# Patient Record
Sex: FEMALE | Race: WHITE | NOT HISPANIC OR LATINO | Employment: OTHER | URBAN - METROPOLITAN AREA
[De-identification: names, ages, dates, MRNs, and addresses within clinical notes are randomized per-mention and may not be internally consistent; named-entity substitution may affect disease eponyms.]

---

## 2017-01-03 ENCOUNTER — PATIENT MESSAGE (OUTPATIENT)
Dept: FAMILY MEDICINE | Facility: CLINIC | Age: 42
End: 2017-01-03

## 2017-01-04 ENCOUNTER — OFFICE VISIT (OUTPATIENT)
Dept: FAMILY MEDICINE | Facility: CLINIC | Age: 42
End: 2017-01-04
Payer: MEDICAID

## 2017-01-04 VITALS
OXYGEN SATURATION: 96 % | HEIGHT: 61 IN | BODY MASS INDEX: 31.55 KG/M2 | TEMPERATURE: 98 F | HEART RATE: 110 BPM | DIASTOLIC BLOOD PRESSURE: 100 MMHG | WEIGHT: 167.13 LBS | SYSTOLIC BLOOD PRESSURE: 170 MMHG | RESPIRATION RATE: 14 BRPM

## 2017-01-04 DIAGNOSIS — H92.01 OTALGIA OF RIGHT EAR: ICD-10-CM

## 2017-01-04 DIAGNOSIS — I10 ESSENTIAL HYPERTENSION: Primary | ICD-10-CM

## 2017-01-04 DIAGNOSIS — M62.838 MUSCLE SPASMS OF NECK: ICD-10-CM

## 2017-01-04 PROCEDURE — 99215 OFFICE O/P EST HI 40 MIN: CPT | Mod: PBBFAC,PO | Performed by: NURSE PRACTITIONER

## 2017-01-04 PROCEDURE — 99999 PR PBB SHADOW E&M-EST. PATIENT-LVL V: CPT | Mod: PBBFAC,,, | Performed by: NURSE PRACTITIONER

## 2017-01-04 PROCEDURE — 99213 OFFICE O/P EST LOW 20 MIN: CPT | Mod: S$PBB,,, | Performed by: NURSE PRACTITIONER

## 2017-01-04 RX ORDER — LISINOPRIL 5 MG/1
5 TABLET ORAL DAILY
Qty: 90 TABLET | Refills: 0 | Status: SHIPPED | OUTPATIENT
Start: 2017-01-04 | End: 2017-04-04 | Stop reason: SDUPTHER

## 2017-01-04 NOTE — PROGRESS NOTES
Subjective:       Patient ID: Lavonne Pierre is a 41 y.o. female.    Chief Complaint: Neck Pain (causing ear to hurt)  She was last seen by Dr. Mcgee on 12/14/2016. This is her first time seeing me in the clinic  HPI   Neck pain to right posterior that is making ear hurt. Using Similasan and no relief.  Vitals:    01/04/17 1458   BP: (!) 170/100   Pulse:    Resp:    Temp:      BP Readings from Last 3 Encounters:   01/04/17 (!) 170/100   12/14/16 (!) 142/80   12/12/16 (!) 120/90   States her blood pressure medication was stopped over one year ago when hospitalized and has not been back on it since.    Review of Systems   HENT: Positive for ear pain.    Respiratory: Negative for shortness of breath.    Cardiovascular: Negative for chest pain.   Musculoskeletal: Positive for neck pain.   Neurological: Negative for dizziness and headaches.   All other systems reviewed and are negative.    She states last took tizanidine a few days ago  Objective:      Physical Exam   Constitutional: She is oriented to person, place, and time. She appears well-developed.   HENT:   Head: Normocephalic and atraumatic.   Right Ear: Hearing, tympanic membrane, external ear and ear canal normal. No drainage or swelling.   Left Ear: Hearing, tympanic membrane, external ear and ear canal normal.   Mouth/Throat: Uvula is midline, oropharynx is clear and moist and mucous membranes are normal.   Eyes: Lids are normal.   Neck: Normal range of motion. Neck supple.       Cardiovascular: Normal rate and regular rhythm.    Pulmonary/Chest: Effort normal and breath sounds normal.   Abdominal: Soft.   Lymphadenopathy:        Head (right side): No submental, no submandibular, no tonsillar, no preauricular, no posterior auricular and no occipital adenopathy present.        Head (left side): No submental, no submandibular, no tonsillar, no preauricular, no posterior auricular and no occipital adenopathy present.     She has no cervical adenopathy.    Neurological: She is alert and oriented to person, place, and time.   Skin: Skin is warm, dry and intact.   Psychiatric: She has a normal mood and affect. Her speech is normal and behavior is normal. Judgment and thought content normal. Cognition and memory are normal.   Nursing note and vitals reviewed.      Assessment & Plan:       Essential hypertension  -     lisinopril (PRINIVIL,ZESTRIL) 5 MG tablet; Take 1 tablet (5 mg total) by mouth once daily.  Dispense: 90 tablet; Refill: 0    Muscle spasms of neck    Otalgia of right ear    Please start taking tizanidine that she has at home  Continue OTC ear discomfort drops    Take blood pressure every day and record and bring into next visit with Dr. Mcgee on 01/17/2017      Return if symptoms worsen or fail to improve. Keep appt with Dr. Mcgee on 01/17/2017 and with rheumatology on 01/30/2017  To ED for severe headache, if neck pain worsens or if she has any visual disturbances

## 2017-01-17 ENCOUNTER — LAB VISIT (OUTPATIENT)
Dept: LAB | Facility: HOSPITAL | Age: 42
End: 2017-01-17
Attending: FAMILY MEDICINE
Payer: MEDICAID

## 2017-01-17 ENCOUNTER — OFFICE VISIT (OUTPATIENT)
Dept: FAMILY MEDICINE | Facility: CLINIC | Age: 42
End: 2017-01-17
Payer: MEDICAID

## 2017-01-17 VITALS
HEART RATE: 92 BPM | SYSTOLIC BLOOD PRESSURE: 110 MMHG | BODY MASS INDEX: 31.26 KG/M2 | WEIGHT: 165.56 LBS | HEIGHT: 61 IN | DIASTOLIC BLOOD PRESSURE: 88 MMHG

## 2017-01-17 DIAGNOSIS — F32.A DEPRESSION, UNSPECIFIED DEPRESSION TYPE: Primary | ICD-10-CM

## 2017-01-17 DIAGNOSIS — M77.11 LATERAL EPICONDYLITIS OF RIGHT ELBOW: ICD-10-CM

## 2017-01-17 DIAGNOSIS — M79.7 FIBROMYALGIA: ICD-10-CM

## 2017-01-17 PROCEDURE — 99213 OFFICE O/P EST LOW 20 MIN: CPT | Mod: S$PBB,,, | Performed by: FAMILY MEDICINE

## 2017-01-17 PROCEDURE — 36415 COLL VENOUS BLD VENIPUNCTURE: CPT | Mod: PO

## 2017-01-17 PROCEDURE — 99999 PR PBB SHADOW E&M-EST. PATIENT-LVL III: CPT | Mod: PBBFAC,,, | Performed by: FAMILY MEDICINE

## 2017-01-17 PROCEDURE — 86039 ANTINUCLEAR ANTIBODIES (ANA): CPT

## 2017-01-17 PROCEDURE — 86235 NUCLEAR ANTIGEN ANTIBODY: CPT | Mod: 59

## 2017-01-17 PROCEDURE — 86038 ANTINUCLEAR ANTIBODIES: CPT

## 2017-01-17 NOTE — MR AVS SNAPSHOT
[facility]  [address] [facility] [address]  [place] [address]  Phone: [phone]  Fax: [phone]                  Lavonne Tonyaicha   2017 3:00 PM   Office Visit    Description:  Female : 1975   Provider:  Omer Mcgee MD   Department:  [facility]           Reason for Visit     Follow-up           Diagnoses this Visit        Comments    Depression, unspecified depression type    -  Primary     Fibromyalgia         Lateral epicondylitis of right elbow                To Do List           Future Appointments        Provider Department Dept Phone    2017 3:00 PM Trevor Estrada MD [facility] Physical Med/Rehab [phone]    2017 8:30 AM Sheri Ross DO [facility] Rheumatology [phone]    3/15/2017 3:00 PM Omer Mcgee MD [facility] [phone]      Goals (5 Years of Data)     None      Follow-Up and Disposition     Return in about 8 weeks (around 3/14/2017).      [facility]     [facility] -  Assistance  Registered nurses in the [facility] provide clinical advisement, health education, appointment booking, and other advisory services.  Call for this free service at [phone].             Medications           Message regarding Medications     Verify the changes and/or additions to your medication regime listed below are the same as discussed with your clinician today.  If any of these changes or additions are incorrect, please notify your healthcare provider.        STOP taking these medications     azelastine-fluticasone (DYMISTA) 137-50 mcg/spray Spry nassal spray 1 spray by Each Nare route 2 (two) times daily.    buPROPion (WELLBUTRIN XL) 150 MG TB24 tablet Take 1 tablet (150 mg total) by mouth once daily.    fluticasone (FLONASE) 50 mcg/actuation nasal spray     medroxyPROGESTERone (PROVERA) 10 MG tablet Take 1 tablet (10 mg total) by mouth once daily. For 10  "days    pregabalin (LYRICA) 75 MG capsule Take 1 capsule (75 mg total) by mouth 2 (two) times daily.    topiramate (TOPAMAX) 100 MG tablet TK 1 T PO Q NIGHT AROUND BEDTIME UTD           Verify that the below list of medications is an accurate representation of the medications you are currently taking.  If none reported, the list may be blank. If incorrect, please contact your healthcare provider. Carry this list with you in case of emergency.           Current Medications     acetaminophen (TYLENOL) 500 MG tablet Take 500 mg by mouth every 6 (six) hours as needed for Pain.    amitriptyline (ELAVIL) 25 MG tablet Take 1 tablet (25 mg total) by mouth nightly as needed for Insomnia.    ANORO ELLIPTA 62.5-25 mcg/actuation DsDv INL 1 PUFF PO QD UTD    lisinopril (PRINIVIL,ZESTRIL) 5 MG tablet Take 1 tablet (5 mg total) by mouth once daily.    milnacipran (SAVELLA) 12.5 mg Tab tablet Take 1 tablet (12.5 mg total) by mouth 2 (two) times daily.    multivitamin (THERAGRAN) per tablet Take 1 tablet by mouth once daily.    pantoprazole (PROTONIX) 40 MG tablet TK 1 T PO D 30 MIN B NIRAV    polyethylene glycol (GLYCOLAX) 17 gram/dose powder Take 17 g by mouth daily as needed.     rizatriptan (MAXALT) 10 MG tablet 1 pill at onset migraine, second 2 hours later if needed, no more than 2 pills day, 3 days use in week    tizanidine (ZANAFLEX) 2 MG tablet TK 1 T PO BID.    acetaminophen-codeine 300-15mg (TYLENOL #2) 300-15 mg per tablet Take 1 tablet by mouth 2 (two) times daily as needed for Pain.           Clinical Reference Information           Vital Signs - Last Recorded  Most recent update: 1/17/2017  3:31 PM by Gabbi Sims LPN    BP Pulse Ht Wt BMI    110/88 (BP Location: Left arm, Patient Position: Sitting, BP Method: Manual) 92 5' 1" (1.549 m) 75.1 kg (165 lb 9.1 oz) 31.28 kg/m2      Blood Pressure          Most Recent Value    BP  110/88      Allergies as of 1/17/2017     Ibuprofen    Latex, Natural Rubber    "   Immunizations Administered on Date of Encounter - 1/17/2017     None      Orders Placed During Today's Visit      Normal Orders This Visit    Ambulatory consult to Physical Medicine Rehab     Future Labs/Procedures Expected by Expires    MIRIAN  1/17/2017 4/17/2017

## 2017-01-17 NOTE — PROGRESS NOTES
Subjective:       Patient ID: Lavonne Pierre is a 41 y.o. female.    Chief Complaint: Follow-up    HPI   Here today to discuss depression  Took Wellbutrin for 3 weeks, then stopped it due to worried about weight gain.    Review of Systems    Objective:      Physical Exam   Constitutional: She is oriented to person, place, and time. She appears well-developed and well-nourished.   Neurological: She is alert and oriented to person, place, and time.   Vitals reviewed.      Assessment:       1. Depression, unspecified depression type        Plan:       Depression, unspecified depression type  - Resume Wellbutrin, reassured about weight issues  - Return in about 8 weeks (around 3/14/2017).     Still with pain in elbow, will set up with PMR.

## 2017-01-18 LAB
ANA SER QL IF: POSITIVE
ANA TITR SER IF: NORMAL {TITER}

## 2017-01-19 LAB
ANTI SM ANTIBODY: 0.91 EU
ANTI SM/RNP ANTIBODY: 1.52 EU
ANTI-SM INTERPRETATION: NEGATIVE
ANTI-SM/RNP INTERPRETATION: NEGATIVE
ANTI-SSA ANTIBODY: 0.81 EU
ANTI-SSA INTERPRETATION: NEGATIVE
ANTI-SSB ANTIBODY: 0.66 EU
ANTI-SSB INTERPRETATION: NEGATIVE
DSDNA AB SER-ACNC: NORMAL [IU]/ML

## 2017-01-24 ENCOUNTER — INITIAL CONSULT (OUTPATIENT)
Dept: PHYSICAL MEDICINE AND REHAB | Facility: CLINIC | Age: 42
End: 2017-01-24
Payer: MEDICAID

## 2017-01-24 ENCOUNTER — HOSPITAL ENCOUNTER (OUTPATIENT)
Dept: RADIOLOGY | Facility: HOSPITAL | Age: 42
Discharge: HOME | End: 2017-01-24
Attending: PHYSICAL MEDICINE & REHABILITATION
Payer: MEDICAID

## 2017-01-24 VITALS
WEIGHT: 165 LBS | BODY MASS INDEX: 31.15 KG/M2 | DIASTOLIC BLOOD PRESSURE: 76 MMHG | HEART RATE: 95 BPM | SYSTOLIC BLOOD PRESSURE: 112 MMHG | HEIGHT: 61 IN

## 2017-01-24 DIAGNOSIS — M25.521 RIGHT ELBOW PAIN: ICD-10-CM

## 2017-01-24 DIAGNOSIS — M25.521 RIGHT ELBOW PAIN: Primary | ICD-10-CM

## 2017-01-24 DIAGNOSIS — M77.11 RIGHT LATERAL EPICONDYLITIS: ICD-10-CM

## 2017-01-24 PROCEDURE — 99999 PR PBB SHADOW E&M-EST. PATIENT-LVL II: CPT | Mod: PBBFAC,,, | Performed by: PHYSICAL MEDICINE & REHABILITATION

## 2017-01-24 PROCEDURE — 99203 OFFICE O/P NEW LOW 30 MIN: CPT | Mod: 25,S$PBB,, | Performed by: PHYSICAL MEDICINE & REHABILITATION

## 2017-01-24 PROCEDURE — 76942 ECHO GUIDE FOR BIOPSY: CPT | Mod: 26,S$PBB,, | Performed by: PHYSICAL MEDICINE & REHABILITATION

## 2017-01-24 PROCEDURE — 20551 NJX 1 TENDON ORIGIN/INSJ: CPT | Mod: S$PBB,,, | Performed by: PHYSICAL MEDICINE & REHABILITATION

## 2017-01-24 PROCEDURE — 73080 X-RAY EXAM OF ELBOW: CPT | Mod: TC,PO,RT

## 2017-01-24 PROCEDURE — 73080 X-RAY EXAM OF ELBOW: CPT | Mod: 26,RT,, | Performed by: RADIOLOGY

## 2017-01-24 RX ORDER — BETAMETHASONE SODIUM PHOSPHATE AND BETAMETHASONE ACETATE 3; 3 MG/ML; MG/ML
6 INJECTION, SUSPENSION INTRA-ARTICULAR; INTRALESIONAL; INTRAMUSCULAR; SOFT TISSUE
Status: DISCONTINUED | OUTPATIENT
Start: 2017-01-24 | End: 2017-01-25 | Stop reason: HOSPADM

## 2017-01-24 RX ADMIN — BETAMETHASONE ACETATE AND BETAMETHASONE SODIUM PHOSPHATE 6 MG: 3; 3 INJECTION, SUSPENSION INTRA-ARTICULAR; INTRALESIONAL; INTRAMUSCULAR; SOFT TISSUE at 03:01

## 2017-01-24 NOTE — LETTER
January 24, 2017      Omer Mcgee MD  [address] [facility] [address] [place] [address]           [facility] Physical Med/Rehab  [address] [facility] [address] [place] [address]  Phone: [phone]  Fax: [phone]          Patient: Lavonne Pierre   MR Number: [id number]   YOB: 1975   Date of Visit: 1/24/2017       Dear Dr. Omer Mcgee:    Thank you for referring Lavonne Pierre to me for evaluation. Attached you will find relevant portions of my assessment and plan of care.    If you have questions, please do not hesitate to call me. I look forward to following Lavonne Pierre along with you.    Sincerely,    Trevor Estrada MD    Enclosure  CC:  No Recipients    If you would like to receive this communication electronically, please contact [email or web address] or [phone] to request more information on [employer] Link access.    For providers and/or their staff who would like to refer a patient to [facility], please contact us through our one-stop-shop provider referral line, [facility], at [phone].    If you feel you have received this communication in error or would no longer like to receive these types of communications, please e-mail [email or web address]

## 2017-01-24 NOTE — PROGRESS NOTES
[facility]    Consulting Provider: Omer Mcgee MD    CHIEF COMPLAINT:   Chief Complaint   Patient presents with    Elbow Pain     right elbow pain     HISTORY OF PRESENT ILLNESS: Lavonne Pierre is a 41 y.o. female who presents to me for the first time for evaluation of her right elbow pain.  She states that the pain in her right elbow began about 1 month ago.  She denies any traumatic injury to it, or repeatedly leaning/hitting it on hard surfaces.  She says the pain is diffusely throughout the elbow, both medial and lateral.  Occasionally will experience numbness/tingling along the lateral aspect of her forearm into the thumb, pointer and middle finger.  Also complains of occasional wrist pain bilaterally but feels this is a separate issue.  Admits to mild weakness in her right upper extremity secondary to the pain.  Denies bowel/bladder incontinence.  Denies weakness/numbness in BLE.  Has tried tylenol for the pain, which helped somewhat.  Has not had any prior injections, procedures, or PT for this issue.    Review of Systems   Constitutional: Negative for fever.   HENT: Negative for drooling.    Eyes: Negative for discharge.   Respiratory: Negative for choking.    Cardiovascular: Negative for chest pain.   Genitourinary: Negative for flank pain.   Skin: Negative for wound.   Allergic/Immunologic: Negative for immunocompromised state.   Neurological: Negative for tremors and syncope.   Psychiatric/Behavioral: Negative for behavioral problems.     Past Medical History:   Past Medical History   Diagnosis Date    Anxiety 2013    Fibromyalgia     HTN (hypertension)     Preeclampsia        Past Surgical History:   Past Surgical History   Procedure Laterality Date     section, low transverse      Breast biopsy       Benign    Tubal ligation         Family History:   Family History   Problem Relation Age of Onset    Pancreatic cancer Mother     Lymphoma Father      Pancreatic cancer Maternal Grandmother     Pancreatic cancer Maternal Uncle     Breast cancer Maternal Aunt        Medications:   Current Outpatient Prescriptions on File Prior to Visit   Medication Sig Dispense Refill    acetaminophen (TYLENOL) 500 MG tablet Take 500 mg by mouth every 6 (six) hours as needed for Pain.      acetaminophen-codeine 300-15mg (TYLENOL #2) 300-15 mg per tablet Take 1 tablet by mouth 2 (two) times daily as needed for Pain.      amitriptyline (ELAVIL) 25 MG tablet Take 1 tablet (25 mg total) by mouth nightly as needed for Insomnia. 30 tablet 3    ANORO ELLIPTA 62.5-25 mcg/actuation DsDv INL 1 PUFF PO QD UTD  6    lisinopril (PRINIVIL,ZESTRIL) 5 MG tablet Take 1 tablet (5 mg total) by mouth once daily. 90 tablet 0    milnacipran (SAVELLA) 12.5 mg Tab tablet Take 1 tablet (12.5 mg total) by mouth 2 (two) times daily. 60 tablet 11    multivitamin (THERAGRAN) per tablet Take 1 tablet by mouth once daily.      pantoprazole (PROTONIX) 40 MG tablet TK 1 T PO D 30 MIN B NIRAV  11    polyethylene glycol (GLYCOLAX) 17 gram/dose powder Take 17 g by mouth daily as needed.       rizatriptan (MAXALT) 10 MG tablet 1 pill at onset migraine, second 2 hours later if needed, no more than 2 pills day, 3 days use in week 9 tablet 2    tizanidine (ZANAFLEX) 2 MG tablet TK 1 T PO BID.  5     No current facility-administered medications on file prior to visit.        Allergies:   Review of patient's allergies indicates:   Allergen Reactions    Ibuprofen Other (See Comments)     Affects stomach ulcer, avoids all NSAIDS    Latex, natural rubber Rash     Small red bumps on skin contact       Social History:   Social History     Social History    Marital status:      Spouse name: N/A    Number of children: N/A    Years of education: N/A     Social History Main Topics    Smoking status: Never Smoker    Smokeless tobacco: Never Used    Alcohol use No    Drug use: No    Sexual activity:  "Not Asked     Other Topics Concern    None     Social History Narrative     PHYSICAL EXAMINATION:   General    Vitals:    01/24/17 1513   BP: 112/76   Pulse: 95   Weight: 74.8 kg (165 lb)   Height: 5' 1" (1.549 m)     Constitutional: Oriented to person, place, and time. No apparent distress. Appears well-developed and well-nourished. Pleasant.  HENT:   Head: Normocephalic and atraumatic.   Eyes: Right eye exhibits no discharge. Left eye exhibits no discharge. No scleral icterus.   Pulmonary/Chest: Effort normal. No respiratory distress.   Abdominal: There is no guarding.   Neurological: Alert and oriented to person, place, and time.   Psychiatric: Behavior is normal.   Right Hand Exam     Muscle Strength   Wrist Extension: 5/5   Wrist Flexion: 5/5   : 5/5     Tests   Phalens Sign: negative  Tinels Sign (Medial Nerve): negative    Other   Sensation: normal  Pulse: present      Left Hand Exam     Muscle Strength   Wrist Extension: 5/5   Wrist Flexion: 5/5   :  5/5     Tests   Phalens Sign: negative  Tinels Sign (Medial Nerve): negative    Other   Sensation: normal  Pulse: present      Right Elbow Exam     Tenderness   The patient is experiencing tenderness in the lateral epicondyle and medial epicondyle.     Range of Motion   Extension: 0   Flexion: 130     Muscle Strength   Pronation:  5/5   Supination:  5/5     Tests Varus: negative  Valgus: negative  Tinel's Sign (cubital tunnel): negative    Other   Erythema: absent  Scars: absent  Sensation: normal  Pulse: present    Comments:  Cozen's test positive      Left Elbow Exam     Tenderness   The patient is experiencing no tenderness.         Range of Motion   Extension: 0   Flexion: 110     Muscle Strength   Pronation:  5/5   Supination:  5/5     Tests Varus: negative  Valgus: negative  Tinel's Sign (cubital tunnel): negative    Other   Erythema: absent  Sensation: normal  Pulse: present        Spurlings negative bilaterally    INSPECTION: There is no " swelling, ecchymoses, erythema or gross deformity about the right elbow.    Imaging  Procedure: Exam Date: Reason for Exam:   X-Ray Elbow Complete Right 01/24/2017 None Specified          RESULTS:  3 views of the right elbow fail to demonstrate fail to demonstrate evidence of fracture or subluxation.  There are no soft tissue abnormalities.        Data Reviewed: X-ray    Supportive Actions: Independent visualization of images or test specimens    ASSESSMENT:   1. Right lateral epicondylitis      PLAN:     1. Time was spent reviewing the above diagnosis in depth with Lavonne today, including acute management and rehabilitation.     2.  Patient's history, physical exam, and ultrasound findings were most consistent with a diagnosis of right lateral epicondylitis.  Brief diagnostic ultrasound exam revealed some mild heterogeneity about the proximal portion of the common extensor tendon.  There was no observed hypoechoic defect and color Doppler failed to reveal significant neovascularity within the tendon substance. Though patient's clinical presentation is complicated with a history of fibromyalgia, she does have physical exam findings that are more specific for lateral epicondylitis.  We discussed treatment options with the patient, including steroid injections, PRP, needling, and physical therapy.  She expressed understanding of her options and elected to proceed with the steroid injection of the right lateral epicondyle.  The risks, benefits, and alternative treatment options were reviewed and the injection was done in clinic today.  See procedure note.    3. Patient was instructed to follow the progress of her elbow pain over the next few weeks.  If pain is not improved with this injection, may consider further workup for cause of her elbow pain.    4.  I recommended formal physical therapy for her right elbow for therapeutic exercise, stretching, therapeutic modalities, and be transitioned to a home exercise  "program for long-term maintenance.  She is in agreement with this plan and she was provided with a prescription to start physical therapy.    5. RTC in 4-6 weeks for a follow up visit.    This is a consult from Dr. Omer Mcgee. Please see the "Communications" section of Epic to see how the consulting physician received the report of today's findings and recommendations. If it's an [facility] physician, it will be forwarded to his/her "in basket".    The above note was completed, in part, with the aid of Dragon dictation software/hardware. Translation errors may be present.    "

## 2017-01-25 NOTE — PROCEDURES
Tendon Origin  Date/Time: 1/24/2017 3:01 PM  Performed by: LIZBETH BLANCA  Authorized by: LIZBETH BLANCA     Consent Done?:  Yes (Verbal)  Timeout: prior to procedure the correct patient, procedure, and site was verified    Indications:  Pain  Site marked: the procedure site was marked    Timeout: prior to procedure the correct patient, procedure, and site was verified    Location:  Elbow  Site:  R elbow  Prep: patient was prepped and draped in usual sterile fashion    Ultrasonic Guidance for Needle Placement?: Yes    Needle size:  25 G  Approach: Needle in plane, distal to proximal.  Medications:  6 mg betamethasone acetate-betamethasone sodium phosphate 6 mg/mL  Patient tolerance:  Patient tolerated the procedure well with no immediate complications   Ultrasound guidance was used for correct needle placement, the images were saved will be uploaded to EMR.

## 2017-01-30 ENCOUNTER — LAB VISIT (OUTPATIENT)
Dept: LAB | Facility: HOSPITAL | Age: 42
End: 2017-01-30
Attending: INTERNAL MEDICINE
Payer: MEDICAID

## 2017-01-30 ENCOUNTER — OFFICE VISIT (OUTPATIENT)
Dept: RHEUMATOLOGY | Facility: CLINIC | Age: 42
End: 2017-01-30
Payer: MEDICAID

## 2017-01-30 VITALS
HEART RATE: 72 BPM | DIASTOLIC BLOOD PRESSURE: 78 MMHG | SYSTOLIC BLOOD PRESSURE: 120 MMHG | HEIGHT: 60 IN | BODY MASS INDEX: 32.93 KG/M2 | WEIGHT: 167.75 LBS

## 2017-01-30 DIAGNOSIS — F32.89 OTHER DEPRESSION: ICD-10-CM

## 2017-01-30 DIAGNOSIS — M79.7 FIBROMYALGIA: ICD-10-CM

## 2017-01-30 DIAGNOSIS — R76.8 ANA POSITIVE: Primary | ICD-10-CM

## 2017-01-30 DIAGNOSIS — R76.8 ANA POSITIVE: ICD-10-CM

## 2017-01-30 LAB
CRP SERPL-MCNC: 8.4 MG/L
ERYTHROCYTE [SEDIMENTATION RATE] IN BLOOD BY WESTERGREN METHOD: 10 MM/HR
THYROGLOB AB SERPL IA-ACNC: 13.2 IU/ML
THYROPEROXIDASE IGG SERPL-ACNC: <6 IU/ML

## 2017-01-30 PROCEDURE — 36415 COLL VENOUS BLD VENIPUNCTURE: CPT | Mod: PO

## 2017-01-30 PROCEDURE — 86140 C-REACTIVE PROTEIN: CPT

## 2017-01-30 PROCEDURE — 86376 MICROSOMAL ANTIBODY EACH: CPT

## 2017-01-30 PROCEDURE — 99214 OFFICE O/P EST MOD 30 MIN: CPT | Mod: S$PBB,,, | Performed by: INTERNAL MEDICINE

## 2017-01-30 PROCEDURE — 99999 PR PBB SHADOW E&M-EST. PATIENT-LVL II: CPT | Mod: PBBFAC,,, | Performed by: INTERNAL MEDICINE

## 2017-01-30 PROCEDURE — 85651 RBC SED RATE NONAUTOMATED: CPT | Mod: PO

## 2017-01-30 PROCEDURE — 86800 THYROGLOBULIN ANTIBODY: CPT

## 2017-01-30 RX ORDER — BUPROPION HYDROCHLORIDE 150 MG/1
150 TABLET ORAL DAILY
COMMUNITY
End: 2017-03-15 | Stop reason: SDUPTHER

## 2017-01-30 RX ORDER — TIZANIDINE 2 MG/1
4 TABLET ORAL 2 TIMES DAILY PRN
Qty: 60 TABLET | Refills: 5 | Status: SHIPPED | OUTPATIENT
Start: 2017-01-30 | End: 2017-03-02 | Stop reason: ALTCHOICE

## 2017-01-30 ASSESSMENT — ROUTINE ASSESSMENT OF PATIENT INDEX DATA (RAPID3)
PSYCHOLOGICAL DISTRESS SCORE: 5.5
PAIN SCORE: 7
FATIGUE SCORE: 8
WHEN YOU AWAKENED IN THE MORNING OVER THE LAST WEEK, PLEASE INDICATE THE AMOUNT OF TIME IT TAKES UNTIL YOU ARE AS LIMBER AS YOU WILL BE FOR THE DAY: AN HOUR
TOTAL RAPID3 SCORE: 5.39
AM STIFFNESS SCORE: 1, YES
MDHAQ FUNCTION SCORE: .5
PATIENT GLOBAL ASSESSMENT SCORE: 7.5

## 2017-01-30 NOTE — PROGRESS NOTES
Subjective:          Chief Complaint: Lavonne Pierre is a 41 y.o. female who had no chief complaint listed for this encounter.    HPI:    Patient here today for follow-up with a history of fibromyalgia positive MIRIAN 1:160 homogeneous/speckled negative MIRIAN profile she's had various myalgias as well as joint pain in the ankles, feet, wrists, shoulders and neck and legs.  She also has an underlying history of depression she's had no constitutional symptoms, rashes, oral ulcers, serositis, Raynaud's.  Initially she was diagnosed with repeat legionnaires pneumonia last year approximately same time of positive MIRIAN recently Dr. Mcgee her primary care physician ordered a repeat MIRIAN showed a labs of 1:320 with a negative MIRIAN profile.     Patient in the interim was started on Dionna a 75mg BID for few months with signficant weight gain. More recently started on Savella 12.5mg (approved by her insurance).  She is noting pain in her feet (diffusely), knees at the superior pole of the patella. And bilateral elbows. She is having stiffness in AM of about 1 hour. She is c/o lumps in her skin tender, no redness but she does try to massage them. She does have some redness in groin, behind knees and face and chin. Non-photyosenstive. She is noting dry eye and dry mouth. Thick secretions in eye every day. Noting some trouble with foods sticking in upper esophagus.     Component      Latest Ref Rng & Units 1/17/2017 1/15/2016   Anti Sm Antibody      0.00 - 19.99 EU  0.97   Anti-Sm Interpretation      Negative  Negative   Anti-SSA Antibody      0.00 - 19.99 EU  1.09   Anti-SSA Interpretation      Negative  Negative   Anti-SSB Antibody      0.00 - 19.99 EU  0.30   Anti-SSB Interpretation      Negative  Negative   ds DNA Ab      Negative 1:10  Negative 1:10   Anti Sm/RNP Antibody      0.00 - 19.99 EU  0.11   Anti-Sm/RNP Interpretation      Negative  Negative   Sed Rate      0 - 20 mm/Hr  9   CRP      0.0 - 8.2 mg/L  5.7   MIRIAN Screen       Negative <1:160 Positive (A) Positive (A)   Rheumatoid Factor      0.0 - 15.0 IU/mL  <10.0   MIRIAN HEP-2 Titer       Positive 1:320 Homogeneous Positive 1:160 Homogeneous       REVIEW OF SYSTEMS:    Review of Systems   Constitutional: Positive for malaise/fatigue and weight loss. Negative for chills and fever.   HENT: Negative for sore throat.    Eyes: Negative for double vision, photophobia, discharge and redness.   Respiratory: Negative for cough, shortness of breath and wheezing.    Cardiovascular: Negative for chest pain, palpitations and orthopnea.   Gastrointestinal: Positive for heartburn. Negative for abdominal pain, constipation and diarrhea.   Genitourinary: Negative for dysuria, hematuria and urgency.   Musculoskeletal: Positive for joint pain, myalgias and neck pain. Negative for back pain.   Skin: Negative for rash.   Neurological: Positive for headaches. Negative for dizziness, tingling, focal weakness and weakness.   Endo/Heme/Allergies: Does not bruise/bleed easily.   Psychiatric/Behavioral: Negative for depression, hallucinations and suicidal ideas. The patient is nervous/anxious.                Objective:            Past Medical History   Diagnosis Date    Anxiety 7/16/2013    Fibromyalgia     HTN (hypertension)     Preeclampsia      Family History   Problem Relation Age of Onset    Pancreatic cancer Mother     Lymphoma Father     Pancreatic cancer Maternal Grandmother     Pancreatic cancer Maternal Uncle     Breast cancer Maternal Aunt      Social History   Substance Use Topics    Smoking status: Never Smoker    Smokeless tobacco: Never Used    Alcohol use No         Current Outpatient Prescriptions on File Prior to Visit   Medication Sig Dispense Refill    acetaminophen (TYLENOL) 500 MG tablet Take 500 mg by mouth every 6 (six) hours as needed for Pain.      acetaminophen-codeine 300-15mg (TYLENOL #2) 300-15 mg per tablet Take 1 tablet by mouth 2 (two) times daily as needed for  Pain.      amitriptyline (ELAVIL) 25 MG tablet Take 1 tablet (25 mg total) by mouth nightly as needed for Insomnia. 30 tablet 3    ANORO ELLIPTA 62.5-25 mcg/actuation DsDv INL 1 PUFF PO QD UTD  6    lisinopril (PRINIVIL,ZESTRIL) 5 MG tablet Take 1 tablet (5 mg total) by mouth once daily. 90 tablet 0    milnacipran (SAVELLA) 12.5 mg Tab tablet Take 1 tablet (12.5 mg total) by mouth 2 (two) times daily. 60 tablet 11    multivitamin (THERAGRAN) per tablet Take 1 tablet by mouth once daily.      pantoprazole (PROTONIX) 40 MG tablet TK 1 T PO D 30 MIN B NIRAV  11    polyethylene glycol (GLYCOLAX) 17 gram/dose powder Take 17 g by mouth daily as needed.       rizatriptan (MAXALT) 10 MG tablet 1 pill at onset migraine, second 2 hours later if needed, no more than 2 pills day, 3 days use in week 9 tablet 2    tizanidine (ZANAFLEX) 2 MG tablet TK 1 T PO BID.  5     No current facility-administered medications on file prior to visit.        Vitals:    01/30/17 0808   BP: 120/78   Pulse: 72       Physical Exam:    Physical Exam   Constitutional: She appears well-developed and well-nourished.   HENT:   Nose: No septal deviation.   Mouth/Throat: Mucous membranes are normal. No oral lesions.   Eyes: Pupils are equal, round, and reactive to light. Right conjunctiva is not injected. Left conjunctiva is not injected.   Neck: No JVD present. No thyroid mass and no thyromegaly present.   Cardiovascular: Normal rate, regular rhythm and normal pulses.    No edema   Pulmonary/Chest: Effort normal and breath sounds normal.   Abdominal: Soft. Normal appearance. There is no hepatosplenomegaly.   Musculoskeletal:        Right shoulder: She exhibits tenderness. She exhibits normal range of motion and no swelling.        Left shoulder: She exhibits tenderness. She exhibits normal range of motion and no swelling.        Right elbow: She exhibits normal range of motion and no swelling. Tenderness found. Medial epicondyle tenderness noted.         Left elbow: She exhibits normal range of motion and no swelling. Tenderness found. Medial epicondyle tenderness noted.        Right wrist: She exhibits normal range of motion, no tenderness and no swelling.        Left wrist: She exhibits normal range of motion, no tenderness and no swelling.        Right hip: She exhibits normal range of motion, normal strength and no swelling.        Left hip: She exhibits normal range of motion, no tenderness and no swelling.        Right knee: She exhibits normal range of motion and no swelling. Tenderness found. Medial joint line tenderness noted.        Left knee: She exhibits normal range of motion and no swelling. Tenderness found. Medial joint line tenderness noted.        Right ankle: She exhibits normal range of motion and no swelling. No tenderness.        Left ankle: She exhibits normal range of motion and no swelling. No tenderness.        Cervical back: She exhibits bony tenderness.   14/18 FMS tenderpoints w/o No synovitis, restriction in ROM, tenderness of wrist, MCP, PIP, DIP. No weakness in . Able to fully curl fingers.      Lymphadenopathy:     She has no cervical adenopathy.     She has no axillary adenopathy.   Neurological: She has normal strength and normal reflexes.   Skin: Skin is dry and intact.   Psychiatric: Her mood appears anxious.             Assessment:       Encounter Diagnoses   Name Primary?    MIRIAN positive Yes    Fibromyalgia     Other depression           Plan:        MIRIAN positive  -     Sedimentation rate, manual; Future; Expected date: 1/30/17  -     C-reactive protein; Future; Expected date: 1/30/17  -     THYROGLOBULIN AB SCREEN; Future; Expected date: 1/30/17  -     THYROID PEROXIDASE ANTIBODY; Future; Expected date: 1/30/17    Fibromyalgia  -     tizanidine (ZANAFLEX) 2 MG tablet; Take 2 tablets (4 mg total) by mouth 2 (two) times daily as needed.  Dispense: 60 tablet; Refill: 5  -     milnacipran (SAVELLA) 12.5 mg Tab  tablet; Take 2 tablets (25 mg total) by mouth 2 (two) times daily.  Dispense: 60 tablet; Refill: 11    Other depression    Very pleasant 41-year-old f will rule out for underlying Hashimoto..  That being said, this ould be Sjogren's;she does have some dryI and dry mouth.    Will adjust her Savella 25mg BID  INcrease Tizanidine  Check ESR, CRP, TPO and Tg Ab.     Return in about 3 months (around 4/30/2017).        30min consultation with greater than 50% spent in counseling, chart review and coordination of care. All questions answered.

## 2017-01-30 NOTE — MR AVS SNAPSHOT
[facility]  [address] [facility] [address]  [facility] [address]  Phone: [phone]  Fax: [phone]                  Lavonne Emery Ignacio   2017 8:30 AM   Office Visit    Description:  Female : 1975   Provider:  Sheri Ross DO   Department:  [facility] - [facility]ology           Diagnoses this Visit        Comments    MIRIAN positive    -  Primary     Fibromyalgia         Other depression                To Do List           Future Appointments        Provider Department Dept Phone    2017 10:45 AM LAB, [place] [facility] [phone]    3/15/2017 3:00 PM Omer Mcgee MD [facility] [phone]    2017 8:30 AM Sheri Ross DO [facility] [phone]      Goals (5 Years of Data)     None      Follow-Up and Disposition     Return in about 3 months (around 2017).    Follow-up and Disposition History       These Medications        Disp Refills Start End    tizanidine (ZANAFLEX) 2 MG tablet 60 tablet 5 2017    Take 2 tablets (4 mg total) by mouth 2 (two) times daily as needed. - Oral    Pharmacy: [facility] [address] [place] [address] AT [facility] OF [facility] [address] Ph #: [phone]       milnacipran (SAVELLA) 12.5 mg Tab tablet 60 tablet 11 2017    Take 2 tablets (25 mg total) by mouth 2 (two) times daily. - Oral    Pharmacy: [facility] [place] [id number] [place] [address] AT [facility] OF [address] Ph #: [phone]         [facility]     [facility] On [facility] Nurse Care Line -  Assistance  Registered nurses in the [facility] On Call Center provide clinical advisement, health education, appointment booking, and other advisory services.  Call for this free service at [phone].             Medications           Message regarding Medications     Verify the changes and/or additions to your medication regime listed below are the same  as discussed with your clinician today.  If any of these changes or additions are incorrect, please notify your healthcare provider.        CHANGE how you are taking these medications     Start Taking Instead of    tizanidine (ZANAFLEX) 2 MG tablet tizanidine (ZANAFLEX) 2 MG tablet    Dosage:  Take 2 tablets (4 mg total) by mouth 2 (two) times daily as needed. Dosage:  TK 1 T PO BID.    Reason for Change:  Reorder     milnacipran (SAVELLA) 12.5 mg Tab tablet milnacipran (SAVELLA) 12.5 mg Tab tablet    Dosage:  Take 2 tablets (25 mg total) by mouth 2 (two) times daily. Dosage:  Take 1 tablet (12.5 mg total) by mouth 2 (two) times daily.    Reason for Change:  Reorder       STOP taking these medications     tizanidine 2 mg Cap Take 2 mg by mouth 2 (two) times daily.           Verify that the below list of medications is an accurate representation of the medications you are currently taking.  If none reported, the list may be blank. If incorrect, please contact your healthcare provider. Carry this list with you in case of emergency.           Current Medications     acetaminophen (TYLENOL) 500 MG tablet Take 500 mg by mouth every 6 (six) hours as needed for Pain.    acetaminophen-codeine 300-15mg (TYLENOL #2) 300-15 mg per tablet Take 1 tablet by mouth 2 (two) times daily as needed for Pain.    amitriptyline (ELAVIL) 25 MG tablet Take 1 tablet (25 mg total) by mouth nightly as needed for Insomnia.    ANORO ELLIPTA 62.5-25 mcg/actuation DsDv INL 1 PUFF PO QD UTD    buPROPion (WELLBUTRIN XL) 150 MG TB24 tablet Take 150 mg by mouth once daily.    lisinopril (PRINIVIL,ZESTRIL) 5 MG tablet Take 1 tablet (5 mg total) by mouth once daily.    milnacipran (SAVELLA) 12.5 mg Tab tablet Take 2 tablets (25 mg total) by mouth 2 (two) times daily.    multivitamin (THERAGRAN) per tablet Take 1 tablet by mouth once daily.    pantoprazole (PROTONIX) 40 MG tablet TK 1 T PO D 30 MIN B NIRAV    polyethylene glycol (GLYCOLAX) 17 gram/dose  powder Take 17 g by mouth daily as needed.     rizatriptan (MAXALT) 10 MG tablet 1 pill at onset migraine, second 2 hours later if needed, no more than 2 pills day, 3 days use in week    tizanidine (ZANAFLEX) 2 MG tablet Take 2 tablets (4 mg total) by mouth 2 (two) times daily as needed.           Clinical Reference Information           Vital Signs - Last Recorded  Most recent update: 1/30/2017  8:08 AM by Tarah Lawson LPN    BP Pulse Ht Wt BMI    120/78 72 5' (1.524 m) 76.1 kg (167 lb 12.3 oz) 32.77 kg/m2      Blood Pressure          Most Recent Value    BP  120/78      Allergies as of 1/30/2017     Ibuprofen    Latex, Natural Rubber      Immunizations Administered on Date of Encounter - 1/30/2017     None      Orders Placed During Today's Visit     Future Labs/Procedures Expected by Expires    C-reactive protein  1/30/2017 3/31/2018    Sedimentation rate, manual  1/30/2017 (Approximate) 1/30/2018    THYROGLOBULIN AB SCREEN  1/30/2017 3/31/2018    THYROID PEROXIDASE ANTIBODY  1/30/2017 3/31/2018

## 2017-02-01 ENCOUNTER — PATIENT MESSAGE (OUTPATIENT)
Dept: RHEUMATOLOGY | Facility: CLINIC | Age: 42
End: 2017-02-01

## 2017-02-02 ENCOUNTER — PATIENT MESSAGE (OUTPATIENT)
Dept: RHEUMATOLOGY | Facility: CLINIC | Age: 42
End: 2017-02-02

## 2017-02-03 ENCOUNTER — TELEPHONE (OUTPATIENT)
Dept: RHEUMATOLOGY | Facility: CLINIC | Age: 42
End: 2017-02-03

## 2017-02-03 NOTE — TELEPHONE ENCOUNTER
----- Message from Nessa Felipe sent at 2/2/2017  2:00 PM CST -----  Patient left previous message requesting test results. (stated results were posted on [facility] but has question)Please call patient back at [phone] to advise.  Thanks!   2-3-17 Spoke to patient and told her Dr. Ross will review and then we will call her back. Patient is concerned after seeing in portal. CG

## 2017-02-14 RX ORDER — RIZATRIPTAN BENZOATE 10 MG/1
TABLET ORAL
Qty: 9 TABLET | Refills: 0 | Status: SHIPPED | OUTPATIENT
Start: 2017-02-14 | End: 2017-03-02 | Stop reason: ALTCHOICE

## 2017-02-17 RX ORDER — TIZANIDINE 2 MG/1
TABLET ORAL
Qty: 60 TABLET | Refills: 11 | Status: SHIPPED | OUTPATIENT
Start: 2017-02-17 | End: 2017-03-02 | Stop reason: ALTCHOICE

## 2017-02-27 ENCOUNTER — TELEPHONE (OUTPATIENT)
Dept: RHEUMATOLOGY | Facility: CLINIC | Age: 42
End: 2017-02-27

## 2017-03-02 ENCOUNTER — HOSPITAL ENCOUNTER (OUTPATIENT)
Dept: RADIOLOGY | Facility: HOSPITAL | Age: 42
Discharge: HOME | End: 2017-03-02
Attending: INTERNAL MEDICINE
Payer: MEDICAID

## 2017-03-02 ENCOUNTER — TELEPHONE (OUTPATIENT)
Dept: FAMILY MEDICINE | Facility: CLINIC | Age: 42
End: 2017-03-02

## 2017-03-02 ENCOUNTER — OFFICE VISIT (OUTPATIENT)
Dept: FAMILY MEDICINE | Facility: CLINIC | Age: 42
End: 2017-03-02
Payer: MEDICAID

## 2017-03-02 VITALS
SYSTOLIC BLOOD PRESSURE: 118 MMHG | WEIGHT: 165.38 LBS | BODY MASS INDEX: 31.23 KG/M2 | DIASTOLIC BLOOD PRESSURE: 88 MMHG | TEMPERATURE: 100 F | OXYGEN SATURATION: 98 % | HEART RATE: 118 BPM | HEIGHT: 61 IN

## 2017-03-02 DIAGNOSIS — J20.9 ACUTE BRONCHITIS, UNSPECIFIED ORGANISM: ICD-10-CM

## 2017-03-02 DIAGNOSIS — R50.9 FEVER, UNSPECIFIED FEVER CAUSE: Primary | ICD-10-CM

## 2017-03-02 DIAGNOSIS — R05.9 COUGH IN ADULT: ICD-10-CM

## 2017-03-02 DIAGNOSIS — M25.50 ARTHRALGIA, UNSPECIFIED JOINT: ICD-10-CM

## 2017-03-02 DIAGNOSIS — R68.89 FLU-LIKE SYMPTOMS: ICD-10-CM

## 2017-03-02 LAB
FLUAV AG SPEC QL IA: NEGATIVE
FLUBV AG SPEC QL IA: NEGATIVE
SPECIMEN SOURCE: NORMAL

## 2017-03-02 PROCEDURE — 71020 XR CHEST PA AND LATERAL: CPT | Mod: TC,PO

## 2017-03-02 PROCEDURE — 99213 OFFICE O/P EST LOW 20 MIN: CPT | Mod: S$PBB,,, | Performed by: INTERNAL MEDICINE

## 2017-03-02 PROCEDURE — 99999 PR PBB SHADOW E&M-EST. PATIENT-LVL III: CPT | Mod: PBBFAC,,, | Performed by: INTERNAL MEDICINE

## 2017-03-02 PROCEDURE — 71020 XR CHEST PA AND LATERAL: CPT | Mod: 26,,, | Performed by: RADIOLOGY

## 2017-03-02 RX ORDER — HYDROCODONE POLISTIREX AND CHLORPHENIRAMINE POLISTIREX 10; 8 MG/5ML; MG/5ML
5 SUSPENSION, EXTENDED RELEASE ORAL EVERY 12 HOURS PRN
Qty: 115 ML | Refills: 0 | Status: SHIPPED | OUTPATIENT
Start: 2017-03-02 | End: 2017-05-04

## 2017-03-02 RX ORDER — AMOXICILLIN AND CLAVULANATE POTASSIUM 875; 125 MG/1; MG/1
1 TABLET, FILM COATED ORAL 2 TIMES DAILY
Qty: 16 TABLET | Refills: 0 | Status: SHIPPED | OUTPATIENT
Start: 2017-03-02 | End: 2017-03-12

## 2017-03-02 NOTE — PROGRESS NOTES
This 41-year-old white female presents with rather significant fever, chills,   sweats, cough productive of dark and material, weakness, fatigability associated   with chills, sweats and fatigability.  Slight nausea and headaches also noted,   but no nasal congestion or pressure over the sinuses.    Importantly, the patient started coughing yesterday morning after her EGD   procedure with Dr. Benedict, an outside gastroenterologist.  Apparently, she has   been having some upper gastrointestinal symptoms with possible dysphagia and   dyspepsia.  No definite results from the procedure noted at the present time,   but she began coughing shortly afterwards and when she returned home.  This   appears to be somewhat suggestive of aspiration possibly during and after the   procedure.    No nausea, vomiting subsequently or diarrhea or skin lesions or acute   arthropathy are noted.  Importantly, the patient has not had her flu shot for   this year, but no contagious contact by careful history and review.  No other   complaints in the review of systems except for a febrile sensation.  Temperature   today was 100 degrees Fahrenheit.  The pulses are over 100, 105-110, blood   pressure is 118/88, repeat in the left arm standing was approximately 128/85.    Slight discomfort noted over the lateral thoraces with her coughing.  No   definite pleurisy obtained by history.    PHYSICAL EXAMINATION:  GENERAL:  Reveals a well-developed, well-nourished, slightly overweight white   female.  See vital signs and measurements.  ARTERIES AND VEINS:  Good peripheral pulsation.  Normal carotid upstroke and   amplitude.  SKIN:  Warm and dry, slightly sweaty though in the neck and shoulder areas.  NODES:  No nodes felt in and around the neck, submandibular or supraclavicular   region.  NEUROLOGICAL:  The patient is weak and lethargic, but ambulatory and responsive   at the present time.  HEENT EXAMINATION:  The pharynx is slightly reddened and  edematous, but no   exudate noted.  Mild rhinitis, clear exudate and no purulent material or   pressure over the sinuses or pain.  Ears clear.  NECK:  No masses or thyroid felt.  No adenopathy.  LUNGS:  Revealed coarse rales in the anterior lung fields and the right middle   lobe appeared clear mostly, but not completely with deep cough and inspiration.  HEART:  Regular rhythm, 68 beats per minute.  No gallop, 105 beats per minute.    No S3 or S4.  ABDOMINAL EXAM:  Soft.  No localized pain, tenderness or organomegaly.  BACK:  No paravertebral or vertebral pain.    IMPRESSION:  At this time acute afebrile flu-like illness with associated   bronchial congestion consistent with acute bronchitis.    This appears to be   historically suggestive of possible aspiration with febrile response.  Cannot   rule out epidemic flu.  We will obtain a nasal swab for A and B and for the   possibility of aspiration, CBC and a chest x-ray on lateral and also Augmentin   875 days 1, 2 ,5, 1 b.i.d. x8 days.  Tussionex for severe protracted cough 1   teaspoon q.12 hours on a p.r.n. basis, avoid driving.  Force cold fluids.  The   patient was advised to call or contact if no improvement over the next three to   four days and will be in contact regarding results with the CBC and chest x-ray.  Continue her inhaler, Anoro Ellipta.      WTB/PN  dd: 03/02/2017 16:37:35 (CST)  td: 03/02/2017 22:13:00 (CST)  Doc ID   #[id number]  Job ID #[id number]    CC:    This office note has been dictated.

## 2017-03-02 NOTE — TELEPHONE ENCOUNTER
----- Message from Nessa Woodall sent at 3/2/2017 11:44 AM CST -----  Patient is requesting medication call into her pharmacy for runny nose/cough/pain in rib cage due to cough,  contact patient at [phone].with any questions        [facility] [address] [place] [address] [id number] [place] [address] AT [facility] [address]  [address] [place] [address]  Phone: [phone] Fax: [phone]

## 2017-03-02 NOTE — MR AVS SNAPSHOT
[facility]  [address]  [facility] [address]  Phone: [phone]  Fax: [phone]                  Lavonne Tonyaicha   3/2/2017 3:30 PM   Office Visit    Description:  Female : 1975   Provider:  Jeffy Martinez MD   Department:  [facility]           Reason for Visit     congested/ bad cough     Fever     Pain in neck           Diagnoses this Visit        Comments    Fever, unspecified fever cause    -  Primary     Cough in adult         Arthralgia, unspecified joint         Flu-like symptoms         Acute bronchitis, unspecified organism                To Do List           Future Appointments        Provider Department Dept Phone    3/2/2017 4:20 PM LAB, [place] [facility] [phone]    3/2/2017 4:45 PM [facility] [id number] [phone]    3/15/2017 3:00 PM Omer Mcgee MD [facility] [phone]    2017 8:30 AM Sheri Ross DO [facility] [phone]      Goals (5 Years of Data)     None       These Medications        Disp Refills Start End    amoxicillin-clavulanate 875-125mg (AUGMENTIN) 875-125 mg per tablet 16 tablet 0 3/2/2017 3/12/2017    Take 1 tablet by mouth 2 (two) times daily. - Oral    Pharmacy: [facility] [address] [place] [address] [id number] [place] [address] AT [facility] [address] Ph #: [phone]       hydrocodone-chlorpheniramine (TUSSIONEX) 10-8 mg/5 mL suspension 115 mL 0 3/2/2017     Take 5 mLs by mouth every 12 (twelve) hours as needed for Cough. - Oral    Pharmacy: [facility] [address] [place] [address] [id number] [place] [address] AT [facility] [address] Ph #: [phone]         [facility]     [facility] On Call Nurse Care Line -  Assistance  Registered nurses in the [facility] provide clinical advisement, health education, appointment booking, and other advisory services.  Call for this free service at  [phone].             Medications           Message regarding Medications     Verify the changes and/or additions to your medication regime listed below are the same as discussed with your clinician today.  If any of these changes or additions are incorrect, please notify your healthcare provider.        START taking these NEW medications        Refills    amoxicillin-clavulanate 875-125mg (AUGMENTIN) 875-125 mg per tablet 0    Sig: Take 1 tablet by mouth 2 (two) times daily.    Class: Normal    Route: Oral    hydrocodone-chlorpheniramine (TUSSIONEX) 10-8 mg/5 mL suspension 0    Sig: Take 5 mLs by mouth every 12 (twelve) hours as needed for Cough.    Class: Normal    Route: Oral      STOP taking these medications     acetaminophen-codeine 300-15mg (TYLENOL #2) 300-15 mg per tablet Take 1 tablet by mouth 2 (two) times daily as needed for Pain.    polyethylene glycol (GLYCOLAX) 17 gram/dose powder Take 17 g by mouth daily as needed.     rizatriptan (MAXALT) 10 MG tablet 1 pill at onset migraine, second 2 hours later if needed, no more than 2 pills day, 3 days use in week    rizatriptan (MAXALT) 10 MG tablet 1 tab onset migraine, 2nd two hours later if needed, no more than 2 pill day/3 days use in week. Courtesy Refill    tizanidine (ZANAFLEX) 2 MG tablet Take 2 tablets (4 mg total) by mouth 2 (two) times daily as needed.    tizanidine (ZANAFLEX) 2 MG tablet TAKE 1 TABLET BY MOUTH TWICE DAILY.           Verify that the below list of medications is an accurate representation of the medications you are currently taking.  If none reported, the list may be blank. If incorrect, please contact your healthcare provider. Carry this list with you in case of emergency.           Current Medications     acetaminophen (TYLENOL) 500 MG tablet Take 500 mg by mouth every 6 (six) hours as needed for Pain.    amitriptyline (ELAVIL) 25 MG tablet Take 1 tablet (25 mg total) by mouth nightly as needed for Insomnia.    ANORO ELLIPTA  62.5-25 mcg/actuation DsDv INL 1 PUFF PO QD UTD    buPROPion (WELLBUTRIN XL) 150 MG TB24 tablet Take 150 mg by mouth once daily.    lisinopril (PRINIVIL,ZESTRIL) 5 MG tablet Take 1 tablet (5 mg total) by mouth once daily.    milnacipran (SAVELLA) 12.5 mg Tab tablet Take 2 tablets (25 mg total) by mouth 2 (two) times daily.    multivitamin (THERAGRAN) per tablet Take 1 tablet by mouth once daily.    pantoprazole (PROTONIX) 40 MG tablet TK 1 T PO D 30 MIN B NIRAV    amoxicillin-clavulanate 875-125mg (AUGMENTIN) 875-125 mg per tablet Take 1 tablet by mouth 2 (two) times daily.    hydrocodone-chlorpheniramine (TUSSIONEX) 10-8 mg/5 mL suspension Take 5 mLs by mouth every 12 (twelve) hours as needed for Cough.           Clinical Reference Information           Your Vitals Were     BP                   118/88           Blood Pressure          Most Recent Value    BP  118/88      Allergies as of 3/2/2017     Ibuprofen    Latex, Natural Rubber      Immunizations Administered on Date of Encounter - 3/2/2017     None      Orders Placed During Today's Visit      Normal Orders This Visit    Influenza antigen Nasal Swab     Future Labs/Procedures Expected by Expires    CBC auto differential  3/2/2017 3/2/2018    X-Ray Chest PA And Lateral  3/2/2017 3/2/2018      Language Assistance Services     ATTENTION: Language assistance services are available, free of charge. Please call [phone].      ATENCIÓN: Si habla español, tiene a arguello disposición servicios gratuitos de asistencia lingüística. Llame al [phone].     [facility] Ý: N?u b?n nói Ti?ng Vi?t, có các d?ch v? h? tr? ngôn ng? mi?n phí dành cho b?n. G?i s? [phone].         [facility] complies with applicable Federal civil rights laws and does not discriminate on the basis of race, color, national origin, age, disability, or sex.

## 2017-03-03 ENCOUNTER — TELEPHONE (OUTPATIENT)
Dept: FAMILY MEDICINE | Facility: CLINIC | Age: 42
End: 2017-03-03

## 2017-03-03 NOTE — TELEPHONE ENCOUNTER
PLEASE CONTACT THE PATIENT AND OBTAIN STATUS RE: HER FEBRILE RESPIRATORY INFECTION. TELL HER THE CXR DOES NOT SHOW PNEUMONIA, BUT THE BLOOD COUNT IS CONSISTENT WITH INFECTION.RICCARDO

## 2017-03-06 NOTE — TELEPHONE ENCOUNTER
Patient notified of results. Patient reports minimal nasal congestion, no fever, and states the abx have worked. Pt to call clinic if symptoms reoccur.

## 2017-03-08 ENCOUNTER — TELEPHONE (OUTPATIENT)
Dept: FAMILY MEDICINE | Facility: CLINIC | Age: 42
End: 2017-03-08

## 2017-03-08 RX ORDER — PROMETHAZINE HYDROCHLORIDE AND CODEINE PHOSPHATE 6.25; 1 MG/5ML; MG/5ML
5 SOLUTION ORAL EVERY 4 HOURS PRN
Qty: 118 ML | Refills: 0 | Status: SHIPPED | OUTPATIENT
Start: 2017-03-08 | End: 2017-03-18

## 2017-03-13 ENCOUNTER — PATIENT MESSAGE (OUTPATIENT)
Dept: PHYSICAL MEDICINE AND REHAB | Facility: CLINIC | Age: 42
End: 2017-03-13

## 2017-03-15 ENCOUNTER — OFFICE VISIT (OUTPATIENT)
Dept: FAMILY MEDICINE | Facility: CLINIC | Age: 42
End: 2017-03-15
Payer: MEDICAID

## 2017-03-15 ENCOUNTER — OFFICE VISIT (OUTPATIENT)
Dept: PHYSICAL MEDICINE AND REHAB | Facility: CLINIC | Age: 42
End: 2017-03-15
Payer: MEDICAID

## 2017-03-15 VITALS
BODY MASS INDEX: 31.05 KG/M2 | OXYGEN SATURATION: 97 % | WEIGHT: 164.44 LBS | DIASTOLIC BLOOD PRESSURE: 78 MMHG | SYSTOLIC BLOOD PRESSURE: 120 MMHG | HEART RATE: 102 BPM | HEIGHT: 61 IN

## 2017-03-15 VITALS
DIASTOLIC BLOOD PRESSURE: 81 MMHG | WEIGHT: 165 LBS | SYSTOLIC BLOOD PRESSURE: 130 MMHG | HEIGHT: 61 IN | BODY MASS INDEX: 31.15 KG/M2 | HEART RATE: 104 BPM

## 2017-03-15 DIAGNOSIS — M77.11 RIGHT LATERAL EPICONDYLITIS: ICD-10-CM

## 2017-03-15 DIAGNOSIS — F32.A DEPRESSION, UNSPECIFIED DEPRESSION TYPE: Primary | ICD-10-CM

## 2017-03-15 DIAGNOSIS — M79.7 FIBROMYALGIA: ICD-10-CM

## 2017-03-15 DIAGNOSIS — M25.521 RIGHT ELBOW PAIN: Primary | ICD-10-CM

## 2017-03-15 PROCEDURE — 99999 PR PBB SHADOW E&M-EST. PATIENT-LVL III: CPT | Mod: PBBFAC,,, | Performed by: PHYSICAL MEDICINE & REHABILITATION

## 2017-03-15 PROCEDURE — 99213 OFFICE O/P EST LOW 20 MIN: CPT | Mod: PBBFAC,27,PO | Performed by: FAMILY MEDICINE

## 2017-03-15 PROCEDURE — 99214 OFFICE O/P EST MOD 30 MIN: CPT | Mod: S$PBB,,, | Performed by: PHYSICAL MEDICINE & REHABILITATION

## 2017-03-15 PROCEDURE — 99213 OFFICE O/P EST LOW 20 MIN: CPT | Mod: S$PBB,,, | Performed by: FAMILY MEDICINE

## 2017-03-15 PROCEDURE — 99999 PR PBB SHADOW E&M-EST. PATIENT-LVL III: CPT | Mod: PBBFAC,,, | Performed by: FAMILY MEDICINE

## 2017-03-15 RX ORDER — BUPROPION HYDROCHLORIDE 300 MG/1
300 TABLET ORAL DAILY
Qty: 90 TABLET | Refills: 3 | Status: SHIPPED | OUTPATIENT
Start: 2017-03-15 | End: 2017-05-04

## 2017-03-15 RX ORDER — MILNACIPRAN HYDROCHLORIDE 25 MG/1
TABLET, FILM COATED ORAL
Refills: 11 | COMMUNITY
Start: 2017-03-06 | End: 2017-03-15

## 2017-03-15 RX ORDER — TIZANIDINE HYDROCHLORIDE 2 MG/1
2 CAPSULE, GELATIN COATED ORAL
COMMUNITY
End: 2017-06-14 | Stop reason: SDUPTHER

## 2017-03-15 NOTE — MR AVS SNAPSHOT
[facility]  [address]  [facility] [address]  Phone: [phone]  Fax: [phone]                  Lavonne Tonyaicha   3/15/2017 3:00 PM   Office Visit    Description:  Female : 1975   Provider:  Omer Mcgee MD   Department:  [facility]           Reason for Visit     Follow-up           Diagnoses this Visit        Comments    Depression, unspecified depression type    -  Primary     Fibromyalgia                To Do List           Future Appointments        Provider Department Dept Phone    3/21/2017 3:00 PM Trevor Estrada MD [facility] Physical Med/Rehab [phone]    2017 8:30 AM Sheri Ross DO [facility] Rheumatology [phone]    2017 3:30 PM Omer Mcgee MD [facility] [phone]      Goals (5 Years of Data)     None      Follow-Up and Disposition     Return in about 8 weeks (around 5/10/2017).       These Medications        Disp Refills Start End    buPROPion (WELLBUTRIN XL) 300 MG 24 hr tablet 90 tablet 3 3/15/2017     Take 1 tablet (300 mg total) by mouth once daily. - Oral    Pharmacy: [facility] [address] [place] [address] AT [facility] [address] Ph #: [phone]       milnacipran (SAVELLA) 50 mg Tab 180 tablet 3 3/15/2017 3/15/2018    Take 1 tablet (50 mg total) by mouth 2 (two) times daily. - Oral    Pharmacy: [facility] [place] [id number] [place] [address] [address] AT [facility] [address] Ph #: [phone]         [facility]     [facility] On Call Nurse Care Line -  Assistance  Registered nurses in the [facility] provide clinical advisement, health education, appointment booking, and other advisory services.  Call for this free service at [phone].             Medications           Message regarding Medications     Verify the changes and/or additions to your medication regime listed below are the same as  discussed with your clinician today.  If any of these changes or additions are incorrect, please notify your healthcare provider.        START taking these NEW medications        Refills    milnacipran (SAVELLA) 50 mg Tab 3    Sig: Take 1 tablet (50 mg total) by mouth 2 (two) times daily.    Class: Normal    Route: Oral      CHANGE how you are taking these medications     Start Taking Instead of    buPROPion (WELLBUTRIN XL) 300 MG 24 hr tablet buPROPion (WELLBUTRIN XL) 150 MG TB24 tablet    Dosage:  Take 1 tablet (300 mg total) by mouth once daily. Dosage:  Take 150 mg by mouth once daily.    Reason for Change:  Reorder            Verify that the below list of medications is an accurate representation of the medications you are currently taking.  If none reported, the list may be blank. If incorrect, please contact your healthcare provider. Carry this list with you in case of emergency.           Current Medications     amitriptyline (ELAVIL) 25 MG tablet Take 1 tablet (25 mg total) by mouth nightly as needed for Insomnia.    ANORO ELLIPTA 62.5-25 mcg/actuation DsDv INL 1 PUFF PO QD UTD    buPROPion (WELLBUTRIN XL) 300 MG 24 hr tablet Take 1 tablet (300 mg total) by mouth once daily.    hydrocodone-chlorpheniramine (TUSSIONEX) 10-8 mg/5 mL suspension Take 5 mLs by mouth every 12 (twelve) hours as needed for Cough.    lisinopril (PRINIVIL,ZESTRIL) 5 MG tablet Take 1 tablet (5 mg total) by mouth once daily.    multivitamin (THERAGRAN) per tablet Take 1 tablet by mouth once daily.    pantoprazole (PROTONIX) 40 MG tablet TK 1 T PO D 30 MIN B NIRAV    tizanidine 2 mg Cap Take by mouth.    milnacipran (SAVELLA) 50 mg Tab Take 1 tablet (50 mg total) by mouth 2 (two) times daily.    promethazine-codeine 6.25-10 mg/5 ml (PHENERGAN WITH CODEINE) 6.25-10 mg/5 mL syrup Take 5 mLs by mouth every 4 (four) hours as needed for Cough.           Clinical Reference Information           Your Vitals Were     BP Pulse Height Weight Last  "Period SpO2    120/78 102 5' 1" (1.549 m) 74.6 kg (164 lb 7.4 oz) 02/13/2017 (Exact Date) 97%    BMI                31.08 kg/m2          Blood Pressure          Most Recent Value    BP  120/78      Allergies as of 3/15/2017     Ibuprofen    Latex, Natural Rubber      Immunizations Administered on Date of Encounter - 3/15/2017     None      Language Assistance Services     ATTENTION: Language assistance services are available, free of charge. Please call [phone].      ATENCIÓN: Si kimberly russ, tiene a arguello disposición servicios gratuitos de asistencia lingüística. Llame al [phone].     [facility] Ý: N?u b?n nói Ti?ng Vi?t, có các d?ch v? h? tr? ngôn ng? mi?n phí dành cho b?n. G?i s? [phone].         [facility] complies with applicable Federal civil rights laws and does not discriminate on the basis of race, color, national origin, age, disability, or sex.        "

## 2017-03-15 NOTE — PROGRESS NOTES
"[facility]    CHIEF COMPLAINT: Right elbow pain    HISTORY OF PRESENT ILLNESS: Lavonne Pierre is a 41 y.o. female who presents in follow up for right elbow pain. At last visit on 17, she received a steroid injection under ultrasound and was given a prescription for physical therapy. Her pain originally started about 10 weeks ago atraumatically. Since last visit, she says that her right elbow pain was improved by approximately 80% for 5 weeks. She did not start PT since she was feeling much better initially with the injection. Her pain has since returned to baseline. She says that she can't hold heavy objects since she gets weakness in her hands and wrist due to pain. Pain is "a twisting" type pain and is 8/10 in severity that is constant located on her lateral right elbow. She feels like the pain radiates at times to her distal triceps now as well.  She has not been leaning/hitting it on hard surfaces. She has tried Tylenol that helps somewhat. No numbness/tingling.     Review of Systems   Constitutional: Negative for fever.   HENT: Negative for drooling.    Eyes: Negative for discharge.   Respiratory: Negative for choking.    Cardiovascular: Negative for chest pain.   Genitourinary: Negative for flank pain.   Skin: Negative for wound.   Allergic/Immunologic: Negative for immunocompromised state.   Neurological: Negative for tremors and syncope.   Psychiatric/Behavioral: Negative for behavioral problems.     Past Medical History:   Past Medical History:   Diagnosis Date    Anxiety 2013    Fibromyalgia     HTN (hypertension)     Preeclampsia        Past Surgical History:   Past Surgical History:   Procedure Laterality Date    BREAST BIOPSY      Benign     SECTION, LOW TRANSVERSE      TUBAL LIGATION         Family History:   Family History   Problem Relation Age of Onset    Pancreatic cancer Mother     Lymphoma Father     Pancreatic cancer Maternal Grandmother  "    Pancreatic cancer Maternal Uncle     Breast cancer Maternal Aunt        Medications:   Current Outpatient Prescriptions on File Prior to Visit   Medication Sig Dispense Refill    acetaminophen (TYLENOL) 500 MG tablet Take 500 mg by mouth every 6 (six) hours as needed for Pain.      amitriptyline (ELAVIL) 25 MG tablet Take 1 tablet (25 mg total) by mouth nightly as needed for Insomnia. 30 tablet 3    ANORO ELLIPTA 62.5-25 mcg/actuation DsDv INL 1 PUFF PO QD UTD  6    buPROPion (WELLBUTRIN XL) 150 MG TB24 tablet Take 150 mg by mouth once daily.      hydrocodone-chlorpheniramine (TUSSIONEX) 10-8 mg/5 mL suspension Take 5 mLs by mouth every 12 (twelve) hours as needed for Cough. 115 mL 0    lisinopril (PRINIVIL,ZESTRIL) 5 MG tablet Take 1 tablet (5 mg total) by mouth once daily. 90 tablet 0    milnacipran (SAVELLA) 12.5 mg Tab tablet Take 2 tablets (25 mg total) by mouth 2 (two) times daily. 60 tablet 11    multivitamin (THERAGRAN) per tablet Take 1 tablet by mouth once daily.      pantoprazole (PROTONIX) 40 MG tablet TK 1 T PO D 30 MIN B NIRAV  11    promethazine-codeine 6.25-10 mg/5 ml (PHENERGAN WITH CODEINE) 6.25-10 mg/5 mL syrup Take 5 mLs by mouth every 4 (four) hours as needed for Cough. 118 mL 0     No current facility-administered medications on file prior to visit.        Allergies:   Review of patient's allergies indicates:   Allergen Reactions    Ibuprofen Other (See Comments)     Affects stomach ulcer, avoids all NSAIDS    Latex, natural rubber Rash     Small red bumps on skin contact       Social History:   Social History     Social History    Marital status:      Spouse name: N/A    Number of children: N/A    Years of education: N/A     Social History Main Topics    Smoking status: Never Smoker    Smokeless tobacco: Never Used    Alcohol use No    Drug use: No    Sexual activity: Not on file     Other Topics Concern    Not on file     Social History Narrative       PHYSICAL  "EXAMINATION:   General  /81  Pulse 104  Ht 5' 1" (1.549 m)  Wt 74.8 kg (165 lb)  LMP 02/13/2017 (Exact Date)  BMI 31.18 kg/m2  Constitutional: Oriented to person, place, and time. No apparent distress. Appears well-developed and well-nourished. Pleasant.  HENT:   Head: Normocephalic and atraumatic.   Eyes: Right eye exhibits no discharge. Left eye exhibits no discharge. No scleral icterus.   Pulmonary/Chest: Effort normal. No respiratory distress.   Abdominal: There is no guarding.   Neurological: Alert and oriented to person, place, and time.   Psychiatric: Behavior is normal.   Ortho Exam  INSPECTION: There is no swelling, ecchymoses, erythema or gross deformity.  Right Hand Exam      Muscle Strength   Wrist Extension: 5-/5 secondary to pain  Wrist Flexion: 5/5   : 5/5      Other   Sensation: normal  Pulse: present        Left Hand Exam      Muscle Strength   Wrist Extension: 5/5   Wrist Flexion: 5/5   : 5/5      Other   Sensation: normal  Pulse: present        Right Elbow Exam      Tenderness   The patient is experiencing tenderness in the lateral epicondyle and distal triceps. Pain most exquisite at the lateral epicondyle.     Range of Motion   Extension: 0   Flexion: 130      Muscle Strength   Pronation: 5/5   Supination: 5/5      Tests Varus: negative  Valgus: negative     Other   Erythema: absent  Scars: absent  Sensation: normal  Pulse: present     Comments: Cozen's test positive        Left Elbow Exam      Tenderness   The patient is experiencing no tenderness.      Range of Motion   Extension: 0   Flexion: 110      Muscle Strength   Pronation: 5/5   Supination: 5/5      Other   Erythema: absent  Sensation: normal  Pulse: present     INSPECTION: There is no swelling, ecchymoses, erythema or gross deformity about the right elbow.    Imaging  X-ray of the right elbow on 1/24/17:  3 views of the right elbow fail to demonstrate fail to demonstrate evidence of fracture or subluxation. There " are no soft tissue abnormalities.    Diagnostic ultrasound exam: Limited exam of the right common extensor tendon was performed today.  The tendon was normal in thickness.  There was some mild heterogeneity of the tissue at the proximal attachment.  There was no lateral elbow joint effusion or abnormalities of the lateral collateral ligament.    Data Reviewed: X-ray, Ultrasound     Supportive Actions: Independent visualization of images or test specimens    ASSESSMENT:   1. Right lateral epicondylitis    PLAN:     1. Time was spent reviewing the above diagnosis in depth with Lavonne today, including acute management and rehabilitation. Brief diagnostic ultrasound exam again revealed some mild heterogeneity about the proximal portion of the common extensor tendon. There was no observed hypoechoic defect and color Doppler failed to reveal significant neovascularity within the tendon substance.    2. We discussed treatment options with the patient, including PRP, needling, and physical therapy. She expressed understanding of her options. Recommended dry needling to her today to help produce long-term resolution of her symptoms. She expressed understanding of her options and elected to proceed with the percutaneous needle fenestration of the right common extensor tendon.  We also discussed the nature of this procedure and the likelihood that she will experience increased soreness for a few weeks following this procedure, and the pain may gradually reduce over a 3 month time span.    3. I recommended formal physical therapy after dry needling is performed. This will be for her right elbow for therapeutic exercise, stretching, therapeutic modalities, and be transitioned to a home exercise program for long-term maintenance. She is in agreement with this plan.    4. RTC for scheduled percutaneous needle fenestration on the right common extensor tendon.      Patient was initially seen and examined by [facility] PGY-II resident  Dr. Angelo Tai. Thank you for allowing me to participate in the care of this patient.

## 2017-03-20 DIAGNOSIS — M25.521 RIGHT ELBOW PAIN: Primary | ICD-10-CM

## 2017-03-21 ENCOUNTER — OFFICE VISIT (OUTPATIENT)
Dept: PHYSICAL MEDICINE AND REHAB | Facility: CLINIC | Age: 42
End: 2017-03-21
Payer: MEDICAID

## 2017-03-21 VITALS — HEIGHT: 61 IN | WEIGHT: 164 LBS | BODY MASS INDEX: 30.96 KG/M2

## 2017-03-21 DIAGNOSIS — M77.11 LATERAL EPICONDYLITIS OF RIGHT ELBOW: ICD-10-CM

## 2017-03-21 DIAGNOSIS — M25.521 RIGHT ELBOW PAIN: Primary | ICD-10-CM

## 2017-03-21 PROCEDURE — 20551 NJX 1 TENDON ORIGIN/INSJ: CPT | Mod: PBBFAC,PO | Performed by: PHYSICAL MEDICINE & REHABILITATION

## 2017-03-21 PROCEDURE — 99213 OFFICE O/P EST LOW 20 MIN: CPT | Mod: PBBFAC,PO | Performed by: PHYSICAL MEDICINE & REHABILITATION

## 2017-03-21 PROCEDURE — 76942 ECHO GUIDE FOR BIOPSY: CPT | Mod: 26,S$PBB,, | Performed by: PHYSICAL MEDICINE & REHABILITATION

## 2017-03-21 PROCEDURE — 99212 OFFICE O/P EST SF 10 MIN: CPT | Mod: 25,S$PBB,, | Performed by: PHYSICAL MEDICINE & REHABILITATION

## 2017-03-21 PROCEDURE — 99999 PR PBB SHADOW E&M-EST. PATIENT-LVL III: CPT | Mod: PBBFAC,,, | Performed by: PHYSICAL MEDICINE & REHABILITATION

## 2017-03-22 ENCOUNTER — PATIENT MESSAGE (OUTPATIENT)
Dept: PHYSICAL MEDICINE AND REHAB | Facility: CLINIC | Age: 42
End: 2017-03-22

## 2017-03-22 NOTE — PROCEDURES
Tendon Origin  Date/Time: 3/21/2017 2:54 PM  Performed by: LIZBETH BLANCA  Authorized by: LIZBETH BLANCA     Consent Done?:  Yes (Verbal)  Timeout: prior to procedure the correct patient, procedure, and site was verified    Indications:  Pain  Site marked: the procedure site was marked    Timeout: prior to procedure the correct patient, procedure, and site was verified    Location:  Elbow  Site:  R elbow (Common extensor tendon)  Prep: patient was prepped and draped in usual sterile fashion    Ultrasonic Guidance for Needle Placement?: Yes    Needle size:  25 G  Approach: Needle in plane, distal to proximal.   Ultrasound guidance was used for correct needle placement, the images were saved will be uploaded to EMR.  The 25-gauge needle was used to pass through the tendon substance in multiple planes over her pathologic areas.  This was done for approximately 5 minutes.  Care was taken not to pass the needle through the radial collateral ligament or surrounding neurovascular structures.

## 2017-03-22 NOTE — PROGRESS NOTES
[facility]    CHIEF COMPLAINT: Right elbow pain    HISTORY OF PRESENT ILLNESS: Lavonne Pierre is a 41 y.o. female who presents in follow up for right elbow pain.  At her last visit one week ago, we agreed to proceed with percutaneous needle fenestration of the right common extensor tendon.  She presents today to receive this procedure.  She rates her pain as a 7 on a scale of 1-10.  Her pain continues to be localized to the lateral right elbow.    Review of Systems   Constitutional: Negative for fever.   HENT: Negative for drooling.    Eyes: Negative for discharge.   Respiratory: Negative for choking.    Cardiovascular: Negative for chest pain.   Genitourinary: Negative for flank pain.   Skin: Negative for wound.   Allergic/Immunologic: Negative for immunocompromised state.   Neurological: Negative for tremors and syncope.   Psychiatric/Behavioral: Negative for behavioral problems.     Past Medical History:   Past Medical History:   Diagnosis Date    Anxiety 2013    Fibromyalgia     HTN (hypertension)     Preeclampsia        Past Surgical History:   Past Surgical History:   Procedure Laterality Date    BREAST BIOPSY      Benign     SECTION, LOW TRANSVERSE      TUBAL LIGATION         Family History:   Family History   Problem Relation Age of Onset    Pancreatic cancer Mother     Lymphoma Father     Pancreatic cancer Maternal Grandmother     Pancreatic cancer Maternal Uncle     Breast cancer Maternal Aunt        Medications:   Current Outpatient Prescriptions on File Prior to Visit   Medication Sig Dispense Refill    amitriptyline (ELAVIL) 25 MG tablet Take 1 tablet (25 mg total) by mouth nightly as needed for Insomnia. 30 tablet 3    ANORO ELLIPTA 62.5-25 mcg/actuation DsDv INL 1 PUFF PO QD UTD  6    buPROPion (WELLBUTRIN XL) 300 MG 24 hr tablet Take 1 tablet (300 mg total) by mouth once daily. 90 tablet 3    hydrocodone-chlorpheniramine (TUSSIONEX) 10-8 mg/5 mL  "suspension Take 5 mLs by mouth every 12 (twelve) hours as needed for Cough. 115 mL 0    lisinopril (PRINIVIL,ZESTRIL) 5 MG tablet Take 1 tablet (5 mg total) by mouth once daily. 90 tablet 0    milnacipran (SAVELLA) 50 mg Tab Take 1 tablet (50 mg total) by mouth 2 (two) times daily. 180 tablet 3    multivitamin (THERAGRAN) per tablet Take 1 tablet by mouth once daily.      pantoprazole (PROTONIX) 40 MG tablet TK 1 T PO D 30 MIN B NIRAV  11    tizanidine 2 mg Cap Take by mouth.       No current facility-administered medications on file prior to visit.        Allergies:   Review of patient's allergies indicates:   Allergen Reactions    Ibuprofen Other (See Comments)     Affects stomach ulcer, avoids all NSAIDS    Latex, natural rubber Rash     Small red bumps on skin contact       Social History:   Social History     Social History    Marital status:      Spouse name: N/A    Number of children: N/A    Years of education: N/A     Social History Main Topics    Smoking status: Never Smoker    Smokeless tobacco: Never Used    Alcohol use No    Drug use: No    Sexual activity: Not Asked     Other Topics Concern    None     Social History Narrative     PHYSICAL EXAMINATION:   General  Ht 5' 1" (1.549 m)  Wt 74.4 kg (164 lb)  LMP 02/13/2017 (Exact Date)  BMI 30.99 kg/m2   Vital signs stable.  Constitutional: Oriented to person, place, and time. No apparent distress. Appears well-developed and well-nourished. Pleasant.  HENT:   Head: Normocephalic and atraumatic.   Eyes: Right eye exhibits no discharge. Left eye exhibits no discharge. No scleral icterus.   Pulmonary/Chest: Effort normal. No respiratory distress.   Abdominal: There is no guarding.   Neurological: Alert and oriented to person, place, and time.   Psychiatric: Behavior is normal.   Ortho Exam  INSPECTION: There is no swelling, ecchymoses, erythema or gross deformity.  Right Hand Exam      Muscle Strength   Wrist Extension: 5-/5 secondary " to pain  Wrist Flexion: 5/5   : 5/5      Other   Sensation: normal  Pulse: present        Left Hand Exam      Muscle Strength   Wrist Extension: 5/5   Wrist Flexion: 5/5   : 5/5      Other   Sensation: normal  Pulse: present        Right Elbow Exam      Tenderness   The patient is experiencing tenderness in the lateral epicondyle and distal triceps. Pain most exquisite at the lateral epicondyle.     Range of Motion   Extension: 0   Flexion: 130      Muscle Strength   Pronation: 5/5   Supination: 5/5      Tests Varus: negative  Valgus: negative     Other   Erythema: absent  Scars: absent  Sensation: normal  Pulse: present     Comments: Cozen's test positive        Left Elbow Exam      Tenderness   The patient is experiencing no tenderness.      Range of Motion   Extension: 0   Flexion: 110      Muscle Strength   Pronation: 5/5   Supination: 5/5      Other   Erythema: absent  Sensation: normal  Pulse: present     INSPECTION: There is no swelling, ecchymoses, erythema or gross deformity about the right elbow.    Imaging  X-ray of the right elbow on 1/24/17:  3 views of the right elbow fail to demonstrate fail to demonstrate evidence of fracture or subluxation. There are no soft tissue abnormalities.    Diagnostic ultrasound exam: Limited exam of the right common extensor tendon was reviewed.  The tendon was normal in thickness.  There was some mild heterogeneity of the tissue at the proximal attachment.  There was no lateral elbow joint effusion or abnormalities of the lateral collateral ligament.    Data Reviewed: X-ray, Ultrasound     Supportive Actions: Independent visualization of images or test specimens    ASSESSMENT:   Right lateral epicondylosis    PLAN:     1.  We proceeded today with the percutaneous needle fenestration of the right common extensor tendon.  She did experience sudden lightheadedness and nausea MCFP through the procedure.  At that point the needle was withdrawn and the procedure was  halted.  She was then laid down and given some water.  She felt much better after resting for 5-10 minutes.  Before this event we were able to needle her most pathologic areas to a moderate degree.    2.  We discussed the likelihood that she will become sore in the area for the following few weeks.  She was instructed to provide ice or heat to the area as needed.  She was also instructed to avoid the use of anti-inflammatories for the next 2 weeks.  She may use Tylenol for pain.  She was also instructed to call office if she has any issues or has increased pain not responsive to Tylenol.  We also discussed activity modifications and the avoidance of lifting anything more than a coffee cup for 2 weeks.  She should also avoid repetitive wrist motions and heavy lifting.    3.  At this point, we'll have her established in formal physical therapy to address her right common extensor tendinosis.    4. RTC in 4 weeks.

## 2017-03-23 DIAGNOSIS — M77.11 LATERAL EPICONDYLITIS, RIGHT ELBOW: Primary | ICD-10-CM

## 2017-03-26 NOTE — PROGRESS NOTES
Subjective:       Patient ID: Lavonne Pierre is a 41 y.o. female    Chief Complaint: Follow-up    HPI  Here today for interval evaluatin  Making slow progress with treatment of fibromyalgia and depression on Wellbutrin and Savella.  No new complaints.  Tolerating medications without difficulty    Review of Systems   HENT: Positive for hearing loss (chronic issue).    Eyes: Positive for discharge.   Respiratory: Positive for wheezing (none presently).    Cardiovascular: Positive for chest pain (stable and consistent with previous issues surrounding fibromyalgia.). Negative for palpitations.   Gastrointestinal: Positive for constipation. Negative for blood in stool, diarrhea and vomiting.   Genitourinary: Negative for dysuria and hematuria.   Neurological: Positive for weakness and headaches (similar to previous complaints.).   Endo/Heme/Allergies: Negative for polydipsia.   Seeing PMR for treatment of lateral epicondylitis which continues to cause pain.      Objective:   Physical Exam   Constitutional: She is oriented to person, place, and time. She appears well-developed and well-nourished.   Neurological: She is alert and oriented to person, place, and time.   Vitals reviewed.        Assessment:       1. Depression, unspecified depression type  buPROPion (WELLBUTRIN XL) 300 MG 24 hr tablet   2. Fibromyalgia  milnacipran (SAVELLA) 50 mg Tab         Plan:       Depression, unspecified depression type with Fibromyalgia  -     INCREASE buPROPion (WELLBUTRIN XL) 300 MG 24 hr tablet; Take 1 tablet (300 mg total) by mouth once daily.  Dispense: 90 tablet; Refill: 3  -     milnacipran (SAVELLA) 50 mg Tab; Take 1 tablet (50 mg total) by mouth 2 (two) times daily.  Dispense: 180 tablet; Refill: 3  - Return in about 8 weeks (around 5/10/2017).

## 2017-04-04 ENCOUNTER — OFFICE VISIT (OUTPATIENT)
Dept: PHYSICAL MEDICINE AND REHAB | Facility: CLINIC | Age: 42
End: 2017-04-04
Payer: MEDICAID

## 2017-04-04 VITALS
HEIGHT: 61 IN | DIASTOLIC BLOOD PRESSURE: 82 MMHG | BODY MASS INDEX: 30.96 KG/M2 | WEIGHT: 164 LBS | HEART RATE: 76 BPM | SYSTOLIC BLOOD PRESSURE: 126 MMHG

## 2017-04-04 DIAGNOSIS — I10 ESSENTIAL HYPERTENSION: ICD-10-CM

## 2017-04-04 DIAGNOSIS — M77.11 RIGHT LATERAL EPICONDYLITIS: Primary | ICD-10-CM

## 2017-04-04 PROCEDURE — 99999 PR PBB SHADOW E&M-EST. PATIENT-LVL III: CPT | Mod: PBBFAC,,, | Performed by: PHYSICAL MEDICINE & REHABILITATION

## 2017-04-04 PROCEDURE — 99211 OFF/OP EST MAY X REQ PHY/QHP: CPT | Mod: S$PBB,,, | Performed by: PHYSICAL MEDICINE & REHABILITATION

## 2017-04-04 PROCEDURE — 99213 OFFICE O/P EST LOW 20 MIN: CPT | Mod: PBBFAC,PO | Performed by: PHYSICAL MEDICINE & REHABILITATION

## 2017-04-04 RX ORDER — NITROGLYCERIN 20 MG/1
1 PATCH TRANSDERMAL DAILY
Qty: 30 PATCH | Refills: 1 | Status: SHIPPED | OUTPATIENT
Start: 2017-04-04 | End: 2017-09-06

## 2017-04-05 ENCOUNTER — PATIENT MESSAGE (OUTPATIENT)
Dept: PHYSICAL MEDICINE AND REHAB | Facility: CLINIC | Age: 42
End: 2017-04-05

## 2017-04-05 RX ORDER — LISINOPRIL 5 MG/1
TABLET ORAL
Qty: 90 TABLET | Refills: 3 | Status: SHIPPED | OUTPATIENT
Start: 2017-04-05 | End: 2018-05-05 | Stop reason: SDUPTHER

## 2017-04-05 NOTE — PROGRESS NOTES
[facility]    CHIEF COMPLAINT: Right elbow pain    HISTORY OF PRESENT ILLNESS: Lavonne Pierre is a 41 y.o. female who presents in follow up for right elbow pain.  At her last visit two weeks ago, we proceeded with percutaneous needle fenestration of the right common extensor tendon.  She did have some lightheadedness and nausea during the procedure, therefore it was cut short.  She reports continued soreness and stiffness of the right lateral elbow over the last 2 weeks. She rates her pain as a 6 on a scale of 1-10.  Her plan was to start physical therapy, however she was unable to schedule an appointment until 2017.  There is some stiffness and pulling sensations over the lateral elbow when attempting to straighten the elbow out.  There is no excess redness or warmth of the area.  She notes increased pain when gripping or lifting heavy objects.    Review of Systems   Constitutional: Negative for fever.   HENT: Negative for drooling.    Eyes: Negative for discharge.   Respiratory: Negative for choking.    Cardiovascular: Negative for chest pain.   Genitourinary: Negative for flank pain.   Skin: Negative for wound.   Allergic/Immunologic: Negative for immunocompromised state.   Neurological: Negative for tremors and syncope.   Psychiatric/Behavioral: Negative for behavioral problems.     Past Medical History:   Past Medical History:   Diagnosis Date    Anxiety 2013    Fibromyalgia     HTN (hypertension)     Preeclampsia        Past Surgical History:   Past Surgical History:   Procedure Laterality Date    BREAST BIOPSY      Benign     SECTION, LOW TRANSVERSE      TUBAL LIGATION         Family History:   Family History   Problem Relation Age of Onset    Pancreatic cancer Mother     Lymphoma Father     Pancreatic cancer Maternal Grandmother     Pancreatic cancer Maternal Uncle     Breast cancer Maternal Aunt        Medications:   Current Outpatient Prescriptions  "on File Prior to Visit   Medication Sig Dispense Refill    amitriptyline (ELAVIL) 25 MG tablet Take 1 tablet (25 mg total) by mouth nightly as needed for Insomnia. 30 tablet 3    ANORO ELLIPTA 62.5-25 mcg/actuation DsDv INL 1 PUFF PO QD UTD  6    buPROPion (WELLBUTRIN XL) 300 MG 24 hr tablet Take 1 tablet (300 mg total) by mouth once daily. 90 tablet 3    hydrocodone-chlorpheniramine (TUSSIONEX) 10-8 mg/5 mL suspension Take 5 mLs by mouth every 12 (twelve) hours as needed for Cough. 115 mL 0    lisinopril (PRINIVIL,ZESTRIL) 5 MG tablet Take 1 tablet (5 mg total) by mouth once daily. 90 tablet 0    milnacipran (SAVELLA) 50 mg Tab Take 1 tablet (50 mg total) by mouth 2 (two) times daily. 180 tablet 3    multivitamin (THERAGRAN) per tablet Take 1 tablet by mouth once daily.      pantoprazole (PROTONIX) 40 MG tablet TK 1 T PO D 30 MIN B NIRAV  11    tizanidine 2 mg Cap Take by mouth.       No current facility-administered medications on file prior to visit.        Allergies:   Review of patient's allergies indicates:   Allergen Reactions    Ibuprofen Other (See Comments)     Affects stomach ulcer, avoids all NSAIDS    Latex, natural rubber Rash     Small red bumps on skin contact       Social History:   Social History     Social History    Marital status:      Spouse name: N/A    Number of children: N/A    Years of education: N/A     Social History Main Topics    Smoking status: Never Smoker    Smokeless tobacco: Never Used    Alcohol use No    Drug use: No    Sexual activity: Not Asked     Other Topics Concern    None     Social History Narrative     PHYSICAL EXAMINATION:   General  /82  Pulse 76  Ht 5' 1" (1.549 m)  Wt 74.4 kg (164 lb)  BMI 30.99 kg/m2   Constitutional: Oriented to person, place, and time. No apparent distress. Appears well-developed and well-nourished. Pleasant.  HENT:   Head: Normocephalic and atraumatic.   Eyes: Right eye exhibits no discharge. Left eye exhibits " no discharge. No scleral icterus.   Pulmonary/Chest: Effort normal. No respiratory distress.   Abdominal: There is no guarding.   Neurological: Alert and oriented to person, place, and time.   Psychiatric: Behavior is normal.   Ortho Exam  INSPECTION: There is no swelling, ecchymoses, erythema or gross deformity.  Right Elbow Exam      Tenderness   The patient is experiencing tenderness in the lateral epicondyle and distal triceps. Pain most exquisite at the lateral epicondyle.     Range of Motion   Extension: 0, pain with terminal extension  Flexion: 130      Muscle Strength   Pronation: 5/5   Supination: 5/5      Tests Varus: negative  Valgus: negative     Other   Erythema: absent  Scars: absent  Sensation: normal  Pulse: present     Comments: Cozen's test positive        Left Elbow Exam      Tenderness   The patient is experiencing no tenderness.      Range of Motion   Extension: 0   Flexion: 110      Muscle Strength   Pronation: 5/5   Supination: 5/5      Other   Erythema: absent  Sensation: normal  Pulse: present     INSPECTION: There is no swelling, ecchymoses, erythema or gross deformity about the right elbow.    Imaging  X-ray of the right elbow on 1/24/17:  3 views of the right elbow fail to demonstrate fail to demonstrate evidence of fracture or subluxation. There are no soft tissue abnormalities.    Data Reviewed: X-ray    Supportive Actions: Independent visualization of images or test specimens    ASSESSMENT:   Right lateral epicondylosis    PLAN:     1.  We are 2 weeks status post percutaneous needle fenestration of the right common extensor tendon.  She notes some continued pain and soreness in the area, and I have reassured her that this is to be expected at this point.  I encouraged her to keep her upcoming physical therapy appointment.  She reports that she frequently gets oral confirm sleep with the pain.  We issued her a wrist splint to wear at night keep the wrist in neutral and prevent excessive  wrist flexion extension during sleep.  I also prescribed her nitroglycerin patch to apply to the common extensor tendon daily at bedtime.  She was instructed to put this on for 12 hours at time.  We discussed common side effects such as headache and lightheadedness and she was instructed to discontinue the patch if the symptoms are too significant or persistent.  She may also use ice and heat when necessary to the elbow.    2.  RTC in 4 weeks.

## 2017-04-19 ENCOUNTER — CLINICAL SUPPORT (OUTPATIENT)
Dept: REHABILITATION | Facility: HOSPITAL | Age: 42
End: 2017-04-19
Attending: PHYSICAL MEDICINE & REHABILITATION
Payer: MEDICAID

## 2017-04-19 DIAGNOSIS — R29.898 DECREASED GRIP STRENGTH OF RIGHT HAND: ICD-10-CM

## 2017-04-19 DIAGNOSIS — G89.29 CHRONIC PAIN OF RIGHT ELBOW: Primary | ICD-10-CM

## 2017-04-19 DIAGNOSIS — R68.89 DECREASED FUNCTIONAL ACTIVITY TOLERANCE: ICD-10-CM

## 2017-04-19 DIAGNOSIS — M25.60 DECREASED RANGE OF MOTION: ICD-10-CM

## 2017-04-19 DIAGNOSIS — M25.521 CHRONIC PAIN OF RIGHT ELBOW: Primary | ICD-10-CM

## 2017-04-19 PROCEDURE — 97530 THERAPEUTIC ACTIVITIES: CPT | Mod: 59,PN

## 2017-04-19 PROCEDURE — G8984 CARRY CURRENT STATUS: HCPCS | Mod: CK,PN

## 2017-04-19 PROCEDURE — 97165 OT EVAL LOW COMPLEX 30 MIN: CPT | Mod: PN

## 2017-04-19 PROCEDURE — G8985 CARRY GOAL STATUS: HCPCS | Mod: CK,PN

## 2017-04-19 NOTE — PROGRESS NOTES
"Occupational Therapy Evaluation    Patient:  Lavonne Pierre  Date of Therapy Visit:  2017  Referring Physician:  Dr Estrada  Diagnosis: R lateral epicondylitis  MRN : [id number]  Referral Orders:  Eval and treat     Start Time:500pm  End Time:  600pm  Total Time:  60 min    Certification period from 17 to 17  Visit # 1   Diagnosis:    HISTORY/OCCUPATIONAL PROFILE/ Medical and Therapy History:  Subjective:  Patient is a 41 year old right hand dominant female who presents for initial occupational therapy evaluation and today,2017 and  is 4 weeks 1 days s/p percutaneous needle fenestration of the right common extensor tendon on 3/21/17 by Dr Estrada.    Date of surgery:  3/21/17  Location of injury:   Right elbow   Current History of Injury /Mechanism of Injury:  Insidious; She started having painful symptoms in November and did a trial of an elbow strap with no resolution in symptoms.  She believes she woke up one day with elbow pain.  She does have fibromyalgia.   She has had a previous cortisone injection which did not help.  On 3/21/17, she had percutaneous needle fenestration of the right common extensor tendon. She is currently wearing a wrist splint during the day.  Her main complaint today is pain and lack of motion. She has difficulty with all ADLs and is dependent on her left for most activities.      Rehabilitation Expectation/Goals: Patient's goals for therapy are to be able to  "have no pain and be able to do things"   - minimize: pain  - normalize: loss of function  Pain:   During no work/At Rest:    4-5 out of 10   While working/ With Activity:  8 out of 10   Sleepin-7 out of 10    Location of Pain:  Right elbow   Description of Pain:  Stabbing pain   Pain relived by: resting, nitroglycerin patch    Previous Medical Management/ Past Medical History/Physical Systems Review:   Patient denies any previous injury to this area.  Past Medical History:   Diagnosis Date    Anxiety " 7/16/2013    Fibromyalgia     HTN (hypertension)     Preeclampsia      Review of patient's allergies indicates:   Allergen Reactions    Ibuprofen Other (See Comments)     Affects stomach ulcer, avoids all NSAIDS    Latex, natural rubber Rash     Small red bumps on skin contact       Occupation:    Working presently:  yes  Patient's work/Activities consist of: phone, computer, writing, filing   Workmen's Compensation:  no        FUNCTIONAL STATUS:   Previous functional status includes: Independent with all ADLs and ambulating without assistance   Current FunctionalStatus:  D on L for most ADLs (see below)   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   Exercise routine prior to onset : n/a   DME owned:  none   Home/Living environment :  Lives with fiance and 2 children(18 yo and 8 yo) in one story house with 4 steps on entry, railings on both sides   Limitation of Functional Status:   ADLs (difficulty with the following tasks):    - Feeding:i    - Meal Prep: pain with holding a pot    - Bathing:i      - Dressing: i     - Grooming: blowdrying hair      Self Care / ADL:     IADLs: (difficulty with the following tasks):    - managing finances: i    - driving/handling transportation: turning steering wheel;     - shopping: D on L    - using phone: pain    - computer: mousing and typing    - managing medications: pain with child proof caps    - housework & basic home maintenance:        Leisure: n/a  Environmental Concerns/ Fall Risk:  None   Barriers to Learning:  None   Cultural/Spiritual : None   Developmental/Education: None  Nutritional Deficit: None   Abuse/Neglect : None    Language: None   Hearing/Vision Deficit: None   Other:     Objective:  Edema/ Girth/Volume: Pre-Treatment Girth (cm):      04/19/2017 04/19/2017    right left   elbow 26.6 25.5     Observations:    Sensation Test:  Sensation grossly intact to light touch all dermatomal regions  Palpation:   Sensitivity: severe  tenderness and hypersensitivity at ECRB insertion   Patient denies current numbness & tingling; Temp, touch and pressure sense are intact in right    Range of Motion: AROM        RIGHT      LEFT       Date:   04/19/2017 04/19/2017         Elbow Ext/Flex 15/115 0/135         Elbow Sup/Pro 75/90 75/90        Wrist Ext/Flex 65/55 70/69        Wrist RD/UD 5/25 15/30     Special Testing:  Cozen's Test: +  Phalen's Test: -  Durkan's Test: -  Tinel's @ cubital tunnel: -  Tinel's @ carpal tunnel: -  Valgus Instability: -  Varus Instability: -    Coordination:  not impaired for FMC in right  in ADL/IADL's     Endurance: decreased for light ADL/IADL's w/ use of right           Strength: (LYDIA Dynamometer in lbs.), Average 3 trials, Position II; deferred at this time    Treatment/ Patient and family/caregiver learning and education: :     OT eval performed today and instruction in written HEP including forearm stretches, forearm, wrist and thumb ROM  Patient did require any verbal cues to perform exercises correctly.  THERAPEUTIC ACTIVITIES x 30 to increase ROM, functional use, coordination, dexterity and improve fine motor control  -gentle STM to ext mass avoiding tendon insertion  -AROM shoulder, elbow, wrist and fingers (all planes) 3 x 10 reps    Assessment:     Medical Necessity is demonstrated by the following impairments/problems:    History Examination Decision Making Complexity Score   Occupational Profile:  Performance Deficits:  Difficulty with bathing, dressing, feeding, writing, driving, cleaning    Detailed assessment, 3-5 performance deficits noted      Medical and Therapy History:   Past Medical History:   Diagnosis Date    Anxiety 7/16/2013    Fibromyalgia     HTN (hypertension)     Preeclampsia     Physical:  Decreased ROM, increased pain, increased edema, decreased functional use, decreased strength Inability to perform regular duties at work:      Cognitive:  Impaired cognitive skills: NONE  Modifications/ need for assistance:  Modification/task , no assistance required  Minimal to moderate assistance or modification of task required  Significant assistance required     Psychosocial:    Lives with finance and 2 children, working FT as  Impaired psychosocial coping strategies:  NONE             Level of complexity is low based on PMHX, co morbidities , data from assessments and functional level of assistance required with task and clinical presentation directly impacting       Medical necessity is demonstrated by the following IMPAIRMENTS/PROBLEMS:  Patient Lack of Knowledge/Awareness in Home Program  Increased Pain in right  Decreased functional use/ unable to perform ADLs/iADLs  Increased Edema  Decreased ROM   Decreased  strength  Decreased pinch strength  Hypersensitivity      Pt presents to occupational therapy with an OT diagnosis of right lateral epicondylitis and is 4 weeks 1 days s/p percutaneous needle fenestration of the right common extensor tendon on 3/21/17 by Dr Estrada. We reviewed a detailed home program to address the above problem areas including modality use, body mechanics, stretches and splinting and patient was able to independently demonstrate these while in therapy today. The patient requires skilled occupational therapy to address the problems identified above and to achieve the individual patient goals as outlined in the problems and goals section of this evaluation.  Overall rehab potential is GOOD.  The patient and or family/caregiver was educated regarding the details of their diagnosis, prognosis, related pathology, plan of care and modality use.  The patient demonstrates a GOOD understanding of the risks, benefits, precautions/ contraindications and prognosis of their skilled occupational therapy rehabilitation program.  We reviewed therapy expectations and goals and it was understood clearly that they will be active participants in their  rehabilitation.Lavonne demonstrated a good understanding of the education provided including HEP and modalities for pain management.     Patient prefers to attend therapy: 1 times a week with time preference of late evening    G CODE TOOL: FOTO    Category: self care/ carrying      Current Score  = 56% impaired  Goal at Discharge Score = 45% impaired    Score interpretation is as follows:     TEST SCORE  Modifier  Impairment Limitation Restriction    0%  CH  0 % impaired, limited or restricted    1-19%  CI  @ least 1% but less than 20% impaired, limited or restricted    20-39%  CJ  @ least 20%<40% impaired, limited or restricted    40-59%  CK  @ least 40%<60% impaired, limited or restricted    60-79%  CL  @ least 60% <80% impaired, limited or restricted    80-99%  CM  @ least 80%<100% impaired limited or restricted    100%  CN  100% impaired, limited or restricted     GOALS:  Short Therm Goals: (2 weeks)    STG: Patient will be independent in home exercise and self care program    STG : Symptomatic Improvements: Decreasing Pain: to 2/10 for increased activity tolerance    STG: Patient to be IND with HEP and modalities for pain management   Long Term Goals: (8-10 weeks)   LTG: Decrease edema in right wrist to WNLs for increased ability to hold a glass of water   LTG: Decrease complaints of pain to 0 out of 10 to increase tolerance for ADLs    LTG: Increase independence in the following ADLs/iADLs: use utensils to feed self and/or prepare meal   LTG: Increase ROM to right = left in order to turn a doorknob   LTG: Increase functional use of right to push a grocery cart   LTG: Increase  strength of right to turn steering wheel while driving   LTG: Increase lateral pinch strength by 2 lbs to turn key in ignition    Pt's spiritual, cultural and educational needs considered and patient is agreeable to plan of care and goals as stated above.     There are no Anticipated Barriers for Occupational  therapy      Plan:    Patient to be treated by Occupational Therapy 2 times per week for 8 weeks  to achieve the established goals. Treatment to include modalities including but not limited to paraffin, fluidotherapy, moist heat pack, edema control techniques, A/PROM, Manual therapy/mobilizations, Therapeutic exercises/activities, ultrasound, scar maturation techniques, desensitization, strengthening, neural mobilizations/nerve gliding, stretching as well as any other treatments deemed necessary based on the patient's needs or progress.                   I certify the need for these services furnished under this plan of treatment and while under my care    ____________________________________                        ______________  Physician/Referring Practitioner                Date of Signature

## 2017-04-20 PROBLEM — G89.29 CHRONIC PAIN OF RIGHT ELBOW: Status: ACTIVE | Noted: 2017-04-20

## 2017-04-20 PROBLEM — M25.60 DECREASED RANGE OF MOTION: Status: ACTIVE | Noted: 2017-04-20

## 2017-04-20 PROBLEM — R68.89 DECREASED FUNCTIONAL ACTIVITY TOLERANCE: Status: ACTIVE | Noted: 2017-04-20

## 2017-04-20 PROBLEM — M25.521 CHRONIC PAIN OF RIGHT ELBOW: Status: ACTIVE | Noted: 2017-04-20

## 2017-04-20 PROBLEM — R29.898 DECREASED GRIP STRENGTH OF RIGHT HAND: Status: ACTIVE | Noted: 2017-04-20

## 2017-04-20 NOTE — PLAN OF CARE
"Occupational Therapy Evaluation    Patient:  Lavonne Pierre  Date of Therapy Visit:  2017  Referring Physician:  Dr Estrada  Diagnosis: R lateral epicondylitis  MRN : [id number]  Referral Orders:  Eval and treat     Start Time:500pm  End Time:  600pm  Total Time:  60 min    Certification period from 17 to 17  Visit # 1   Diagnosis:    HISTORY/OCCUPATIONAL PROFILE/ Medical and Therapy History:  Subjective:  Patient is a 41 year old right hand dominant female who presents for initial occupational therapy evaluation and today,2017 and  is 4 weeks 1 days s/p percutaneous needle fenestration of the right common extensor tendon on 3/21/17 by Dr Estrada.    Date of surgery:  3/21/17  Location of injury:   Right elbow   Current History of Injury /Mechanism of Injury:  Insidious; She started having painful symptoms in November and did a trial of an elbow strap with no resolution in symptoms.  She believes she woke up one day with elbow pain.  She does have fibromyalgia.   She has had a previous cortisone injection which did not help.  On 3/21/17, she had percutaneous needle fenestration of the right common extensor tendon. She is currently wearing a wrist splint during the day.  Her main complaint today is pain and lack of motion. She has difficulty with all ADLs and is dependent on her left for most activities.      Rehabilitation Expectation/Goals: Patient's goals for therapy are to be able to  "have no pain and be able to do things"   - minimize: pain  - normalize: loss of function  Pain:   During no work/At Rest:    4-5 out of 10   While working/ With Activity:  8 out of 10   Sleepin-7 out of 10    Location of Pain:  Right elbow   Description of Pain:  Stabbing pain   Pain relived by: resting, nitroglycerin patch    Previous Medical Management/ Past Medical History/Physical Systems Review:   Patient denies any previous injury to this area.  Past Medical History:   Diagnosis Date    Anxiety " 7/16/2013    Fibromyalgia     HTN (hypertension)     Preeclampsia      Review of patient's allergies indicates:   Allergen Reactions    Ibuprofen Other (See Comments)     Affects stomach ulcer, avoids all NSAIDS    Latex, natural rubber Rash     Small red bumps on skin contact       Occupation:    Working presently:  yes  Patient's work/Activities consist of: phone, computer, writing, filing   Workmen's Compensation:  no        FUNCTIONAL STATUS:   Previous functional status includes: Independent with all ADLs and ambulating without assistance   Current FunctionalStatus:  D on L for most ADLs (see below)   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   Exercise routine prior to onset : n/a   DME owned:  none   Home/Living environment :  Lives with fiance and 2 children(16 yo and 10 yo) in one story house with 4 steps on entry, railings on both sides   Limitation of Functional Status:   ADLs (difficulty with the following tasks):    - Feeding:i    - Meal Prep: pain with holding a pot    - Bathing:i      - Dressing: i     - Grooming: blowdrying hair      Self Care / ADL:     IADLs: (difficulty with the following tasks):    - managing finances: i    - driving/handling transportation: turning steering wheel;     - shopping: D on L    - using phone: pain    - computer: mousing and typing    - managing medications: pain with child proof caps    - housework & basic home maintenance:        Leisure: n/a  Environmental Concerns/ Fall Risk:  None   Barriers to Learning:  None   Cultural/Spiritual : None   Developmental/Education: None  Nutritional Deficit: None   Abuse/Neglect : None    Language: None   Hearing/Vision Deficit: None   Other:     Objective:  Edema/ Girth/Volume: Pre-Treatment Girth (cm):      04/19/2017 04/19/2017    right left   elbow 26.6 25.5     Observations:    Sensation Test:  Sensation grossly intact to light touch all dermatomal regions  Palpation:   Sensitivity: severe  tenderness and hypersensitivity at ECRB insertion   Patient denies current numbness & tingling; Temp, touch and pressure sense are intact in right    Range of Motion: AROM        RIGHT      LEFT       Date:   04/19/2017 04/19/2017         Elbow Ext/Flex 15/115 0/135         Elbow Sup/Pro 75/90 75/90        Wrist Ext/Flex 65/55 70/69        Wrist RD/UD 5/25 15/30     Special Testing:  Cozen's Test: +  Phalen's Test: -  Durkan's Test: -  Tinel's @ cubital tunnel: -  Tinel's @ carpal tunnel: -  Valgus Instability: -  Varus Instability: -    Coordination:  not impaired for FMC in right  in ADL/IADL's     Endurance: decreased for light ADL/IADL's w/ use of right           Strength: (LYDIA Dynamometer in lbs.), Average 3 trials, Position II; deferred at this time    Treatment/ Patient and family/caregiver learning and education: :     OT eval performed today and instruction in written HEP including forearm stretches, forearm, wrist and thumb ROM  Patient did require any verbal cues to perform exercises correctly.  THERAPEUTIC ACTIVITIES x 30 to increase ROM, functional use, coordination, dexterity and improve fine motor control  -gentle STM to ext mass avoiding tendon insertion  -AROM shoulder, elbow, wrist and fingers (all planes) 3 x 10 reps    Assessment:     Medical Necessity is demonstrated by the following impairments/problems:    History Examination Decision Making Complexity Score   Occupational Profile:  Performance Deficits:  Difficulty with bathing, dressing, feeding, writing, driving, cleaning    Detailed assessment, 3-5 performance deficits noted      Medical and Therapy History:   Past Medical History:   Diagnosis Date    Anxiety 7/16/2013    Fibromyalgia     HTN (hypertension)     Preeclampsia     Physical:  Decreased ROM, increased pain, increased edema, decreased functional use, decreased strength Inability to perform regular duties at work:      Cognitive:  Impaired cognitive skills: NONE  Modifications/ need for assistance:  Modification/task , no assistance required  Minimal to moderate assistance or modification of task required  Significant assistance required     Psychosocial:    Lives with finance and 2 children, working FT as  Impaired psychosocial coping strategies:  NONE             Level of complexity is low based on PMHX, co morbidities , data from assessments and functional level of assistance required with task and clinical presentation directly impacting       Medical necessity is demonstrated by the following IMPAIRMENTS/PROBLEMS:  Patient Lack of Knowledge/Awareness in Home Program  Increased Pain in right  Decreased functional use/ unable to perform ADLs/iADLs  Increased Edema  Decreased ROM   Decreased  strength  Decreased pinch strength  Hypersensitivity      Pt presents to occupational therapy with an OT diagnosis of right lateral epicondylitis and is 4 weeks 1 days s/p percutaneous needle fenestration of the right common extensor tendon on 3/21/17 by Dr Estrada. We reviewed a detailed home program to address the above problem areas including modality use, body mechanics, stretches and splinting and patient was able to independently demonstrate these while in therapy today. The patient requires skilled occupational therapy to address the problems identified above and to achieve the individual patient goals as outlined in the problems and goals section of this evaluation.  Overall rehab potential is GOOD.  The patient and or family/caregiver was educated regarding the details of their diagnosis, prognosis, related pathology, plan of care and modality use.  The patient demonstrates a GOOD understanding of the risks, benefits, precautions/ contraindications and prognosis of their skilled occupational therapy rehabilitation program.  We reviewed therapy expectations and goals and it was understood clearly that they will be active participants in their  rehabilitation.Lavonne demonstrated a good understanding of the education provided including HEP and modalities for pain management.     Patient prefers to attend therapy: 1 times a week with time preference of late evening    G CODE TOOL: FOTO    Category: self care/ carrying      Current Score  = 56% impaired  Goal at Discharge Score = 45% impaired    Score interpretation is as follows:     TEST SCORE  Modifier  Impairment Limitation Restriction    0%  CH  0 % impaired, limited or restricted    1-19%  CI  @ least 1% but less than 20% impaired, limited or restricted    20-39%  CJ  @ least 20%<40% impaired, limited or restricted    40-59%  CK  @ least 40%<60% impaired, limited or restricted    60-79%  CL  @ least 60% <80% impaired, limited or restricted    80-99%  CM  @ least 80%<100% impaired limited or restricted    100%  CN  100% impaired, limited or restricted     GOALS:  Short Therm Goals: (2 weeks)    STG: Patient will be independent in home exercise and self care program    STG : Symptomatic Improvements: Decreasing Pain: to 2/10 for increased activity tolerance    STG: Patient to be IND with HEP and modalities for pain management   Long Term Goals: (8-10 weeks)   LTG: Decrease edema in right wrist to WNLs for increased ability to hold a glass of water   LTG: Decrease complaints of pain to 0 out of 10 to increase tolerance for ADLs    LTG: Increase independence in the following ADLs/iADLs: use utensils to feed self and/or prepare meal   LTG: Increase ROM to right = left in order to turn a doorknob   LTG: Increase functional use of right to push a grocery cart   LTG: Increase  strength of right to turn steering wheel while driving   LTG: Increase lateral pinch strength by 2 lbs to turn key in ignition    Pt's spiritual, cultural and educational needs considered and patient is agreeable to plan of care and goals as stated above.     There are no Anticipated Barriers for Occupational  therapy      Plan:    Patient to be treated by Occupational Therapy 2 times per week for 8 weeks  to achieve the established goals. Treatment to include modalities including but not limited to paraffin, fluidotherapy, moist heat pack, edema control techniques, A/PROM, Manual therapy/mobilizations, Therapeutic exercises/activities, ultrasound, scar maturation techniques, desensitization, strengthening, neural mobilizations/nerve gliding, stretching as well as any other treatments deemed necessary based on the patient's needs or progress.                   I certify the need for these services furnished under this plan of treatment and while under my care    ____________________________________                        ______________  Physician/Referring Practitioner                Date of Signature

## 2017-04-26 ENCOUNTER — CLINICAL SUPPORT (OUTPATIENT)
Dept: REHABILITATION | Facility: HOSPITAL | Age: 42
End: 2017-04-26
Attending: PHYSICAL MEDICINE & REHABILITATION
Payer: MEDICAID

## 2017-04-26 DIAGNOSIS — M25.60 DECREASED RANGE OF MOTION: ICD-10-CM

## 2017-04-26 DIAGNOSIS — G89.29 CHRONIC PAIN OF RIGHT ELBOW: ICD-10-CM

## 2017-04-26 DIAGNOSIS — R29.898 DECREASED GRIP STRENGTH OF RIGHT HAND: ICD-10-CM

## 2017-04-26 DIAGNOSIS — M25.521 CHRONIC PAIN OF RIGHT ELBOW: ICD-10-CM

## 2017-04-26 DIAGNOSIS — R68.89 DECREASED FUNCTIONAL ACTIVITY TOLERANCE: ICD-10-CM

## 2017-04-26 PROCEDURE — 97035 APP MDLTY 1+ULTRASOUND EA 15: CPT | Mod: PN

## 2017-04-26 PROCEDURE — 97530 THERAPEUTIC ACTIVITIES: CPT | Mod: 59,PN

## 2017-04-26 NOTE — PROGRESS NOTES
"Occupational Therapy Evaluation    Patient:  Lavonne Pierre  Date of Therapy Visit:  2017  Referring Physician:  Dr Estrada  Diagnosis: R lateral epicondylitis  MRN : [id number]  Referral Orders:  Eval and treat     Start Time:500pm  End Time:  600pm  Total Time:  60 min    Certification period from 17 to 17  Visit # 1   G CODE TOOL: FOTO  Category: self care/ carrying   Current Score  = 56% impaired  Goal at Discharge Score = 45% impaired    HISTORY/OCCUPATIONAL PROFILE/ Medical and Therapy History:  Subjective:  Patient is a 41 year old right hand dominant female who presents for initial occupational therapy evaluation and today,2017 and  is 4 weeks 1 days s/p percutaneous needle fenestration of the right common extensor tendon on 3/21/17 by Dr Estrada.  Daily Comments:  "My elbow really hurts when I move it, I have to carry my arm when I walk"  Date of surgery:  3/21/17    Pain:   During no work/At Rest:    4-5 out of 10   While working/ With Activity:  8 out of 10   Sleepin-7 out of 10    Location of Pain:  Right elbow   Description of Pain:  Stabbing pain   Pain relived by: resting, nitroglycerin patch    Occupation:    Objective:  Treatment :     THERAPEUTIC ACTIVITIES x 30 to increase ROM, functional use, coordination, dexterity and improve fine motor control  -MH to right elbow  -gentle STM to ext mass avoiding tendon insertion  -AROM shoulder, elbow, wrist and fingers (all planes) 3 x 10 reps  -gentle neutral overhead pulleys x 4  -PUS pain control and decreased inflammation @ 50 duty cycle, 3.3 Mhz, applied to right ECRB insertion, intensity = .6 w/cm2 for 8 minutes.    Assessment:   Medical necessity is demonstrated by the following IMPAIRMENTS/PROBLEMS:  Patient Lack of Knowledge/Awareness in Home Program  Increased Pain in right  Decreased functional use/ unable to perform ADLs/iADLs  Increased Edema  Decreased ROM   Decreased  strength  Decreased pinch " strength  Hypersensitivity    Pt presents to occupational therapy with an OT diagnosis of right lateral epicondylitis and is 5 weeks 1 days s/p percutaneous needle fenestration of the right common extensor tendon on 3/21/17 by Dr Estrada. Lavonne continues to walk with her hand on her chest in a guarded position.  She was able to complete AROM exercises today with mild discomfort.  I encouraged her to increase her elbow flexion/extension exercises in supination.  She was instructed to continue with using cold packs.  I fitted her for a long xochitl sleeve for her right as a trial.  I adjusted her wrist splint to a more approximately 15 degrees of extension as it was in approximately 30-35.  We re-reviewed her home program to address the above problem areas including modality use, body mechanics, stretches and splinting and patient was able to independently demonstrate these while in therapy today. The patient requires skilled occupational therapy to address the problems identified above and to achieve the individual patient goals as outlined in the problems and goals section.    GOALS:  Short Therm Goals: (2 weeks)    STG: Patient will be independent in home exercise and self care program    STG : Symptomatic Improvements: Decreasing Pain: to 2/10 for increased activity tolerance    STG: Patient to be IND with HEP and modalities for pain management   Long Term Goals: (8-10 weeks)   LTG: Decrease edema in right wrist to WNLs for increased ability to hold a glass of water   LTG: Decrease complaints of pain to 0 out of 10 to increase tolerance for ADLs    LTG: Increase independence in the following ADLs/iADLs: use utensils to feed self and/or prepare meal   LTG: Increase ROM to right = left in order to turn a doorknob   LTG: Increase functional use of right to push a grocery cart   LTG: Increase  strength of right to turn steering wheel while driving   LTG: Increase lateral pinch strength by 2 lbs to turn key in  ignition    Pt's spiritual, cultural and educational needs considered and patient is agreeable to plan of care and goals as stated above.     There are no Anticipated Barriers for Occupational  therapy      Plan:   Patient to be treated by Occupational Therapy 2 times per week for 8 weeks  to achieve the established goals. Treatment to include modalities including but not limited to paraffin, fluidotherapy, moist heat pack, edema control techniques, A/PROM, Manual therapy/mobilizations, Therapeutic exercises/activities, ultrasound, scar maturation techniques, desensitization, strengthening, neural mobilizations/nerve gliding, stretching as well as any other treatments deemed necessary based on the patient's needs or progress.                   I certify the need for these services furnished under this plan of treatment and while under my care    ____________________________________                        ______________  Physician/Referring Practitioner                Date of Signature

## 2017-05-03 DIAGNOSIS — M79.7 FIBROMYALGIA: ICD-10-CM

## 2017-05-03 RX ORDER — AMITRIPTYLINE HYDROCHLORIDE 25 MG/1
TABLET, FILM COATED ORAL
Qty: 30 TABLET | Refills: 11 | Status: SHIPPED | OUTPATIENT
Start: 2017-05-03 | End: 2018-05-05 | Stop reason: SDUPTHER

## 2017-05-04 ENCOUNTER — OFFICE VISIT (OUTPATIENT)
Dept: RHEUMATOLOGY | Facility: CLINIC | Age: 42
End: 2017-05-04
Payer: MEDICAID

## 2017-05-04 VITALS
SYSTOLIC BLOOD PRESSURE: 128 MMHG | HEART RATE: 112 BPM | DIASTOLIC BLOOD PRESSURE: 87 MMHG | BODY MASS INDEX: 31.74 KG/M2 | WEIGHT: 168 LBS

## 2017-05-04 DIAGNOSIS — R76.8 ANA POSITIVE: ICD-10-CM

## 2017-05-04 DIAGNOSIS — R68.89 DECREASED FUNCTIONAL ACTIVITY TOLERANCE: ICD-10-CM

## 2017-05-04 DIAGNOSIS — E04.1 THYROID NODULE: ICD-10-CM

## 2017-05-04 DIAGNOSIS — R13.10 DYSPHAGIA, UNSPECIFIED TYPE: ICD-10-CM

## 2017-05-04 DIAGNOSIS — M79.7 FIBROMYALGIA: Primary | ICD-10-CM

## 2017-05-04 DIAGNOSIS — F41.9 ANXIETY: ICD-10-CM

## 2017-05-04 PROCEDURE — 99213 OFFICE O/P EST LOW 20 MIN: CPT | Mod: PBBFAC,PO | Performed by: INTERNAL MEDICINE

## 2017-05-04 PROCEDURE — 99214 OFFICE O/P EST MOD 30 MIN: CPT | Mod: S$PBB,,, | Performed by: INTERNAL MEDICINE

## 2017-05-04 PROCEDURE — 99999 PR PBB SHADOW E&M-EST. PATIENT-LVL III: CPT | Mod: PBBFAC,,, | Performed by: INTERNAL MEDICINE

## 2017-05-04 RX ORDER — BUPROPION HYDROCHLORIDE 300 MG/1
300 TABLET ORAL DAILY
Qty: 90 TABLET | Refills: 4 | Status: SHIPPED | OUTPATIENT
Start: 2017-05-04 | End: 2017-09-17

## 2017-05-04 RX ORDER — BUPROPION HYDROCHLORIDE 150 MG/1
150 TABLET ORAL DAILY
Refills: 8 | COMMUNITY
Start: 2017-04-04 | End: 2017-05-04 | Stop reason: SDUPTHER

## 2017-05-04 ASSESSMENT — ROUTINE ASSESSMENT OF PATIENT INDEX DATA (RAPID3)
PSYCHOLOGICAL DISTRESS SCORE: 2.2
MDHAQ FUNCTION SCORE: 1.2
PAIN SCORE: 7
TOTAL RAPID3 SCORE: 5.33
PATIENT GLOBAL ASSESSMENT SCORE: 5
FATIGUE SCORE: 7
AM STIFFNESS SCORE: 1, YES

## 2017-05-04 NOTE — MR AVS SNAPSHOT
[facility]  [address] [facility] [address] [place] [address]  Phone: [phone]  Fax: [phone]                  Lavonne Emery Cecilyt   2017 8:30 AM   Office Visit    Description:  Female : 1975   Provider:  Sheri Ross DO   Department:  [place] [facility]           Reason for Visit     Follow-up           Diagnoses this Visit        Comments    Fibromyalgia    -  Primary Savella and Wellbutrin. Recent increase Savella to 50mg , now increasing Wellbutrin to 300mg    MIRIAN positive     With autoimmune thyroid antibodies.     Anxiety         Decreased functional activity tolerance     Deconditioning.     Dysphagia, unspecified type         Thyroid nodule                To Do List           Future Appointments        Provider Department Dept Phone    2017 2:45 PM [facility] US[facility] [phone]    5/10/2017 5:00 PM Abram Herman, OT [facility] [facility] [phone]    2017 5:00 PM Abram Herman OT [facility] [phone]    2017 5:00 PM Abram Herman, OT [facility] [phone]    2017 3:30 PM Omer Mcgee MD [facility] [phone]      Goals (5 Years of Data)     None      Follow-Up and Disposition     Return in about 4 months (around 2017).       These Medications        Disp Refills Start End    buPROPion (WELLBUTRIN XL) 300 MG 24 hr tablet 90 tablet 4 2017    Take 1 tablet (300 mg total) by mouth once daily at 6am. - Oral    Pharmacy: [facility] [address] AT [facility] [address] Ph #: [phone]         [facility]     [facility] On Call Nurse Care Line -  Assistance  Unless otherwise directed by your provider, please contact [facility], our nurse care line that is available for  assistance.     Registered nurses in the [facility] provide: appointment  scheduling, clinical advisement, health education, and other advisory services.  Call: [phone] (toll free)               Medications           Message regarding Medications     Verify the changes and/or additions to your medication regime listed below are the same as discussed with your clinician today.  If any of these changes or additions are incorrect, please notify your healthcare provider.        START taking these NEW medications        Refills    buPROPion (WELLBUTRIN XL) 300 MG 24 hr tablet 4    Sig: Take 1 tablet (300 mg total) by mouth once daily at 6am.    Class: Normal    Route: Oral      STOP taking these medications     hydrocodone-chlorpheniramine (TUSSIONEX) 10-8 mg/5 mL suspension Take 5 mLs by mouth every 12 (twelve) hours as needed for Cough.    pantoprazole (PROTONIX) 40 MG tablet TK 1 T PO D 30 MIN B NIRAV           Verify that the below list of medications is an accurate representation of the medications you are currently taking.  If none reported, the list may be blank. If incorrect, please contact your healthcare provider. Carry this list with you in case of emergency.           Current Medications     amitriptyline (ELAVIL) 25 MG tablet TAKE 1 TABLET(25 MG) BY MOUTH EVERY NIGHT AS NEEDED FOR INSOMNIA    ANORO ELLIPTA 62.5-25 mcg/actuation DsDv INL 1 PUFF PO QD UTD    buPROPion (WELLBUTRIN XL) 300 MG 24 hr tablet Take 1 tablet (300 mg total) by mouth once daily at 6am.    lisinopril (PRINIVIL,ZESTRIL) 5 MG tablet TAKE 1 TABLET BY MOUTH ONCE DAILY.    milnacipran (SAVELLA) 50 mg Tab Take 1 tablet (50 mg total) by mouth 2 (two) times daily.    multivitamin (THERAGRAN) per tablet Take 1 tablet by mouth once daily.    nitroGLYCERIN 0.1 mg/hr TD PT24 (NITRODUR) 0.1 mg/hr PT24 Place 1 patch onto the skin once daily.    tizanidine 2 mg Cap Take 2 mg by mouth as needed.            Clinical Reference Information           Your Vitals Were     BP Pulse Weight BMI       128/87 (BP Location: Left  arm, Patient Position: Sitting, BP Method: Automatic) 112 76.2 kg (167 lb 15.9 oz) 31.74 kg/m2       Blood Pressure          Most Recent Value    BP  128/87      Allergies as of 5/4/2017     Ibuprofen    Latex, Natural Rubber      Immunizations Administered on Date of Encounter - 5/4/2017     None      Orders Placed During Today's Visit     Future Labs/Procedures Expected by Expires    US Soft Tissue Head Neck Thyroid  5/4/2017 5/4/2018      Language Assistance Services     ATTENTION: Language assistance services are available, free of charge. Please call [phone].      ATENCIÓN: Si habla dlañol, tiene a arguello disposición servicios gratuitos de asistencia lingüística. Llame al [phone].     CHÚ Ý: N?u b?n nói Ti?ng Vi?t, có các d?ch v? h? tr? ngôn ng? mi?n phí dành cho b?n. G?i s? [phone].         [facility] complies with applicable Federal civil rights laws and does not discriminate on the basis of race, color, national origin, age, disability, or sex.

## 2017-05-04 NOTE — PROGRESS NOTES
Subjective:          Chief Complaint: Lavonne Pierre is a 41 y.o. female who had concerns including Follow-up.    HPI:    Patient here today for follow-up with a history of fibromyalgia positive MIRIAN 1:160 homogeneous/speckled. negative MIRIAN profile. + thyroid Antibodies. Patient did have increase with Savella to 50mg BID, Still on Wellbutrin XL 300mg daily.   She is noting significant brain fog. INcreased anxiety. Trouble with restless leg type symptoms, but occur during the day.  She feels like she has a lump in her neck. Did have dilation of esophagus with Dr. Benedict as of 2/2017. Still feels food getting stuck.    she's had various myalgias as well as joint pain in the ankles, feet, wrists, shoulders and neck and legs.  She also has an underlying history of depression she's had no constitutional symptoms, rashes, oral ulcers, serositis, Raynaud's.  Initially she was diagnosed with repeat legionnaires pneumonia last year approximately same time of positive MIRIAN recently Dr. Mcgee her primary care physician ordered a repeat MIRIAN showed a labs of 1:320 with a negative MIRIAN profile.     Patient in the interim was started on Lyrica a 75mg BID for few months with signficant weight gain. More recently started on Savella 12.5mg (approved by her insurance).  She is noting pain in her feet (diffusely), knees at the superior pole of the patella. And bilateral elbows. She is having stiffness in AM of about 1 hour. She is c/o lumps in her skin tender, no redness but she does try to massage them. She does have some redness in groin, behind knees and face and chin. Non-photyosenstive. She is noting dry eye and dry mouth. Thick secretions in eye every day. Noting some trouble with foods sticking in upper esophagus.     Component      Latest Ref Rng & Units 1/17/2017 1/15/2016   Anti Sm Antibody      0.00 - 19.99 EU  0.97   Anti-Sm Interpretation      Negative  Negative   Anti-SSA Antibody      0.00 - 19.99 EU  1.09   Anti-SSA  Interpretation      Negative  Negative   Anti-SSB Antibody      0.00 - 19.99 EU  0.30   Anti-SSB Interpretation      Negative  Negative   ds DNA Ab      Negative 1:10  Negative 1:10   Anti Sm/RNP Antibody      0.00 - 19.99 EU  0.11   Anti-Sm/RNP Interpretation      Negative  Negative   Sed Rate      0 - 20 mm/Hr  9   CRP      0.0 - 8.2 mg/L  5.7   MIRIAN Screen      Negative <1:160 Positive (A) Positive (A)   Rheumatoid Factor      0.0 - 15.0 IU/mL  <10.0   MIRIAN HEP-2 Titer       Positive 1:320 Homogeneous Positive 1:160 Homogeneous       REVIEW OF SYSTEMS:    Review of Systems   Constitutional: Positive for malaise/fatigue and weight loss. Negative for chills and fever.   HENT: Negative for sore throat.    Eyes: Negative for double vision, photophobia, discharge and redness.   Respiratory: Negative for cough, shortness of breath and wheezing.    Cardiovascular: Negative for chest pain, palpitations and orthopnea.   Gastrointestinal: Positive for heartburn. Negative for abdominal pain, constipation and diarrhea.   Genitourinary: Negative for dysuria, hematuria and urgency.   Musculoskeletal: Positive for joint pain, myalgias and neck pain. Negative for back pain.   Skin: Negative for rash.   Neurological: Positive for headaches. Negative for dizziness, tingling, focal weakness and weakness.   Endo/Heme/Allergies: Does not bruise/bleed easily.   Psychiatric/Behavioral: Negative for depression, hallucinations and suicidal ideas. The patient is nervous/anxious.                Objective:            Past Medical History:   Diagnosis Date    Anxiety 7/16/2013    Fibromyalgia     HTN (hypertension)     Preeclampsia      Family History   Problem Relation Age of Onset    Pancreatic cancer Mother     Lymphoma Father     Pancreatic cancer Maternal Grandmother     Pancreatic cancer Maternal Uncle     Breast cancer Maternal Aunt      Social History   Substance Use Topics    Smoking status: Never Smoker    Smokeless  tobacco: Never Used    Alcohol use No         Current Outpatient Prescriptions on File Prior to Visit   Medication Sig Dispense Refill    amitriptyline (ELAVIL) 25 MG tablet TAKE 1 TABLET(25 MG) BY MOUTH EVERY NIGHT AS NEEDED FOR INSOMNIA 30 tablet 11    ANORO ELLIPTA 62.5-25 mcg/actuation DsDv INL 1 PUFF PO QD UTD  6    lisinopril (PRINIVIL,ZESTRIL) 5 MG tablet TAKE 1 TABLET BY MOUTH ONCE DAILY. 90 tablet 3    milnacipran (SAVELLA) 50 mg Tab Take 1 tablet (50 mg total) by mouth 2 (two) times daily. 180 tablet 3    multivitamin (THERAGRAN) per tablet Take 1 tablet by mouth once daily.      nitroGLYCERIN 0.1 mg/hr TD PT24 (NITRODUR) 0.1 mg/hr PT24 Place 1 patch onto the skin once daily. 30 patch 1    tizanidine 2 mg Cap Take 2 mg by mouth as needed.       [DISCONTINUED] buPROPion (WELLBUTRIN XL) 300 MG 24 hr tablet Take 1 tablet (300 mg total) by mouth once daily. (Patient taking differently: Take 150 mg by mouth once daily. ) 90 tablet 3    [DISCONTINUED] hydrocodone-chlorpheniramine (TUSSIONEX) 10-8 mg/5 mL suspension Take 5 mLs by mouth every 12 (twelve) hours as needed for Cough. 115 mL 0    [DISCONTINUED] pantoprazole (PROTONIX) 40 MG tablet TK 1 T PO D 30 MIN B NIRAV  11     No current facility-administered medications on file prior to visit.        Vitals:    05/04/17 0804   BP: 128/87   Pulse: (!) 112       Physical Exam:    Physical Exam   Constitutional: She appears well-developed and well-nourished.   HENT:   Nose: No septal deviation.   Mouth/Throat: Mucous membranes are normal. No oral lesions.   Eyes: Pupils are equal, round, and reactive to light. Right conjunctiva is not injected. Left conjunctiva is not injected.   Neck: No JVD present. No thyroid mass and no thyromegaly present.   Cardiovascular: Normal rate, regular rhythm and normal pulses.    No edema   Pulmonary/Chest: Effort normal and breath sounds normal.   Abdominal: Soft. Normal appearance. There is no hepatosplenomegaly.    Musculoskeletal:        Right shoulder: She exhibits tenderness. She exhibits normal range of motion and no swelling.        Left shoulder: She exhibits tenderness. She exhibits normal range of motion and no swelling.        Right elbow: She exhibits normal range of motion and no swelling. Tenderness found. Medial epicondyle tenderness noted.        Left elbow: She exhibits normal range of motion and no swelling. Tenderness found. Medial epicondyle tenderness noted.        Right wrist: She exhibits normal range of motion, no tenderness and no swelling.        Left wrist: She exhibits normal range of motion, no tenderness and no swelling.        Right hip: She exhibits normal range of motion, normal strength and no swelling.        Left hip: She exhibits normal range of motion, no tenderness and no swelling.        Right knee: She exhibits normal range of motion and no swelling. Tenderness found. Medial joint line tenderness noted.        Left knee: She exhibits normal range of motion and no swelling. Tenderness found. Medial joint line tenderness noted.        Right ankle: She exhibits normal range of motion and no swelling. No tenderness.        Left ankle: She exhibits normal range of motion and no swelling. No tenderness.        Cervical back: She exhibits bony tenderness.   14/18 FMS tenderpoints w/o No synovitis, restriction in ROM, tenderness of wrist, MCP, PIP, DIP. No weakness in . Able to fully curl fingers.      Lymphadenopathy:     She has no cervical adenopathy.     She has no axillary adenopathy.   Neurological: She has normal strength and normal reflexes.   Skin: Skin is dry and intact.   Psychiatric: Her mood appears anxious.             Assessment:       Encounter Diagnoses   Name Primary?    Fibromyalgia Yes    MIRIAN positive     Anxiety     Decreased functional activity tolerance     Dysphagia, unspecified type     Thyroid nodule           Plan:        Fibromyalgia  Comments:  Nadja riggs  Wellbutrin. Recent increase Savella to 50mg , now increasing Wellbutrin to 300mg    MIRIAN positive  Comments:  With autoimmune thyroid antibodies.     Anxiety    Decreased functional activity tolerance  Comments:  Deconditioning.     Dysphagia, unspecified type  -     US Soft Tissue Head Neck Thyroid; Future; Expected date: 5/4/17    Thyroid nodule  -     US Soft Tissue Head Neck Thyroid; Future; Expected date: 5/4/17    Other orders  -     buPROPion (WELLBUTRIN XL) 300 MG 24 hr tablet; Take 1 tablet (300 mg total) by mouth once daily at 6am.  Dispense: 90 tablet; Refill: 4    Very pleasant 41-year-old f will rule out for underlying Hashimoto..  That being said, this ould be Sjogren's;she does have some dryI and dry mouth.    Will adjust her Savella 25mg BID  INcrease Tizanidine  Check ESR, CRP, TPO and Tg Ab.     Return in about 4 months (around 9/4/2017).        30min consultation with greater than 50% spent in counseling, chart review and coordination of care. All questions answered.

## 2017-05-08 ENCOUNTER — HOSPITAL ENCOUNTER (OUTPATIENT)
Dept: RADIOLOGY | Facility: HOSPITAL | Age: 42
Discharge: HOME | End: 2017-05-08
Attending: INTERNAL MEDICINE
Payer: MEDICAID

## 2017-05-08 DIAGNOSIS — E04.1 THYROID NODULE: ICD-10-CM

## 2017-05-08 DIAGNOSIS — R13.10 DYSPHAGIA, UNSPECIFIED TYPE: ICD-10-CM

## 2017-05-08 PROCEDURE — 76536 US EXAM OF HEAD AND NECK: CPT | Mod: 26,,, | Performed by: RADIOLOGY

## 2017-05-08 PROCEDURE — 76536 US EXAM OF HEAD AND NECK: CPT | Mod: TC,PO

## 2017-05-10 ENCOUNTER — TELEPHONE (OUTPATIENT)
Dept: RHEUMATOLOGY | Facility: CLINIC | Age: 42
End: 2017-05-10

## 2017-05-10 NOTE — TELEPHONE ENCOUNTER
----- Message from RT Shazia sent at 5/10/2017  1:14 PM CDT -----  Contact: pt    pt , requesting a call back soon to have her US of the thyroid results explained, thanks.    Spoke to the patient and let her know that the doctor will review and let her know her interpretation. CG

## 2017-05-11 NOTE — TELEPHONE ENCOUNTER
Please call patient she will need a TSH and likely just f/u for small nodule on thyroid that is less than 1cm. Typically we just monitor these if TSH is normal. She can f/u with Dr. Alford for this.    Dr. KELLY

## 2017-05-11 NOTE — TELEPHONE ENCOUNTER
Spoke to patient and advised that Dr. Mcgee should monitor nodule and thyroid labs. As a courtesy, a TSH test was booked from an old order from Melissa Cagle for this Saturday. Patient will contact PCP in a few days for results. ROME

## 2017-05-12 ENCOUNTER — PATIENT MESSAGE (OUTPATIENT)
Dept: FAMILY MEDICINE | Facility: CLINIC | Age: 42
End: 2017-05-12

## 2017-05-13 ENCOUNTER — LAB VISIT (OUTPATIENT)
Dept: LAB | Facility: HOSPITAL | Age: 42
End: 2017-05-13
Attending: OBSTETRICS & GYNECOLOGY
Payer: MEDICAID

## 2017-05-13 DIAGNOSIS — N91.2 ABSENT MENSES: ICD-10-CM

## 2017-05-13 LAB — TSH SERPL DL<=0.005 MIU/L-ACNC: 2.21 UIU/ML

## 2017-05-13 PROCEDURE — 84443 ASSAY THYROID STIM HORMONE: CPT

## 2017-05-13 PROCEDURE — 36415 COLL VENOUS BLD VENIPUNCTURE: CPT | Mod: PO

## 2017-05-17 ENCOUNTER — CLINICAL SUPPORT (OUTPATIENT)
Dept: REHABILITATION | Facility: HOSPITAL | Age: 42
End: 2017-05-17
Attending: PHYSICAL MEDICINE & REHABILITATION
Payer: MEDICAID

## 2017-05-17 DIAGNOSIS — G89.29 CHRONIC PAIN OF RIGHT ELBOW: Primary | ICD-10-CM

## 2017-05-17 DIAGNOSIS — M25.521 CHRONIC PAIN OF RIGHT ELBOW: Primary | ICD-10-CM

## 2017-05-17 DIAGNOSIS — R68.89 DECREASED FUNCTIONAL ACTIVITY TOLERANCE: ICD-10-CM

## 2017-05-17 DIAGNOSIS — M25.60 DECREASED RANGE OF MOTION: ICD-10-CM

## 2017-05-17 DIAGNOSIS — R29.898 DECREASED GRIP STRENGTH OF RIGHT HAND: ICD-10-CM

## 2017-05-17 PROCEDURE — 97530 THERAPEUTIC ACTIVITIES: CPT | Mod: 59,PN

## 2017-05-17 NOTE — PROGRESS NOTES
"Occupational Therapy Evaluation    Patient:  Lavonne Pierre  Date of Therapy Visit:  2017  Referring Physician:  Dr Estrada  Diagnosis: R lateral epicondylitis  MRN : [id number]  Referral Orders:  Eval and treat     Start Time: 500pm  End Time:  600pm  Total Time:  60 min    Certification period from 17 to 17  Visit # 3   G CODE TOOL: FOTO  Category: self care/ carrying   Current Score  = 56% impaired  Goal at Discharge Score = 45% impaired    HISTORY/OCCUPATIONAL PROFILE/ Medical and Therapy History:  Subjective:  Patient is a 41 year old right hand dominant female who presents for initial occupational therapy evaluation and today,2017 and  is 4 weeks 1 days s/p percutaneous needle fenestration of the right common extensor tendon on 3/21/17 by Dr Estrada.  Daily Comments:  "I had to stop wearing the nitroglycerin patch because it is causing a rash"  Date of surgery:  3/21/17    Pain:   During no work/At Rest:    3 out of 10   While working/ With Activity:  5 out of 10   Sleepin out of 10    Location of Pain:  Right elbow   Description of Pain:  Stabbing pain   Pain relived by: resting, nitroglycerin patch    Occupation:    Objective:  Treatment :     THERAPEUTIC ACTIVITIES x 60 to increase ROM, functional use, coordination, dexterity and improve fine motor control  -MH to right elbow  -gentle STM to ext mass avoiding tendon insertion  -AROM shoulder, elbow, wrist and fingers (all planes) 3 x 20 reps  -gentle neutral overhead pulleys x 4  -PUS pain control and decreased inflammation @ 50 duty cycle, 3.3 Mhz, applied to right ECRB insertion, intensity = .6 w/cm2 for 8 minutes  -blue weighted ball on table top elbow e/f, wrist e/f  - dextercisor x 3  - wrist power flex x 3  - digi-extension x 3  -hammer s/p x3  -cold pack right elbow      Assessment:   Medical necessity is demonstrated by the following IMPAIRMENTS/PROBLEMS:  Patient Lack of Knowledge/Awareness in Home " Program  Increased Pain in right  Decreased functional use/ unable to perform ADLs/iADLs  Increased Edema  Decreased ROM   Decreased  strength  Decreased pinch strength  Hypersensitivity    Pt presents to occupational therapy with an OT diagnosis of right lateral epicondylitis and is 8 weeks 1 days s/p percutaneous needle fenestration of the right common extensor tendon on 3/21/17 by Dr Estrada. Lavonne has no showed for her past 2 appointments.  She states she is doing her HEP but could not replicate them in the clinic.  She had moderate pain with pulley exercises and has difficulty with full extension of her elbow.   I encouraged her to be compliant with her elbow flexion/extension exercises and with her forearm stretches.  She was instructed to continue with using cold packs.  I fitted her for another long xochitl sleeve for her right as a trial.   We re-reviewed her home program to address the above problem areas including modality use, body mechanics, stretches and splinting and patient was able to independently demonstrate these while in therapy today. The patient requires skilled occupational therapy to address the problems identified above and to achieve the individual patient goals as outlined in the problems and goals section.    GOALS:  Short Therm Goals: (2 weeks)    STG: Patient will be independent in home exercise and self care program    STG : Symptomatic Improvements: Decreasing Pain: to 2/10 for increased activity tolerance    STG: Patient to be IND with HEP and modalities for pain management   Long Term Goals: (8-10 weeks)   LTG: Decrease edema in right wrist to WNLs for increased ability to hold a glass of water   LTG: Decrease complaints of pain to 0 out of 10 to increase tolerance for ADLs    LTG: Increase independence in the following ADLs/iADLs: use utensils to feed self and/or prepare meal   LTG: Increase ROM to right = left in order to turn a doorknob   LTG: Increase functional use of  right to push a grocery cart   LTG: Increase  strength of right to turn steering wheel while driving   LTG: Increase lateral pinch strength by 2 lbs to turn key in ignition    Pt's spiritual, cultural and educational needs considered and patient is agreeable to plan of care and goals as stated above.     There are no Anticipated Barriers for Occupational  therapy      Plan:   Patient to be treated by Occupational Therapy 2 times per week for 8 weeks  to achieve the established goals. Treatment to include modalities including but not limited to paraffin, fluidotherapy, moist heat pack, edema control techniques, A/PROM, Manual therapy/mobilizations, Therapeutic exercises/activities, ultrasound, scar maturation techniques, desensitization, strengthening, neural mobilizations/nerve gliding, stretching as well as any other treatments deemed necessary based on the patient's needs or progress.                   I certify the need for these services furnished under this plan of treatment and while under my care    ____________________________________                        ______________  Physician/Referring Practitioner                Date of Signature

## 2017-05-24 ENCOUNTER — CLINICAL SUPPORT (OUTPATIENT)
Dept: REHABILITATION | Facility: HOSPITAL | Age: 42
End: 2017-05-24
Attending: PHYSICAL MEDICINE & REHABILITATION
Payer: MEDICAID

## 2017-05-24 DIAGNOSIS — R29.898 DECREASED GRIP STRENGTH OF RIGHT HAND: ICD-10-CM

## 2017-05-24 DIAGNOSIS — M25.60 DECREASED RANGE OF MOTION: ICD-10-CM

## 2017-05-24 DIAGNOSIS — R68.89 DECREASED FUNCTIONAL ACTIVITY TOLERANCE: ICD-10-CM

## 2017-05-24 DIAGNOSIS — G89.29 CHRONIC PAIN OF RIGHT ELBOW: ICD-10-CM

## 2017-05-24 DIAGNOSIS — M25.521 CHRONIC PAIN OF RIGHT ELBOW: ICD-10-CM

## 2017-05-24 PROCEDURE — 97530 THERAPEUTIC ACTIVITIES: CPT | Mod: 59,PN

## 2017-05-24 NOTE — PROGRESS NOTES
"Occupational Therapy Evaluation    Patient:  Lavonne Pierre  Date of Therapy Visit:  5/24/2017  Referring Physician:  Dr Estrada  Diagnosis: R lateral epicondylitis  MRN : [id number]  Referral Orders:  Eval and treat     Start Time: 500pm  End Time:  600pm  Total Time:  60 min    Certification period from 4/19/17 to 6/14/17  Visit # 4   G CODE TOOL: FOTO  Category: self care/ carrying   Current Score  = 56% impaired  Goal at Discharge Score = 45% impaired    HISTORY/OCCUPATIONAL PROFILE/ Medical and Therapy History:  Subjective:  Patient is a 41 year old right hand dominant female who presents for initial occupational therapy evaluation and today,05/24/2017 and  is 4 weeks 1 days s/p percutaneous needle fenestration of the right common extensor tendon on 3/21/17 by Dr Estrada.  Daily Comments:  "I had to stop wearing the nitroglycerin patch because it is causing a rash"  Date of surgery:  3/21/17    Pain:   Upon arrival to therapy:  3 out of 10   While working/ With Activity:  5 out of 10   Upon Leaving therapy:  3 out of 10    Location of Pain:  Right elbow   Description of Pain:  Stabbing pain   Pain relived by: resting, nitroglycerin patch    Occupation:    Objective:  Treatment :     THERAPEUTIC ACTIVITIES x 60 to increase ROM, functional use, coordination, dexterity and improve fine motor control  -MH to right elbow  -gentle STM to ext mass avoiding tendon insertion  -AROM shoulder, elbow, wrist and fingers (all planes) 3 x 20 reps  -gentle neutral overhead pulleys x 4  -PUS pain control and decreased inflammation @ 50 duty cycle, 3.3 Mhz, applied to right ECRB insertion, intensity = .6 w/cm2 for 8 minutes  -blue weighted ball on table top elbow e/f, wrist e/f  - dextercisor x 3  - wrist power flex x 3  - digi-extension x 3  -hammer s/p 3 x20  - yellow digi-flex x 4  -s/p wand 3x10  -cold pack right elbow      Assessment:   Medical necessity is demonstrated by the following " IMPAIRMENTS/PROBLEMS:  Patient Lack of Knowledge/Awareness in Home Program  Increased Pain in right  Decreased functional use/ unable to perform ADLs/iADLs  Increased Edema  Decreased ROM   Decreased  strength  Decreased pinch strength  Hypersensitivity    Pt presents to occupational therapy with an OT diagnosis of right lateral epicondylitis and is  s/p percutaneous needle fenestration of the right common extensor tendon on 3/21/17 by Dr Estrada. Lavonne states she was sore after therapy for a day.  She states she took her muscle relaxant medications.  She completed all exercises today with minimal difficulty.  I encouraged her to be compliant with her elbow flexion/extension exercises and with her forearm stretches.  She was instructed to continue with using cold packs.  I fitted her for another long xochitl sleeve for her right as she thinks the compression helps with her pain.   We re-reviewed her home program to address the above problem areas including modality use, body mechanics, stretches and splinting and patient was able to independently demonstrate these while in therapy today. The patient requires skilled occupational therapy to address the problems identified above and to achieve the individual patient goals as outlined in the problems and goals section.    GOALS:  Short Therm Goals: (2 weeks)    STG: Patient will be independent in home exercise and self care program    STG : Symptomatic Improvements: Decreasing Pain: to 2/10 for increased activity tolerance    STG: Patient to be IND with HEP and modalities for pain management   Long Term Goals: (8-10 weeks)   LTG: Decrease edema in right wrist to WNLs for increased ability to hold a glass of water   LTG: Decrease complaints of pain to 0 out of 10 to increase tolerance for ADLs    LTG: Increase independence in the following ADLs/iADLs: use utensils to feed self and/or prepare meal   LTG: Increase ROM to right = left in order to turn a doorknob   LTG:  Increase functional use of right to push a grocery cart   LTG: Increase  strength of right to turn steering wheel while driving   LTG: Increase lateral pinch strength by 2 lbs to turn key in ignition    Pt's spiritual, cultural and educational needs considered and patient is agreeable to plan of care and goals as stated above.     There are no Anticipated Barriers for Occupational  therapy      Plan:   Patient to be treated by Occupational Therapy 2 times per week for 8 weeks  to achieve the established goals. Treatment to include modalities including but not limited to paraffin, fluidotherapy, moist heat pack, edema control techniques, A/PROM, Manual therapy/mobilizations, Therapeutic exercises/activities, ultrasound, scar maturation techniques, desensitization, strengthening, neural mobilizations/nerve gliding, stretching as well as any other treatments deemed necessary based on the patient's needs or progress.                   I certify the need for these services furnished under this plan of treatment and while under my care    ____________________________________                        ______________  Physician/Referring Practitioner                Date of Signature

## 2017-05-26 ENCOUNTER — OFFICE VISIT (OUTPATIENT)
Dept: FAMILY MEDICINE | Facility: CLINIC | Age: 42
End: 2017-05-26
Payer: MEDICAID

## 2017-05-26 VITALS
RESPIRATION RATE: 16 BRPM | SYSTOLIC BLOOD PRESSURE: 116 MMHG | BODY MASS INDEX: 30.88 KG/M2 | WEIGHT: 163.56 LBS | HEIGHT: 61 IN | HEART RATE: 110 BPM | DIASTOLIC BLOOD PRESSURE: 80 MMHG

## 2017-05-26 DIAGNOSIS — M79.7 FIBROMYALGIA: Primary | ICD-10-CM

## 2017-05-26 PROCEDURE — 99213 OFFICE O/P EST LOW 20 MIN: CPT | Mod: S$PBB,,, | Performed by: FAMILY MEDICINE

## 2017-05-26 PROCEDURE — 99213 OFFICE O/P EST LOW 20 MIN: CPT | Mod: PBBFAC,PO | Performed by: FAMILY MEDICINE

## 2017-05-26 PROCEDURE — 99999 PR PBB SHADOW E&M-EST. PATIENT-LVL III: CPT | Mod: PBBFAC,,, | Performed by: FAMILY MEDICINE

## 2017-05-28 NOTE — PROGRESS NOTES
"Subjective:       Patient ID: Lavonne Pierre is a 41 y.o. female    Chief Complaint: Depression (follow up)    HPI  Here today for review of depression.  Overall improved with Wellbutrin.  Patient discontinued Savella due to side effects.  Since that time, her pain related to fibromyalgia has worsened.  She is seeing Rheumatology for elevated MIRIAN and diagnosed with potentially Sjogren's syndrome.  She reports her primary pain is in her hands and feet, but she "hurts all over"    Review of Systems   Constitutional: Positive for activity change. Negative for unexpected weight change.   HENT: Positive for hearing loss and trouble swallowing. Negative for rhinorrhea.    Eyes: Positive for discharge. Negative for visual disturbance.   Respiratory: Negative for chest tightness and wheezing.    Cardiovascular: Negative for chest pain and palpitations.   Gastrointestinal: Positive for constipation. Negative for blood in stool, diarrhea and vomiting.   Endocrine: Positive for polydipsia. Negative for polyuria.   Genitourinary: Negative for difficulty urinating, dysuria, hematuria and menstrual problem.   Musculoskeletal: Positive for arthralgias and joint swelling.   Neurological: Negative for weakness and headaches.   Psychiatric/Behavioral: Positive for confusion. Negative for dysphoric mood.         Objective:   Physical Exam   Constitutional: She is oriented to person, place, and time. She appears well-developed and well-nourished.   Neurological: She is alert and oriented to person, place, and time.   Vitals reviewed.        Assessment:       1. Fibromyalgia           Plan:       Fibromyalgia  - Continue current therapy  - Continue Rheumatology  - Return in about 4 months (around 9/26/2017).         "

## 2017-06-02 ENCOUNTER — PATIENT MESSAGE (OUTPATIENT)
Dept: FAMILY MEDICINE | Facility: CLINIC | Age: 42
End: 2017-06-02

## 2017-06-02 ENCOUNTER — TELEPHONE (OUTPATIENT)
Dept: RHEUMATOLOGY | Facility: CLINIC | Age: 42
End: 2017-06-02

## 2017-06-02 ENCOUNTER — TELEPHONE (OUTPATIENT)
Dept: FAMILY MEDICINE | Facility: CLINIC | Age: 42
End: 2017-06-02

## 2017-06-02 NOTE — TELEPHONE ENCOUNTER
----- Message from Delmi Terry sent at 6/2/2017 10:44 AM CDT -----  Contact: Patient  Patient states that she would like to come in for an injection because she is in a lot of pain in her right hip and leg.  Dr Nery Ross is not in today.  Can you please call the patient back at [phone].  Thank you

## 2017-06-02 NOTE — TELEPHONE ENCOUNTER
Pt called clinic. Upset. Req to come to clinic for steroid injection for c/o fibromyalgia. States Dr. Ross is out of clinic today. Advised Dr. Mcgee is out of clinic but she can go to urgent care clinic or er. Pt hung up phone.

## 2017-06-02 NOTE — TELEPHONE ENCOUNTER
----- Message from Stephanie Roman sent at 6/2/2017  7:43 AM CDT -----  Contact: self  Needs a shot today due to pain in right hip/leg.  Please call back at [phone] (home)     Explained to  the  patient a shot is not possible today because the doctor is not in the office to order it and monitor for adverse reactions. Advised patient to call PCP concerning hip pain. ROME

## 2017-06-07 ENCOUNTER — CLINICAL SUPPORT (OUTPATIENT)
Dept: REHABILITATION | Facility: HOSPITAL | Age: 42
End: 2017-06-07
Attending: PHYSICAL MEDICINE & REHABILITATION
Payer: MEDICAID

## 2017-06-07 DIAGNOSIS — M25.521 CHRONIC PAIN OF RIGHT ELBOW: Primary | ICD-10-CM

## 2017-06-07 DIAGNOSIS — M25.60 DECREASED RANGE OF MOTION: ICD-10-CM

## 2017-06-07 DIAGNOSIS — G89.29 CHRONIC PAIN OF RIGHT ELBOW: Primary | ICD-10-CM

## 2017-06-07 DIAGNOSIS — R29.898 DECREASED GRIP STRENGTH OF RIGHT HAND: ICD-10-CM

## 2017-06-07 DIAGNOSIS — R68.89 DECREASED FUNCTIONAL ACTIVITY TOLERANCE: ICD-10-CM

## 2017-06-07 PROCEDURE — 97035 APP MDLTY 1+ULTRASOUND EA 15: CPT | Mod: PN

## 2017-06-07 PROCEDURE — 97530 THERAPEUTIC ACTIVITIES: CPT | Mod: 59,PN

## 2017-06-07 NOTE — PROGRESS NOTES
"Occupational Therapy Evaluation    Patient:  Lavonne Pierre  Date of Therapy Visit:  6/7/2017  Referring Physician:  Dr Estrada  Diagnosis: R lateral epicondylitis  MRN : [id number]  Referral Orders:  Eval and treat     Start Time: 500pm  End Time:  600pm  Total Time:  60 min    Certification period from 4/19/17 to 6/14/17  Visit # 5  G CODE TOOL: FOTO  Category: self care/ carrying   Current Score  = 56% impaired  Goal at Discharge Score = 45% impaired    HISTORY/OCCUPATIONAL PROFILE/ Medical and Therapy History:  Subjective:  Patient is a 41 year old right hand dominant female who presents for initial occupational therapy evaluation and today,06/07/2017 and  is 4 weeks 1 days s/p percutaneous needle fenestration of the right common extensor tendon on 3/21/17 by Dr Estrada.  Daily Comments:  "I had to quit my job last month because of my health"  Date of surgery:  3/21/17    Pain:   Upon arrival to therapy:  2  out of 10   While working/ With Activity:  5 out of 10   Upon Leaving therapy:  3 out of 10    Location of Pain:  Right elbow   Description of Pain:  Stabbing pain   Pain relived by: resting, nitroglycerin patch    Occupation:    Objective:  Treatment :     THERAPEUTIC ACTIVITIES x 60 to increase ROM, functional use, coordination, dexterity and improve fine motor control  -MH to right elbow  -gentle STM to ext mass avoiding tendon insertion  -AROM shoulder, elbow, wrist and fingers (all planes) 3 x 20 reps  -gentle neutral overhead pulleys x 4  -PUS pain control and decreased inflammation @ 50 duty cycle, 3.3 Mhz, applied to right ECRB insertion, intensity = .6 w/cm2 for 8 minutes  -blue weighted ball on table top elbow e/f, wrist e/f  - dextercisor x 3  - wrist power flex x 3  - digi-extension x 3  -hammer s/p 3 x20  - yellow digi-flex x 4  -s/p wand 3x10  -2# wrist curls e/f 2 x 10  -red tbar s/p with pivot fulcrum on tabletop 3 x 10  -kinesiotape to forearm extensors x 2 istrips  -cold " pack right elbow      Assessment:   Medical necessity is demonstrated by the following IMPAIRMENTS/PROBLEMS:  Patient Lack of Knowledge/Awareness in Home Program  Increased Pain in right  Decreased functional use/ unable to perform ADLs/iADLs  Increased Edema  Decreased ROM   Decreased  strength  Decreased pinch strength  Hypersensitivity    Pt presents to occupational therapy with an OT diagnosis of right lateral epicondylitis and is  s/p percutaneous needle fenestration of the right common extensor tendon on 3/21/17 by Dr Estrada. Lavonne is making steady gains in therapy.  She has minimal pain in volar elbow with elbow extension.  She states she is doing better overall and is increasing her function.  We added 2 istrips of kinesiotape to forearm extensors.  She will continue with using cold packs.   We re-reviewed her home program to address the above problem areas including modality use, body mehchanics, stretches and splinting and patient was able to independently demonstrate these while in therapy today. The patient requires skilled occupational therapy to address the problems identified above and to achieve the individual patient goals as outlined in the problems and goals section.    GOALS:  Short Therm Goals: (2 weeks)    STG: Patient will be independent in home exercise and self care program    STG : Symptomatic Improvements: Decreasing Pain: to 2/10 for increased activity tolerance    STG: Patient to be IND with HEP and modalities for pain management   Long Term Goals: (8-10 weeks)   LTG: Decrease edema in right wrist to WNLs for increased ability to hold a glass of water   LTG: Decrease complaints of pain to 0 out of 10 to increase tolerance for ADLs    LTG: Increase independence in the following ADLs/iADLs: use utensils to feed self and/or prepare meal   LTG: Increase ROM to right = left in order to turn a doorknob   LTG: Increase functional use of right to push a grocery cart   LTG: Increase   strength of right to turn steering wheel while driving   LTG: Increase lateral pinch strength by 2 lbs to turn key in ignition    Pt's spiritual, cultural and educational needs considered and patient is agreeable to plan of care and goals as stated above.     There are no Anticipated Barriers for Occupational  therapy      Plan:   Patient to be treated by Occupational Therapy 2 times per week for 8 weeks  to achieve the established goals. Treatment to include modalities including but not limited to paraffin, fluidotherapy, moist heat pack, edema control techniques, A/PROM, Manual therapy/mobilizations, Therapeutic exercises/activities, ultrasound, scar maturation techniques, desensitization, strengthening, neural mobilizations/nerve gliding, stretching as well as any other treatments deemed necessary based on the patient's needs or progress.                   I certify the need for these services furnished under this plan of treatment and while under my care    ____________________________________                        ______________  Physician/Referring Practitioner                Date of Signature

## 2017-06-08 RX ORDER — RIZATRIPTAN BENZOATE 10 MG/1
TABLET ORAL
Qty: 9 TABLET | Refills: 0 | OUTPATIENT
Start: 2017-06-08

## 2017-06-12 ENCOUNTER — TELEPHONE (OUTPATIENT)
Dept: RHEUMATOLOGY | Facility: CLINIC | Age: 42
End: 2017-06-12

## 2017-06-12 ENCOUNTER — CLINICAL SUPPORT (OUTPATIENT)
Dept: REHABILITATION | Facility: HOSPITAL | Age: 42
End: 2017-06-12
Attending: PHYSICAL MEDICINE & REHABILITATION
Payer: MEDICAID

## 2017-06-12 DIAGNOSIS — M25.521 CHRONIC PAIN OF RIGHT ELBOW: Primary | ICD-10-CM

## 2017-06-12 DIAGNOSIS — M25.60 DECREASED RANGE OF MOTION: ICD-10-CM

## 2017-06-12 DIAGNOSIS — R68.89 DECREASED FUNCTIONAL ACTIVITY TOLERANCE: ICD-10-CM

## 2017-06-12 DIAGNOSIS — R29.898 DECREASED GRIP STRENGTH OF RIGHT HAND: ICD-10-CM

## 2017-06-12 DIAGNOSIS — G89.29 CHRONIC PAIN OF RIGHT ELBOW: Primary | ICD-10-CM

## 2017-06-12 PROCEDURE — 97035 APP MDLTY 1+ULTRASOUND EA 15: CPT | Mod: PN

## 2017-06-12 PROCEDURE — 97530 THERAPEUTIC ACTIVITIES: CPT | Mod: 59,PN

## 2017-06-12 NOTE — PROGRESS NOTES
"Occupational Therapy Daily Note    Patient:  Lavonne Pierre  Date of Therapy Visit:  6/12/2017  Referring Physician:  Dr Estrada  Diagnosis: R lateral epicondylitis  MRN : [id number]  Referral Orders:  Eval and treat     Start Time: 1130AM  End Time:  1230am  Total Time:  60 min    Certification period from 4/19/17 to 6/14/17  Visit # 7  G CODE TOOL: FOTO  Category: self care/ carrying   Current Score  = 56% impaired  Goal at Discharge Score = 45% impaired    HISTORY/OCCUPATIONAL PROFILE/ Medical and Therapy History:  Subjective:  Patient is a 41 year old right hand dominant female who presents for initial occupational therapy evaluation and today,06/12/2017 and  is 4 weeks 1 days s/p percutaneous needle fenestration of the right common extensor tendon on 3/21/17 by Dr Estrada.  Daily Comments:  "The tape felt good"  Date of surgery:  3/21/17    Pain:   Upon arrival to therapy:  2  out of 10   While working/ With Activity:  5 out of 10   Upon Leaving therapy:  3 out of 10    Location of Pain:  Right elbow   Description of Pain:  Stabbing pain   Pain relived by: resting, nitroglycerin patch    Occupation:    Objective:  Treatment :     THERAPEUTIC ACTIVITIES x 60 to increase ROM, functional use, coordination, dexterity and improve fine motor control  -MH to right elbow  -gentle STM to ext mass avoiding tendon insertion  -AROM shoulder, elbow, wrist and fingers (all planes) 3 x 20 reps  -gentle neutral overhead pulleys x 4  -PUS pain control and decreased inflammation @ 50 duty cycle, 3.3 Mhz, applied to right ECRB insertion, intensity = .6 w/cm2 for 8 minutes  -blue weighted ball on table top elbow e/f, wrist e/f  - dextercisor x 3  - wrist power flex x 3  - digi-extension x 3  -hammer s/p 3 x20  - yellow digi-flex x 4  -s/p wand 3x10  -2# wrist curls e/f 2 x 10  -red tbar s/p with pivot fulcrum on tabletop 3 x 10  -kinesiotape to forearm extensors x 2 istrips  -cold pack right elbow      Assessment: "   Medical necessity is demonstrated by the following IMPAIRMENTS/PROBLEMS:  Patient Lack of Knowledge/Awareness in Home Program  Increased Pain in right  Decreased functional use/ unable to perform ADLs/iADLs  Increased Edema  Decreased ROM   Decreased  strength  Decreased pinch strength  Hypersensitivity    Pt presents to occupational therapy with an OT diagnosis of right lateral epicondylitis and is  s/p percutaneous needle fenestration of the right common extensor tendon on 3/21/17 by Dr Estrada. Lavonne states the kinesiotape helped with her pain level.  She is having an exacerbation of fibromyalgia symptoms today and is having increased pain overall.   We continues with 2 istrips of kinesiotape to forearm extensors.   We re-reviewed her home program to address the above problem areas including modality use, body mehchanics, stretches and splinting and patient was able to independently demonstrate these while in therapy today. The patient requires skilled occupational therapy to address the problems identified above and to achieve the individual patient goals as outlined in the problems and goals section.    GOALS:  Short Therm Goals: (2 weeks)    STG: Patient will be independent in home exercise and self care program    STG : Symptomatic Improvements: Decreasing Pain: to 2/10 for increased activity tolerance    STG: Patient to be IND with HEP and modalities for pain management   Long Term Goals: (8-10 weeks)   LTG: Decrease edema in right wrist to WNLs for increased ability to hold a glass of water   LTG: Decrease complaints of pain to 0 out of 10 to increase tolerance for ADLs    LTG: Increase independence in the following ADLs/iADLs: use utensils to feed self and/or prepare meal   LTG: Increase ROM to right = left in order to turn a doorknob   LTG: Increase functional use of right to push a grocery cart   LTG: Increase  strength of right to turn steering wheel while driving   LTG: Increase lateral  pinch strength by 2 lbs to turn key in ignition        Plan:   Misael with POC

## 2017-06-12 NOTE — TELEPHONE ENCOUNTER
Returned patients call. Patient states that Wellbutrin is not working for her Fibromyalgia pain. Patient requesting to be seen. Informed patient no current opening today. Will advise with Dr. Ross on new treatment plan. Patient verbalized understanding.

## 2017-06-14 ENCOUNTER — TELEPHONE (OUTPATIENT)
Dept: FAMILY MEDICINE | Facility: CLINIC | Age: 42
End: 2017-06-14

## 2017-06-14 RX ORDER — TIZANIDINE HYDROCHLORIDE 2 MG/1
4 CAPSULE, GELATIN COATED ORAL
Qty: 90 CAPSULE | Refills: 3 | Status: SHIPPED | OUTPATIENT
Start: 2017-06-14 | End: 2017-10-09

## 2017-06-14 NOTE — TELEPHONE ENCOUNTER
The wellbutrin is at its max.  As for Savella is she still taking 50 mg BID?  Can try increasing her tizanidine to 4mg up to Three times daily.

## 2017-06-14 NOTE — TELEPHONE ENCOUNTER
----- Message from Tarah Lawson LPN sent at 6/14/2017  2:54 PM CDT -----  Per Dr. Ross-she does not fill out disability forms for fibromyalgia. This patient gave us a form today  from a disability . I will call her and let her know. Dr. Ross just wanted primary staff to know. Tarah/[id number]

## 2017-06-19 ENCOUNTER — PATIENT MESSAGE (OUTPATIENT)
Dept: RHEUMATOLOGY | Facility: CLINIC | Age: 42
End: 2017-06-19

## 2017-06-26 ENCOUNTER — CLINICAL SUPPORT (OUTPATIENT)
Dept: REHABILITATION | Facility: HOSPITAL | Age: 42
End: 2017-06-26
Attending: PHYSICAL MEDICINE & REHABILITATION
Payer: MEDICAID

## 2017-06-26 DIAGNOSIS — R68.89 DECREASED FUNCTIONAL ACTIVITY TOLERANCE: ICD-10-CM

## 2017-06-26 DIAGNOSIS — R29.898 DECREASED GRIP STRENGTH OF RIGHT HAND: ICD-10-CM

## 2017-06-26 DIAGNOSIS — M25.521 CHRONIC PAIN OF RIGHT ELBOW: Primary | ICD-10-CM

## 2017-06-26 DIAGNOSIS — M25.60 DECREASED RANGE OF MOTION: ICD-10-CM

## 2017-06-26 DIAGNOSIS — G89.29 CHRONIC PAIN OF RIGHT ELBOW: Primary | ICD-10-CM

## 2017-06-26 PROCEDURE — 97530 THERAPEUTIC ACTIVITIES: CPT | Mod: 59,PN

## 2017-06-26 PROCEDURE — 97035 APP MDLTY 1+ULTRASOUND EA 15: CPT | Mod: PN

## 2017-06-26 RX ORDER — RIZATRIPTAN BENZOATE 10 MG/1
TABLET ORAL
Qty: 9 TABLET | Refills: 0 | OUTPATIENT
Start: 2017-06-26

## 2017-06-26 NOTE — PROGRESS NOTES
"Occupational Therapy Daily Note    Patient:  Lavonne Pierre  Date of Therapy Visit:  6/26/2017  Referring Physician:  Dr Estrada  Diagnosis: R lateral epicondylitis  MRN : [id number]  Referral Orders:  Eval and treat     Start Time: 1130AM  End Time:  1230am  Total Time:  60 min    Certification period from 4/19/17 to 6/14/17  Visit # 8  G CODE TOOL: FOTO  Category: self care/ carrying   Current Score  = 56% impaired  Goal at Discharge Score = 45% impaired    HISTORY/OCCUPATIONAL PROFILE/ Medical and Therapy History:  Subjective:  Patient is a 41 year old right hand dominant female who presents for initial occupational therapy evaluation and today,06/26/2017 and  is 4 weeks 1 days s/p percutaneous needle fenestration of the right common extensor tendon on 3/21/17 by Dr Estrada.  Daily Comments:  "I am wearing the splint because my hand is getting numb; I had to cancel the appointment with the doctor"  Date of surgery:  3/21/17    Pain:   Upon arrival to therapy:  2  out of 10   While working/ With Activity:  5 out of 10   Upon Leaving therapy:  3 out of 10    Location of Pain:  Right elbow   Description of Pain:  Stabbing pain   Pain relived by: resting, nitroglycerin patch    Occupation:    Objective:  Treatment :     THERAPEUTIC ACTIVITIES x 60 to increase ROM, functional use, coordination, dexterity and improve fine motor control  -MH to right elbow  -gentle STM to ext mass avoiding tendon insertion  -AROM shoulder, elbow, wrist and fingers (all planes) 3 x 20 reps  -gentle neutral overhead pulleys x 4  -PUS pain control and decreased inflammation @ 50 duty cycle, 3.3 Mhz, applied to right ECRB insertion, intensity = .6 w/cm2 for 8 minutes  -blue weighted ball on table top elbow e/f, wrist e/f  - dextercisor x 3  - wrist power flex x 3  - digi-extension x 3  -hammer s/p 3 x20  - yellow digi-flex x 4  -s/p wand 3x10  -2# wrist curls e/f 2 x 10  -red tbar s/p with pivot fulcrum on tabletop 3 x " 10  -kinesiotape to forearm extensors x 2 istrips  -cold pack right elbow      Assessment:   Medical necessity is demonstrated by the following IMPAIRMENTS/PROBLEMS:  Patient Lack of Knowledge/Awareness in Home Program  Increased Pain in right  Decreased functional use/ unable to perform ADLs/iADLs  Increased Edema  Decreased ROM   Decreased  strength  Decreased pinch strength  Hypersensitivity    Pt presents to occupational therapy with an OT diagnosis of right lateral epicondylitis and is  s/p percutaneous needle fenestration of the right common extensor tendon on 3/21/17 by Dr Estrada. Lavonne states she has had a lot of pain lately due to her fibromyalgia. She arrives with a splint on her wrist again.  It appears that she has extrinsic extensor tightness again in her forearm.  She admits to not doing her stretches as instructed.  We reviewed the importance of performing her stretches and her exercises at home in order for her to improve.  We continues with 2 istrips of kinesiotape to forearm extensors.  The patient requires skilled occupational therapy to address the problems identified above and to achieve the individual patient goals as outlined in the problems and goals section.    GOALS:  Short Therm Goals: (2 weeks)    STG: Patient will be independent in home exercise and self care program    STG : Symptomatic Improvements: Decreasing Pain: to 2/10 for increased activity tolerance    STG: Patient to be IND with HEP and modalities for pain management   Long Term Goals: (8-10 weeks)   LTG: Decrease edema in right wrist to WNLs for increased ability to hold a glass of water   LTG: Decrease complaints of pain to 0 out of 10 to increase tolerance for ADLs    LTG: Increase independence in the following ADLs/iADLs: use utensils to feed self and/or prepare meal   LTG: Increase ROM to right = left in order to turn a doorknob   LTG: Increase functional use of right to push a grocery cart   LTG: Increase   strength of right to turn steering wheel while driving   LTG: Increase lateral pinch strength by 2 lbs to turn key in ignition        Plan:   Misael with POC

## 2017-07-07 ENCOUNTER — TELEPHONE (OUTPATIENT)
Dept: FAMILY MEDICINE | Facility: CLINIC | Age: 42
End: 2017-07-07

## 2017-07-07 NOTE — TELEPHONE ENCOUNTER
(FYI)Called pt, she called to say she called the Medicaid number and they advised her to go to [place] which is out of the question as it is too far for her, so she will just keep appt in September.

## 2017-07-07 NOTE — TELEPHONE ENCOUNTER
Spoke to patient and attempted to schedule her to see NP next week. Due to new Medicaid rule there are no current appointments. Gave patient [facility] Medicaid number. Verbalized understanding.

## 2017-07-07 NOTE — TELEPHONE ENCOUNTER
Spoke to patient and states that the Wellbutrin is not working anymore. Pt states that she is hurting all the time, she feels down and just needs to see if there is something else she can take. Pt would like to be seen Monday. No available appointments with . Okay for patient to see NP next week? Please advise.

## 2017-07-07 NOTE — TELEPHONE ENCOUNTER
----- Message from Josselin Dyer sent at 7/7/2017 12:18 PM CDT -----  Contact: isidro   Wants to be seen on MOnday 07 10 2017  Wants her medication changed   Call back

## 2017-07-07 NOTE — TELEPHONE ENCOUNTER
----- Message from Eli Hsu sent at 7/7/2017  2:51 PM CDT -----  Contact: [phone]    Calling to  Speak to the  Nurse // please call  For  details

## 2017-08-10 ENCOUNTER — PATIENT MESSAGE (OUTPATIENT)
Dept: OBSTETRICS AND GYNECOLOGY | Facility: CLINIC | Age: 42
End: 2017-08-10

## 2017-08-14 ENCOUNTER — OFFICE VISIT (OUTPATIENT)
Dept: OBSTETRICS AND GYNECOLOGY | Facility: CLINIC | Age: 42
End: 2017-08-14
Payer: MEDICAID

## 2017-08-14 VITALS
WEIGHT: 166.88 LBS | SYSTOLIC BLOOD PRESSURE: 118 MMHG | BODY MASS INDEX: 32.76 KG/M2 | HEIGHT: 60 IN | DIASTOLIC BLOOD PRESSURE: 72 MMHG

## 2017-08-14 DIAGNOSIS — N89.8 VAGINAL ODOR: ICD-10-CM

## 2017-08-14 DIAGNOSIS — K58.1 IRRITABLE BOWEL SYNDROME WITH CONSTIPATION: ICD-10-CM

## 2017-08-14 DIAGNOSIS — N92.6 IRREGULAR PERIODS: Primary | ICD-10-CM

## 2017-08-14 DIAGNOSIS — R10.30 LOWER ABDOMINAL PAIN: ICD-10-CM

## 2017-08-14 LAB
CANDIDA RRNA VAG QL PROBE: NEGATIVE
G VAGINALIS RRNA GENITAL QL PROBE: NEGATIVE
T VAGINALIS RRNA GENITAL QL PROBE: NEGATIVE

## 2017-08-14 PROCEDURE — 87660 TRICHOMONAS VAGIN DIR PROBE: CPT

## 2017-08-14 PROCEDURE — 99999 PR PBB SHADOW E&M-EST. PATIENT-LVL IV: CPT | Mod: PBBFAC,,, | Performed by: OBSTETRICS & GYNECOLOGY

## 2017-08-14 PROCEDURE — 99213 OFFICE O/P EST LOW 20 MIN: CPT | Mod: S$PBB,,, | Performed by: OBSTETRICS & GYNECOLOGY

## 2017-08-14 PROCEDURE — 3078F DIAST BP <80 MM HG: CPT | Mod: ,,, | Performed by: OBSTETRICS & GYNECOLOGY

## 2017-08-14 PROCEDURE — 3074F SYST BP LT 130 MM HG: CPT | Mod: ,,, | Performed by: OBSTETRICS & GYNECOLOGY

## 2017-08-14 PROCEDURE — 3008F BODY MASS INDEX DOCD: CPT | Mod: ,,, | Performed by: OBSTETRICS & GYNECOLOGY

## 2017-08-14 PROCEDURE — 87086 URINE CULTURE/COLONY COUNT: CPT

## 2017-08-14 PROCEDURE — 87480 CANDIDA DNA DIR PROBE: CPT

## 2017-08-14 PROCEDURE — 99214 OFFICE O/P EST MOD 30 MIN: CPT | Mod: PBBFAC,PN | Performed by: OBSTETRICS & GYNECOLOGY

## 2017-08-14 RX ORDER — MEDROXYPROGESTERONE ACETATE 10 MG/1
10 TABLET ORAL DAILY
Qty: 10 TABLET | Refills: 0 | Status: SHIPPED | OUTPATIENT
Start: 2017-08-14 | End: 2017-10-09

## 2017-08-14 NOTE — PROGRESS NOTES
Chief Complaint   Patient presents with    Pelvic Pain     x few months    Vaginal Discharge       History of Present Illness: Lavonne Pierre is a 41 y.o. female that presents today 2017 for   Chief Complaint   Patient presents with    Pelvic Pain     x few months    Vaginal Discharge   she reports for 2 months bilateral lower pelvic pain with vaginal odor with discharge. She reports that she missed her period early in the year.       Past Medical History:   Diagnosis Date    Anxiety 2013    Asthma     Fibromyalgia     HTN (hypertension)     Preeclampsia        Past Surgical History:   Procedure Laterality Date    BREAST BIOPSY      Benign     SECTION, LOW TRANSVERSE      TUBAL LIGATION         Current Outpatient Prescriptions   Medication Sig Dispense Refill    amitriptyline (ELAVIL) 25 MG tablet TAKE 1 TABLET(25 MG) BY MOUTH EVERY NIGHT AS NEEDED FOR INSOMNIA 30 tablet 11    ANORO ELLIPTA 62.5-25 mcg/actuation DsDv INL 1 PUFF PO QD UTD  6    lisinopril (PRINIVIL,ZESTRIL) 5 MG tablet TAKE 1 TABLET BY MOUTH ONCE DAILY. 90 tablet 3    multivitamin (THERAGRAN) per tablet Take 1 tablet by mouth once daily.      nitroGLYCERIN 0.1 mg/hr TD PT24 (NITRODUR) 0.1 mg/hr PT24 Place 1 patch onto the skin once daily. 30 patch 1    buPROPion (WELLBUTRIN XL) 300 MG 24 hr tablet Take 1 tablet (300 mg total) by mouth once daily at 6am. 90 tablet 4    medroxyPROGESTERone (PROVERA) 10 MG tablet Take 1 tablet (10 mg total) by mouth once daily. 10 tablet 0    tizanidine 2 mg Cap Take 2 capsules (4 mg total) by mouth as needed. 90 capsule 3     No current facility-administered medications for this visit.        Review of patient's allergies indicates:   Allergen Reactions    Ibuprofen Other (See Comments)     Affects stomach ulcer, avoids all NSAIDS    Latex, natural rubber Rash     Small red bumps on skin contact       Family History   Problem Relation Age of Onset    Pancreatic cancer  Mother     Lymphoma Father     Pancreatic cancer Maternal Grandmother     Pancreatic cancer Maternal Uncle     Breast cancer Maternal Aunt        Social History   Substance Use Topics    Smoking status: Never Smoker    Smokeless tobacco: Never Used    Alcohol use No       OB History    Para Term  AB Living   2 2 1 1   3   SAB TAB Ectopic Multiple Live Births           1      # Outcome Date GA Lbr Ron/2nd Weight Sex Delivery Anes PTL Lv   2   29w0d    CS-Unspec   ALEX   1 Term      Vag-Spont             Review of Symptoms:  GENERAL: Denies weight gain or weight loss. Feeling well overall.   SKIN: Denies rash or lesions.   HEAD: Denies head injury or headache.   NODES: Denies enlarged lymph nodes.   CHEST: Denies chest pain or shortness of breath.   CARDIOVASCULAR: Denies palpitations or left sided chest pain.   ABDOMEN: ++ constipation,IBS ++  URINARY: No frequency, dysuria, hematuria, or burning on urination.  HEMATOLOGIC: No easy bruisability or excessive bleeding.   MUSCULOSKELETAL: Denies joint pain or swelling.     /72   Ht 5' (1.524 m)   Wt 75.7 kg (166 lb 14.2 oz)   LMP 2017 (Exact Date)   Physical Exam:  APPEARANCE: Well nourished, well developed, in no acute distress.  SKIN: Normal skin turgor, no lesions.  NECK: Neck symmetric without masses   RESPIRATORY: Normal respiratory effort with no retractions or use of accessory muscles  CARDIOVASCULAR: Peripheral vascular system with no swelling no varicosities and palpation of pulses normal  LYMPHATIC: No enlargements of the lymph nodes noted in the neck, axillae, or groin  ABDOMEN: Soft. No tenderness or masses. No hepatosplenomegaly. No hernias.  PELVIC: Normal external female genitalia without lesions. Normal hair distribution. Adequate perineal body, normal urethral meatus. Urethra with no masses.  Bladder +++tender. Vagina moist and well rugated without lesions or discharge. Cervix pink and without lesions. No  significant cystocele or rectocele. Bimanual exam showed uterus normal size, shape, position, mobile and nontender. Adnexa without masses or tenderness. Urethra and bladder normal.  EXTREMITIES: No clubbing cyanosis or edema.    ASSESSMENT/PLAN:  Irregular periods  -     medroxyPROGESTERone (PROVERA) 10 MG tablet; Take 1 tablet (10 mg total) by mouth once daily.  Dispense: 10 tablet; Refill: 0    Vaginal odor  -     Vaginosis Screen by DNA Probe    Lower abdominal pain  -     US Pelvis Limited Non OB; Future; Expected date: 08/14/2017  -     Urine culture    Irritable bowel syndrome with constipation      15 minutes spent today with this patient. Greater than half spent in counseling today.

## 2017-08-15 ENCOUNTER — TELEPHONE (OUTPATIENT)
Dept: OBSTETRICS AND GYNECOLOGY | Facility: CLINIC | Age: 42
End: 2017-08-15

## 2017-08-15 LAB — BACTERIA UR CULT: NO GROWTH

## 2017-08-15 NOTE — TELEPHONE ENCOUNTER
----- Message from Palak Sims sent at 8/15/2017 10:08 AM CDT -----  Contact: pt  Pt states that she needs to reschedule her ultrasound that is scheduled for 8/16.please...[phone] (home)

## 2017-08-21 ENCOUNTER — HOSPITAL ENCOUNTER (OUTPATIENT)
Dept: RADIOLOGY | Facility: HOSPITAL | Age: 42
Discharge: HOME | End: 2017-08-21
Attending: OBSTETRICS & GYNECOLOGY
Payer: MEDICAID

## 2017-08-21 DIAGNOSIS — R10.30 LOWER ABDOMINAL PAIN: ICD-10-CM

## 2017-08-21 PROCEDURE — 76856 US EXAM PELVIC COMPLETE: CPT | Mod: TC,PO

## 2017-08-21 PROCEDURE — 76856 US EXAM PELVIC COMPLETE: CPT | Mod: 26,,, | Performed by: RADIOLOGY

## 2017-08-21 PROCEDURE — 76830 TRANSVAGINAL US NON-OB: CPT | Mod: 26,,, | Performed by: RADIOLOGY

## 2017-08-23 ENCOUNTER — TELEPHONE (OUTPATIENT)
Dept: PHYSICAL MEDICINE AND REHAB | Facility: CLINIC | Age: 42
End: 2017-08-23

## 2017-08-23 NOTE — TELEPHONE ENCOUNTER
----- Message from Audrey Lindsay sent at 8/23/2017  1:40 PM CDT -----  Contact: self  Patient [phone] is returning call to reschedule appt/please call

## 2017-09-06 ENCOUNTER — OFFICE VISIT (OUTPATIENT)
Dept: RHEUMATOLOGY | Facility: CLINIC | Age: 42
End: 2017-09-06
Payer: MEDICAID

## 2017-09-06 VITALS
WEIGHT: 165.81 LBS | BODY MASS INDEX: 32.55 KG/M2 | HEART RATE: 68 BPM | HEIGHT: 60 IN | SYSTOLIC BLOOD PRESSURE: 130 MMHG | DIASTOLIC BLOOD PRESSURE: 90 MMHG

## 2017-09-06 DIAGNOSIS — M25.562 CHRONIC ARTHRALGIAS OF KNEES AND HIPS: ICD-10-CM

## 2017-09-06 DIAGNOSIS — F32.89 OTHER DEPRESSION: ICD-10-CM

## 2017-09-06 DIAGNOSIS — G89.29 CHRONIC ARTHRALGIAS OF KNEES AND HIPS: ICD-10-CM

## 2017-09-06 DIAGNOSIS — M25.561 CHRONIC ARTHRALGIAS OF KNEES AND HIPS: ICD-10-CM

## 2017-09-06 DIAGNOSIS — K25.4 CHRONIC GASTRIC ULCER WITH HEMORRHAGE: ICD-10-CM

## 2017-09-06 DIAGNOSIS — M25.552 CHRONIC ARTHRALGIAS OF KNEES AND HIPS: ICD-10-CM

## 2017-09-06 DIAGNOSIS — F41.9 ANXIETY: ICD-10-CM

## 2017-09-06 DIAGNOSIS — M79.7 FIBROMYALGIA: Primary | ICD-10-CM

## 2017-09-06 DIAGNOSIS — M25.551 CHRONIC ARTHRALGIAS OF KNEES AND HIPS: ICD-10-CM

## 2017-09-06 PROCEDURE — 99213 OFFICE O/P EST LOW 20 MIN: CPT | Mod: PBBFAC,PO | Performed by: INTERNAL MEDICINE

## 2017-09-06 PROCEDURE — 3075F SYST BP GE 130 - 139MM HG: CPT | Mod: ,,, | Performed by: INTERNAL MEDICINE

## 2017-09-06 PROCEDURE — 99999 PR PBB SHADOW E&M-EST. PATIENT-LVL III: CPT | Mod: PBBFAC,,, | Performed by: INTERNAL MEDICINE

## 2017-09-06 PROCEDURE — 3008F BODY MASS INDEX DOCD: CPT | Mod: ,,, | Performed by: INTERNAL MEDICINE

## 2017-09-06 PROCEDURE — 99214 OFFICE O/P EST MOD 30 MIN: CPT | Mod: S$PBB,,, | Performed by: INTERNAL MEDICINE

## 2017-09-06 PROCEDURE — 3080F DIAST BP >= 90 MM HG: CPT | Mod: ,,, | Performed by: INTERNAL MEDICINE

## 2017-09-06 RX ORDER — FLUOXETINE HYDROCHLORIDE 40 MG/1
40 CAPSULE ORAL DAILY
Qty: 30 CAPSULE | Refills: 3 | Status: SHIPPED | OUTPATIENT
Start: 2017-09-06 | End: 2017-11-29 | Stop reason: ALTCHOICE

## 2017-09-06 RX ORDER — CELECOXIB 200 MG/1
200 CAPSULE ORAL DAILY
Qty: 30 CAPSULE | Refills: 6 | Status: SHIPPED | OUTPATIENT
Start: 2017-09-06 | End: 2017-10-09

## 2017-09-06 NOTE — PROGRESS NOTES
"Subjective:          Chief Complaint: Lavonne Pierre is a 42 y.o. female who had concerns including Disease Management.    HPI:    Patient here today for follow-up with a history of fibromyalgia, positive MIRIAN 1:160 homogeneous/speckled. negative MIRIAN profile. + thyroid Antibodies. No evidence of CTD.     Patient did try to increase with Savella to 50mg BID   Still on Wellbutrin XL 300mg daily. But not helping and noting increased tearfulness.    was started on Lyrica a 75mg BID for few months with signficant weight gain  Elavil 25mg for HS  Failed Cymbalta, Effexor, Zoloft in past.   States she did well with Topamax but concern on long term safety.   Did have steroid injections in past no relief with elbows, hands, feet or knees. Does help some with hip busitis.     She is noting significant brain fog. INcreased anxiety. Trouble with restless leg type symptoms, but occur during the day.     She's had various myalgias as well as joint pain in the ankles, feet, wrists, shoulders and neck and legs.  She also has an underlying history of depression she's had no constitutional symptoms, rashes, oral ulcers, serositis, Raynaud's.  Having increase in Headaches as well.   She is noting pain in her feet (diffusely), knees at the superior pole of the patella. And bilateral elbows. She is having stiffness in AM of about 1 hour. She is c/o lumps in her skin tender, no redness but she does try to massage them. She is feeling increased pain in the knees with "lumps" in gastrocnemius region. Do feel swollen.     NSAIDs with gastric ulcers in past. Never on Celebrex.   Never prozac that she can recall.       Component      Latest Ref Rng & Units 1/17/2017 1/15/2016   Anti Sm Antibody      0.00 - 19.99 EU  0.97   Anti-Sm Interpretation      Negative  Negative   Anti-SSA Antibody      0.00 - 19.99 EU  1.09   Anti-SSA Interpretation      Negative  Negative   Anti-SSB Antibody      0.00 - 19.99 EU  0.30   Anti-SSB Interpretation      " Negative  Negative   ds DNA Ab      Negative 1:10  Negative 1:10   Anti Sm/RNP Antibody      0.00 - 19.99 EU  0.11   Anti-Sm/RNP Interpretation      Negative  Negative   Sed Rate      0 - 20 mm/Hr  9   CRP      0.0 - 8.2 mg/L  5.7   MIRIAN Screen      Negative <1:160 Positive (A) Positive (A)   Rheumatoid Factor      0.0 - 15.0 IU/mL  <10.0   MIRIAN HEP-2 Titer       Positive 1:320 Homogeneous Positive 1:160 Homogeneous       REVIEW OF SYSTEMS:    Review of Systems   Constitutional: Positive for malaise/fatigue and weight loss. Negative for chills and fever.   HENT: Negative for sore throat.    Eyes: Negative for double vision, photophobia, discharge and redness.   Respiratory: Negative for cough, shortness of breath and wheezing.    Cardiovascular: Negative for chest pain, palpitations and orthopnea.   Gastrointestinal: Positive for heartburn. Negative for abdominal pain, constipation and diarrhea.   Genitourinary: Negative for dysuria, hematuria and urgency.   Musculoskeletal: Positive for joint pain, myalgias and neck pain. Negative for back pain.   Skin: Negative for rash.   Neurological: Positive for headaches. Negative for dizziness, tingling, focal weakness and weakness.   Endo/Heme/Allergies: Does not bruise/bleed easily.   Psychiatric/Behavioral: Negative for depression, hallucinations and suicidal ideas. The patient is nervous/anxious.                Objective:            Past Medical History:   Diagnosis Date    Anxiety 7/16/2013    Asthma     Fibromyalgia     HTN (hypertension)     Preeclampsia      Family History   Problem Relation Age of Onset    Pancreatic cancer Mother     Lymphoma Father     Pancreatic cancer Maternal Grandmother     Pancreatic cancer Maternal Uncle     Breast cancer Maternal Aunt      Social History   Substance Use Topics    Smoking status: Never Smoker    Smokeless tobacco: Never Used    Alcohol use No         Current Outpatient Prescriptions on File Prior to Visit    Medication Sig Dispense Refill    amitriptyline (ELAVIL) 25 MG tablet TAKE 1 TABLET(25 MG) BY MOUTH EVERY NIGHT AS NEEDED FOR INSOMNIA 30 tablet 11    ANORO ELLIPTA 62.5-25 mcg/actuation DsDv INL 1 PUFF PO QD UTD  6    lisinopril (PRINIVIL,ZESTRIL) 5 MG tablet TAKE 1 TABLET BY MOUTH ONCE DAILY. 90 tablet 3    medroxyPROGESTERone (PROVERA) 10 MG tablet Take 1 tablet (10 mg total) by mouth once daily. 10 tablet 0    multivitamin (THERAGRAN) per tablet Take 1 tablet by mouth once daily.      buPROPion (WELLBUTRIN XL) 300 MG 24 hr tablet Take 1 tablet (300 mg total) by mouth once daily at 6am. 90 tablet 4    tizanidine 2 mg Cap Take 2 capsules (4 mg total) by mouth as needed. 90 capsule 3    [DISCONTINUED] nitroGLYCERIN 0.1 mg/hr TD PT24 (NITRODUR) 0.1 mg/hr PT24 Place 1 patch onto the skin once daily. 30 patch 1     No current facility-administered medications on file prior to visit.        Vitals:    09/06/17 0852   BP: (!) 130/90   Pulse: 68       Physical Exam:    Physical Exam   Constitutional: She appears well-developed and well-nourished.   HENT:   Nose: No septal deviation.   Mouth/Throat: Mucous membranes are normal. No oral lesions.   Eyes: Pupils are equal, round, and reactive to light. Right conjunctiva is not injected. Left conjunctiva is not injected.   Neck: No JVD present. No thyroid mass and no thyromegaly present.   Cardiovascular: Normal rate, regular rhythm and normal pulses.    No edema   Pulmonary/Chest: Effort normal and breath sounds normal.   Abdominal: Soft. Normal appearance. There is no hepatosplenomegaly.   Musculoskeletal:        Right shoulder: She exhibits tenderness. She exhibits normal range of motion and no swelling.        Left shoulder: She exhibits tenderness. She exhibits normal range of motion and no swelling.        Right elbow: She exhibits normal range of motion and no swelling. Tenderness found. Medial epicondyle tenderness noted.        Left elbow: She exhibits  normal range of motion and no swelling. Tenderness found. Medial epicondyle tenderness noted.        Right wrist: She exhibits normal range of motion, no tenderness and no swelling.        Left wrist: She exhibits normal range of motion, no tenderness and no swelling.        Right hip: She exhibits normal range of motion, normal strength and no swelling.        Left hip: She exhibits normal range of motion, no tenderness and no swelling.        Right knee: She exhibits normal range of motion and no swelling. Tenderness found. Medial joint line tenderness noted.        Left knee: She exhibits normal range of motion and no swelling. Tenderness found. Medial joint line tenderness noted.        Right ankle: She exhibits normal range of motion and no swelling. No tenderness.        Left ankle: She exhibits normal range of motion and no swelling. No tenderness.        Cervical back: She exhibits bony tenderness.   14/18 FMS tenderpoints w/o No synovitis, restriction in ROM, tenderness of wrist, MCP, PIP, DIP. No weakness in . Able to fully curl fingers.      Lymphadenopathy:     She has no cervical adenopathy.     She has no axillary adenopathy.   Neurological: She has normal strength and normal reflexes.   Skin: Skin is dry and intact.   Psychiatric: Her mood appears anxious.             Assessment:       Encounter Diagnoses   Name Primary?    Fibromyalgia Yes    Anxiety     Other depression     Chronic arthralgias of knees and hips     Chronic gastric ulcer with hemorrhage           Plan:        Fibromyalgia    Anxiety  -     fluoxetine (PROZAC) 40 MG capsule; Take 1 capsule (40 mg total) by mouth once daily.  Dispense: 30 capsule; Refill: 3    Other depression  -     fluoxetine (PROZAC) 40 MG capsule; Take 1 capsule (40 mg total) by mouth once daily.  Dispense: 30 capsule; Refill: 3    Chronic arthralgias of knees and hips  -     celecoxib (CELEBREX) 200 MG capsule; Take 1 capsule (200 mg total) by mouth once  daily.  Dispense: 30 capsule; Refill: 6    Chronic gastric ulcer with hemorrhage  -     celecoxib (CELEBREX) 200 MG capsule; Take 1 capsule (200 mg total) by mouth once daily.  Dispense: 30 capsule; Refill: 6    Trail of prozac  DC Wellbutrin  Trial with Celebrex.       Return in about 6 months (around 3/6/2018).        30min consultation with greater than 50% spent in counseling, chart review and coordination of care. All questions answered.

## 2017-09-07 ENCOUNTER — OFFICE VISIT (OUTPATIENT)
Dept: PHYSICAL MEDICINE AND REHAB | Facility: CLINIC | Age: 42
End: 2017-09-07
Payer: MEDICAID

## 2017-09-07 VITALS
BODY MASS INDEX: 32.39 KG/M2 | WEIGHT: 165 LBS | DIASTOLIC BLOOD PRESSURE: 81 MMHG | HEART RATE: 114 BPM | HEIGHT: 60 IN | SYSTOLIC BLOOD PRESSURE: 130 MMHG

## 2017-09-07 DIAGNOSIS — G56.03 CARPAL TUNNEL SYNDROME ON BOTH SIDES: ICD-10-CM

## 2017-09-07 DIAGNOSIS — M25.522 LEFT ELBOW PAIN: Primary | ICD-10-CM

## 2017-09-07 DIAGNOSIS — M77.11 RIGHT LATERAL EPICONDYLITIS: ICD-10-CM

## 2017-09-07 PROCEDURE — 99213 OFFICE O/P EST LOW 20 MIN: CPT | Mod: 25,S$PBB,, | Performed by: PHYSICAL MEDICINE & REHABILITATION

## 2017-09-07 PROCEDURE — 20526 THER INJECTION CARP TUNNEL: CPT | Mod: 50,S$PBB,, | Performed by: PHYSICAL MEDICINE & REHABILITATION

## 2017-09-07 PROCEDURE — 3008F BODY MASS INDEX DOCD: CPT | Mod: ,,, | Performed by: PHYSICAL MEDICINE & REHABILITATION

## 2017-09-07 PROCEDURE — 99999 PR PBB SHADOW E&M-EST. PATIENT-LVL III: CPT | Mod: PBBFAC,,, | Performed by: PHYSICAL MEDICINE & REHABILITATION

## 2017-09-07 PROCEDURE — 99213 OFFICE O/P EST LOW 20 MIN: CPT | Mod: PBBFAC,PO | Performed by: PHYSICAL MEDICINE & REHABILITATION

## 2017-09-07 PROCEDURE — 76942 ECHO GUIDE FOR BIOPSY: CPT | Mod: PBBFAC,PO | Performed by: PHYSICAL MEDICINE & REHABILITATION

## 2017-09-07 PROCEDURE — 76882 US LMTD JT/FCL EVL NVASC XTR: CPT | Mod: 26,S$PBB,, | Performed by: PHYSICAL MEDICINE & REHABILITATION

## 2017-09-07 PROCEDURE — 76882 US LMTD JT/FCL EVL NVASC XTR: CPT | Mod: PBBFAC,PO | Performed by: PHYSICAL MEDICINE & REHABILITATION

## 2017-09-07 PROCEDURE — 20526 THER INJECTION CARP TUNNEL: CPT | Mod: 50,PBBFAC,PO | Performed by: PHYSICAL MEDICINE & REHABILITATION

## 2017-09-07 PROCEDURE — 3079F DIAST BP 80-89 MM HG: CPT | Mod: ,,, | Performed by: PHYSICAL MEDICINE & REHABILITATION

## 2017-09-07 PROCEDURE — 3075F SYST BP GE 130 - 139MM HG: CPT | Mod: ,,, | Performed by: PHYSICAL MEDICINE & REHABILITATION

## 2017-09-07 RX ADMIN — BETAMETHASONE ACETATE AND BETAMETHASONE SODIUM PHOSPHATE 6 MG: 3; 3 INJECTION, SUSPENSION INTRA-ARTICULAR; INTRALESIONAL; INTRAMUSCULAR; SOFT TISSUE at 01:09

## 2017-09-08 RX ORDER — BETAMETHASONE SODIUM PHOSPHATE AND BETAMETHASONE ACETATE 3; 3 MG/ML; MG/ML
6 INJECTION, SUSPENSION INTRA-ARTICULAR; INTRALESIONAL; INTRAMUSCULAR; SOFT TISSUE
Status: DISCONTINUED | OUTPATIENT
Start: 2017-09-07 | End: 2017-09-08 | Stop reason: HOSPADM

## 2017-09-08 NOTE — PROGRESS NOTES
[facility]    CHIEF COMPLAINT: Right elbow pain    HISTORY OF PRESENT ILLNESS: Lavonne Pierre is a 42 y.o. female who presents in follow up for right elbow pain, as well as bilateral hand pain and paresthesias.  She is about 5 months status post percutaneous needle fenestration of the right common extensor tendon.  She feels that overall her pain is reduced compared to before the procedure.  She does still have some occasional soreness of the area.  She notes increased range of motion.  She does have some increased soreness when picking up heavy objects.  She did use a nitroglycerin patches but has discontinued these.  Secondary to developing a rash.  She rates her pain as a 3 on a scale of 1-10.  She also notes that she has been developing some similar pains at the lateral aspect left elbow.  Her primary complaint today however is bilateral hand pain and paresthesias.  She notes frequent tingling and numbness in all 5 fingers of each hand, worse on the right.  Her symptoms are increased at night and she has to shake her hands to reduce the symptoms.  She has tried night splints but this is produced no noticeable improvement.    Review of Systems   Constitutional: Negative for fever.   HENT: Negative for drooling.    Eyes: Negative for discharge.   Respiratory: Negative for choking.    Cardiovascular: Negative for chest pain.   Genitourinary: Negative for flank pain.   Skin: Negative for wound.   Allergic/Immunologic: Negative for immunocompromised state.   Neurological: Negative for tremors and syncope.   Psychiatric/Behavioral: Negative for behavioral problems.     Past Medical History:   Past Medical History:   Diagnosis Date    Anxiety 2013    Asthma     Fibromyalgia     HTN (hypertension)     Preeclampsia        Past Surgical History:   Past Surgical History:   Procedure Laterality Date    BREAST BIOPSY      Benign     SECTION, LOW TRANSVERSE      TUBAL LIGATION          Family History:   Family History   Problem Relation Age of Onset    Pancreatic cancer Mother     Lymphoma Father     Pancreatic cancer Maternal Grandmother     Pancreatic cancer Maternal Uncle     Breast cancer Maternal Aunt        Medications:   Current Outpatient Prescriptions on File Prior to Visit   Medication Sig Dispense Refill    amitriptyline (ELAVIL) 25 MG tablet TAKE 1 TABLET(25 MG) BY MOUTH EVERY NIGHT AS NEEDED FOR INSOMNIA 30 tablet 11    ANORO ELLIPTA 62.5-25 mcg/actuation DsDv INL 1 PUFF PO QD UTD  6    buPROPion (WELLBUTRIN XL) 300 MG 24 hr tablet Take 1 tablet (300 mg total) by mouth once daily at 6am. 90 tablet 4    celecoxib (CELEBREX) 200 MG capsule Take 1 capsule (200 mg total) by mouth once daily. 30 capsule 6    fluoxetine (PROZAC) 40 MG capsule Take 1 capsule (40 mg total) by mouth once daily. 30 capsule 3    lisinopril (PRINIVIL,ZESTRIL) 5 MG tablet TAKE 1 TABLET BY MOUTH ONCE DAILY. 90 tablet 3    medroxyPROGESTERone (PROVERA) 10 MG tablet Take 1 tablet (10 mg total) by mouth once daily. 10 tablet 0    multivitamin (THERAGRAN) per tablet Take 1 tablet by mouth once daily.      tizanidine 2 mg Cap Take 2 capsules (4 mg total) by mouth as needed. 90 capsule 3     No current facility-administered medications on file prior to visit.        Allergies:   Review of patient's allergies indicates:   Allergen Reactions    Ibuprofen Other (See Comments)     Affects stomach ulcer, avoids all NSAIDS    Latex, natural rubber Rash     Small red bumps on skin contact       Social History:   Social History     Social History    Marital status:      Spouse name: N/A    Number of children: N/A    Years of education: N/A     Social History Main Topics    Smoking status: Never Smoker    Smokeless tobacco: Never Used    Alcohol use No    Drug use: No    Sexual activity: Yes     Birth control/ protection: See Surgical Hx     Other Topics Concern    None     Social History  Narrative    None     PHYSICAL EXAMINATION:   General  /81   Pulse (!) 114   Ht 5' (1.524 m)   Wt 74.8 kg (165 lb)   LMP 07/20/2017 (Exact Date)   BMI 32.22 kg/m²    Constitutional: Oriented to person, place, and time. No apparent distress. Appears well-developed and well-nourished. Pleasant.  HENT:   Head: Normocephalic and atraumatic.   Eyes: Right eye exhibits no discharge. Left eye exhibits no discharge. No scleral icterus.   Pulmonary/Chest: Effort normal. No respiratory distress.   Abdominal: There is no guarding.   Neurological: Alert and oriented to person, place, and time.   Psychiatric: Behavior is normal.   Right Hand Exam     Tenderness   The patient is experiencing no tenderness.         Range of Motion     Wrist   Extension: normal   Flexion: normal   Pronation: normal   Supination: normal     Muscle Strength   Wrist Extension: 5/5   Wrist Flexion: 5/5   : 5/5     Tests   Phalens Sign: positive  Tinels Sign (Medial Nerve): positive  Finkelstein: negative    Other   Erythema: absent  Scars: absent  Sensation: normal  Pulse: present      Left Hand Exam     Tenderness   The patient is experiencing no tenderness.         Range of Motion     Wrist   Extension: normal   Flexion: normal   Pronation: normal   Supination: normal     Muscle Strength   Wrist Extension: 5/5   Wrist Flexion: 5/5   :  5/5     Tests   Phalens Sign: negative  Tinels Sign (Medial Nerve): negative    Other   Erythema: absent  Scars: absent  Sensation: normal  Pulse: present      Right Elbow Exam     Tenderness   The patient is experiencing tenderness in the lateral epicondyle.     Range of Motion   Extension: 0   Flexion: 130   Pronation: normal   Supination: normal     Muscle Strength   Pronation:  5/5   Supination:  5/5     Tests Varus: negative  Valgus: negative  Tinel's Sign (cubital tunnel): negative    Other   Erythema: absent  Scars: absent  Sensation: normal  Pulse: present    Comments:  Positive Cozen's  test      Left Elbow Exam     Tenderness   The patient is experiencing tenderness in the lateral epicondyle.     Range of Motion   Extension: normal   Flexion: normal   Pronation: normal   Supination: normal     Muscle Strength   Pronation:  5/5   Supination:  5/5     Tests Varus: negative  Valgus: negative        Other   Erythema: absent  Scars: absent  Sensation: normal  Pulse: present    Comments:  Positive Cozen's test        INSPECTION: There is no swelling, ecchymoses, erythema or gross deformity about the bilateral hands.  There is no gross atrophy.    Imaging  X-ray of the right elbow on 1/24/17:  3 views of the right elbow fail to demonstrate fail to demonstrate evidence of fracture or subluxation. There are no soft tissue abnormalities.    Diagnostic ultrasound exam: Limited diagnostic exam of the bilateral median nerves was performed today.  The images were saved will be uploaded to EMR.  On the right, the median nerve was observed and short axis coursing into the carpal tunnel.  There was significant flattening of the nerve in a hypoechoic appearance at the level of the transverse carpal ligament.  The cross-sectional area measured 0.25 cm².  On the left side, the nerve measured 0.12 cm² and also had a flattened appearance.    Data Reviewed: X-ray, ultrasound    Supportive Actions: Independent visualization of images or test specimens    ASSESSMENT:   Right lateral epicondylosis  Bilateral carpal tunnel syndrome    PLAN:     1.  In terms of her elbow, she is improved post percutaneous needle fenestration.  She does have some persistent symptoms we discussed other advanced options such as Tenex procedure, and platelet rich plasma.  He feels that the elbow pain is secondary to her hand pain and she would like to monitor her elbows at the present time.  She may continue to do her home exercises to promote lengthening of the forearm and elbow tendons and muscles.    2.  In terms of her hand symptoms, she  appears to have bilateral carpal tunnel syndrome.  Diagnostic ultrasound exam also was suggestive of this showing flattening of the bilateral median nerves in the carpal tunnel.  The right median nerve was very significantly thickened in terms of his cross-sectional area.  She has failed night splints and I recommended injection of corticosteroid under ultrasound guidance.  She is in favor of this option, see separate procedure note for bilateral carpal tunnel injections under ultrasound guidance.    3.  Return to clinic in 4-6 weeks.

## 2017-09-08 NOTE — PROCEDURES
Carpal Tunnel  Date/Time: 9/7/2017 1:42 PM  Performed by: LIZBETH BLANCA  Authorized by: LIZBETH BLANCA     Consent Done?:  Yes (Verbal)  Indications:  Pain  Site marked: The procedure site was marked    Timeout: Prior to procedure the correct patient, procedure, and site was verified      Location:  Wrist  Site:  R intercarpal and L intercarpal  Prep: Patient was prepped and draped in usual sterile fashion    Ultrasonic Guidance for needle placement: Yes  Images are saved and documented.  Needle size:  27 G  Approach: Needle in plane, ulnar to radial.  Medications:  6 mg betamethasone acetate-betamethasone sodium phosphate 6 mg/mL  Patient tolerance:  Patient tolerated the procedure well with no immediate complications     Additional Comments: Ultrasound guidance was used for correct needle placement, the images were saved will be uploaded to EMR.

## 2017-09-18 ENCOUNTER — TELEPHONE (OUTPATIENT)
Dept: OBSTETRICS AND GYNECOLOGY | Facility: CLINIC | Age: 42
End: 2017-09-18

## 2017-09-18 DIAGNOSIS — Z12.31 VISIT FOR SCREENING MAMMOGRAM: Primary | ICD-10-CM

## 2017-09-18 NOTE — TELEPHONE ENCOUNTER
----- Message from Shira Mace sent at 9/18/2017 10:36 AM CDT -----  Contact: pt [phone]  Patient called and asked if you will put her Mammogram orders into the system she received a letter.

## 2017-09-24 DIAGNOSIS — M79.7 FIBROMYALGIA: ICD-10-CM

## 2017-09-25 RX ORDER — DULOXETIN HYDROCHLORIDE 60 MG/1
CAPSULE, DELAYED RELEASE ORAL
Qty: 30 CAPSULE | Refills: 11 | Status: SHIPPED | OUTPATIENT
Start: 2017-09-25 | End: 2017-10-09

## 2017-09-27 ENCOUNTER — OFFICE VISIT (OUTPATIENT)
Dept: FAMILY MEDICINE | Facility: CLINIC | Age: 42
End: 2017-09-27
Payer: MEDICAID

## 2017-09-27 ENCOUNTER — TELEPHONE (OUTPATIENT)
Dept: FAMILY MEDICINE | Facility: CLINIC | Age: 42
End: 2017-09-27

## 2017-09-27 VITALS
WEIGHT: 163.38 LBS | HEIGHT: 60 IN | RESPIRATION RATE: 18 BRPM | DIASTOLIC BLOOD PRESSURE: 64 MMHG | BODY MASS INDEX: 32.08 KG/M2 | HEART RATE: 80 BPM | SYSTOLIC BLOOD PRESSURE: 110 MMHG

## 2017-09-27 DIAGNOSIS — M79.7 FIBROMYALGIA: ICD-10-CM

## 2017-09-27 DIAGNOSIS — R51.9 HEADACHE, UNSPECIFIED HEADACHE TYPE: ICD-10-CM

## 2017-09-27 DIAGNOSIS — N91.2 ABSENT MENSES: ICD-10-CM

## 2017-09-27 PROCEDURE — 3078F DIAST BP <80 MM HG: CPT | Mod: ,,, | Performed by: FAMILY MEDICINE

## 2017-09-27 PROCEDURE — 99213 OFFICE O/P EST LOW 20 MIN: CPT | Mod: PBBFAC,PO,25 | Performed by: FAMILY MEDICINE

## 2017-09-27 PROCEDURE — 90471 IMMUNIZATION ADMIN: CPT | Mod: PBBFAC,PO

## 2017-09-27 PROCEDURE — 3074F SYST BP LT 130 MM HG: CPT | Mod: ,,, | Performed by: FAMILY MEDICINE

## 2017-09-27 PROCEDURE — 99999 PR PBB SHADOW E&M-EST. PATIENT-LVL III: CPT | Mod: PBBFAC,,, | Performed by: FAMILY MEDICINE

## 2017-09-27 PROCEDURE — 99214 OFFICE O/P EST MOD 30 MIN: CPT | Mod: S$PBB,,, | Performed by: FAMILY MEDICINE

## 2017-09-27 PROCEDURE — 3008F BODY MASS INDEX DOCD: CPT | Mod: ,,, | Performed by: FAMILY MEDICINE

## 2017-09-27 RX ORDER — MEDROXYPROGESTERONE ACETATE 10 MG/1
TABLET ORAL
Qty: 30 TABLET | Refills: 0 | Status: SHIPPED | OUTPATIENT
Start: 2017-09-27 | End: 2017-10-09

## 2017-09-27 NOTE — TELEPHONE ENCOUNTER
Pt in clinic seeing PCP . He would like patient to be seen by Dr. Sheth or Dr. Ly for headaches. Pt is a medicaid patient. Please assist. Thanks.

## 2017-09-27 NOTE — TELEPHONE ENCOUNTER
Unfortunately, we do not take medicaid. The patient will have to go to [facility]. The patient can call [phone] or [phone] to schedule. (Routing comment)     See message from neurology. Okay to send patient to [facility]?

## 2017-09-27 NOTE — MEDICAL/APP STUDENT
Subjective:       Patient ID: Lavonne Pierre is a 42 y.o. female.    Chief Complaint: Fibromyalgia (follow up; hm due: mammogram, flu) and Headache (pt states her headaches are coming back)    Patient is complaining of return of her headaches that are triggered by light and loud noises.  They are located in the front of her head and back of her head and last for about 2 hours.  Patient is also complaining of a rash that occurred 3 days ago that is itchy.  Patient continues to have generalized body pain. Patient also endorses bumps bilaterally on the back of her legs and left shoulder associated with tenderness. Patient recently received bilateral lidocaine injections for carpel tunnel.  Patient was recently put on Elavil and Prozac for mood and pain.  Patient cannot exercise due to asthma.      Review of Systems   Constitutional: Positive for fatigue.   HENT: Negative for rhinorrhea, sinus pain, sinus pressure and sneezing.    Respiratory: Negative for apnea, choking and chest tightness.    Cardiovascular: Negative for chest pain, palpitations and leg swelling.   Gastrointestinal: Positive for constipation. Negative for abdominal distention, diarrhea and nausea.   Endocrine: Negative.    Genitourinary: Negative.    Musculoskeletal: Positive for arthralgias and myalgias.   Neurological: Positive for dizziness, light-headedness, numbness and headaches.       Objective:       Vitals:    09/27/17 1046   BP: 110/64   Pulse: 80   Resp: 18       Physical Exam   Constitutional: She is oriented to person, place, and time. She appears well-developed and well-nourished.   HENT:   Head: Normocephalic and atraumatic.   Right Ear: External ear normal.   Left Ear: External ear normal.   Nose: Nose normal.   Mouth/Throat: Oropharynx is clear and moist.   Eyes: Conjunctivae and EOM are normal. Pupils are equal, round, and reactive to light.   Neck: Normal range of motion.   Cardiovascular: Normal rate, regular rhythm, normal  heart sounds and intact distal pulses.    Pulmonary/Chest: Effort normal and breath sounds normal.   Abdominal: Soft. Bowel sounds are normal.   Musculoskeletal: Normal range of motion. She exhibits tenderness (left shoulder).   Neurological: She is alert and oriented to person, place, and time.   Skin: Skin is warm and dry. Rash noted.   Tender subcutaneous nodules bilaterally on back of lower limb                 Assessment:       -Fibromyalgia  -Headache  -Rash  Plan:       -Neurology referral   -Flu shot  -Continue Prozac and Elavil     Avinash Olivas MS4

## 2017-10-01 NOTE — PROGRESS NOTES
Subjective:       Patient ID: Lavonne Pierre is a 42 y.o. female.     Chief Complaint: Fibromyalgia (follow up; hm due: mammogram, flu) and Headache (pt states her headaches are coming back)     Patient is complaining of return of her headaches that are triggered by light and loud noises.  They are located in the front of her head and back of her head and last for about 2 hours.    Patient is also complaining of a rash that occurred 3 days ago that is itchy.  Patient continues to have generalized body pain. Patient also endorses bumps bilaterally on the back of her legs and left shoulder associated with tenderness.   Patient recently received bilateral lidocaine injections for carpel tunnel.    Patient was recently put on Elavil and Prozac by Rheumatology  Patient reports inability to exercise due to asthma.     Review of Systems   Constitutional: Positive for fatigue.   HENT: Negative for rhinorrhea, sinus pain, sinus pressure and sneezing.    Respiratory: Negative for apnea, choking and chest tightness.    Cardiovascular: Negative for chest pain, palpitations and leg swelling.   Gastrointestinal: Positive for constipation. Negative for abdominal distention, diarrhea and nausea.   Endocrine: Negative.    Genitourinary: Negative.    Musculoskeletal: Positive for arthralgias and myalgias.   Neurological: Positive for dizziness, light-headedness, numbness and headaches.       Objective:           Vitals:     09/27/17 1046   BP: 110/64   Pulse: 80   Resp: 18   Physical Exam   Constitutional: She is oriented to person, place, and time. She appears well-developed and well-nourished.   HENT:   Head: Normocephalic and atraumatic.   Right Ear: External ear normal.   Left Ear: External ear normal.   Nose: Nose normal.   Mouth/Throat: Oropharynx is clear and moist.   Eyes: Conjunctivae and EOM are normal. Pupils are equal, round, and reactive to light.   Neck: Normal range of motion.   Cardiovascular: Normal rate, regular  rhythm, normal heart sounds and intact distal pulses.    Pulmonary/Chest: Effort normal and breath sounds normal.   Abdominal: Soft. Bowel sounds are normal.   Musculoskeletal: Normal range of motion. She exhibits tenderness (left shoulder).   Neurological: She is alert and oriented to person, place, and time.   Skin: Skin is warm and dry. Rash noted.   Tender subcutaneous nodules bilaterally on back of lower limb     Assessment:       1. Fibromyalgia     2. Headache, unspecified headache type  Ambulatory consult to Neurology      Plan:       Fibromyalgia  - Continue current therapy  - Continue Rheumaology    Headache, unspecified headache type  -     Ambulatory consult to Neurology    Other orders  -     Influenza - Quadrivalent (3 years & older) (PF)    Return in about 6 months (around 3/27/2018).

## 2017-10-05 ENCOUNTER — TELEPHONE (OUTPATIENT)
Dept: NEUROLOGY | Facility: CLINIC | Age: 42
End: 2017-10-05

## 2017-10-05 NOTE — TELEPHONE ENCOUNTER
Called to schedule an appointment to see a neurologist for headaches, got voicemail, left return call message.

## 2017-10-09 ENCOUNTER — HOSPITAL ENCOUNTER (OUTPATIENT)
Dept: RADIOLOGY | Facility: HOSPITAL | Age: 42
Discharge: HOME | End: 2017-10-09
Attending: OBSTETRICS & GYNECOLOGY
Payer: MEDICAID

## 2017-10-09 ENCOUNTER — OFFICE VISIT (OUTPATIENT)
Dept: OBSTETRICS AND GYNECOLOGY | Facility: CLINIC | Age: 42
End: 2017-10-09
Payer: MEDICAID

## 2017-10-09 VITALS — WEIGHT: 162 LBS | BODY MASS INDEX: 31.8 KG/M2 | HEIGHT: 60 IN

## 2017-10-09 VITALS
SYSTOLIC BLOOD PRESSURE: 110 MMHG | BODY MASS INDEX: 31.99 KG/M2 | DIASTOLIC BLOOD PRESSURE: 82 MMHG | WEIGHT: 162.94 LBS | HEIGHT: 60 IN

## 2017-10-09 DIAGNOSIS — K59.04 CHRONIC IDIOPATHIC CONSTIPATION: ICD-10-CM

## 2017-10-09 DIAGNOSIS — Z12.4 ENCOUNTER FOR PAP SMEAR OF CERVIX WITH HPV DNA COTESTING: Primary | ICD-10-CM

## 2017-10-09 DIAGNOSIS — Z12.31 VISIT FOR SCREENING MAMMOGRAM: ICD-10-CM

## 2017-10-09 PROCEDURE — 88175 CYTOPATH C/V AUTO FLUID REDO: CPT

## 2017-10-09 PROCEDURE — 77067 SCR MAMMO BI INCL CAD: CPT | Mod: 26,,, | Performed by: RADIOLOGY

## 2017-10-09 PROCEDURE — 99999 PR PBB SHADOW E&M-EST. PATIENT-LVL III: CPT | Mod: PBBFAC,,, | Performed by: OBSTETRICS & GYNECOLOGY

## 2017-10-09 PROCEDURE — 87624 HPV HI-RISK TYP POOLED RSLT: CPT

## 2017-10-09 PROCEDURE — 77067 SCR MAMMO BI INCL CAD: CPT | Mod: TC

## 2017-10-09 PROCEDURE — 77063 BREAST TOMOSYNTHESIS BI: CPT | Mod: 26,,, | Performed by: RADIOLOGY

## 2017-10-09 PROCEDURE — 99396 PREV VISIT EST AGE 40-64: CPT | Mod: S$PBB,,, | Performed by: OBSTETRICS & GYNECOLOGY

## 2017-10-09 PROCEDURE — 99213 OFFICE O/P EST LOW 20 MIN: CPT | Mod: PBBFAC,PN | Performed by: OBSTETRICS & GYNECOLOGY

## 2017-10-09 NOTE — PROGRESS NOTES
Chief Complaint   Patient presents with    Well Woman       History of Present Illness: Lavonne Pierre is a 42 y.o. female that presents today 10/9/2017 for well gyn visit.    Past Medical History:   Diagnosis Date    Anxiety 2013    Asthma     Fibromyalgia     HTN (hypertension)     Preeclampsia        Past Surgical History:   Procedure Laterality Date    BREAST BIOPSY      Benign     SECTION, LOW TRANSVERSE      TUBAL LIGATION         Current Outpatient Prescriptions   Medication Sig Dispense Refill    amitriptyline (ELAVIL) 25 MG tablet TAKE 1 TABLET(25 MG) BY MOUTH EVERY NIGHT AS NEEDED FOR INSOMNIA 30 tablet 11    ANORO ELLIPTA 62.5-25 mcg/actuation DsDv INL 1 PUFF PO QD UTD  6    fluoxetine (PROZAC) 40 MG capsule Take 1 capsule (40 mg total) by mouth once daily. 30 capsule 3    lisinopril (PRINIVIL,ZESTRIL) 5 MG tablet TAKE 1 TABLET BY MOUTH ONCE DAILY. 90 tablet 3    multivitamin (THERAGRAN) per tablet Take 1 tablet by mouth once daily.       No current facility-administered medications for this visit.        Review of patient's allergies indicates:   Allergen Reactions    Ibuprofen Other (See Comments)     Affects stomach ulcer, avoids all NSAIDS    Latex, natural rubber Rash     Small red bumps on skin contact       Family History   Problem Relation Age of Onset    Pancreatic cancer Mother     Lymphoma Father     Pancreatic cancer Maternal Grandmother     Pancreatic cancer Maternal Uncle     Breast cancer Maternal Aunt        Social History     Social History    Marital status:      Spouse name: N/A    Number of children: N/A    Years of education: N/A     Social History Main Topics    Smoking status: Never Smoker    Smokeless tobacco: Never Used    Alcohol use No    Drug use: No    Sexual activity: Yes     Birth control/ protection: See Surgical Hx     Other Topics Concern    None     Social History Narrative    None       OB History    Para  Term  AB Living   2 2 1 1   3   SAB TAB Ectopic Multiple Live Births           1      # Outcome Date GA Lbr Ron/2nd Weight Sex Delivery Anes PTL Lv   2   29w0d    CS-Unspec   ALEX   1 Term      Vag-Spont             Review of Symptoms:  GENERAL: Denies weight gain or weight loss. Feeling well overall.   SKIN: Denies rash or lesions.   HEAD: Denies head injury or headache.   NODES: Denies enlarged lymph nodes.   CHEST: Denies chest pain or shortness of breath.   CARDIOVASCULAR: Denies palpitations or left sided chest pain.   ABDOMEN: No abdominal pain, constipation, diarrhea, nausea, vomiting or rectal bleeding.   URINARY: No frequency, dysuria, hematuria, or burning on urination.  HEMATOLOGIC: No easy bruisability or excessive bleeding.   MUSCULOSKELETAL: Denies joint pain or swelling.     /82   Ht 5' (1.524 m)   Wt 73.9 kg (162 lb 14.7 oz)   LMP 10/02/2017   Physical Exam:  APPEARANCE: Well nourished, well developed, in no acute distress.  SKIN: Normal skin turgor, no lesions.  NECK: Neck symmetric without masses   RESPIRATORY: Normal respiratory effort with no retractions or use of accessory muscles  CARDIOVASCULAR: Peripheral vascular system with no swelling no varicosities and palpation of pulses normal  LYMPHATIC: No enlargements of the lymph nodes noted in the neck, axillae, or groin  ABDOMEN: Soft. No tenderness or masses. No hepatosplenomegaly. No hernias.  BREASTS: Symmetrical, no skin changes or visible lesions. No palpable masses, nipple discharge or adenopathy bilaterally.  PELVIC: Normal external female genitalia without lesions. Normal hair distribution. Adequate perineal body, normal urethral meatus. Urethra with no masses.  Bladder nontender. Vagina moist and well rugated without lesions or discharge. Cervix pink and without lesions. No significant cystocele or rectocele. Bimanual exam showed uterus normal size, shape, position, mobile and nontender. Adnexa without masses or  tenderness. Urethra and bladder normal. PAP DONE  EXTREMITIES: No clubbing cyanosis or edema.    ASSESSMENT/PLAN:  Encounter for Pap smear of cervix with HPV DNA cotesting  -     Liquid-based pap smear, screening  -     HPV High Risk Genotypes, PCR    Chronic idiopathic constipation      Repeat pap in 2 years    Patient was counseled today on Pap guidelines, recommendation for pelvic exams, mammograms every other year after the age of 40 and annually after the age of 50, Colonoscopy after the age of 50, Dexa Bone Scan and calcium and vitamin D supplementation in menopause and to see her PCP for other health maintenance.   FOLLOW-UP:prn

## 2017-10-10 ENCOUNTER — PATIENT MESSAGE (OUTPATIENT)
Dept: RHEUMATOLOGY | Facility: CLINIC | Age: 42
End: 2017-10-10

## 2017-10-12 LAB
HPV HR 12 DNA CVX QL NAA+PROBE: NEGATIVE
HPV16 DNA SPEC QL NAA+PROBE: NEGATIVE
HPV18 DNA SPEC QL NAA+PROBE: NEGATIVE

## 2017-10-13 ENCOUNTER — PATIENT MESSAGE (OUTPATIENT)
Dept: FAMILY MEDICINE | Facility: CLINIC | Age: 42
End: 2017-10-13

## 2017-10-13 ENCOUNTER — PATIENT MESSAGE (OUTPATIENT)
Dept: PHYSICAL MEDICINE AND REHAB | Facility: CLINIC | Age: 42
End: 2017-10-13

## 2017-10-16 DIAGNOSIS — M77.11 RIGHT LATERAL EPICONDYLITIS: Primary | ICD-10-CM

## 2017-10-16 RX ORDER — METAXALONE 400 MG/1
400 TABLET ORAL 3 TIMES DAILY
Qty: 30 TABLET | Refills: 0 | Status: SHIPPED | OUTPATIENT
Start: 2017-10-16 | End: 2017-10-26

## 2017-10-17 ENCOUNTER — PATIENT MESSAGE (OUTPATIENT)
Dept: RHEUMATOLOGY | Facility: CLINIC | Age: 42
End: 2017-10-17

## 2017-10-17 RX ORDER — TRAMADOL HYDROCHLORIDE 50 MG/1
50 TABLET ORAL EVERY 12 HOURS PRN
Qty: 20 TABLET | Refills: 0 | Status: SHIPPED | OUTPATIENT
Start: 2017-10-17 | End: 2017-11-28 | Stop reason: SDUPTHER

## 2017-10-23 ENCOUNTER — TELEPHONE (OUTPATIENT)
Dept: PHYSICAL MEDICINE AND REHAB | Facility: CLINIC | Age: 42
End: 2017-10-23

## 2017-10-23 NOTE — TELEPHONE ENCOUNTER
----- Message from Jak Kelsey sent at 10/23/2017 12:28 PM CDT -----  Contact: Lavonne  Patient is returning phone call. Please contact patient back at   [phone] (home)   Thanks!

## 2017-10-24 ENCOUNTER — PATIENT MESSAGE (OUTPATIENT)
Dept: PHYSICAL MEDICINE AND REHAB | Facility: CLINIC | Age: 42
End: 2017-10-24

## 2017-10-25 ENCOUNTER — PATIENT MESSAGE (OUTPATIENT)
Dept: PHYSICAL MEDICINE AND REHAB | Facility: CLINIC | Age: 42
End: 2017-10-25

## 2017-11-07 ENCOUNTER — PROCEDURE VISIT (OUTPATIENT)
Dept: PHYSICAL MEDICINE AND REHAB | Facility: CLINIC | Age: 42
End: 2017-11-07
Payer: MEDICAID

## 2017-11-07 DIAGNOSIS — G56.03 CARPAL TUNNEL SYNDROME ON BOTH SIDES: Primary | ICD-10-CM

## 2017-11-07 DIAGNOSIS — M77.11 RIGHT LATERAL EPICONDYLITIS: ICD-10-CM

## 2017-11-07 PROCEDURE — 95911 NRV CNDJ TEST 9-10 STUDIES: CPT | Mod: PBBFAC,PO | Performed by: PHYSICAL MEDICINE & REHABILITATION

## 2017-11-07 PROCEDURE — 95886 MUSC TEST DONE W/N TEST COMP: CPT | Mod: PBBFAC,PO | Performed by: PHYSICAL MEDICINE & REHABILITATION

## 2017-11-07 PROCEDURE — 95886 MUSC TEST DONE W/N TEST COMP: CPT | Mod: 26,S$PBB,, | Performed by: PHYSICAL MEDICINE & REHABILITATION

## 2017-11-07 PROCEDURE — 95911 NRV CNDJ TEST 9-10 STUDIES: CPT | Mod: 26,S$PBB,, | Performed by: PHYSICAL MEDICINE & REHABILITATION

## 2017-11-07 RX ORDER — GABAPENTIN 300 MG/1
300 CAPSULE ORAL 2 TIMES DAILY
Qty: 60 CAPSULE | Refills: 0 | Status: SHIPPED | OUTPATIENT
Start: 2017-11-07 | End: 2017-11-29 | Stop reason: SDUPTHER

## 2017-11-08 NOTE — PROCEDURES
[facility]  [address] [place] [address]             Full Name: FLIP RAUSCH Gender: Female  Patient ID: [id number] YOB: 1975  History: Pt reports bilateral hand paresthesias and pain.      Visit Date: 11/7/2017 11:27  Age: 42 Years 2 Months Old  Examining Physician: EVANGELIST  Referring Physician: EVANGELIST      Sensory NCS      Nerve / Sites Rec. Site Onset Lat Peak Lat NP Amp PP Amp Segments Distance Velocity     ms ms µV µV  cm m/s   L Median - Digit III (Antidromic)      Wrist Dig III 3.33 4.22 28.3 42.7 Wrist - Dig III 14 42      Mid palm Dig III 1.09 1.98 84.4 57.8 Mid palm - Dig III 7 64   R Median - Digit III (Antidromic)      Wrist Dig III 4.69 5.78 4.0 29.7 Wrist - Dig III 14 30      Mid palm Dig III 1.82 2.60 15.7 7.1 Mid palm - Dig III 7 38   L Ulnar - Digit V (Antidromic)      Wrist Dig V 2.40 3.18 42.2 26.3 Wrist - Dig V 14 58   R Ulnar - Digit V (Antidromic)      Wrist Dig V 2.24 3.13 47.6 88.2 Wrist - Dig V 14 63   R Radial - Anatomical snuff box (Forearm)      Forearm Wrist 1.72 2.40 35.0 49.9 Forearm - Wrist 10 58       Motor NCS      Nerve / Sites Muscle Latency Amplitude Amp % Duration Segments Distance Lat Diff Velocity     ms mV % ms  cm ms m/s   R Median - APB      Wrist APB 5.36 16.8 100 6.09 Wrist - APB 8        Elbow APB 9.53 15.7 93.7 6.30 Elbow - Wrist 22 4.17 53   L Median - APB      Wrist APB 3.65 16.4 100 5.78 Wrist - APB 8        Elbow APB 7.50 16.9 103 5.78 Elbow - Wrist 22 3.85 57   R Ulnar - ADM      Wrist ADM 2.60 17.3 100 5.83 Wrist - ADM 8        B.Elbow ADM 4.64 17.0 98.4 5.99 B.Elbow - Wrist 15 2.03 74      A.Elbow ADM 6.61 16.8 97.2 5.94 A.Elbow - B.Elbow 10 1.98 51   L Ulnar - ADM      Wrist ADM 2.55 15.6 100 5.36 Wrist - ADM 8        B.Elbow ADM 4.95 15.6 100 5.52 B.Elbow - Wrist 15 2.40 63      A.Elbow ADM 6.98 14.6 93.9 5.63 A.Elbow - B.Elbow 10 2.03 49   R Ulnar - FDI      Wrist FDI 3.18 13.2 100 5.31 Wrist - FDI 11        B.Elbow FDI  5.68 12.5 94.8 5.31 B.Elbow - Wrist 15 2.50 60      A.Elbow FDI 7.29 11.4 86.4 5.26 A.Elbow - B.Elbow 10 1.61 62       EMG         EMG Summary Table     Spontaneous MUAP Recruitment   Muscle IA Fib PSW Fasc H.F. Amp Dur. PPP Pattern   R. Deltoid N None None None None N N N N   R. Biceps brachii N None None None None N N N N   R. Triceps brachii N None None None None N N N N   R. Pronator teres N None None None None N N N N   R. First dorsal interosseous N None None None None N N N N   R. Abductor pollicis brevis N None None None None N N N N   R. Abductor digiti minimi (manus) N None None None None N N N N   R. Cervical paraspinals N None None None None           Summary    The motor conduction test was performed on 5 nerve(s). The results were normal in 4 nerve(s): L Median - APB, R Ulnar - ADM, L Ulnar - ADM, R Ulnar - FDI. Results outside the specified normal range were found in 1 nerve(s), as follows:   In the R Median - APB study  o the take off latency result was increased for Wrist stimulation    The sensory conduction test was performed on 5 nerve(s). The results were normal in 3 nerve(s): R Radial - Anatomical snuff box (Forearm), L Ulnar - Digit V (Antidromic), R Ulnar - Digit V (Antidromic). Results outside the specified normal range were found in 2 nerve(s), as follows:   In the L Median - Digit III (Antidromic) study  o the peak latency result was increased for Wrist stimulation   In the R Median - Digit III (Antidromic) study  o the peak latency result was increased for Wrist stimulation  o the peak amplitude result was reduced for Wrist stimulation    The needle EMG study was normal in all 8 tested muscles: R. Deltoid, R. Biceps brachii, R. Triceps brachii, R. Pronator teres, R. First dorsal interosseous, R. Abductor pollicis brevis, R. Abductor digiti minimi (manus), R. Cervical paraspinals.      Electrodiagnostic Impression:  1. There was electrophysiologic evidence of moderate right and mild  left median nerve neuropathy at the wrists.  2. There is no electrodiagnostic evidence of peripheral polyneuropathy, myopathy, or cervical radiculopathy/brachial plexopathy of the right upper extremity.    Summary:  Moderate right and mild left carpal tunnel syndrome.    Plan:  Todays electrodiagnostic findings in combination with a diagnostic ultrasound exam findings both confirm the diagnosis of right greater than left carpal tunnel syndrome.  She did respond but temporarily to the prior injections of corticosteroid.  She did not respond a prior night splints.  At this point, I recommended surgical consultation.  Previous diagnostic ultrasound exam did show a significantly swollen right median nerve at the level of the tunnel.  We will help facilitate her getting set up to see one of our surgeons to discuss open carpal tunnel release.      -------------------------------  Trevor Estrada M.D.

## 2017-11-10 ENCOUNTER — PATIENT MESSAGE (OUTPATIENT)
Dept: PHYSICAL MEDICINE AND REHAB | Facility: CLINIC | Age: 42
End: 2017-11-10

## 2017-11-10 NOTE — TELEPHONE ENCOUNTER
Tali I told Mrs Banerjee that you would call her Monday to discuss a doctor that accepts medicaid for her carpal tunnel.

## 2017-11-13 ENCOUNTER — TELEPHONE (OUTPATIENT)
Dept: PHYSICAL MEDICINE AND REHAB | Facility: CLINIC | Age: 42
End: 2017-11-13

## 2017-11-13 ENCOUNTER — PATIENT MESSAGE (OUTPATIENT)
Dept: PHYSICAL MEDICINE AND REHAB | Facility: CLINIC | Age: 42
End: 2017-11-13

## 2017-11-13 NOTE — TELEPHONE ENCOUNTER
Dr Deng in [place] can do carpal tunnel and takes medicaid. I sent a msg to his staff to call but if you would like to give them a call you are free to do so. His name is Carlos Deng MD. I believe he is with [facility] but I am not sure.    Thanks  Tali

## 2017-11-15 ENCOUNTER — TELEPHONE (OUTPATIENT)
Dept: PHYSICAL MEDICINE AND REHAB | Facility: CLINIC | Age: 42
End: 2017-11-15

## 2017-11-15 NOTE — TELEPHONE ENCOUNTER
----- Message from Mer Sheridan sent at 11/15/2017 10:03 AM CST -----  Contact: Patient  Patient is trying to see if you sent a referral over to have surgery on right hand for carpal tunnel.  Call Back#[phone]  Thanks

## 2017-11-24 PROBLEM — G56.03 BILATERAL CARPAL TUNNEL SYNDROME: Status: ACTIVE | Noted: 2017-11-24

## 2017-11-28 ENCOUNTER — PATIENT MESSAGE (OUTPATIENT)
Dept: RHEUMATOLOGY | Facility: CLINIC | Age: 42
End: 2017-11-28

## 2017-11-28 RX ORDER — TRAMADOL HYDROCHLORIDE 50 MG/1
50 TABLET ORAL EVERY 12 HOURS PRN
Qty: 20 TABLET | Refills: 0 | Status: ON HOLD | OUTPATIENT
Start: 2017-11-28 | End: 2017-12-07 | Stop reason: HOSPADM

## 2017-11-29 ENCOUNTER — OFFICE VISIT (OUTPATIENT)
Dept: FAMILY MEDICINE | Facility: CLINIC | Age: 42
End: 2017-11-29
Payer: MEDICAID

## 2017-11-29 VITALS
DIASTOLIC BLOOD PRESSURE: 82 MMHG | WEIGHT: 167.31 LBS | SYSTOLIC BLOOD PRESSURE: 132 MMHG | BODY MASS INDEX: 32.85 KG/M2 | HEIGHT: 60 IN | RESPIRATION RATE: 16 BRPM | HEART RATE: 88 BPM

## 2017-11-29 DIAGNOSIS — M54.31 SCIATICA OF RIGHT SIDE: Primary | ICD-10-CM

## 2017-11-29 DIAGNOSIS — M79.7 FIBROMYALGIA: ICD-10-CM

## 2017-11-29 PROCEDURE — 99999 PR PBB SHADOW E&M-EST. PATIENT-LVL III: CPT | Mod: PBBFAC,,, | Performed by: FAMILY MEDICINE

## 2017-11-29 PROCEDURE — 99213 OFFICE O/P EST LOW 20 MIN: CPT | Mod: S$PBB,,, | Performed by: FAMILY MEDICINE

## 2017-11-29 PROCEDURE — 99213 OFFICE O/P EST LOW 20 MIN: CPT | Mod: PBBFAC,PO | Performed by: FAMILY MEDICINE

## 2017-11-29 RX ORDER — PREDNISONE 20 MG/1
20 TABLET ORAL DAILY
Qty: 5 TABLET | Refills: 0 | Status: SHIPPED | OUTPATIENT
Start: 2017-11-29 | End: 2017-12-04

## 2017-11-29 RX ORDER — FLUOXETINE HYDROCHLORIDE 60 MG/1
60 TABLET, FILM COATED ORAL; ORAL DAILY
Qty: 90 TABLET | Refills: 3 | Status: SHIPPED | OUTPATIENT
Start: 2017-11-29 | End: 2018-02-06 | Stop reason: SDUPTHER

## 2017-11-29 RX ORDER — GABAPENTIN 300 MG/1
300 CAPSULE ORAL 3 TIMES DAILY
Qty: 270 CAPSULE | Refills: 3 | Status: SHIPPED | OUTPATIENT
Start: 2017-11-29 | End: 2018-08-06

## 2017-11-29 NOTE — PROGRESS NOTES
Subjective:       Patient ID: Lavonne Pierre is a 42 y.o. female    Chief Complaint: Hip Pain (R hip pain) and Fibromyalgia    HPI  Here today to follow up fibromyalgia with depression.  No improvement with Prozac and Neurontin.  Now with worsening pain in sciatic area.  No radiation.  No trauma.  No weakness.    Review of Systems     Objective:   Physical Exam   Constitutional: She is oriented to person, place, and time. She appears well-developed and well-nourished.   Musculoskeletal:        Lumbar back: She exhibits tenderness (right sciatic area) and pain. She exhibits normal range of motion and no spasm.   Neurological: She is alert and oriented to person, place, and time.   Vitals reviewed.       Assessment:       1. Sciatica of right side  predniSONE (DELTASONE) 20 MG tablet   2. Fibromyalgia  FLUoxetine 60 mg Tab    gabapentin (NEURONTIN) 300 MG capsule        Plan:       Sciatica of right side  -     predniSONE (DELTASONE) 20 MG tablet; Take 1 tablet (20 mg total) by mouth once daily.  Dispense: 5 tablet; Refill: 0  - Recheck if persists    Fibromyalgia  -     INCREASE FLUoxetine 60 mg Tab; Take 60 mg by mouth once daily.  Dispense: 90 tablet; Refill: 3  -     INCREASE gabapentin (NEURONTIN) 300 MG capsule; Take 1 capsule (300 mg total) by mouth 3 (three) times daily.  Dispense: 270 capsule; Refill: 3    Return in about 3 months (around 2/28/2018).

## 2017-12-07 PROBLEM — G56.01 RIGHT CARPAL TUNNEL SYNDROME: Status: ACTIVE | Noted: 2017-12-07

## 2017-12-21 ENCOUNTER — TELEPHONE (OUTPATIENT)
Dept: FAMILY MEDICINE | Facility: CLINIC | Age: 42
End: 2017-12-21

## 2017-12-21 NOTE — TELEPHONE ENCOUNTER
Received incoming call from patient. Asked patient the questions and stated to her that I would leave it up front for her pickup. Verbalized understanding.

## 2017-12-21 NOTE — TELEPHONE ENCOUNTER
Attempted to contact patient. Left message to call clinic back in regards to paperwork that  completed. Have a few questions to finish answering and need to ask patient.

## 2018-01-02 DIAGNOSIS — F32.89 OTHER DEPRESSION: ICD-10-CM

## 2018-01-02 DIAGNOSIS — F41.9 ANXIETY: ICD-10-CM

## 2018-01-05 RX ORDER — FLUOXETINE HYDROCHLORIDE 40 MG/1
CAPSULE ORAL
Qty: 30 CAPSULE | Refills: 6 | Status: SHIPPED | OUTPATIENT
Start: 2018-01-05 | End: 2018-02-06

## 2018-01-24 PROBLEM — R20.0 NUMBNESS AND TINGLING IN RIGHT HAND: Status: ACTIVE | Noted: 2018-01-24

## 2018-01-24 PROBLEM — R52 PAIN: Status: ACTIVE | Noted: 2018-01-24

## 2018-01-24 PROBLEM — R20.2 NUMBNESS AND TINGLING IN RIGHT HAND: Status: ACTIVE | Noted: 2018-01-24

## 2018-01-25 ENCOUNTER — PATIENT MESSAGE (OUTPATIENT)
Dept: FAMILY MEDICINE | Facility: CLINIC | Age: 43
End: 2018-01-25

## 2018-01-31 ENCOUNTER — PATIENT MESSAGE (OUTPATIENT)
Dept: FAMILY MEDICINE | Facility: CLINIC | Age: 43
End: 2018-01-31

## 2018-01-31 RX ORDER — OSELTAMIVIR PHOSPHATE 75 MG/1
75 CAPSULE ORAL 2 TIMES DAILY
Qty: 10 CAPSULE | Refills: 0 | Status: SHIPPED | OUTPATIENT
Start: 2018-01-31 | End: 2018-02-05

## 2018-02-06 ENCOUNTER — OFFICE VISIT (OUTPATIENT)
Dept: RHEUMATOLOGY | Facility: CLINIC | Age: 43
End: 2018-02-06
Payer: MEDICAID

## 2018-02-06 ENCOUNTER — HOSPITAL ENCOUNTER (OUTPATIENT)
Dept: RADIOLOGY | Facility: HOSPITAL | Age: 43
Discharge: HOME | End: 2018-02-06
Attending: INTERNAL MEDICINE
Payer: MEDICAID

## 2018-02-06 VITALS
DIASTOLIC BLOOD PRESSURE: 81 MMHG | WEIGHT: 175.06 LBS | BODY MASS INDEX: 37.77 KG/M2 | HEART RATE: 81 BPM | HEIGHT: 57 IN | SYSTOLIC BLOOD PRESSURE: 120 MMHG

## 2018-02-06 DIAGNOSIS — G43.B0 OPHTHALMOPLEGIC MIGRAINE, NOT INTRACTABLE: ICD-10-CM

## 2018-02-06 DIAGNOSIS — M54.2 CERVICALGIA: ICD-10-CM

## 2018-02-06 DIAGNOSIS — M79.7 FIBROMYALGIA: ICD-10-CM

## 2018-02-06 DIAGNOSIS — F41.9 ANXIETY: Primary | ICD-10-CM

## 2018-02-06 PROCEDURE — 99999 PR PBB SHADOW E&M-EST. PATIENT-LVL III: CPT | Mod: PBBFAC,,, | Performed by: INTERNAL MEDICINE

## 2018-02-06 PROCEDURE — 72040 X-RAY EXAM NECK SPINE 2-3 VW: CPT | Mod: TC,FY,PO

## 2018-02-06 PROCEDURE — 72040 X-RAY EXAM NECK SPINE 2-3 VW: CPT | Mod: 26,,, | Performed by: RADIOLOGY

## 2018-02-06 PROCEDURE — 3008F BODY MASS INDEX DOCD: CPT | Mod: ,,, | Performed by: INTERNAL MEDICINE

## 2018-02-06 PROCEDURE — 99214 OFFICE O/P EST MOD 30 MIN: CPT | Mod: S$PBB,,, | Performed by: INTERNAL MEDICINE

## 2018-02-06 PROCEDURE — 99213 OFFICE O/P EST LOW 20 MIN: CPT | Mod: PBBFAC,PO,25 | Performed by: INTERNAL MEDICINE

## 2018-02-06 RX ORDER — FLUOXETINE HYDROCHLORIDE 60 MG/1
60 TABLET, FILM COATED ORAL; ORAL DAILY
Qty: 90 TABLET | Refills: 3 | Status: SHIPPED | OUTPATIENT
Start: 2018-02-06 | End: 2018-08-06

## 2018-02-06 RX ORDER — TIZANIDINE 4 MG/1
TABLET ORAL
Refills: 0 | COMMUNITY
Start: 2018-01-31 | End: 2018-08-06

## 2018-02-06 RX ORDER — PREDNISONE 20 MG/1
TABLET ORAL
Refills: 0 | COMMUNITY
Start: 2018-01-31 | End: 2018-07-30

## 2018-02-06 ASSESSMENT — ROUTINE ASSESSMENT OF PATIENT INDEX DATA (RAPID3)
MDHAQ FUNCTION SCORE: 1.8
TOTAL RAPID3 SCORE: 7.16
PAIN SCORE: 8
PATIENT GLOBAL ASSESSMENT SCORE: 7.5
PSYCHOLOGICAL DISTRESS SCORE: 9.9

## 2018-02-06 NOTE — PATIENT INSTRUCTIONS
The patient will have to go to [facility]. The patient can call [phone] or [phone] to schedule.      and try Celebrex for next few weeks.      Once done with hand PT, let me know I want to get you into PT for FMS.

## 2018-02-06 NOTE — PROGRESS NOTES
"Subjective:          Chief Complaint: Lavonne Pierre is a 42 y.o. female who had concerns including Fibromyalgia (5 month follow up).    HPI:    Patient here today for follow-up with a history of fibromyalgia, positive MIRIAN 1:160 homogeneous/speckled. negative MIRIAN profile. + thyroid Antibodies. No evidence of CTD.        Still on Wellbutrin XL 300mg daily. But not helping and noting increased tearfulness.    We switched to Prozac - increased by PCP to 60mg daily  Added in Gabapentin 300mg TID  Celebrex: has not started, insurance just approved for $1  Elavil 25mg for HS    S/o right CTR 12/7/17, will f/u with Ortho for left soon. Doing better.     Failed Cymbalta, Effexor, Zoloft, wellbutrin. Tizanidine, savella,    States she did well with Topamax but concern on long term safety. For ocular migraines. Still having HA.   Lyrica ASE with weight gain.   Did have steroid injections in past no relief with elbows, hands, feet or knees. Does help some with hip busitis.     She's had various myalgias as well as joint pain in the ankles, feet, wrists, shoulders and neck and legs.  She also has an underlying history of depression she's had no constitutional symptoms, rashes, oral ulcers, serositis, Raynaud's.   She is not sleeping at night.   She is having significant fatigue and feel "shaky" in her body. She is noting significant brain fog.  Trouble with restless leg type symptoms, but occur during the day.  She is noting pain in her feet (diffusely), knees at the superior pole of the patella. And bilateral elbows she is going to meet with Dr. Jack as right elbow is doing better , now needing the left.    She is c/o lumps in her skin tender, no redness but she does try to massage them. She is feeling increased pain in the knees with "lumps" in gastrocnemius region. Do feel swollen.     NSAIDs with gastric ulcers in past.   Cannot exercise b/c of pain and asthma causes her to stop.         Component      Latest Ref " Rng & Units 1/17/2017 1/15/2016   Anti Sm Antibody      0.00 - 19.99 EU  0.97   Anti-Sm Interpretation      Negative  Negative   Anti-SSA Antibody      0.00 - 19.99 EU  1.09   Anti-SSA Interpretation      Negative  Negative   Anti-SSB Antibody      0.00 - 19.99 EU  0.30   Anti-SSB Interpretation      Negative  Negative   ds DNA Ab      Negative 1:10  Negative 1:10   Anti Sm/RNP Antibody      0.00 - 19.99 EU  0.11   Anti-Sm/RNP Interpretation      Negative  Negative   Sed Rate      0 - 20 mm/Hr  9   CRP      0.0 - 8.2 mg/L  5.7   MIRIAN Screen      Negative <1:160 Positive (A) Positive (A)   Rheumatoid Factor      0.0 - 15.0 IU/mL  <10.0   MIRIAN HEP-2 Titer       Positive 1:320 Homogeneous Positive 1:160 Homogeneous       REVIEW OF SYSTEMS:    Review of Systems   Constitutional: Positive for malaise/fatigue and weight loss. Negative for chills and fever.   HENT: Negative for sore throat.    Eyes: Negative for double vision, photophobia, discharge and redness.   Respiratory: Negative for cough, shortness of breath and wheezing.    Cardiovascular: Negative for chest pain, palpitations and orthopnea.   Gastrointestinal: Positive for heartburn. Negative for abdominal pain, constipation and diarrhea.   Genitourinary: Negative for dysuria, hematuria and urgency.   Musculoskeletal: Positive for joint pain, myalgias and neck pain. Negative for back pain.   Skin: Negative for rash.   Neurological: Positive for headaches. Negative for dizziness, tingling, focal weakness and weakness.   Endo/Heme/Allergies: Does not bruise/bleed easily.   Psychiatric/Behavioral: Negative for depression, hallucinations and suicidal ideas. The patient is nervous/anxious.                Objective:            Past Medical History:   Diagnosis Date    Anxiety 7/16/2013    Asthma     Depression     Fibromyalgia     GERD (gastroesophageal reflux disease)     History of pneumonia 2015    HTN (hypertension)     Preeclampsia     Sleep apnea     Uses  CPAP     Family History   Problem Relation Age of Onset    Pancreatic cancer Mother     Lymphoma Father     Diabetes Father     Hypertension Father     Pancreatic cancer Maternal Grandmother     Pancreatic cancer Maternal Uncle     Breast cancer Maternal Aunt      Social History   Substance Use Topics    Smoking status: Never Smoker    Smokeless tobacco: Never Used    Alcohol use No         Current Outpatient Prescriptions on File Prior to Visit   Medication Sig Dispense Refill    amitriptyline (ELAVIL) 25 MG tablet TAKE 1 TABLET(25 MG) BY MOUTH EVERY NIGHT AS NEEDED FOR INSOMNIA 30 tablet 11    ASMANEX TWISTHALER 220 mcg (30 doses) inhaler INL 1 PUFF PO BID  3    budesonide-formoterol 160-4.5 mcg (SYMBICORT) 160-4.5 mcg/actuation HFAA Inhale 2 puffs into the lungs every 12 (twelve) hours. Controller      FLUoxetine 60 mg Tab Take 60 mg by mouth once daily. 90 tablet 3    gabapentin (NEURONTIN) 300 MG capsule Take 1 capsule (300 mg total) by mouth 3 (three) times daily. 270 capsule 3    hydrocodone-acetaminophen 5-325mg (NORCO) 5-325 mg per tablet Take 1 tablet by mouth every 6 (six) hours as needed for Pain. 30 tablet 0    lisinopril (PRINIVIL,ZESTRIL) 5 MG tablet TAKE 1 TABLET BY MOUTH ONCE DAILY. 90 tablet 3    multivitamin (THERAGRAN) per tablet Take 1 tablet by mouth once daily.      ondansetron (ZOFRAN-ODT) 4 MG TbDL Take 1 tablet (4 mg total) by mouth every 6 (six) hours as needed. 10 tablet 1    VENTOLIN HFA 90 mcg/actuation inhaler INL 1 PUFF PO Q 4 TO 6 H PRN  2    docusate sodium 100 mg capsule Take 100 mg by mouth 2 (two) times daily.  0    FLUoxetine (PROZAC) 40 MG capsule TAKE 1 CAPSULE(40 MG) BY MOUTH EVERY DAY 30 capsule 6    [] oseltamivir (TAMIFLU) 75 MG capsule Take 1 capsule (75 mg total) by mouth 2 (two) times daily. 10 capsule 0     No current facility-administered medications on file prior to visit.        Vitals:    18 0919   BP: 120/81   Pulse: 81        Physical Exam:    Physical Exam   Constitutional: She appears well-developed and well-nourished.   HENT:   Nose: No septal deviation.   Mouth/Throat: Mucous membranes are normal. No oral lesions.   Eyes: Pupils are equal, round, and reactive to light. Right conjunctiva is not injected. Left conjunctiva is not injected.   Neck: No JVD present. No thyroid mass and no thyromegaly present.   Cardiovascular: Normal rate, regular rhythm and normal pulses.    No edema   Pulmonary/Chest: Effort normal and breath sounds normal.   Abdominal: Soft. Normal appearance. There is no hepatosplenomegaly.   Musculoskeletal:        Right shoulder: She exhibits tenderness. She exhibits normal range of motion and no swelling.        Left shoulder: She exhibits tenderness. She exhibits normal range of motion and no swelling.        Right elbow: She exhibits normal range of motion and no swelling. Tenderness found. Medial epicondyle tenderness noted.        Left elbow: She exhibits normal range of motion and no swelling. Tenderness found. Medial epicondyle tenderness noted.        Right wrist: She exhibits normal range of motion, no tenderness and no swelling.        Left wrist: She exhibits normal range of motion, no tenderness and no swelling.        Right hip: She exhibits normal range of motion, normal strength and no swelling.        Left hip: She exhibits normal range of motion, no tenderness and no swelling.        Right knee: She exhibits normal range of motion and no swelling. Tenderness found. Medial joint line tenderness noted.        Left knee: She exhibits normal range of motion and no swelling. Tenderness found. Medial joint line tenderness noted.        Right ankle: She exhibits normal range of motion and no swelling. No tenderness.        Left ankle: She exhibits normal range of motion and no swelling. No tenderness.        Cervical back: She exhibits bony tenderness.   14/18 FMS tenderpoints w/o No synovitis,  restriction in ROM, tenderness of wrist, MCP, PIP, DIP. No weakness in . Able to fully curl fingers.      Lymphadenopathy:     She has no cervical adenopathy.     She has no axillary adenopathy.   Neurological: She has normal strength and normal reflexes.   Skin: Skin is dry and intact.   Psychiatric: Her mood appears anxious.             Assessment:       Encounter Diagnoses   Name Primary?    Fibromyalgia     Anxiety Yes    Cervicalgia           Plan:        Anxiety    Fibromyalgia  -     FLUoxetine 60 mg Tab; Take 60 mg by mouth once daily.  Dispense: 90 tablet; Refill: 3    Cervicalgia  Comments:  chronic, failed conservative therapy.   Orders:  -     X-Ray Cervical Spine AP And Lateral; Future; Expected date: 02/06/2018    Ophthalmoplegic migraine, not intractable  Comments:  better on topamax has not established with neurology. gave # for [facility] 2/2018 JMM         Keep Prozac at 60mg.   Keep Gabapentin at 300mg TID-she has been on in past no real benefit, but want to see if combination of Prozac, Celebrex, Gabapentin will offer some relief.   Trial with Celebrex.   Patient just started with [facility] PT for hand, I think once she is done we should consider FMS PT for overall deconditioning as she has exhausted all FMS medications. She has a myriad of complaints for which I am not sure as to etiologies with twitching, shaking, various tender spots in the skin/fascia.     Follow-up in about 6 months (around 8/6/2018).        30min consultation with greater than 50% spent in counseling, chart review and coordination of care. All questions answered.

## 2018-02-09 ENCOUNTER — PATIENT MESSAGE (OUTPATIENT)
Dept: RHEUMATOLOGY | Facility: CLINIC | Age: 43
End: 2018-02-09

## 2018-02-14 ENCOUNTER — PATIENT MESSAGE (OUTPATIENT)
Dept: RHEUMATOLOGY | Facility: CLINIC | Age: 43
End: 2018-02-14

## 2018-02-26 ENCOUNTER — OFFICE VISIT (OUTPATIENT)
Dept: FAMILY MEDICINE | Facility: CLINIC | Age: 43
End: 2018-02-26
Payer: MEDICAID

## 2018-02-26 ENCOUNTER — LAB VISIT (OUTPATIENT)
Dept: LAB | Facility: HOSPITAL | Age: 43
End: 2018-02-26
Attending: FAMILY MEDICINE
Payer: MEDICAID

## 2018-02-26 VITALS
SYSTOLIC BLOOD PRESSURE: 122 MMHG | RESPIRATION RATE: 18 BRPM | BODY MASS INDEX: 38.09 KG/M2 | WEIGHT: 176.56 LBS | HEIGHT: 57 IN | DIASTOLIC BLOOD PRESSURE: 84 MMHG | HEART RATE: 76 BPM

## 2018-02-26 DIAGNOSIS — M79.7 FIBROMYALGIA: ICD-10-CM

## 2018-02-26 DIAGNOSIS — M79.7 FIBROMYALGIA: Primary | ICD-10-CM

## 2018-02-26 LAB — TSH SERPL DL<=0.005 MIU/L-ACNC: 2.25 UIU/ML

## 2018-02-26 PROCEDURE — 3008F BODY MASS INDEX DOCD: CPT | Mod: ,,, | Performed by: FAMILY MEDICINE

## 2018-02-26 PROCEDURE — 99214 OFFICE O/P EST MOD 30 MIN: CPT | Mod: S$PBB,,, | Performed by: FAMILY MEDICINE

## 2018-02-26 PROCEDURE — 99213 OFFICE O/P EST LOW 20 MIN: CPT | Mod: PBBFAC,PO | Performed by: FAMILY MEDICINE

## 2018-02-26 PROCEDURE — 84443 ASSAY THYROID STIM HORMONE: CPT

## 2018-02-26 PROCEDURE — 36415 COLL VENOUS BLD VENIPUNCTURE: CPT | Mod: PO

## 2018-02-26 PROCEDURE — 99999 PR PBB SHADOW E&M-EST. PATIENT-LVL III: CPT | Mod: PBBFAC,,, | Performed by: FAMILY MEDICINE

## 2018-02-26 RX ORDER — TRAMADOL HYDROCHLORIDE 50 MG/1
50 TABLET ORAL EVERY 6 HOURS PRN
COMMUNITY
End: 2018-03-08 | Stop reason: SDUPTHER

## 2018-02-26 RX ORDER — TRAMADOL HYDROCHLORIDE 50 MG/1
50 TABLET ORAL EVERY 6 HOURS PRN
Status: CANCELLED | OUTPATIENT
Start: 2018-02-26

## 2018-02-26 NOTE — Clinical Note
Patient requesting stimulants due to her chronic fatigue based on some recent research she has done regarding fibromyalgia.  I am not sure about this and want your opinion

## 2018-02-26 NOTE — PROGRESS NOTES
"Subjective:       Patient ID: Lavonne Pierre is a 42 y.o. female    Chief Complaint: Fibromyalgia (follow up)    HPI  Here today to review fibromyalgia  She continues with chronic fatigue.    She continues with diffuse myalgias.  She describes "a fog" with decreased focus and concentration.  She has requested stimulants for treatment.  No previous history of use of these.  Her son uses stimulants for ADHD.    Review of Systems     Objective:   Physical Exam   Constitutional: She is oriented to person, place, and time. She appears well-developed and well-nourished.   Neurological: She is alert and oriented to person, place, and time.   Vitals reviewed.       Assessment:       1. Fibromyalgia  TSH    Ambulatory Referral to Physical/Occupational Therapy        Plan:       Fibromyalgia  -     TSH; Future; Expected date: 02/26/2018  -     Ambulatory Referral to Physical/Occupational Therapy  - Continue current therapy  - Will discuss possible stimulant use with Rheumatology         "

## 2018-02-27 ENCOUNTER — PATIENT MESSAGE (OUTPATIENT)
Dept: RHEUMATOLOGY | Facility: CLINIC | Age: 43
End: 2018-02-27

## 2018-02-27 ENCOUNTER — PATIENT MESSAGE (OUTPATIENT)
Dept: FAMILY MEDICINE | Facility: CLINIC | Age: 43
End: 2018-02-27

## 2018-02-27 RX ORDER — TRAMADOL HYDROCHLORIDE 50 MG/1
50 TABLET ORAL EVERY 6 HOURS PRN
Status: CANCELLED | OUTPATIENT
Start: 2018-02-27

## 2018-03-05 ENCOUNTER — PATIENT MESSAGE (OUTPATIENT)
Dept: RHEUMATOLOGY | Facility: CLINIC | Age: 43
End: 2018-03-05

## 2018-03-08 RX ORDER — TRAMADOL HYDROCHLORIDE 50 MG/1
50 TABLET ORAL
Qty: 30 TABLET | Refills: 3 | Status: SHIPPED | OUTPATIENT
Start: 2018-03-08 | End: 2018-05-09 | Stop reason: SDUPTHER

## 2018-03-11 ENCOUNTER — PATIENT MESSAGE (OUTPATIENT)
Dept: FAMILY MEDICINE | Facility: CLINIC | Age: 43
End: 2018-03-11

## 2018-03-12 RX ORDER — ARMODAFINIL 250 MG/1
250 TABLET ORAL DAILY
Qty: 30 TABLET | Refills: 3 | Status: SHIPPED | OUTPATIENT
Start: 2018-03-12 | End: 2018-04-11

## 2018-03-13 ENCOUNTER — PATIENT MESSAGE (OUTPATIENT)
Dept: FAMILY MEDICINE | Facility: CLINIC | Age: 43
End: 2018-03-13

## 2018-03-19 ENCOUNTER — PATIENT MESSAGE (OUTPATIENT)
Dept: FAMILY MEDICINE | Facility: CLINIC | Age: 43
End: 2018-03-19

## 2018-03-19 PROBLEM — R52 PAIN: Status: RESOLVED | Noted: 2018-01-24 | Resolved: 2018-03-19

## 2018-03-19 PROBLEM — R20.2 NUMBNESS AND TINGLING IN RIGHT HAND: Status: RESOLVED | Noted: 2018-01-24 | Resolved: 2018-03-19

## 2018-03-19 PROBLEM — R20.0 NUMBNESS AND TINGLING IN RIGHT HAND: Status: RESOLVED | Noted: 2018-01-24 | Resolved: 2018-03-19

## 2018-03-20 ENCOUNTER — PATIENT MESSAGE (OUTPATIENT)
Dept: RHEUMATOLOGY | Facility: CLINIC | Age: 43
End: 2018-03-20

## 2018-03-27 ENCOUNTER — PATIENT MESSAGE (OUTPATIENT)
Dept: FAMILY MEDICINE | Facility: CLINIC | Age: 43
End: 2018-03-27

## 2018-04-15 RX ORDER — CELECOXIB 200 MG/1
200 CAPSULE ORAL DAILY
Qty: 30 CAPSULE | Refills: 11 | Status: SHIPPED | OUTPATIENT
Start: 2018-04-15 | End: 2018-10-30

## 2018-04-17 ENCOUNTER — PATIENT MESSAGE (OUTPATIENT)
Dept: FAMILY MEDICINE | Facility: CLINIC | Age: 43
End: 2018-04-17

## 2018-04-17 ENCOUNTER — TELEPHONE (OUTPATIENT)
Dept: PHARMACY | Facility: CLINIC | Age: 43
End: 2018-04-17

## 2018-04-17 NOTE — TELEPHONE ENCOUNTER
Called and spoke with patient, Lavonne Pierre, notifying her that PA was required on his medication Celebrex 200 MG.    Explained that PA has been submitted to her insurance and a decision can take up to 72 hours.    Patient verbalized understanding and was thankful for the call.    Please let me know if you any questions.     Thanks,   Samina Clark  Patient Care Advocate   [facility]  Phone: [phone]  Fax: [phone]

## 2018-04-17 NOTE — TELEPHONE ENCOUNTER
Called and spoke with patient notifying her PA on Celecoxib was approved for $1.00.    Patient would like prescription mailed to her, address confirmed with patient for mailing.     [facility] will reach out to patient for payment and shipping date.    Patient was thankful for the call.    Thanks,   Samina Clark  Patient Care Advocate   [facility]  Phone: [phone]  Fax: [phone]

## 2018-04-18 ENCOUNTER — PATIENT MESSAGE (OUTPATIENT)
Dept: FAMILY MEDICINE | Facility: CLINIC | Age: 43
End: 2018-04-18

## 2018-04-18 DIAGNOSIS — M79.7 FIBROMYALGIA: Primary | ICD-10-CM

## 2018-05-04 ENCOUNTER — PATIENT MESSAGE (OUTPATIENT)
Dept: RHEUMATOLOGY | Facility: CLINIC | Age: 43
End: 2018-05-04

## 2018-05-04 DIAGNOSIS — F32.9 REACTIVE DEPRESSION: Primary | ICD-10-CM

## 2018-05-04 DIAGNOSIS — M79.7 FIBROMYALGIA: ICD-10-CM

## 2018-05-05 DIAGNOSIS — M79.7 FIBROMYALGIA: ICD-10-CM

## 2018-05-05 DIAGNOSIS — I10 ESSENTIAL HYPERTENSION: ICD-10-CM

## 2018-05-07 ENCOUNTER — CLINICAL SUPPORT (OUTPATIENT)
Dept: REHABILITATION | Facility: HOSPITAL | Age: 43
End: 2018-05-07
Attending: FAMILY MEDICINE
Payer: MEDICAID

## 2018-05-07 DIAGNOSIS — M79.7 MUSCLE PAIN, FIBROMYALGIA: Primary | ICD-10-CM

## 2018-05-07 PROCEDURE — 97162 PT EVAL MOD COMPLEX 30 MIN: CPT | Mod: PN

## 2018-05-07 PROCEDURE — G8991 OTHER PT/OT GOAL STATUS: HCPCS | Mod: CK,PN

## 2018-05-07 PROCEDURE — 97110 THERAPEUTIC EXERCISES: CPT | Mod: PN

## 2018-05-07 PROCEDURE — G8990 OTHER PT/OT CURRENT STATUS: HCPCS | Mod: CK,PN

## 2018-05-07 RX ORDER — AMITRIPTYLINE HYDROCHLORIDE 25 MG/1
TABLET, FILM COATED ORAL
Qty: 30 TABLET | Refills: 3 | Status: SHIPPED | OUTPATIENT
Start: 2018-05-07 | End: 2018-08-29 | Stop reason: SDUPTHER

## 2018-05-07 RX ORDER — LISINOPRIL 5 MG/1
TABLET ORAL
Qty: 90 TABLET | Refills: 3 | Status: SHIPPED | OUTPATIENT
Start: 2018-05-07 | End: 2019-06-07 | Stop reason: SDUPTHER

## 2018-05-07 NOTE — PATIENT INSTRUCTIONS
Pelvic Tilt: Posterior - Legs Bent (Supine)        Tighten stomach and flatten back by rolling pelvis down. Hold ____ seconds. Relax.  Repeat ____ times per set. Do ____ sets per session. Do ____ sessions per day.     https://"[employer]/203     Copyright © [employer]. All rights reserved.   Unilateral Isometric Hip Flexion      https://[employer]/1098     Copyright © [employer]. All rights reserved.   Extremity Flexion (Hook-Lying)        Tighten stomach and slowly lower right arm over head until back begins to arch. Keep trunk rigid.  Repeat ____ times per set. Do ____ sets per session. Do ____ sessions per day.     https://"[employer]/1088     Copyright © [employer]. All rights reserved.   Bent Leg Lift (Hook-Lying)        Tighten stomach and slowly raise right leg ____ inches from floor. Keep trunk rigid. Hold ____ seconds.  Repeat ____ times per set. Do ____ sets per session. Do ____ sessions per day.     https://"[employer]/1090     Copyright © [employer]. All rights reserved.

## 2018-05-07 NOTE — PROGRESS NOTES
OUTPATIENT PHYSICAL THERAPY  PHYSICAL THERAPY EVALUATION    Name: Lavonne Pierre  Clinic Number: [id number]    Evaluation Date: 05/07/2018  Visit #: 1  Precautions: HTN     Diagnosis: Fibromyalgia   Physician: Omer Mcgee MD  Treatment Orders: PT Eval and Treat  Past Medical History:   Diagnosis Date    Anxiety 7/16/2013    Asthma     Depression     Fibromyalgia     GERD (gastroesophageal reflux disease)     History of pneumonia 2015    HTN (hypertension)     Preeclampsia     Sleep apnea     Uses CPAP     Current Outpatient Prescriptions   Medication Sig    amitriptyline (ELAVIL) 25 MG tablet TAKE 1 TABLET(25 MG) BY MOUTH EVERY NIGHT AS NEEDED FOR INSOMNIA    ASMANEX TWISTHALER 220 mcg (30 doses) inhaler INL 1 PUFF PO BID    budesonide-formoterol 160-4.5 mcg (SYMBICORT) 160-4.5 mcg/actuation HFAA Inhale 2 puffs into the lungs every 12 (twelve) hours. Controller    celecoxib (CELEBREX) 200 MG capsule Take 1 capsule (200 mg total) by mouth once daily.    FLUoxetine 60 mg Tab Take 60 mg by mouth once daily.    gabapentin (NEURONTIN) 300 MG capsule Take 1 capsule (300 mg total) by mouth 3 (three) times daily.    lisinopril (PRINIVIL,ZESTRIL) 5 MG tablet TAKE 1 TABLET BY MOUTH ONCE DAILY.    multivitamin (THERAGRAN) per tablet Take 1 tablet by mouth once daily.    predniSONE (DELTASONE) 20 MG tablet TK 1 T PO QD FOR 5 DAYS    traMADol (ULTRAM) 50 mg tablet Take 1 tablet (50 mg total) by mouth every 24 hours as needed for Pain.    VENTOLIN HFA 90 mcg/actuation inhaler INL 1 PUFF PO Q 4 TO 6 H PRN    VIRTUSSIN AC  mg/5 mL syrup TK 10 ML PO Q 4 TO  6 H PRF COUGH     No current facility-administered medications for this visit.      Review of patient's allergies indicates:   Allergen Reactions    Ibuprofen Other (See Comments)     Affects stomach ulcer, avoids all NSAIDS    Latex, natural rubber Rash     Small red bumps on skin contact       Subjective   History of Present Illness:  "Diagnosis with fibromyalgia > 3 years ago, she is afraid that physical activity will make her pain worse. She reports that she is depressed and has chronic fatigue from not being able to move.   PMH: sleep apnea, asthma,  recent carpal tunnel release of R hand  Chief complaint: Back and feet are the worst, also has pain in neck with headaches and knees   Pain: current 7/10, worst 10/10, best 4/10, Aching and Throbbing  Aggravating factors: walking or standing  Easing factors: none   Pts goals: "Move a little more without pain, Do everyday activities without pain."      Prior Therapy/PMH: none for fibromyalgia  Social History: lives with ramirez who helps her around the home, she also has a 10 year old son.  Previous functional status: ongoing widespread pain due to fibromyalgia   Current functional status: limited physical activity due to pain   Work: not working     Objective   Mental status: alert, anxious     Static Postural Assessment: Lower cross syndrome ~ anterior tilt of the pelvis, increased flexion of the hips, compensatory hyperlordosis in the lumbar spine.     GAIT: Lavonne ambulates with no assistive device with independently.     GAIT DEVIATIONS: Lavonne displays decreased wu    Dynamic Movement Screen:    -Squat assessment ~ unable due to pain       ROM:  Cervical Flexion = 50% limited   Cervical Ext = 25% limited   Cervical Rot = 25% limited    Lumbar flexion = 50% limited    Lumbar rotation = 50% limited Darshan      Muscle Length  + Lumbar paraspinals  + Hip flexors   + Piriformis   + Upper traps      Strength:   Anterior core stab = 1   Glute Med = 2 ~ compensation from hip flexors/anterior hips  Glute Max = 2 ~ hamstring dominant     Level of strength:   3 = Normal   2 = Compensation (recruits other muscles)  1= Weak    Special Tests:  + ALEJO for bilateral hip tightness       Pt/family was provided educational information, including: role of PT, goals for PT, scheduling - pt verbalized " understanding. Discussed insurance plan with pt.     TREATMENT   Time In: 9:01 AM  Time Out: 1000 AM     Discussed Plan of Care with patient: Yes    Lavonne received 10 minutes of therapeutic exercise including:   -Basic Core stability exercises:   -PPT 2 x 10   -Hooklying Alt marches 2 x 10   -Hooklying Alt Shld flx 2 x 10       Written Home Exercises Provided: yes  Exercises were reviewed and Lavonne was able to demonstrate them prior to the end of the session. Pt received a written copy of exercises to perform at home. Lavonne demonstrated good  understanding of the education provided.     Assessment   Lavonne is a 42 y.o. female referred to outpatient physical therapy with a medical diagnosis of Fibromyalgia/muscle pain .   Pt with poor stability of lumbo pelvic hip complex, due to decreased abdominal and glute strength and subsequent lumbar paraspinal and hip flexor tightness.   She with poor tolerance for exercise, she would like to try aquatic based exercise routine to see if this will help with her tolerance.     Demonstrates limitations as described in the problem list.  Medical necessity is demonstrated by the following IMPAIRMENTS/PROBLEMS:  weakness, impaired endurance, impaired balance, decreased upper extremity function, decreased lower extremity function, pain, decreased ROM and impaired muscle length       Pt prognosis is Fair.    Pt will benefit from skilled outpatient physical therapy to address the above stated deficits, provide pt/family education, and to maximize pt's level of independence.       History  Co-morbidities and personal factors that may impact the plan of care Examination  Body Structures and Functions, activity limitations and participation restrictions that may impact the plan of care Clinical Presentation   Decision Making/ Complexity Score   Co-morbidities:   anxiety, depression, difficulty sleeping, high BMI and fear avoidance behaviors        Personal Factors:   coping  style  lifestyle  attitudes Body Regions:   neck  back  lower extremities  upper extremities  trunk    Body Systems:   ROM  strength  balance  motor control    Activity limitations:   Learning and applying knowledge  no deficits    General Tasks and Commands  no deficits    Communication  no deficits    Mobility  walking    Self care  no deficits    Domestic Life  doing house work (cleaning house, washing dishes, laundry)    Life Areas  no deficits    Community and Social Life  community life    Participation Restrictions:            evolving clinical presentation with changing clinical characteristics            moderate moderate moderate moderate         Anticipated Barriers for physical therapy:  Fear avoidance, insurance     GOALS: 6  weeks:   -Tolerate HEP for general strength and conditioning program   -Improve core and hip strength to more normalized function with decreased compensatory strategies.  -Tolerate > 20 min continuous activity in aquatic environment  -FOTO reporting to predicted level of limitation       Plan     Pt will transfer to [facility] to begin aquatic exercise routine.     Outpatient physical therapy 1- 2 times weekly to include: pt ed, HEP, therapeutic exercises, therapeutic activities, neuromuscular re-education/ balance exercises, manual therapy, and modalities prn. Cont PT for  6  weeks. Pt may be seen by PTA as part of the rehabilitation team.     I certify the need for these services furnished under this plan of treatment and while under my care.    Tacho Verdin, PT

## 2018-05-07 NOTE — PLAN OF CARE
OUTPATIENT PHYSICAL THERAPY  PHYSICAL THERAPY EVALUATION    Name: Lavonne Pierre  Clinic Number: [id number]    Evaluation Date: 05/07/2018  Visit #: 1  Precautions: HTN     Diagnosis: Fibromyalgia   Physician: Omer Mcgee MD  Treatment Orders: PT Eval and Treat  Past Medical History:   Diagnosis Date    Anxiety 7/16/2013    Asthma     Depression     Fibromyalgia     GERD (gastroesophageal reflux disease)     History of pneumonia 2015    HTN (hypertension)     Preeclampsia     Sleep apnea     Uses CPAP     Current Outpatient Prescriptions   Medication Sig    amitriptyline (ELAVIL) 25 MG tablet TAKE 1 TABLET(25 MG) BY MOUTH EVERY NIGHT AS NEEDED FOR INSOMNIA    ASMANEX TWISTHALER 220 mcg (30 doses) inhaler INL 1 PUFF PO BID    budesonide-formoterol 160-4.5 mcg (SYMBICORT) 160-4.5 mcg/actuation HFAA Inhale 2 puffs into the lungs every 12 (twelve) hours. Controller    celecoxib (CELEBREX) 200 MG capsule Take 1 capsule (200 mg total) by mouth once daily.    FLUoxetine 60 mg Tab Take 60 mg by mouth once daily.    gabapentin (NEURONTIN) 300 MG capsule Take 1 capsule (300 mg total) by mouth 3 (three) times daily.    lisinopril (PRINIVIL,ZESTRIL) 5 MG tablet TAKE 1 TABLET BY MOUTH ONCE DAILY.    multivitamin (THERAGRAN) per tablet Take 1 tablet by mouth once daily.    predniSONE (DELTASONE) 20 MG tablet TK 1 T PO QD FOR 5 DAYS    traMADol (ULTRAM) 50 mg tablet Take 1 tablet (50 mg total) by mouth every 24 hours as needed for Pain.    VENTOLIN HFA 90 mcg/actuation inhaler INL 1 PUFF PO Q 4 TO 6 H PRN    VIRTUSSIN AC  mg/5 mL syrup TK 10 ML PO Q 4 TO  6 H PRF COUGH     No current facility-administered medications for this visit.      Review of patient's allergies indicates:   Allergen Reactions    Ibuprofen Other (See Comments)     Affects stomach ulcer, avoids all NSAIDS    Latex, natural rubber Rash     Small red bumps on skin contact       Subjective   History of Present Illness:  "Diagnosis with fibromyalgia > 3 years ago, she is afraid that physical activity will make her pain worse. She reports that she is depressed and has chronic fatigue from not being able to move.   PMH: sleep apnea, asthma,  recent carpal tunnel release of R hand  Chief complaint: Back and feet are the worst, also has pain in neck with headaches and knees   Pain: current 7/10, worst 10/10, best 4/10, Aching and Throbbing  Aggravating factors: walking or standing  Easing factors: none   Pts goals: "Move a little more without pain, Do everyday activities without pain."      Prior Therapy/PMH: none for fibromyalgia  Social History: lives with ramirez who helps her around the home, she also has a 10 year old son.  Previous functional status: ongoing widespread pain due to fibromyalgia   Current functional status: limited physical activity due to pain   Work: not working     Objective   Mental status: alert, anxious     Static Postural Assessment: Lower cross syndrome ~ anterior tilt of the pelvis, increased flexion of the hips, compensatory hyperlordosis in the lumbar spine.     GAIT: Lavonne ambulates with no assistive device with independently.     GAIT DEVIATIONS: Lavonne displays decreased wu    Dynamic Movement Screen:    -Squat assessment ~ unable due to pain       ROM:  Cervical Flexion = 50% limited   Cervical Ext = 25% limited   Cervical Rot = 25% limited    Lumbar flexion = 50% limited    Lumbar rotation = 50% limited Darshan      Muscle Length  + Lumbar paraspinals  + Hip flexors   + Piriformis   + Upper traps      Strength:   Anterior core stab = 1   Glute Med = 2 ~ compensation from hip flexors/anterior hips  Glute Max = 2 ~ hamstring dominant     Level of strength:   3 = Normal   2 = Compensation (recruits other muscles)  1= Weak    Special Tests:  + ALEJO for bilateral hip tightness       Pt/family was provided educational information, including: role of PT, goals for PT, scheduling - pt verbalized " understanding. Discussed insurance plan with pt.     TREATMENT   Time In: 9:01 AM  Time Out: 1000 AM     Discussed Plan of Care with patient: Yes    Lavonne received 10 minutes of therapeutic exercise including:   -Basic Core stability exercises:   -PPT 2 x 10   -Hooklying Alt marches 2 x 10   -Hooklying Alt Shld flx 2 x 10       Written Home Exercises Provided: yes  Exercises were reviewed and Lavonne was able to demonstrate them prior to the end of the session. Pt received a written copy of exercises to perform at home. Lavonne demonstrated good  understanding of the education provided.     Assessment   Lavonne is a 42 y.o. female referred to outpatient physical therapy with a medical diagnosis of Fibromyalgia/muscle pain .   Pt with poor stability of lumbo pelvic hip complex, due to decreased abdominal and glute strength and subsequent lumbar paraspinal and hip flexor tightness.   She with poor tolerance for exercise, she would like to try aquatic based exercise routine to see if this will help with her tolerance.     Demonstrates limitations as described in the problem list.  Medical necessity is demonstrated by the following IMPAIRMENTS/PROBLEMS:  weakness, impaired endurance, impaired balance, decreased upper extremity function, decreased lower extremity function, pain, decreased ROM and impaired muscle length       Pt prognosis is Fair.    Pt will benefit from skilled outpatient physical therapy to address the above stated deficits, provide pt/family education, and to maximize pt's level of independence.       History  Co-morbidities and personal factors that may impact the plan of care Examination  Body Structures and Functions, activity limitations and participation restrictions that may impact the plan of care Clinical Presentation   Decision Making/ Complexity Score   Co-morbidities:   anxiety, depression, difficulty sleeping, high BMI and fear avoidance behaviors        Personal Factors:   coping  style  lifestyle  attitudes Body Regions:   neck  back  lower extremities  upper extremities  trunk    Body Systems:   ROM  strength  balance  motor control    Activity limitations:   Learning and applying knowledge  no deficits    General Tasks and Commands  no deficits    Communication  no deficits    Mobility  walking    Self care  no deficits    Domestic Life  doing house work (cleaning house, washing dishes, laundry)    Life Areas  no deficits    Community and Social Life  community life    Participation Restrictions:            evolving clinical presentation with changing clinical characteristics            moderate moderate moderate moderate         Anticipated Barriers for physical therapy:  Fear avoidance, insurance     GOALS: 6  weeks:   -Tolerate HEP for general strength and conditioning program   -Improve core and hip strength to more normalized function with decreased compensatory strategies.  -Tolerate > 20 min continuous activity in aquatic environment  -FOTO reporting to predicted level of limitation       Plan     Pt will transfer to [facility] to begin aquatic exercise routine.     Outpatient physical therapy 1- 2 times weekly to include: pt ed, HEP, therapeutic exercises, therapeutic activities, neuromuscular re-education/ balance exercises, manual therapy, and modalities prn. Cont PT for  6  weeks. Pt may be seen by PTA as part of the rehabilitation team.     I certify the need for these services furnished under this plan of treatment and while under my care.    Tacho Verdin, PT

## 2018-05-09 NOTE — TELEPHONE ENCOUNTER
Due to multiple drug failures and widespread somatic complaints I would recommend PT (she should be doing this) and referral to Dr. Villar for any adjustment in psych meds but also some therapy with regard to better managing pain.

## 2018-05-11 RX ORDER — TRAMADOL HYDROCHLORIDE 50 MG/1
50 TABLET ORAL
Qty: 30 TABLET | Refills: 3 | Status: SHIPPED | OUTPATIENT
Start: 2018-05-11 | End: 2018-08-06 | Stop reason: SDUPTHER

## 2018-05-15 ENCOUNTER — PATIENT MESSAGE (OUTPATIENT)
Dept: FAMILY MEDICINE | Facility: CLINIC | Age: 43
End: 2018-05-15

## 2018-05-16 RX ORDER — ARMODAFINIL 150 MG/1
150 TABLET ORAL DAILY
Qty: 30 TABLET | Refills: 3 | Status: SHIPPED | OUTPATIENT
Start: 2018-05-16 | End: 2018-07-30 | Stop reason: ALTCHOICE

## 2018-05-22 ENCOUNTER — CLINICAL SUPPORT (OUTPATIENT)
Dept: REHABILITATION | Facility: HOSPITAL | Age: 43
End: 2018-05-22
Attending: FAMILY MEDICINE
Payer: MEDICAID

## 2018-05-22 DIAGNOSIS — G89.4 CHRONIC PAIN SYNDROME: ICD-10-CM

## 2018-05-22 DIAGNOSIS — R29.3 POSTURE ABNORMALITY: ICD-10-CM

## 2018-05-22 DIAGNOSIS — R29.898 WEAKNESS OF BOTH LOWER EXTREMITIES: ICD-10-CM

## 2018-05-22 PROBLEM — G89.29 CHRONIC PAIN: Status: ACTIVE | Noted: 2018-05-22

## 2018-05-22 PROCEDURE — 97110 THERAPEUTIC EXERCISES: CPT | Mod: PO

## 2018-05-22 NOTE — PROGRESS NOTES
Physical Therapy Daily Treatment Note     Name: Lavonne Pierre  Clinic Number: [id number]    Therapy Diagnosis:   Encounter Diagnoses   Name Primary?    Posture abnormality     Chronic pain syndrome     Weakness of both lower extremities      Physician: Omer Mcgee MD    Physician Orders: PT Eval and Treat  Medical Diagnosis: fibromyalgia  Evaluation Date: 5/7/18  Authorization period Expiration: 12/31/18  Plan of Care Certification Period: 6 weeks    Visit #/ Visits authorized: 2 / 20  Time In: 8:20  Time Out: 8:50  Total Billable Time: 30 minutes    Precautions: Standard    Subjective     Pt reports: she was compliant with home exercise program given last session.   Response to previous treatment:no change  Functional change: has started walking more at home    Pain: 5/10  Location: bilateral back  and feet     Objective     Lavonne received Aquatic therapy to develop strength, endurance, ROM, posture and core stabilization for 30 minutes including:  -forwards/backwards stepping x3 min  -sidestepping x3 min  -standing pelvic tilts x10  -standing marching x10  -standing hip abduction x10  -standing hip extension x10  -standing heel raise x10  -shoulder flexion x10  -shoulder abduction x10  -shoulder horizontal abduction x10  -shoulder rows x10  -piriformis stretch x30 sec each    Home Exercises Provided and Patient Education Provided     Education provided:   - progress towards goals   - role of therapy     Written Home Exercises Provided: Patient educated to continue with previously issued HEP.  Exercises were reviewed and   Lavonne was able to demonstrate them prior to the end of the session.   Pt received a written copy of exercises to perform at home.     Lavonne demonstrated good  understanding of the education provided.     Assessment     Pt has mild difficulty understanding directions, requires multiple cues for correct movements. Required cueing to decrease lumbar  "lordosis during standing exercises, especially hip extension. Reported feeling "pulling" with all standing exercises.   Lavonne is progressing well towards her goals.   Pt prognosis is Fair.     Pt will continue to benefit from skilled outpatient physical therapy to address the deficits listed in the problem list box on initial evaluation, provide pt/family education and to maximize pt's level of independence in the home and community environment.     Anticipated barriers to physical therapy: whole-body pain symptoms    Pt's spiritual, cultural and educational needs considered and pt agreeable to plan of care and goals.    Goals:    6  weeks:   -Tolerate HEP for general strength and conditioning program   -Improve core and hip strength to more normalized function with decreased compensatory strategies.  -Tolerate > 20 min continuous activity in aquatic environment  -FOTO reporting to predicted level of limitation     Plan     Continue with current POC.     Fela Tavarez, PT  "

## 2018-06-04 ENCOUNTER — CLINICAL SUPPORT (OUTPATIENT)
Dept: REHABILITATION | Facility: HOSPITAL | Age: 43
End: 2018-06-04
Attending: FAMILY MEDICINE
Payer: MEDICAID

## 2018-06-04 DIAGNOSIS — R29.3 POSTURE ABNORMALITY: ICD-10-CM

## 2018-06-04 DIAGNOSIS — R29.898 WEAKNESS OF BOTH LOWER EXTREMITIES: ICD-10-CM

## 2018-06-04 DIAGNOSIS — G89.4 CHRONIC PAIN SYNDROME: ICD-10-CM

## 2018-06-04 PROCEDURE — 97110 THERAPEUTIC EXERCISES: CPT | Mod: PO

## 2018-06-04 NOTE — PROGRESS NOTES
Physical Therapy Daily Treatment Note     Name: Lavonne Pierre  Clinic Number: [id number]    Therapy Diagnosis:   Encounter Diagnoses   Name Primary?    Posture abnormality     Chronic pain syndrome     Weakness of both lower extremities      Physician: Omer Mcgee MD    Physician Orders: PT Eval and Treat  Medical Diagnosis: fibromyalgia  Evaluation Date: 5/7/18  Authorization period Expiration: 12/31/18  Plan of Care Certification Period: 6 weeks    Visit #/ Visits authorized: 3 / 20  Time In: 4:15  Time Out: 4:55  Total Billable Time: 40 minutes    Precautions: Standard    Subjective     Pt reports: she was compliant with home exercise program given last session.   Response to previous treatment:no change  Functional change: She was a little sore after last session, and took a few days to diminish. She went to the ED over the weekend due to an asthma attack.     Pain: 5/10  Location: bilateral back  and feet     Objective     Lavonne received Aquatic therapy to develop strength, endurance, ROM, posture and core stabilization for 40 minutes including:  -forwards/backwards stepping x3 min  -sidestepping x3 min  -standing pelvic tilts x10  -standing marching x10  -standing hip abduction x10  -standing squats x10  -standing hip extension x10  -standing heel raise x10  -shoulder flexion x10 (with open paddles)  -shoulder abduction x10 (with open paddles)  -shoulder horizontal abduction x10 (with open paddles)  -shoulder rows x10 (with open paddles)  -abdominal crunches with board 2x10  -piriformis stretch x30 sec each    Home Exercises Provided and Patient Education Provided     Education provided:   - progress towards goals   - role of therapy     Written Home Exercises Provided: Patient educated to continue with previously issued HEP.  Exercises were reviewed and   Lavonne was able to demonstrate them prior to the end of the session.   Pt received a written copy of exercises to  perform at home.     Lavonne demonstrated good  understanding of the education provided.     Assessment     Pt unable to figure out how to perform pelvic tilt exercise today, even with significant verbal cueing and demonstration. Reported muscle pulling with multiple exercises, but no pain. Tendency for bilateral knee valgus with squats, but able to correct with cueing.   Lavonne is progressing well towards her goals.   Pt prognosis is Fair.     Pt will continue to benefit from skilled outpatient physical therapy to address the deficits listed in the problem list box on initial evaluation, provide pt/family education and to maximize pt's level of independence in the home and community environment.     Anticipated barriers to physical therapy: whole-body pain symptoms    Pt's spiritual, cultural and educational needs considered and pt agreeable to plan of care and goals.    Goals:    6  weeks:   -Tolerate HEP for general strength and conditioning program   -Improve core and hip strength to more normalized function with decreased compensatory strategies.  -Tolerate > 20 min continuous activity in aquatic environment  -FOTO reporting to predicted level of limitation     Plan     Continue with current POC.     Fela Tavarez, PT

## 2018-06-06 ENCOUNTER — CLINICAL SUPPORT (OUTPATIENT)
Dept: REHABILITATION | Facility: HOSPITAL | Age: 43
End: 2018-06-06
Attending: FAMILY MEDICINE
Payer: MEDICAID

## 2018-06-06 DIAGNOSIS — G89.29 OTHER CHRONIC PAIN: ICD-10-CM

## 2018-06-06 DIAGNOSIS — R29.3 POSTURE ABNORMALITY: ICD-10-CM

## 2018-06-06 DIAGNOSIS — R29.898 WEAKNESS OF BOTH LOWER EXTREMITIES: ICD-10-CM

## 2018-06-06 PROCEDURE — 97113 AQUATIC THERAPY/EXERCISES: CPT | Mod: PO

## 2018-06-06 NOTE — PROGRESS NOTES
Physical Therapy Daily Treatment Note     Name: Lavonne Pierre  Clinic Number: [id number]    Therapy Diagnosis:   Encounter Diagnoses   Name Primary?    Posture abnormality     Other chronic pain     Weakness of both lower extremities      Physician: Omer Mcgee MD    Physician Orders: PT Eval and Treat  Medical Diagnosis: fibromyalgia  Evaluation Date: 5/7/18  Authorization period Expiration: 12/31/18  Plan of Care Certification Period: 6 weeks    Visit #/ Visits authorized: 3 / 20  Time In: 4:00  Time Out: 4:45  Total Billable Time: 45 minutes    Precautions: Standard    Subjective     Pt reports: she was compliant with home exercise program given last session.   Response to previous treatment:no change  Functional change: She had some gen mm soreness for the rest of the evening post last tx.      Pain: 3/10  Location: [facility]     Objective     Lavonne received Aquatic therapy to develop strength, endurance, ROM, posture and core stabilization for 40 minutes including:  -forwards/backwards stepping x3 min  -sidestepping x3 min  -standing pelvic tilts x12 back against wall  -standing marching x 2min  -standing hip abduction x12  -standing squats x12  -standing hip extension x12  -standing heel raise x12  -shoulder flexion x12 (with open paddles)  -shoulder abduction x12 (with open paddles)  -shoulder horizontal abduction x12 (with open paddles)  -shoulder rows x12 (with open paddles)  -abdominal crunches with board 2x10  -piriformis stretch x30 sec each  Cool Down laps FWD/Side 1 min each    Home Exercises Provided and Patient Education Provided     Education provided:   - progress towards goals   - role of therapy     Written Home Exercises Provided: Patient educated to continue with previously issued HEP.  Exercises were reviewed and   Lavonne was able to demonstrate them prior to the end of the session.   Pt received a written copy of exercises to perform at home.     Lavonne  demonstrated good  understanding of the education provided.     Assessment   Pt babatunde tx with progression of thex well w/o c/o LBP sx.   Pt was able to perform pelvic tilt exercise today with back against the wall. Continues with tendency for bilateral knee valgus with squats, but able to correct with cueing.   Lavonne is progressing well towards her goals.   Pt prognosis is Fair.     Pt will continue to benefit from skilled outpatient physical therapy to address the deficits listed in the problem list box on initial evaluation, provide pt/family education and to maximize pt's level of independence in the home and community environment.     Anticipated barriers to physical therapy: whole-body pain symptoms    Pt's spiritual, cultural and educational needs considered and pt agreeable to plan of care and goals.    Goals:    6  weeks:   -Tolerate HEP for general strength and conditioning program   -Improve core and hip strength to more normalized function with decreased compensatory strategies.  -Tolerate > 20 min continuous activity in aquatic environment  -FOTO reporting to predicted level of limitation     Plan     Continue with current POC.     Jan Stein, PTA

## 2018-06-11 ENCOUNTER — CLINICAL SUPPORT (OUTPATIENT)
Dept: REHABILITATION | Facility: HOSPITAL | Age: 43
End: 2018-06-11
Attending: FAMILY MEDICINE
Payer: MEDICAID

## 2018-06-11 DIAGNOSIS — G89.29 OTHER CHRONIC PAIN: ICD-10-CM

## 2018-06-11 DIAGNOSIS — R29.898 WEAKNESS OF BOTH LOWER EXTREMITIES: ICD-10-CM

## 2018-06-11 DIAGNOSIS — R29.3 POSTURE ABNORMALITY: ICD-10-CM

## 2018-06-11 PROCEDURE — 97110 THERAPEUTIC EXERCISES: CPT | Mod: PO

## 2018-06-11 PROCEDURE — G8978 MOBILITY CURRENT STATUS: HCPCS | Mod: CK,PO

## 2018-06-11 PROCEDURE — G8979 MOBILITY GOAL STATUS: HCPCS | Mod: CK,PO

## 2018-06-11 NOTE — PROGRESS NOTES
Physical Therapy Daily Treatment Note     Name: Lavonne Pierre  Clinic Number: [id number]    Therapy Diagnosis:   Encounter Diagnoses   Name Primary?    Posture abnormality     Other chronic pain     Weakness of both lower extremities      Physician: Omer Mcgee MD    Physician Orders: PT Eval and Treat  Medical Diagnosis: fibromyalgia  Evaluation Date: 5/7/18  Authorization period Expiration: 12/31/18  Plan of Care Certification Period: 6 weeks    Visit #/ Visits authorized: 3 / 20  Time In: 4:50  Time Out: 5:53  Total Billable Time: 53 minutes    Precautions: Standard    Subjective     Pt reports: she was compliant with home exercise program given last session.   Response to previous treatment:no change  Functional change: Noticed muscle soreness in her shoulders from using paddles. Educated on normal muscle response to exercise.     Pain: 3/10  Location: [facility]     Objective     Lavonne received Aquatic therapy to develop strength, endurance, ROM, posture and core stabilization for 53 minutes including:  -forwards/backwards stepping x3 min  -sidestepping x3 min  -standing pelvic tilts x12 back against wall  -standing marching x 2min  -standing hip abduction x12  -standing squats x12  -standing hip extension x12  -standing heel raise x12  -shoulder flexion x12 (with open paddles)  -shoulder abduction x12 (with open paddles)  -shoulder horizontal abduction x12 (with open paddles)  -shoulder rows x12 (with open paddles)  -abdominal crunches with board 2x10  Cool Down laps FWD/Side 1 min each    Home Exercises Provided and Patient Education Provided     Education provided:   - progress towards goals   - role of therapy     Written Home Exercises Provided: Patient educated to continue with previously issued HEP.  Exercises were reviewed and   Lavonne was able to demonstrate them prior to the end of the session.   Pt received a written copy of exercises to perform at home.      Lavonne demonstrated good  understanding of the education provided.     Assessment   Continues to have mild difficulty with comprehension, despite not changing exercises since last visit. Unwilling to go to lowest level for arm exercises, due to feeling that the water level was too high today.   Lavonne is progressing well towards her goals.   Pt prognosis is Fair.     Pt will continue to benefit from skilled outpatient physical therapy to address the deficits listed in the problem list box on initial evaluation, provide pt/family education and to maximize pt's level of independence in the home and community environment.     Anticipated barriers to physical therapy: whole-body pain symptoms    Pt's spiritual, cultural and educational needs considered and pt agreeable to plan of care and goals.    Goals:    6  weeks:   -Tolerate HEP for general strength and conditioning program   -Improve core and hip strength to more normalized function with decreased compensatory strategies.  -Tolerate > 20 min continuous activity in aquatic environment  -FOTO reporting to predicted level of limitation     Plan     Extend current POC.     Fela Tavarez, PT

## 2018-06-11 NOTE — PLAN OF CARE
OUTPATIENT PHYSICAL THERAPY  PHYSICAL THERAPY EVALUATION    Name: Lavonne Pierre  Clinic Number: [id number]    Evaluation Date: 05/07/2018  Visit #: 5  Precautions: HTN     Diagnosis: Fibromyalgia   Physician: Omer Mcgee MD  Treatment Orders: PT Eval and Treat  Past Medical History:   Diagnosis Date    Anxiety 7/16/2013    Asthma     Depression     Fibromyalgia     GERD (gastroesophageal reflux disease)     History of pneumonia 2015    HTN (hypertension)     Preeclampsia     Sleep apnea     Uses CPAP     Current Outpatient Prescriptions   Medication Sig    amitriptyline (ELAVIL) 25 MG tablet TAKE 1 TABLET(25 MG) BY MOUTH EVERY NIGHT AS NEEDED FOR INSOMNIA    ASMANEX TWISTHALER 220 mcg (30 doses) inhaler INL 1 PUFF PO BID    budesonide-formoterol 160-4.5 mcg (SYMBICORT) 160-4.5 mcg/actuation HFAA Inhale 2 puffs into the lungs every 12 (twelve) hours. Controller    celecoxib (CELEBREX) 200 MG capsule Take 1 capsule (200 mg total) by mouth once daily.    FLUoxetine 60 mg Tab Take 60 mg by mouth once daily.    gabapentin (NEURONTIN) 300 MG capsule Take 1 capsule (300 mg total) by mouth 3 (three) times daily.    lisinopril (PRINIVIL,ZESTRIL) 5 MG tablet TAKE 1 TABLET BY MOUTH ONCE DAILY.    multivitamin (THERAGRAN) per tablet Take 1 tablet by mouth once daily.    predniSONE (DELTASONE) 20 MG tablet TK 1 T PO QD FOR 5 DAYS    traMADol (ULTRAM) 50 mg tablet Take 1 tablet (50 mg total) by mouth every 24 hours as needed for Pain.    VENTOLIN HFA 90 mcg/actuation inhaler INL 1 PUFF PO Q 4 TO 6 H PRN    VIRTUSSIN AC  mg/5 mL syrup TK 10 ML PO Q 4 TO  6 H PRF COUGH     No current facility-administered medications for this visit.      Review of patient's allergies indicates:   Allergen Reactions    Ibuprofen Other (See Comments)     Affects stomach ulcer, avoids all NSAIDS    Latex, natural rubber Rash     Small red bumps on skin contact       Subjective   Pt has not noticed any big changes  "since starting therapy. She still has the pulling sensation in her low back. She feels her balance is getting better, and she's moving better than before. She feels the exercises are getting slightly easier in the pool.     Objective   Mental status: alert, anxious     Static Postural Assessment: Lower cross syndrome ~ anterior tilt of the pelvis, increased flexion of the hips, compensatory hyperlordosis in the lumbar spine.     GAIT: Lavonne ambulates with no assistive device with independently.     GAIT DEVIATIONS: Lavonne displays decreased wu    ROM:  Cervical Flexion = WNL  Cervical Ext = 25% limited   Cervical Rot = 25% limited    Lumbar flexion = 50% limited - normal overall motion to touch toes, but minimal reversal of lumbar lordosis   Lumbar rotation = 25% limited Darshan    Lumbar side glide= 25% limited   * Pt reports feeling "muscle pulling" with all activities. Required education that the muscles she felt stretches in were expected to stretch with each motion.     Muscle Length  + Lumbar paraspinals  + Hip flexors   + Piriformis   + Upper traps      Strength:   Anterior core stab = 1   Glute Med = 2 ~ compensation from hip flexors/anterior hips  Glute Max = 2 ~ hamstring dominant     Level of strength:   3 = Normal   2 = Compensation (recruits other muscles)  1= Weak    Special Tests:  + ALEJO for bilateral hip tightness     PT reviewed FOTO scores for Lavonne Emery Ignacio on 6/11/2018.   FOTO scores were entered into [employer] - see media section.    CMS Impairment/Limitation/Restriction for FOTO  Muscle, Tendon + Soft Tissue Disorders Survey    Status  Limit   G-Code CMS Severity Modifier    44% 56% CK (40-60% limitation)  CATEGORY: Mobility: walking & moving around functional limitation      Educated patient on difference between acute pain and chronic pain, muscle use/pulling vs. harm to body to decrease pain catastrophizing responses.     Pt/family was provided educational information, including: role of " PT, goals for PT, scheduling - pt verbalized understanding. Discussed insurance plan with pt.     TREATMENT   See daily note    Assessment   Lavonne is a 42 y.o. female referred to outpatient physical therapy with a medical diagnosis of Fibromyalgia/muscle pain .   Pt with poor stability of lumbo pelvic hip complex, due to decreased abdominal and glute strength and subsequent lumbar paraspinal and hip flexor tightness.   She with poor tolerance for exercise, she would like to try aquatic based exercise routine to see if this will help with her tolerance.     Demonstrates limitations as described in the problem list.  Medical necessity is demonstrated by the following IMPAIRMENTS/PROBLEMS:  weakness, impaired endurance, impaired balance, decreased upper extremity function, decreased lower extremity function, pain, decreased ROM and impaired muscle length       Pt prognosis is Fair.    Pt will benefit from skilled outpatient physical therapy to address the above stated deficits, provide pt/family education, and to maximize pt's level of independence.       History  Co-morbidities and personal factors that may impact the plan of care Examination  Body Structures and Functions, activity limitations and participation restrictions that may impact the plan of care Clinical Presentation   Decision Making/ Complexity Score   Co-morbidities:   anxiety, depression, difficulty sleeping, high BMI and fear avoidance behaviors        Personal Factors:   coping style  lifestyle  attitudes Body Regions:   neck  back  lower extremities  upper extremities  trunk    Body Systems:   ROM  strength  balance  motor control    Activity limitations:   Learning and applying knowledge  no deficits    General Tasks and Commands  no deficits    Communication  no deficits    Mobility  walking    Self care  no deficits    Domestic Life  doing house work (cleaning house, washing dishes, laundry)    Life Areas  no deficits    Community and Social  Life  community life    Participation Restrictions:            evolving clinical presentation with changing clinical characteristics            moderate moderate moderate moderate         Anticipated Barriers for physical therapy:  Fear avoidance, insurance     GOALS: 6  weeks:   -Tolerate HEP for general strength and conditioning program. Met 6/11/18.    -Improve core and hip strength to more normalized function with decreased compensatory strategies.  -Tolerate > 20 min continuous activity in aquatic environment. Met 6/11/18.   -FOTO reporting to predicted level of limitation. Technically met 6/11/18, but only decreased by 2%.   - New goal: report improved tolerance for walking from parking lot to store entrance with no pain increase.     Plan     Outpatient physical therapy 1- 2 times weekly to include: aquatic therapy, pt ed, HEP, therapeutic exercises, therapeutic activities, neuromuscular re-education/ balance exercises, manual therapy, and modalities prn. Extend POC for another month due to time from eval to start of aquatic therapy. Pt may be seen by PTA as part of the rehabilitation team.     I certify the need for these services furnished under this plan of treatment and while under my care.    Fela Tavarez, PT

## 2018-06-13 ENCOUNTER — CLINICAL SUPPORT (OUTPATIENT)
Dept: REHABILITATION | Facility: HOSPITAL | Age: 43
End: 2018-06-13
Attending: FAMILY MEDICINE
Payer: MEDICAID

## 2018-06-13 DIAGNOSIS — G89.29 OTHER CHRONIC PAIN: ICD-10-CM

## 2018-06-13 DIAGNOSIS — R29.3 POSTURE ABNORMALITY: ICD-10-CM

## 2018-06-13 DIAGNOSIS — R29.898 WEAKNESS OF BOTH LOWER EXTREMITIES: ICD-10-CM

## 2018-06-13 PROCEDURE — 97110 THERAPEUTIC EXERCISES: CPT | Mod: PO

## 2018-06-13 NOTE — PROGRESS NOTES
Physical Therapy Daily Treatment Note     Name: Lavonne Pierre  Clinic Number: [id number]    Therapy Diagnosis:   Encounter Diagnoses   Name Primary?    Posture abnormality     Other chronic pain     Weakness of both lower extremities      Physician: Omer Mcgee MD    Physician Orders: PT Eval and Treat  Medical Diagnosis: fibromyalgia  Evaluation Date: 5/7/18  Authorization period Expiration: 12/31/18  Plan of Care Certification Period: 7/11/18    Visit #/ Visits authorized: 3 / 20  Time In: 5:05  Time Out: 5:53  Total Billable Time: 48 minutes    Precautions: Standard    Subjective     Pt reports: she was compliant with home exercise program given last session.   Response to previous treatment:no change  Functional change: Still has the pulling sensation in her back but reports it has decreased since first starting aquatics.      Pain: 3/10  Location: [facility]     Objective     Lavonne received Aquatic therapy to develop strength, endurance, ROM, posture and core stabilization for 53 minutes including:  -forwards/backwards stepping x3 min  -sidestepping x3 min  -standing pelvic tilts x15 back against wall  -standing marching x 15  -standing hip abduction x15  -standing squats x15  -standing hip extension x15  -standing heel raise x15  -shoulder flexion x15 (with open paddles)  -shoulder abduction x15 (with open paddles)  -shoulder horizontal abduction x15 (with open paddles)  -shoulder rows x15 (with open paddles)  - modified tandem stance balance x30 each, SLS x30 sec each  - single knee to chest stretch 2x30 sec  -abdominal crunches with board 2x10  Cool Down laps FWD/Side 1 min each    Home Exercises Provided and Patient Education Provided     Education provided:   - progress towards goals   - role of therapy     Written Home Exercises Provided: Patient educated to continue with previously issued HEP.  Exercises were reviewed and   Lavonne was able to demonstrate them prior  to the end of the session.   Pt received a written copy of exercises to perform at home.     Lavonne demonstrated good  understanding of the education provided.     Assessment   Attempted karaoke/[place] walking, but patient was unable to figure out pattern after verbal cueing and demonstration. Able to perform correct squat form without cueing. Reported feeling very wobbly with balance, but no visible movement or wobbling. Reported excessive pulling in the hip with tandem stance, and foot pain with SLS.   Lavonne is progressing well towards her goals.   Pt prognosis is Fair.     Pt will continue to benefit from skilled outpatient physical therapy to address the deficits listed in the problem list box on initial evaluation, provide pt/family education and to maximize pt's level of independence in the home and community environment.     Anticipated barriers to physical therapy: whole-body pain symptoms    Pt's spiritual, cultural and educational needs considered and pt agreeable to plan of care and goals.    Goals:    6  weeks:   -Tolerate HEP for general strength and conditioning program   -Improve core and hip strength to more normalized function with decreased compensatory strategies.  -Tolerate > 20 min continuous activity in aquatic environment  -FOTO reporting to predicted level of limitation     Plan     Extend current POC.     Fela Tavarez, PT

## 2018-06-18 ENCOUNTER — CLINICAL SUPPORT (OUTPATIENT)
Dept: REHABILITATION | Facility: HOSPITAL | Age: 43
End: 2018-06-18
Attending: FAMILY MEDICINE
Payer: MEDICAID

## 2018-06-18 DIAGNOSIS — G89.29 OTHER CHRONIC PAIN: ICD-10-CM

## 2018-06-18 DIAGNOSIS — R29.898 WEAKNESS OF BOTH LOWER EXTREMITIES: ICD-10-CM

## 2018-06-18 DIAGNOSIS — R29.3 POSTURE ABNORMALITY: ICD-10-CM

## 2018-06-18 PROCEDURE — 97110 THERAPEUTIC EXERCISES: CPT | Mod: PO

## 2018-06-18 NOTE — PROGRESS NOTES
Physical Therapy Daily Treatment Note     Name: Lavonne Pierre  Clinic Number: [id number]    Therapy Diagnosis:   Encounter Diagnoses   Name Primary?    Posture abnormality     Other chronic pain     Weakness of both lower extremities      Physician: Omer Mcgee MD    Physician Orders: PT Eval and Treat  Medical Diagnosis: fibromyalgia  Evaluation Date: 5/7/18  Authorization period Expiration: 12/31/18  Plan of Care Certification Period: 7/11/18    Visit #/ Visits authorized: 7 / 20  Time In: 4:55PM  Time Out: 5:50  Total Billable Time: 55 minutes    Precautions: Standard    Subjective     Pt reports: she was compliant with home exercise program given last session. She feels less stiff and sore today, despite the weather.  Response to previous treatment:no change  Functional change: Improving symptoms    Pain: 3/10  Location: [facility]     Objective     Lavonne received Aquatic therapy to develop strength, endurance, ROM, posture and core stabilization for 53 minutes including:  -forwards/backwards stepping x3 min  -sidestepping x3 min  -standing pelvic tilts x15 back against wall  -standing marching x 15  -standing hip abduction x15  -standing squats x15  -standing hip extension x15  -standing heel raise x15  -shoulder flexion x15 (with closed paddles)  -shoulder abduction x15 (with closed paddles)  -shoulder horizontal abduction x15 (with closed paddles)  -shoulder rows x15 (with closed paddles)  -SLS 2x30 sec each  - single knee to chest stretch 2x30 sec  -abdominal crunches with board 2x10  Cool Down laps FWD/Side 1 min each    Home Exercises Provided and Patient Education Provided     Education provided:   - progress towards goals   - role of therapy     Written Home Exercises Provided: Patient educated to continue with previously issued HEP.  Exercises were reviewed and   Lavonne was able to demonstrate them prior to the end of the session.   Pt received a written copy of  exercises to perform at home.     Lavonne demonstrated good  understanding of the education provided.     Assessment   Reported an increase in pulling after all the LE exercises, and she feels it's just the cumulative effect of doing all the exercises. PT educated her to take her time between exercises, that it is okay to take breaks. Tolerated increase in resistance with closed paddles. Heightened anxiety response to SLS. She feels her back pain is making her feel off balance.   Lavonne is progressing well towards her goals.   Pt prognosis is Fair.     Pt will continue to benefit from skilled outpatient physical therapy to address the deficits listed in the problem list box on initial evaluation, provide pt/family education and to maximize pt's level of independence in the home and community environment.     Anticipated barriers to physical therapy: whole-body pain symptoms    Pt's spiritual, cultural and educational needs considered and pt agreeable to plan of care and goals.    Goals:    6  weeks:   -Tolerate HEP for general strength and conditioning program   -Improve core and hip strength to more normalized function with decreased compensatory strategies.  -Tolerate > 20 min continuous activity in aquatic environment  -FOTO reporting to predicted level of limitation     Plan     Extend current POC.     Fela Tavarez, PT

## 2018-06-20 ENCOUNTER — CLINICAL SUPPORT (OUTPATIENT)
Dept: REHABILITATION | Facility: HOSPITAL | Age: 43
End: 2018-06-20
Attending: FAMILY MEDICINE
Payer: MEDICAID

## 2018-06-20 DIAGNOSIS — R29.898 WEAKNESS OF BOTH LOWER EXTREMITIES: ICD-10-CM

## 2018-06-20 DIAGNOSIS — G89.29 OTHER CHRONIC PAIN: ICD-10-CM

## 2018-06-20 DIAGNOSIS — R29.3 POSTURE ABNORMALITY: ICD-10-CM

## 2018-06-20 PROCEDURE — 97110 THERAPEUTIC EXERCISES: CPT | Mod: PO

## 2018-06-20 NOTE — PROGRESS NOTES
Physical Therapy Daily Treatment Note     Name: Lavonne Pierre  Clinic Number: [id number]    Therapy Diagnosis:   Encounter Diagnoses   Name Primary?    Posture abnormality     Other chronic pain     Weakness of both lower extremities      Physician: Omer Mcgee MD    Physician Orders: PT Eval and Treat  Medical Diagnosis: fibromyalgia  Evaluation Date: 5/7/18  Authorization period Expiration: 12/31/18  Plan of Care Certification Period: 7/11/18    Visit #/ Visits authorized: 8 / 20  Time In: 5:00PM  Time Out: 5:48  Total Billable Time: 48 minutes    Precautions: Standard    Subjective     Pt reports: she was compliant with home exercise program given last session. Pt does not feel comfortable going in the pool today due to being on her period. Will perform land activities instead.  Response to previous treatment:no change  Functional change: Improving symptoms    Pain: 3/10  Location: [facility]     Objective     Lavonne received Therapeutic exercise to improve strength, endurance, and core stabilization for 48 min:  5 min recumbent bike, L2  Supine pelvic tilt x10  Pelvic tilt with hooklying marches x10 each  LTR x10  DKTC with orange SB x15  Sidelying clamshells x10 each way  Seated scapular retractions x10  Standing marches x10 each  Standing hip abduction x10 each  Tandem stance balance on ground 2x30 sec each, SLS x30 sec each  TB rows, extension YTB 2x10 each    Home Exercises Provided and Patient Education Provided     Education provided:   - progress towards goals   - role of therapy     Written Home Exercises Provided: Patient educated to continue with previously issued HEP.  Exercises were reviewed and   Lavonne was able to demonstrate them prior to the end of the session.   Pt received a written copy of exercises to perform at home.     Lavonne demonstrated good  understanding of the education provided.     Assessment   Felt pulling in the back with hooklying marching and  LTR. Difficult to decide whether patient is having pain or just a normal muscle stretch. Pt resistant to balance exercises, but showed very minimal sway with tandem stance. Significant difficulty decreasing upper trap activation during rows and extension, even after verbal and tactile cueing. Pt was very fatigued by end of session, so did not push for increased time of exercise.   Lavonne is progressing well towards her goals.   Pt prognosis is Fair.     Pt will continue to benefit from skilled outpatient physical therapy to address the deficits listed in the problem list box on initial evaluation, provide pt/family education and to maximize pt's level of independence in the home and community environment.     Anticipated barriers to physical therapy: whole-body pain symptoms    Pt's spiritual, cultural and educational needs considered and pt agreeable to plan of care and goals.    Goals:    6  weeks:   -Tolerate HEP for general strength and conditioning program   -Improve core and hip strength to more normalized function with decreased compensatory strategies.  -Tolerate > 20 min continuous activity in aquatic environment  -FOTO reporting to predicted level of limitation     Plan     Extend current POC.     Fela Tavarez, PT

## 2018-06-25 ENCOUNTER — PATIENT MESSAGE (OUTPATIENT)
Dept: FAMILY MEDICINE | Facility: CLINIC | Age: 43
End: 2018-06-25

## 2018-06-25 ENCOUNTER — CLINICAL SUPPORT (OUTPATIENT)
Dept: REHABILITATION | Facility: HOSPITAL | Age: 43
End: 2018-06-25
Payer: MEDICAID

## 2018-06-25 DIAGNOSIS — R29.3 POSTURE ABNORMALITY: ICD-10-CM

## 2018-06-25 DIAGNOSIS — G89.29 OTHER CHRONIC PAIN: ICD-10-CM

## 2018-06-25 DIAGNOSIS — R29.898 WEAKNESS OF BOTH LOWER EXTREMITIES: ICD-10-CM

## 2018-06-25 PROCEDURE — 97110 THERAPEUTIC EXERCISES: CPT | Mod: PO

## 2018-06-25 NOTE — PROGRESS NOTES
Physical Therapy Daily Treatment Note     Name: Lavonne Pierre  Clinic Number: [id number]    Therapy Diagnosis:   Encounter Diagnoses   Name Primary?    Posture abnormality     Other chronic pain     Weakness of both lower extremities      Physician: Omer Mcgee MD    Physician Orders: PT Eval and Treat  Medical Diagnosis: fibromyalgia  Evaluation Date: 5/7/18  Authorization period Expiration: 12/31/18  Plan of Care Certification Period: 7/11/18    Visit #/ Visits authorized: 9 / 20  Time In: 5:05 PM  Time Out: 5:54  Total Billable Time: 49 minutes    Precautions: Standard    Subjective     Pt reports: she was compliant with home exercise program given last session. She felt a little more sore after the land session, but took a tramadol, which helped. She went to the ED on Friday for vomiting. Her tests all came back normal, except she has a lesion on her kidney, and is supposed to follow up with Dr. Mcgee for a possible CT scan.   Response to previous treatment:no change  Functional change: Improving symptoms    Pain: 3/10  Location: [facility]     Objective     Lavonne received Aquatic therapy to develop strength, endurance, ROM, posture and core stabilization for 49 minutes including:  -forwards/backwards stepping x3 min  -sidestepping x3 min  -standing pelvic tilts x15 back against wall  -standing marching x 15  -standing hip abduction x15  -standing squats x15  -standing hip extension x15  -standing heel raise x15  -shoulder flexion x15 (with closed paddles)  -shoulder abduction x15 (with closed paddles)  -shoulder horizontal abduction x15 (with closed paddles)  -shoulder rows x15 (with closed paddles)  -SLS 2x30 sec each  -single knee to chest stretch 2x30 sec  -abdominal crunches with board 2x10  Cool Down laps FWD/Side 1 min each    Home Exercises Provided and Patient Education Provided     Education provided:   - progress towards goals   - role of therapy     Written  Home Exercises Provided: Patient educated to continue with previously issued HEP.  Exercises were reviewed and   Lavonne was able to demonstrate them prior to the end of the session.   Pt received a written copy of exercises to perform at home.     Lavonne demonstrated good  understanding of the education provided.     Assessment   Did not progress activities today due to sickness over the weekend. She reports no increase in discomfort with activities in the pool today. Reports she has had decreased carpal tunnel symptoms since starting PT.   Lavonne is progressing well towards her goals.   Pt prognosis is Fair.     Pt will continue to benefit from skilled outpatient physical therapy to address the deficits listed in the problem list box on initial evaluation, provide pt/family education and to maximize pt's level of independence in the home and community environment.     Anticipated barriers to physical therapy: whole-body pain symptoms    Pt's spiritual, cultural and educational needs considered and pt agreeable to plan of care and goals.    Goals:    6  weeks:   -Tolerate HEP for general strength and conditioning program   -Improve core and hip strength to more normalized function with decreased compensatory strategies.  -Tolerate > 20 min continuous activity in aquatic environment  -FOTO reporting to predicted level of limitation     Plan     Extend current POC.     Fela Tavarez, PT

## 2018-06-27 ENCOUNTER — CLINICAL SUPPORT (OUTPATIENT)
Dept: REHABILITATION | Facility: HOSPITAL | Age: 43
End: 2018-06-27
Payer: MEDICAID

## 2018-06-27 ENCOUNTER — PATIENT MESSAGE (OUTPATIENT)
Dept: FAMILY MEDICINE | Facility: CLINIC | Age: 43
End: 2018-06-27

## 2018-06-27 DIAGNOSIS — G89.29 OTHER CHRONIC PAIN: ICD-10-CM

## 2018-06-27 DIAGNOSIS — R29.898 WEAKNESS OF BOTH LOWER EXTREMITIES: ICD-10-CM

## 2018-06-27 DIAGNOSIS — R29.3 POSTURE ABNORMALITY: ICD-10-CM

## 2018-06-27 PROCEDURE — 97110 THERAPEUTIC EXERCISES: CPT | Mod: PO

## 2018-06-27 NOTE — PROGRESS NOTES
Physical Therapy Daily Treatment Note     Name: Lavonne Pierre  Clinic Number: [id number]    Therapy Diagnosis:   Encounter Diagnoses   Name Primary?    Posture abnormality     Other chronic pain     Weakness of both lower extremities      Physician: Omer Mcgee MD    Physician Orders: PT Eval and Treat  Medical Diagnosis: fibromyalgia  Evaluation Date: 5/7/18  Authorization period Expiration: 12/31/18  Plan of Care Certification Period: 7/11/18    Visit #/ Visits authorized: 10 / 20  Time In: 5:00 PM  Time Out: 5:55  Total Billable Time: 55 minutes    Precautions: Standard    Subjective     Pt reports: she was compliant with home exercise program given last session. She feels pretty good today, with not much pain.   Response to previous treatment:no change  Functional change: Improving symptoms    Pain: 3/10  Location: [facility]     Objective     Lavonne received Aquatic therapy to develop strength, endurance, ROM, posture and core stabilization for 55 minutes including:  -forwards/backwards stepping x3 min  -sidestepping x3 min  -standing pelvic tilts x15 back against wall  -standing marching x 15  -standing hip abduction x15  -standing squats x15  -standing hip extension x15  -standing heel raise x15  -step ups on first step of pool stairs x10 each leg  -shoulder flexion x15 (with closed paddles)  -shoulder abduction x15 (with closed paddles)  -shoulder horizontal abduction x15 (with closed paddles)  -shoulder rows x15 (with closed paddles)  -core rotation with closed paddle x10 each way  -SLS 2x30 sec each- NP  -single knee to chest stretch 2x30 sec- NP  -abdominal crunches with board 2x10- NP  Cool Down laps FWD/Side 1 min each    Home Exercises Provided and Patient Education Provided     Education provided:   - progress towards goals   - role of therapy     Written Home Exercises Provided: Patient educated to continue with previously issued HEP.  Exercises were reviewed and    Lavonne was able to demonstrate them prior to the end of the session.   Pt received a written copy of exercises to perform at home.     Lavonne demonstrated good  understanding of the education provided.     Assessment   Pt reported increased pulling with step ups. Demonstrated increased lumbar lordosis with this exercise, so cued pt to have slight posterior pelvic tilt. PT unable to maintain this with movement, and still had pulling. She feels the weight of the water on her clothes made the exercise too difficult. Reported possible ease compared to stairs on land.   Lavonne is progressing well towards her goals.   Pt prognosis is Fair.     Pt will continue to benefit from skilled outpatient physical therapy to address the deficits listed in the problem list box on initial evaluation, provide pt/family education and to maximize pt's level of independence in the home and community environment.     Anticipated barriers to physical therapy: whole-body pain symptoms    Pt's spiritual, cultural and educational needs considered and pt agreeable to plan of care and goals.    Goals:    6  weeks:   -Tolerate HEP for general strength and conditioning program   -Improve core and hip strength to more normalized function with decreased compensatory strategies.  -Tolerate > 20 min continuous activity in aquatic environment  -FOTO reporting to predicted level of limitation     Plan     Extend current POC.     Fela Tavarez, PT

## 2018-07-02 ENCOUNTER — CLINICAL SUPPORT (OUTPATIENT)
Dept: REHABILITATION | Facility: HOSPITAL | Age: 43
End: 2018-07-02
Payer: MEDICAID

## 2018-07-02 ENCOUNTER — PATIENT MESSAGE (OUTPATIENT)
Dept: FAMILY MEDICINE | Facility: CLINIC | Age: 43
End: 2018-07-02

## 2018-07-02 DIAGNOSIS — N28.9 KIDNEY LESION: Primary | ICD-10-CM

## 2018-07-02 DIAGNOSIS — R29.898 WEAKNESS OF BOTH LOWER EXTREMITIES: ICD-10-CM

## 2018-07-02 DIAGNOSIS — G89.29 OTHER CHRONIC PAIN: ICD-10-CM

## 2018-07-02 DIAGNOSIS — R29.3 POSTURE ABNORMALITY: ICD-10-CM

## 2018-07-02 PROCEDURE — 97113 AQUATIC THERAPY/EXERCISES: CPT | Mod: PO

## 2018-07-02 NOTE — TELEPHONE ENCOUNTER
I received notes.  They showed a lesion on the kidney, needs additional evaluation with CT, Orders entered

## 2018-07-02 NOTE — PROGRESS NOTES
"                            Physical Therapy Daily Treatment Note     Name: Lavonne Tony  Clinic Number: [id number]    Therapy Diagnosis:   Encounter Diagnoses   Name Primary?    Posture abnormality     Other chronic pain     Weakness of both lower extremities      Physician: Omer Mcgee MD    Physician Orders: PT Eval and Treat  Medical Diagnosis: fibromyalgia  Evaluation Date: 5/7/18  Authorization period Expiration: 12/31/18  Plan of Care Certification Period: 7/11/18    Visit #/ Visits authorized: 11 / 20  Time In: 5:00 PM  Time Out: 6:05  Total Billable Time: 55 minutes    Precautions: Standard    Subjective     Pt reports: she was compliant with home exercise program given last session. She feels pretty good today, with " not much pain".   Response to previous treatment:no change  Functional change: Improving symptoms    Pain: 3/10  Location: [facility]     Objective     Lavonne received Aquatic therapy to develop strength, endurance, ROM, posture and core stabilization for 55 minutes including:  -forwards/backwards stepping x3 min  -sidestepping x3 min  -standing pelvic tilts x15 back against wall  -standing marching x 15  -standing hip abduction x15  -standing squats x15  -standing hip extension x15  -standing heel raise x15  -step ups on first step of pool stairs x15 each leg  -shoulder flexion x15 (with closed paddles)  -shoulder abduction x15 (with closed paddles)  -shoulder horizontal abduction x15 (with closed paddles)  -shoulder rows x15 (with closed paddles)  -core rotation with closed paddle x15 each way  -SLS 2x30 sec each- NP  -single knee to chest stretch 3x30 sec-   -abdominal crunches with board 2x10-   Cool Down laps FWD/Side 1 min each    Home Exercises Provided and Patient Education Provided     Education provided:   - progress towards goals   - role of therapy     Written Home Exercises Provided: Patient educated to continue with previously issued HEP.  Exercises were reviewed and "   Lavonne was able to demonstrate them prior to the end of the session.   Pt received a written copy of exercises to perform at home.     Lavonne demonstrated good  understanding of the education provided.     Assessment   Pt again reported increased pulling with step ups. Demonstrated increased lumbar lordosis with this exercise, so cued pt to have slight posterior pelvic tilt. PT unable to maintain this with movement, and still had pulling.    Lavonne is progressing well towards her goals.   Pt prognosis is Fair.     Pt will continue to benefit from skilled outpatient physical therapy to address the deficits listed in the problem list box on initial evaluation, provide pt/family education and to maximize pt's level of independence in the home and community environment.     Anticipated barriers to physical therapy: whole-body pain symptoms    Pt's spiritual, cultural and educational needs considered and pt agreeable to plan of care and goals.    Goals:    6  weeks:   -Tolerate HEP for general strength and conditioning program   -Improve core and hip strength to more normalized function with decreased compensatory strategies.  -Tolerate > 20 min continuous activity in aquatic environment  -FOTO reporting to predicted level of limitation     Plan     Extend current POC.     Jan Stein, PTA

## 2018-07-03 ENCOUNTER — PATIENT MESSAGE (OUTPATIENT)
Dept: FAMILY MEDICINE | Facility: CLINIC | Age: 43
End: 2018-07-03

## 2018-07-06 ENCOUNTER — HOSPITAL ENCOUNTER (OUTPATIENT)
Dept: RADIOLOGY | Facility: HOSPITAL | Age: 43
Discharge: HOME | End: 2018-07-06
Attending: FAMILY MEDICINE
Payer: MEDICAID

## 2018-07-06 DIAGNOSIS — N28.9 KIDNEY LESION: ICD-10-CM

## 2018-07-06 PROCEDURE — 74178 CT ABD&PLV WO CNTR FLWD CNTR: CPT | Mod: TC,PO

## 2018-07-06 PROCEDURE — 74178 CT ABD&PLV WO CNTR FLWD CNTR: CPT | Mod: 26,,, | Performed by: RADIOLOGY

## 2018-07-06 PROCEDURE — 25500020 PHARM REV CODE 255: Mod: PO | Performed by: FAMILY MEDICINE

## 2018-07-06 RX ADMIN — IOHEXOL 30 ML: 350 INJECTION, SOLUTION INTRAVENOUS at 02:07

## 2018-07-06 RX ADMIN — IOHEXOL 75 ML: 350 INJECTION, SOLUTION INTRAVENOUS at 02:07

## 2018-07-08 DIAGNOSIS — D17.71 ANGIOMYOLIPOMA OF KIDNEY: Primary | ICD-10-CM

## 2018-07-09 ENCOUNTER — PATIENT MESSAGE (OUTPATIENT)
Dept: FAMILY MEDICINE | Facility: CLINIC | Age: 43
End: 2018-07-09

## 2018-07-09 ENCOUNTER — CLINICAL SUPPORT (OUTPATIENT)
Dept: REHABILITATION | Facility: HOSPITAL | Age: 43
End: 2018-07-09
Payer: MEDICAID

## 2018-07-09 DIAGNOSIS — R29.3 POSTURE ABNORMALITY: ICD-10-CM

## 2018-07-09 DIAGNOSIS — R29.898 WEAKNESS OF BOTH LOWER EXTREMITIES: ICD-10-CM

## 2018-07-09 DIAGNOSIS — G89.29 OTHER CHRONIC PAIN: ICD-10-CM

## 2018-07-09 PROCEDURE — 97110 THERAPEUTIC EXERCISES: CPT | Mod: PO | Performed by: PHYSICAL THERAPIST

## 2018-07-09 NOTE — PLAN OF CARE
OUTPATIENT PHYSICAL THERAPY  PHYSICAL THERAPY DISCHARGE    Name: Lavonne Pierre  Clinic Number: [id number]    Evaluation Date: 05/07/2018  Visit #: 5  Precautions: HTN     Diagnosis: Fibromyalgia   Physician: Omer Mcgee MD  Treatment Orders: PT Eval and Treat  Past Medical History:   Diagnosis Date    Anxiety 7/16/2013    Asthma     Depression     Fibromyalgia     GERD (gastroesophageal reflux disease)     History of pneumonia 2015    HTN (hypertension)     Preeclampsia     Sleep apnea     Uses CPAP     Current Outpatient Prescriptions   Medication Sig    amitriptyline (ELAVIL) 25 MG tablet TAKE 1 TABLET(25 MG) BY MOUTH EVERY NIGHT AS NEEDED FOR INSOMNIA    ASMANEX TWISTHALER 220 mcg (30 doses) inhaler INL 1 PUFF PO BID    budesonide-formoterol 160-4.5 mcg (SYMBICORT) 160-4.5 mcg/actuation HFAA Inhale 2 puffs into the lungs every 12 (twelve) hours. Controller    celecoxib (CELEBREX) 200 MG capsule Take 1 capsule (200 mg total) by mouth once daily.    FLUoxetine 60 mg Tab Take 60 mg by mouth once daily.    gabapentin (NEURONTIN) 300 MG capsule Take 1 capsule (300 mg total) by mouth 3 (three) times daily.    lisinopril (PRINIVIL,ZESTRIL) 5 MG tablet TAKE 1 TABLET BY MOUTH ONCE DAILY.    multivitamin (THERAGRAN) per tablet Take 1 tablet by mouth once daily.    predniSONE (DELTASONE) 20 MG tablet TK 1 T PO QD FOR 5 DAYS    traMADol (ULTRAM) 50 mg tablet Take 1 tablet (50 mg total) by mouth every 24 hours as needed for Pain.    VENTOLIN HFA 90 mcg/actuation inhaler INL 1 PUFF PO Q 4 TO 6 H PRN    VIRTUSSIN AC  mg/5 mL syrup TK 10 ML PO Q 4 TO  6 H PRF COUGH     No current facility-administered medications for this visit.      Review of patient's allergies indicates:   Allergen Reactions    Ibuprofen Other (See Comments)     Affects stomach ulcer, avoids all NSAIDS    Latex, natural rubber Rash     Small red bumps on skin contact       Subjective   Pt continues to report chronic  "pain, that hasn't significantly changed with PT treatment.   Objective   Mental status: alert, anxious     Static Postural Assessment: Lower cross syndrome ~ anterior tilt of the pelvis, increased flexion of the hips, compensatory hyperlordosis in the lumbar spine.     GAIT: Lavonne ambulates with no assistive device with independently.     GAIT DEVIATIONS: Lavonne displays decreased wu    ROM:  Cervical Flexion = WNL  Cervical Ext = 25% limited   Cervical Rot = 25% limited    Lumbar flexion = 50% limited - normal overall motion to touch toes, but minimal reversal of lumbar lordosis   Lumbar rotation = 25% limited Darshan    Lumbar side glide= 25% limited   * Pt reports feeling "muscle pulling" with all activities. Required education that the muscles she felt stretches in were expected to stretch with each motion.     Muscle Length  + Lumbar paraspinals  + Hip flexors   + Piriformis   + Upper traps      Strength:   Anterior core stab = 1   Glute Med = 2 ~ compensation from hip flexors/anterior hips  Glute Max = 2 ~ hamstring dominant     Level of strength:   3 = Normal   2 = Compensation (recruits other muscles)  1= Weak    Special Tests:  + ALEJO for bilateral hip tightness     PT reviewed FOTO scores for Lavonne Tonyaicha on 7/9/2018.   FOTO scores were entered into [employer] - see media section.    CMS Impairment/Limitation/Restriction for FOTO  Muscle, Tendon + Soft Tissue Disorders Survey    Status  Limit   G-Code CMS Severity Modifier    44% 56% CK (40-60% limitation)  CATEGORY: Mobility: walking & moving around functional limitation      Educated patient on difference between acute pain and chronic pain, muscle use/pulling vs. harm to body to decrease pain catastrophizing responses.     Pt/family was provided educational information, including: role of PT, goals for PT, scheduling - pt verbalized understanding. Discussed insurance plan with pt.     TREATMENT   See daily note    Assessment   Lavonne is a 42 y.o. " female referred to outpatient physical therapy with a medical diagnosis of Fibromyalgia/muscle pain .   Pt continues to have chronic pain in multiple areas, that has been minimally changed with treatment. She is being discharged due to minimal change in symptoms.   Demonstrates limitations as described in the problem list.  Medical necessity is demonstrated by the following IMPAIRMENTS/PROBLEMS:  weakness, impaired endurance, impaired balance, decreased upper extremity function, decreased lower extremity function, pain, decreased ROM and impaired muscle length       Pt prognosis is Fair.    Pt will benefit from skilled outpatient physical therapy to address the above stated deficits, provide pt/family education, and to maximize pt's level of independence.       History  Co-morbidities and personal factors that may impact the plan of care Examination  Body Structures and Functions, activity limitations and participation restrictions that may impact the plan of care Clinical Presentation   Decision Making/ Complexity Score   Co-morbidities:   anxiety, depression, difficulty sleeping, high BMI and fear avoidance behaviors        Personal Factors:   coping style  lifestyle  attitudes Body Regions:   neck  back  lower extremities  upper extremities  trunk    Body Systems:   ROM  strength  balance  motor control    Activity limitations:   Learning and applying knowledge  no deficits    General Tasks and Commands  no deficits    Communication  no deficits    Mobility  walking    Self care  no deficits    Domestic Life  doing house work (cleaning house, washing dishes, laundry)    Life Areas  no deficits    Community and Social Life  community life    Participation Restrictions:            evolving clinical presentation with changing clinical characteristics            moderate moderate moderate moderate         Anticipated Barriers for physical therapy:  Fear avoidance, insurance     GOALS: 6  weeks:   -Tolerate HEP for  general strength and conditioning program. Met 6/11/18.    -Improve core and hip strength to more normalized function with decreased compensatory strategies.  -Tolerate > 20 min continuous activity in aquatic environment. Met 6/11/18.   -FOTO reporting to predicted level of limitation. Technically met 6/11/18, but only decreased by 2%.   - New goal: report improved tolerance for walking from parking lot to store entrance with no pain increase.     Plan     Discharge from formal physical therapy  I certify the need for these services furnished under this plan of treatment and while under my care.    Fela Tavarez, PT

## 2018-07-09 NOTE — PROGRESS NOTES
Physical Therapy Daily Treatment Note     Name: Lavonne Pierre  Clinic Number: [id number]    Therapy Diagnosis:   Encounter Diagnoses   Name Primary?    Posture abnormality     Other chronic pain     Weakness of both lower extremities      Physician: Omer Mcgee MD    Physician Orders: PT Eval and Treat  Medical Diagnosis: fibromyalgia  Evaluation Date: 5/7/18  Authorization period Expiration: 12/31/18  Plan of Care Certification Period: 7/11/18    Visit #/ Visits authorized: 11 / 20  Time In: 5:00 PM  Time Out: 6:05  Total Billable Time: 55 minutes    Precautions: Standard    Subjective     Pt reports: she was compliant with home exercise program given last session. She has increased levels of foot pain today. Back is feeling ok.   Response to previous treatment:no change  Functional change: Improving symptoms    Pain: 3/10  Location: [facility]     Objective     Lavonne received Aquatic therapy to develop strength, endurance, ROM, posture and core stabilization for 55 minutes including:  -forwards/backwards stepping x3 min  -sidestepping x3 min  -standing pelvic tilts x15 back against wall  -standing marching x 15  -standing hip abduction x15  -standing squats x15  -standing hip extension x15  -standing heel raise x15  -step ups on first step of pool stairs x15 each leg  -shoulder flexion x15 (with closed paddles)  -shoulder abduction x15 (with closed paddles)  -shoulder horizontal abduction x15 (with closed paddles)  -shoulder rows x15 (with closed paddles)  -core rotation with closed paddle x15 each way  -SLS 2x30 sec each- NP  -single knee to chest stretch 3x30 sec-   -abdominal crunches with board 2x10-   Cool Down laps FWD/Side 1 min each    Home Exercises Provided and Patient Education Provided     Education provided:   - progress towards goals   - role of therapy     Written Home Exercises Provided: Patient educated to continue with previously issued HEP.  Exercises were  reviewed and   Lavonne was able to demonstrate them prior to the end of the session.   Pt received a written copy of exercises to perform at home.     Lavonne demonstrated good  understanding of the education provided.     Assessment   Pt again reported increased pulling with step ups. Demonstrated increased lumbar lordosis with this exercise, so cued pt to have slight posterior pelvic tilt. PT unable to maintain this with movement, and still had pulling.    Lavonne is progressing well towards her goals.   Pt prognosis is Fair.     Pt will continue to benefit from skilled outpatient physical therapy to address the deficits listed in the problem list box on initial evaluation, provide pt/family education and to maximize pt's level of independence in the home and community environment.     Anticipated barriers to physical therapy: whole-body pain symptoms    Pt's spiritual, cultural and educational needs considered and pt agreeable to plan of care and goals.    Goals:    6  weeks:   -Tolerate HEP for general strength and conditioning program   -Improve core and hip strength to more normalized function with decreased compensatory strategies.  -Tolerate > 20 min continuous activity in aquatic environment  -FOTO reporting to predicted level of limitation     Plan     Extend current POC.     Gunner Ludwig, PT

## 2018-07-30 ENCOUNTER — OFFICE VISIT (OUTPATIENT)
Dept: FAMILY MEDICINE | Facility: CLINIC | Age: 43
End: 2018-07-30
Payer: MEDICAID

## 2018-07-30 VITALS
SYSTOLIC BLOOD PRESSURE: 128 MMHG | WEIGHT: 177 LBS | HEIGHT: 57 IN | HEART RATE: 100 BPM | BODY MASS INDEX: 38.19 KG/M2 | DIASTOLIC BLOOD PRESSURE: 72 MMHG | RESPIRATION RATE: 14 BRPM

## 2018-07-30 DIAGNOSIS — M79.7 FIBROMYALGIA: Primary | ICD-10-CM

## 2018-07-30 DIAGNOSIS — J45.20 MILD INTERMITTENT ASTHMA WITHOUT COMPLICATION: ICD-10-CM

## 2018-07-30 PROCEDURE — 99213 OFFICE O/P EST LOW 20 MIN: CPT | Mod: PBBFAC,PO | Performed by: FAMILY MEDICINE

## 2018-07-30 PROCEDURE — 99214 OFFICE O/P EST MOD 30 MIN: CPT | Mod: S$PBB,,, | Performed by: FAMILY MEDICINE

## 2018-07-30 PROCEDURE — 99999 PR PBB SHADOW E&M-EST. PATIENT-LVL III: CPT | Mod: PBBFAC,,, | Performed by: FAMILY MEDICINE

## 2018-07-30 RX ORDER — ARMODAFINIL 250 MG/1
250 TABLET ORAL DAILY
Qty: 30 TABLET | Refills: 3 | Status: SHIPPED | OUTPATIENT
Start: 2018-07-30 | End: 2018-08-29

## 2018-07-30 NOTE — PROGRESS NOTES
Subjective:       Patient ID: Lavonne Pierre is a 42 y.o. female    Chief Complaint: Follow-up (4 month f/u )    HPI  Here today for interval evaluation  Patient has multiple complaints that have been present and deemed associated with fibromyalgia.  She has seen the ER x1 with abdominal discomfort that has resolved spontaneously.  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  She has follow up pending with Rheumatology next week.    Review of Systems   Constitutional: Positive for unexpected weight change. Negative for activity change.   HENT: Positive for hearing loss and rhinorrhea. Negative for trouble swallowing.    Eyes: Positive for discharge. Negative for visual disturbance.   Respiratory: Positive for chest tightness and wheezing.         Seeing Pulmonary with recent change in inhaler therapy   Cardiovascular: Positive for chest pain. Negative for palpitations.   Gastrointestinal: Positive for constipation. Negative for blood in stool, diarrhea and vomiting.   Endocrine: Negative for polydipsia and polyuria.   Genitourinary: Positive for menstrual problem. Negative for difficulty urinating, dysuria and hematuria.   Musculoskeletal: Positive for arthralgias, joint swelling and neck pain.   Neurological: Positive for weakness and headaches.   Psychiatric/Behavioral: Positive for confusion and dysphoric mood.        Objective:   Physical Exam   Constitutional: She appears well-developed and well-nourished.   HENT:   Head: Normocephalic and atraumatic.   Eyes: Conjunctivae and EOM are normal. Pupils are equal, round, and reactive to light. No scleral icterus.   Neck: Normal range of motion. Neck supple. No thyromegaly present.   Cardiovascular: Normal rate, regular rhythm and normal heart sounds.  Exam reveals no gallop and no friction rub.    No murmur heard.  Pulmonary/Chest: Effort normal and breath sounds normal. No respiratory distress. She has  no wheezes. She has no rales.   Lymphadenopathy:     She has no cervical adenopathy.   Vitals reviewed.       Assessment:       1. Fibromyalgia  armodafinil (NUVIGIL) 250 mg tablet   2. Mild intermittent asthma without complication          Plan:       Fibromyalgia  -     INCREASE armodafinil (NUVIGIL) 250 mg tablet; Take 1 tablet (250 mg total) by mouth once daily.  Dispense: 30 tablet; Refill: 3  - Continue current therapy  - Continue Rheumatology  - Follow-up in about 4 months (around 11/30/2018).    Mild intermittent asthma without complication  - Continue current therapy  - Continue Pulmonary

## 2018-08-06 ENCOUNTER — OFFICE VISIT (OUTPATIENT)
Dept: RHEUMATOLOGY | Facility: CLINIC | Age: 43
End: 2018-08-06
Payer: MEDICAID

## 2018-08-06 VITALS
BODY MASS INDEX: 38.08 KG/M2 | WEIGHT: 176.5 LBS | HEART RATE: 108 BPM | HEIGHT: 57 IN | DIASTOLIC BLOOD PRESSURE: 75 MMHG | SYSTOLIC BLOOD PRESSURE: 123 MMHG

## 2018-08-06 DIAGNOSIS — G89.29 OTHER CHRONIC PAIN: ICD-10-CM

## 2018-08-06 DIAGNOSIS — R76.8 ANA POSITIVE: ICD-10-CM

## 2018-08-06 DIAGNOSIS — M79.7 FIBROMYALGIA: Primary | ICD-10-CM

## 2018-08-06 PROCEDURE — 99999 PR PBB SHADOW E&M-EST. PATIENT-LVL III: CPT | Mod: PBBFAC,,, | Performed by: INTERNAL MEDICINE

## 2018-08-06 PROCEDURE — 99214 OFFICE O/P EST MOD 30 MIN: CPT | Mod: S$PBB,,, | Performed by: INTERNAL MEDICINE

## 2018-08-06 PROCEDURE — 99213 OFFICE O/P EST LOW 20 MIN: CPT | Mod: PBBFAC,PO | Performed by: INTERNAL MEDICINE

## 2018-08-06 RX ORDER — METHOCARBAMOL 500 MG/1
500 TABLET, FILM COATED ORAL 2 TIMES DAILY PRN
Qty: 40 TABLET | Refills: 6 | Status: SHIPPED | OUTPATIENT
Start: 2018-08-06 | End: 2019-02-04 | Stop reason: SDUPTHER

## 2018-08-06 RX ORDER — TRAMADOL HYDROCHLORIDE 50 MG/1
50 TABLET ORAL EVERY 12 HOURS PRN
Qty: 60 TABLET | Refills: 3 | Status: SHIPPED | OUTPATIENT
Start: 2018-08-06 | End: 2019-02-04 | Stop reason: SDUPTHER

## 2018-08-06 ASSESSMENT — ROUTINE ASSESSMENT OF PATIENT INDEX DATA (RAPID3)
PAIN SCORE: 8.5
MDHAQ FUNCTION SCORE: 1.7
TOTAL RAPID3 SCORE: 7.55
PATIENT GLOBAL ASSESSMENT SCORE: 8.5
PSYCHOLOGICAL DISTRESS SCORE: 5.5

## 2018-08-06 NOTE — PROGRESS NOTES
Subjective:          Chief Complaint: Lavonne Pierre is a 42 y.o. female who had concerns including Fibromyalgia (6 months).    HPI:    Patient here today for follow-up with a history of fibromyalgia, positive MIRIAN 1:160 homogeneous/speckled. negative MIRIAN profile. + thyroid Antibodies. No evidence of CTD.     Interval events.  Patient recently seen in the primary care physician. Currently having pain in cervical, lumbar spine with spasm. The Aquatic therapy did help, not going now.   She is also having pain in her feet worse with walking, present with standing and sitting. Pain located on the dorsum of the foot to the ankle. Left does have some plantar involvement. She does endorse pain first thing in the AM with shearing pain.   She is noting pain in medial knee region  She states that she did speak with Psych. But involved with [facility] feels she has good supoprt for pains.   She is following with Dr. Arteaga-not sleeping well   Increased anxiety as of recent.       Current medications   tramadol 50 mg once daily. This does help only takes at night.   Celebrex 200 mg daily which she states is not working discontinued.   Gabapentin 300 mg t.i.d. which she feels is not working discontinued.   Nuvigil at 250mg daily  We switched to Prozac -  now off  Elavil 25mg for HS    S/o right CTR 12/7/17, will f/u with Ortho for left soon. Doing better.   She was referred to physical therapy  Failed Cymbalta, Effexor, Zoloft, wellbutrin. Tizanidine, savella,  Prozac  States she did well with Topamax but concern on long term safety. For ocular migraines. Still having HA.   Lyrica ASE with weight gain. No relief.     Did have steroid injections in past no relief with elbows, hands, feet or knees. Does help some with hip busitis.     She's had various myalgias as well as joint pain in the ankles, feet, wrists, shoulders and neck and legs.  She also has an underlying history of depression she's had no constitutional symptoms,  "rashes, oral ulcers, serositis, Raynaud's.   She is not sleeping at night.   She is having significant fatigue and feel "shaky" in her body. She is noting significant brain fog.  Trouble with restless leg type symptoms, but occur during the day.  She is noting pain in her feet (diffusely), knees at the superior pole of the patella. And bilateral elbows she is going to meet with Dr. Jack as right elbow is doing better , now needing the left.    She is c/o lumps in her skin tender, no redness but she does try to massage them. She is feeling increased pain in the knees with "lumps" in gastrocnemius region. Do feel swollen.     NSAIDs with gastric ulcers in past.          Component      Latest Ref Rng & Units 1/17/2017 1/15/2016   Anti Sm Antibody      0.00 - 19.99 EU  0.97   Anti-Sm Interpretation      Negative  Negative   Anti-SSA Antibody      0.00 - 19.99 EU  1.09   Anti-SSA Interpretation      Negative  Negative   Anti-SSB Antibody      0.00 - 19.99 EU  0.30   Anti-SSB Interpretation      Negative  Negative   ds DNA Ab      Negative 1:10  Negative 1:10   Anti Sm/RNP Antibody      0.00 - 19.99 EU  0.11   Anti-Sm/RNP Interpretation      Negative  Negative   Sed Rate      0 - 20 mm/Hr  9   CRP      0.0 - 8.2 mg/L  5.7   MIRIAN Screen      Negative <1:160 Positive (A) Positive (A)   Rheumatoid Factor      0.0 - 15.0 IU/mL  <10.0   MIRIAN HEP-2 Titer       Positive 1:320 Homogeneous Positive 1:160 Homogeneous       REVIEW OF SYSTEMS:    Review of Systems   Constitutional: Positive for malaise/fatigue and weight loss. Negative for chills and fever.   HENT: Negative for sore throat.    Eyes: Negative for double vision, photophobia, discharge and redness.   Respiratory: Negative for cough, shortness of breath and wheezing.    Cardiovascular: Negative for chest pain, palpitations and orthopnea.   Gastrointestinal: Positive for heartburn. Negative for abdominal pain, constipation and diarrhea.   Genitourinary: Negative for " dysuria, hematuria and urgency.   Musculoskeletal: Positive for back pain, joint pain, myalgias and neck pain.   Skin: Negative for rash.   Neurological: Positive for headaches. Negative for dizziness, tingling, focal weakness and weakness.   Endo/Heme/Allergies: Does not bruise/bleed easily.   Psychiatric/Behavioral: Negative for depression, hallucinations and suicidal ideas. The patient is nervous/anxious.                Objective:            Past Medical History:   Diagnosis Date    Anxiety 7/16/2013    Asthma     Depression     Fibromyalgia     GERD (gastroesophageal reflux disease)     History of pneumonia 2015    HTN (hypertension)     Preeclampsia     Sleep apnea     Uses CPAP     Family History   Problem Relation Age of Onset    Pancreatic cancer Mother     Lymphoma Father     Diabetes Father     Hypertension Father     Pancreatic cancer Maternal Grandmother     Pancreatic cancer Maternal Uncle     Breast cancer Maternal Aunt      Social History   Substance Use Topics    Smoking status: Never Smoker    Smokeless tobacco: Never Used    Alcohol use No         Current Outpatient Prescriptions on File Prior to Visit   Medication Sig Dispense Refill    amitriptyline (ELAVIL) 25 MG tablet TAKE 1 TABLET(25 MG) BY MOUTH EVERY NIGHT AS NEEDED FOR INSOMNIA 30 tablet 3    armodafinil (NUVIGIL) 250 mg tablet Take 1 tablet (250 mg total) by mouth once daily. 30 tablet 3    lisinopril (PRINIVIL,ZESTRIL) 5 MG tablet TAKE 1 TABLET BY MOUTH EVERY DAY 90 tablet 3    multivitamin (THERAGRAN) per tablet Take 1 tablet by mouth once daily.      VENTOLIN HFA 90 mcg/actuation inhaler INL 1 PUFF PO Q 4 TO 6 H PRN  2    [DISCONTINUED] budesonide-formoterol 160-4.5 mcg (SYMBICORT) 160-4.5 mcg/actuation HFAA Inhale 2 puffs into the lungs every 12 (twelve) hours. Controller      celecoxib (CELEBREX) 200 MG capsule Take 1 capsule (200 mg total) by mouth once daily. 30 capsule 11    traMADol (ULTRAM) 50 mg  tablet Take 1 tablet (50 mg total) by mouth every 24 hours as needed for Pain. 30 tablet 3    [DISCONTINUED] FLUoxetine 60 mg Tab Take 60 mg by mouth once daily. 90 tablet 3    [DISCONTINUED] gabapentin (NEURONTIN) 300 MG capsule Take 1 capsule (300 mg total) by mouth 3 (three) times daily. 270 capsule 3    [DISCONTINUED] VIRTUSSIN AC  mg/5 mL syrup TK 10 ML PO Q 4 TO  6 H PRF COUGH  0     No current facility-administered medications on file prior to visit.        Vitals:    08/06/18 0928   BP: 123/75   Pulse: 108       Physical Exam:    Physical Exam   Constitutional: She appears well-developed and well-nourished.   HENT:   Nose: No septal deviation.   Mouth/Throat: Mucous membranes are normal. No oral lesions.   Eyes: Pupils are equal, round, and reactive to light. Right conjunctiva is not injected. Left conjunctiva is not injected.   Neck: No JVD present. No thyroid mass and no thyromegaly present.   Cardiovascular: Normal rate, regular rhythm and normal pulses.    No edema   Pulmonary/Chest: Effort normal and breath sounds normal.   Abdominal: Soft. Normal appearance. There is no hepatosplenomegaly.   Musculoskeletal:        Right shoulder: She exhibits tenderness. She exhibits normal range of motion and no swelling.        Left shoulder: She exhibits tenderness. She exhibits normal range of motion and no swelling.        Right elbow: She exhibits normal range of motion and no swelling. Tenderness found. Medial epicondyle tenderness noted.        Left elbow: She exhibits normal range of motion and no swelling. Tenderness found. Medial epicondyle tenderness noted.        Right wrist: She exhibits normal range of motion, no tenderness and no swelling.        Left wrist: She exhibits normal range of motion, no tenderness and no swelling.        Right hip: She exhibits normal range of motion, normal strength and no swelling.        Left hip: She exhibits normal range of motion, no tenderness and no  swelling.        Right knee: She exhibits normal range of motion and no swelling. Tenderness found. Medial joint line tenderness noted.        Left knee: She exhibits normal range of motion and no swelling. Tenderness found. Medial joint line tenderness noted.        Right ankle: She exhibits normal range of motion and no swelling. No tenderness.        Left ankle: She exhibits normal range of motion and no swelling. No tenderness.        Cervical back: She exhibits bony tenderness.   14/18 FMS tenderpoints w/o No synovitis, restriction in ROM, tenderness of wrist, MCP, PIP, DIP. No weakness in . Able to fully curl fingers.      Lymphadenopathy:     She has no cervical adenopathy.     She has no axillary adenopathy.   Neurological: She has normal strength and normal reflexes.   Skin: Skin is dry and intact.   Psychiatric: Her mood appears anxious.             Assessment:       Encounter Diagnoses   Name Primary?    Fibromyalgia Yes    MIRIAN positive     Other chronic pain           Plan:        Fibromyalgia  -     traMADol (ULTRAM) 50 mg tablet; Take 1 tablet (50 mg total) by mouth every 12 (twelve) hours as needed for Pain.  Dispense: 60 tablet; Refill: 3  -     methocarbamol (ROBAXIN) 500 MG Tab; Take 1 tablet (500 mg total) by mouth 2 (two) times daily as needed.  Dispense: 40 tablet; Refill: 6    MIRIAN positive    Other chronic pain  -     traMADol (ULTRAM) 50 mg tablet; Take 1 tablet (50 mg total) by mouth every 12 (twelve) hours as needed for Pain.  Dispense: 60 tablet; Refill: 3  -     methocarbamol (ROBAXIN) 500 MG Tab; Take 1 tablet (500 mg total) by mouth 2 (two) times daily as needed.  Dispense: 40 tablet; Refill: 6           Off gabapentin no help  Off Prozac no help.   Continue with Elavil hs   Increase Tramadol to 50mg BID  Trial of Robaxin for myalgias  I do want her to speak with Psych if we can get appt for any pharmacologic input for depression/myalgia     Patient just started with [facility]-did  well with aquatic therapy.       TNo Follow-up on file.        30min consultation with greater than 50% spent in counseling, chart review and coordination of care. All questions answered.

## 2018-08-29 DIAGNOSIS — M79.7 FIBROMYALGIA: ICD-10-CM

## 2018-08-31 RX ORDER — AMITRIPTYLINE HYDROCHLORIDE 25 MG/1
TABLET, FILM COATED ORAL
Qty: 30 TABLET | Refills: 11 | Status: SHIPPED | OUTPATIENT
Start: 2018-08-31 | End: 2019-10-09

## 2018-09-12 ENCOUNTER — PATIENT MESSAGE (OUTPATIENT)
Dept: RHEUMATOLOGY | Facility: CLINIC | Age: 43
End: 2018-09-12

## 2018-09-13 ENCOUNTER — PATIENT MESSAGE (OUTPATIENT)
Dept: RHEUMATOLOGY | Facility: CLINIC | Age: 43
End: 2018-09-13

## 2018-09-13 DIAGNOSIS — R93.7 ABNORMAL X-RAY OF CERVICAL SPINE: ICD-10-CM

## 2018-09-14 ENCOUNTER — PATIENT MESSAGE (OUTPATIENT)
Dept: FAMILY MEDICINE | Facility: CLINIC | Age: 43
End: 2018-09-14

## 2018-09-17 ENCOUNTER — HOSPITAL ENCOUNTER (OUTPATIENT)
Dept: RADIOLOGY | Facility: HOSPITAL | Age: 43
Discharge: HOME | End: 2018-09-17
Attending: INTERNAL MEDICINE
Payer: MEDICAID

## 2018-09-17 DIAGNOSIS — R93.7 ABNORMAL X-RAY OF CERVICAL SPINE: ICD-10-CM

## 2018-09-17 PROCEDURE — 72141 MRI NECK SPINE W/O DYE: CPT | Mod: TC,PO

## 2018-09-17 PROCEDURE — 72141 MRI NECK SPINE W/O DYE: CPT | Mod: 26,,, | Performed by: RADIOLOGY

## 2018-09-20 ENCOUNTER — PATIENT MESSAGE (OUTPATIENT)
Dept: RHEUMATOLOGY | Facility: CLINIC | Age: 43
End: 2018-09-20

## 2018-09-20 DIAGNOSIS — M48.02 CERVICAL STENOSIS OF SPINAL CANAL: Primary | ICD-10-CM

## 2018-09-21 ENCOUNTER — PATIENT MESSAGE (OUTPATIENT)
Dept: RHEUMATOLOGY | Facility: CLINIC | Age: 43
End: 2018-09-21

## 2018-09-24 ENCOUNTER — TELEPHONE (OUTPATIENT)
Dept: RHEUMATOLOGY | Facility: CLINIC | Age: 43
End: 2018-09-24

## 2018-09-24 ENCOUNTER — PATIENT MESSAGE (OUTPATIENT)
Dept: RHEUMATOLOGY | Facility: CLINIC | Age: 43
End: 2018-09-24

## 2018-09-24 DIAGNOSIS — M54.12 CERVICAL RADICULOPATHY: Primary | ICD-10-CM

## 2018-09-24 DIAGNOSIS — M48.02 CERVICAL STENOSIS OF SPINE: ICD-10-CM

## 2018-09-24 NOTE — TELEPHONE ENCOUNTER
Left message requesting pt callback to discuss options. Pt weill need to contact insurance to so who is preferred.

## 2018-09-24 NOTE — TELEPHONE ENCOUNTER
----- Message from Cortez Biswas sent at 9/24/2018 10:56 AM CDT -----  Contact: patient  Lavonne, [phone]. Requesting to speak with the nurse regarding her referral for neurosurgery. Says they do not accept her insurance, and would like to know if there's another neurosurgeon she can be referred to that is accepting medicaid. Please advise. Thanks.

## 2018-09-24 NOTE — TELEPHONE ENCOUNTER
Pt requested that orders//referral be sent to Dr. Montgomery for neurosurgery. Waiting for orders to be signed.

## 2018-09-25 ENCOUNTER — PATIENT MESSAGE (OUTPATIENT)
Dept: FAMILY MEDICINE | Facility: CLINIC | Age: 43
End: 2018-09-25

## 2018-09-26 ENCOUNTER — PATIENT MESSAGE (OUTPATIENT)
Dept: RHEUMATOLOGY | Facility: CLINIC | Age: 43
End: 2018-09-26

## 2018-09-26 ENCOUNTER — PATIENT MESSAGE (OUTPATIENT)
Dept: FAMILY MEDICINE | Facility: CLINIC | Age: 43
End: 2018-09-26

## 2018-10-02 ENCOUNTER — PATIENT MESSAGE (OUTPATIENT)
Dept: RHEUMATOLOGY | Facility: CLINIC | Age: 43
End: 2018-10-02

## 2018-10-02 ENCOUNTER — PATIENT MESSAGE (OUTPATIENT)
Dept: FAMILY MEDICINE | Facility: CLINIC | Age: 43
End: 2018-10-02

## 2018-10-03 ENCOUNTER — PATIENT MESSAGE (OUTPATIENT)
Dept: RHEUMATOLOGY | Facility: CLINIC | Age: 43
End: 2018-10-03

## 2018-10-03 ENCOUNTER — PATIENT MESSAGE (OUTPATIENT)
Dept: FAMILY MEDICINE | Facility: CLINIC | Age: 43
End: 2018-10-03

## 2018-10-15 ENCOUNTER — PATIENT MESSAGE (OUTPATIENT)
Dept: RHEUMATOLOGY | Facility: CLINIC | Age: 43
End: 2018-10-15

## 2018-10-15 ENCOUNTER — PATIENT MESSAGE (OUTPATIENT)
Dept: OBSTETRICS AND GYNECOLOGY | Facility: CLINIC | Age: 43
End: 2018-10-15

## 2018-10-19 ENCOUNTER — PATIENT MESSAGE (OUTPATIENT)
Dept: RHEUMATOLOGY | Facility: CLINIC | Age: 43
End: 2018-10-19

## 2018-10-22 ENCOUNTER — PATIENT MESSAGE (OUTPATIENT)
Dept: RHEUMATOLOGY | Facility: CLINIC | Age: 43
End: 2018-10-22

## 2018-10-25 NOTE — TELEPHONE ENCOUNTER
If he is requiring PT that is something she will have to do. That is his policy I cannot change this.   As for other Neurosurgery that accepts medicaid she will have to speak to Medicaid.     I wish I could help more but each doc accepts different insurances.   I will copy Dr. Mcgee see if he knows anyone else too.

## 2018-10-30 ENCOUNTER — HOSPITAL ENCOUNTER (OUTPATIENT)
Dept: RADIOLOGY | Facility: HOSPITAL | Age: 43
Discharge: HOME | End: 2018-10-30
Attending: OBSTETRICS & GYNECOLOGY
Payer: MEDICAID

## 2018-10-30 ENCOUNTER — OFFICE VISIT (OUTPATIENT)
Dept: OBSTETRICS AND GYNECOLOGY | Facility: CLINIC | Age: 43
End: 2018-10-30
Payer: MEDICAID

## 2018-10-30 VITALS — HEIGHT: 57 IN | BODY MASS INDEX: 35.81 KG/M2 | WEIGHT: 166 LBS

## 2018-10-30 VITALS
BODY MASS INDEX: 36.02 KG/M2 | DIASTOLIC BLOOD PRESSURE: 72 MMHG | WEIGHT: 166.44 LBS | SYSTOLIC BLOOD PRESSURE: 126 MMHG

## 2018-10-30 DIAGNOSIS — Z12.31 VISIT FOR SCREENING MAMMOGRAM: ICD-10-CM

## 2018-10-30 DIAGNOSIS — M79.7 FIBROMYALGIA: ICD-10-CM

## 2018-10-30 DIAGNOSIS — R10.30 LOWER ABDOMINAL PAIN: ICD-10-CM

## 2018-10-30 DIAGNOSIS — K59.04 CHRONIC IDIOPATHIC CONSTIPATION: ICD-10-CM

## 2018-10-30 DIAGNOSIS — Z01.411 ENCOUNTER FOR GYNECOLOGICAL EXAMINATION WITH ABNORMAL FINDING: Primary | ICD-10-CM

## 2018-10-30 DIAGNOSIS — K58.1 IRRITABLE BOWEL SYNDROME WITH CONSTIPATION: ICD-10-CM

## 2018-10-30 PROCEDURE — 99213 OFFICE O/P EST LOW 20 MIN: CPT | Mod: PBBFAC,PN | Performed by: OBSTETRICS & GYNECOLOGY

## 2018-10-30 PROCEDURE — 77063 BREAST TOMOSYNTHESIS BI: CPT | Mod: TC,PN

## 2018-10-30 PROCEDURE — 77063 BREAST TOMOSYNTHESIS BI: CPT | Mod: 26,,, | Performed by: RADIOLOGY

## 2018-10-30 PROCEDURE — 99999 PR PBB SHADOW E&M-EST. PATIENT-LVL III: CPT | Mod: PBBFAC,,, | Performed by: OBSTETRICS & GYNECOLOGY

## 2018-10-30 PROCEDURE — 99396 PREV VISIT EST AGE 40-64: CPT | Mod: S$PBB,,, | Performed by: OBSTETRICS & GYNECOLOGY

## 2018-10-30 PROCEDURE — 77067 SCR MAMMO BI INCL CAD: CPT | Mod: 26,,, | Performed by: RADIOLOGY

## 2018-10-30 RX ORDER — ARMODAFINIL 250 MG/1
TABLET ORAL
Refills: 1 | COMMUNITY
Start: 2018-10-11 | End: 2019-01-09 | Stop reason: SDUPTHER

## 2018-10-30 RX ORDER — ARMODAFINIL 250 MG/1
TABLET ORAL
COMMUNITY
Start: 2017-01-19 | End: 2019-01-08

## 2018-10-30 RX ORDER — FLUTICASONE PROPIONATE AND SALMETEROL 232; 14 UG/1; UG/1
POWDER, METERED RESPIRATORY (INHALATION)
COMMUNITY
End: 2019-01-08

## 2018-10-30 RX ORDER — MONTELUKAST SODIUM 10 MG/1
TABLET ORAL
COMMUNITY
End: 2019-01-08

## 2018-10-30 NOTE — PROGRESS NOTES
Chief Complaint   Patient presents with    Well Woman    Mammogram       History of Present Illness: Lavonne Pierre is a 43 y.o. female that presents today 10/30/2018 for well gyn visit. She reports diffuse lower abdominal pain. She reports intermittent. She reports bowel movements 2-3 times daily. She reports periods are monthly for 7 days with heavy bleeding. She reports pad changes every 4-6 hours. She reports on OCPS she had hair growth. Says it did not help her periods.    Past Medical History:   Diagnosis Date    Anxiety 2013    Asthma     Depression     Fibromyalgia     GERD (gastroesophageal reflux disease)     History of pneumonia     HTN (hypertension)     Preeclampsia     Sleep apnea     Uses CPAP       Past Surgical History:   Procedure Laterality Date    BREAST BIOPSY      Benign    CARPAL TUNNEL RELEASE Right 2017     SECTION, LOW TRANSVERSE  2008    for PREE    RELEASE-CARPAL TUNNEL Right 2017    [facility]    TUBAL LIGATION         Current Outpatient Medications   Medication Sig Dispense Refill    armodafinil (NUVIGIL) 250 mg tablet       amitriptyline (ELAVIL) 25 MG tablet TAKE 1 TABLET(25 MG) BY MOUTH EVERY NIGHT AS NEEDED FOR INSOMNIA 30 tablet 11    armodafinil (NUVIGIL) 250 mg tablet   1    fluticasone-salmeterol 232-14 mcg/actuation AePB fluticasone 232 mcg-salmeterol 14 mcg/actuation breath activated powdr      lisinopril (PRINIVIL,ZESTRIL) 5 MG tablet TAKE 1 TABLET BY MOUTH EVERY DAY 90 tablet 3    methocarbamol (ROBAXIN) 500 MG Tab Take 1 tablet (500 mg total) by mouth 2 (two) times daily as needed. 40 tablet 6    montelukast (SINGULAIR) 10 mg tablet montelukast 10 mg tablet   Take 1 tablet every day by oral route.      multivitamin (THERAGRAN) per tablet Take 1 tablet by mouth once daily.      traMADol (ULTRAM) 50 mg tablet Take 1 tablet (50 mg total) by mouth every 12 (twelve) hours as needed  for Pain. 60 tablet 3    VENTOLIN HFA 90 mcg/actuation inhaler INL 1 PUFF PO Q 4 TO 6 H PRN  2     No current facility-administered medications for this visit.        Review of patient's allergies indicates:   Allergen Reactions    Ibuprofen Other (See Comments)     Affects stomach ulcer, avoids all NSAIDS    Latex, natural rubber Rash     Small red bumps on skin contact       Family History   Problem Relation Age of Onset    Pancreatic cancer Mother     Lymphoma Father     Diabetes Father     Hypertension Father     Pancreatic cancer Maternal Grandmother     Pancreatic cancer Maternal Uncle     Breast cancer Maternal Aunt        Social History     Socioeconomic History    Marital status:      Spouse name: None    Number of children: None    Years of education: None    Highest education level: None   Social Needs    Financial resource strain: None    Food insecurity - worry: None    Food insecurity - inability: None    Transportation needs - medical: None    Transportation needs - non-medical: None   Occupational History    None   Tobacco Use    Smoking status: Never Smoker    Smokeless tobacco: Never Used   Substance and Sexual Activity    Alcohol use: No    Drug use: No    Sexual activity: No     Birth control/protection: See Surgical Hx   Other Topics Concern    None   Social History Narrative    None       OB History    Para Term  AB Living   2 2 1 1   2   SAB TAB Ectopic Multiple Live Births           2      # Outcome Date GA Lbr Ron/2nd Weight Sex Delivery Anes PTL Lv   2   29w0d   M CS-Unspec   ALEX   1 Term     M Vag-Spont   ALEX      Obstetric Comments    delivery for PREE       Review of Symptoms:  GENERAL: Denies weight gain or weight loss. Feeling well overall.   SKIN: Denies rash or lesions.   HEAD: Denies head injury or headache.   NODES: Denies enlarged lymph nodes.   CHEST: Denies chest pain or shortness of breath.   CARDIOVASCULAR: Denies  palpitations or left sided chest pain.   ABDOMEN: No abdominal pain, constipation, diarrhea, nausea, vomiting or rectal bleeding.   URINARY: No frequency, dysuria, hematuria, or burning on urination.  HEMATOLOGIC: No easy bruisability or excessive bleeding.   MUSCULOSKELETAL: Denies joint pain or swelling.     /72   Wt 75.5 kg (166 lb 7.2 oz)   LMP 10/05/2018   Physical Exam:  APPEARANCE: Well nourished, well developed, in no acute distress.  SKIN: Normal skin turgor, no lesions.  NECK: Neck symmetric without masses   RESPIRATORY: Normal respiratory effort with no retractions or use of accessory muscles  CARDIOVASCULAR: Peripheral vascular system with no swelling no varicosities and palpation of pulses normal  LYMPHATIC: No enlargements of the lymph nodes noted in the neck, axillae, or groin  ABDOMEN: Soft. No tenderness or masses. No hepatosplenomegaly. No hernias.  BREASTS: Symmetrical, no skin changes or visible lesions. No palpable masses, nipple discharge or adenopathy bilaterally.  PELVIC: Normal external female genitalia without lesions. Normal hair distribution. Adequate perineal body, normal urethral meatus. Urethra with no masses.  Bladder nontender. Vagina moist and well rugated without lesions or discharge. Cervix pink and without lesions. No significant cystocele or rectocele. Bimanual exam showed uterus normal size, shape, position, mobile and nontender. Adnexa without masses or tenderness. Urethra and bladder normal.   EXTREMITIES: No clubbing cyanosis or edema.    ASSESSMENT/PLAN:  Encounter for gynecological examination with abnormal finding    Visit for screening mammogram  -     Mammo Digital Screening Bilat With CAD; Future; Expected date: 10/30/2018    Chronic idiopathic constipation    Lower abdominal pain    Fibromyalgia    Irritable bowel syndrome with constipation      We discussed option of mirena IUD to lighten periods.     Patient was counseled today on Pap guidelines,  recommendation for pelvic exams, mammograms every other year after the age of 40 and annually after the age of 50, Colonoscopy after the age of 50, Dexa Bone Scan and calcium and vitamin D supplementation in menopause and to see her PCP for other health maintenance.   FOLLOW-UP:prn

## 2018-11-01 ENCOUNTER — PATIENT MESSAGE (OUTPATIENT)
Dept: RHEUMATOLOGY | Facility: CLINIC | Age: 43
End: 2018-11-01

## 2018-11-02 ENCOUNTER — PATIENT MESSAGE (OUTPATIENT)
Dept: RHEUMATOLOGY | Facility: CLINIC | Age: 43
End: 2018-11-02

## 2018-11-05 ENCOUNTER — PATIENT MESSAGE (OUTPATIENT)
Dept: RHEUMATOLOGY | Facility: CLINIC | Age: 43
End: 2018-11-05

## 2018-11-05 ENCOUNTER — PATIENT MESSAGE (OUTPATIENT)
Dept: FAMILY MEDICINE | Facility: CLINIC | Age: 43
End: 2018-11-05

## 2018-11-07 ENCOUNTER — PATIENT MESSAGE (OUTPATIENT)
Dept: FAMILY MEDICINE | Facility: CLINIC | Age: 43
End: 2018-11-07

## 2018-11-07 ENCOUNTER — PATIENT MESSAGE (OUTPATIENT)
Dept: RHEUMATOLOGY | Facility: CLINIC | Age: 43
End: 2018-11-07

## 2018-11-08 ENCOUNTER — PATIENT MESSAGE (OUTPATIENT)
Dept: RHEUMATOLOGY | Facility: CLINIC | Age: 43
End: 2018-11-08

## 2018-11-08 ENCOUNTER — PATIENT MESSAGE (OUTPATIENT)
Dept: FAMILY MEDICINE | Facility: CLINIC | Age: 43
End: 2018-11-08

## 2018-11-08 ENCOUNTER — IMMUNIZATION (OUTPATIENT)
Dept: PHARMACY | Facility: CLINIC | Age: 43
End: 2018-11-08
Payer: MEDICAID

## 2018-11-08 NOTE — TELEPHONE ENCOUNTER
Pt was told she has to have PT prior to being seen by neurosurgeon. Pt is requesting orders be sent to [facility] at this [place] location.

## 2018-11-13 ENCOUNTER — PATIENT MESSAGE (OUTPATIENT)
Dept: RHEUMATOLOGY | Facility: CLINIC | Age: 43
End: 2018-11-13

## 2018-11-13 DIAGNOSIS — M48.02 CERVICAL STENOSIS OF SPINE: ICD-10-CM

## 2018-11-13 DIAGNOSIS — N88.2 CERVICAL STENOSIS (UTERINE CERVIX): Primary | ICD-10-CM

## 2018-11-19 ENCOUNTER — PATIENT MESSAGE (OUTPATIENT)
Dept: FAMILY MEDICINE | Facility: CLINIC | Age: 43
End: 2018-11-19

## 2018-11-19 NOTE — TELEPHONE ENCOUNTER
----- Message from Suni Lockhart sent at 11/19/2018  8:56 AM CST -----  Contact: Patient  Type: Needs Medical Advice    Who Called:  Patient  Symptoms (please be specific):  lisa  How long has patient had these symptoms:  lisa  Pharmacy name and phone #:  l[facility]  Best Call Back Number: [phone] (home)    Additional Information: Patient states that she has sent a message via My Chart, patient states that she is not explaining again and she just wants to have someone reach out to her today. She states that once Dr. Mcgee sees her message he will know what is going on. Please call to advise.

## 2018-11-26 ENCOUNTER — CLINICAL SUPPORT (OUTPATIENT)
Dept: REHABILITATION | Facility: HOSPITAL | Age: 43
End: 2018-11-26
Attending: INTERNAL MEDICINE
Payer: MEDICAID

## 2018-11-26 DIAGNOSIS — M54.2 NECK PAIN: ICD-10-CM

## 2018-11-26 PROCEDURE — G8985 CARRY GOAL STATUS: HCPCS | Mod: CJ,PO | Performed by: PHYSICAL THERAPIST

## 2018-11-26 PROCEDURE — G8984 CARRY CURRENT STATUS: HCPCS | Mod: CL,PO | Performed by: PHYSICAL THERAPIST

## 2018-11-26 PROCEDURE — 97162 PT EVAL MOD COMPLEX 30 MIN: CPT | Mod: PO | Performed by: PHYSICAL THERAPIST

## 2018-11-26 NOTE — PATIENT INSTRUCTIONS
AROM: Lateral Neck Flexion        Slowly tilt head toward one shoulder, then the other. Hold each position ____ seconds.  Repeat ____ times per set. Do ____ sets per session. Do ____ sessions per day.     [email or web address]     Copyright © [employer]. All rights reserved.   Flexibility: Upper Trapezius Stretch        Gently grasp right side of head while reaching behind back with other hand. Tilt head away until a gentle stretch is felt. Hold ____ seconds.  Repeat ____ times per set. Do ____ sets per session. Do ____ sessions per day.     [email or web address]     Copyright © [employer]. All rights reserved.   Levator Scapula Stretch        Place left hand on same side shoulder blade. With other hand, gently stretch head down and away. Hold ____ seconds.  Repeat ____ times per set. Do ____ sets per session. Do ____ sessions per day.     [email or web address]     Copyright © [employer]. All rights reserved.   Strengthening: Shoulder Shrug (Phase 1)        Shrug shoulders up and down, forward and backward.  Repeat ____ times per set. Do ____ sets per session. Do ____ sessions per day.     [email or web address]     Copyright © [employer]. All rights reserved.   Facial Exercise: Platysma Frown        Turn down corners of mouth and, looking up, tighten muscles at front of neck. Hold ____ seconds. Relax.  Repeat ____ times per set. Do ____ sets per session. Do ____ sessions per day.    Copyright © [employer]. All rights reserved.   Scapular Retraction: Elbow Flexion (Standing)        With elbows bent to 90°, pinch shoulder blades together and rotate arms out, keeping elbows bent.  Repeat ____ times per set. Do ____ sets per session. Do ____ sessions per day.     [email or web address]     Copyright © [employer]. All rights reserved.
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
## 2018-12-03 ENCOUNTER — OFFICE VISIT (OUTPATIENT)
Dept: FAMILY MEDICINE | Facility: CLINIC | Age: 43
End: 2018-12-03
Payer: MEDICAID

## 2018-12-03 VITALS
WEIGHT: 164.44 LBS | DIASTOLIC BLOOD PRESSURE: 80 MMHG | HEART RATE: 88 BPM | RESPIRATION RATE: 16 BRPM | OXYGEN SATURATION: 98 % | BODY MASS INDEX: 35.48 KG/M2 | SYSTOLIC BLOOD PRESSURE: 134 MMHG | HEIGHT: 57 IN

## 2018-12-03 DIAGNOSIS — F51.01 PRIMARY INSOMNIA: ICD-10-CM

## 2018-12-03 DIAGNOSIS — G47.33 OSA (OBSTRUCTIVE SLEEP APNEA): ICD-10-CM

## 2018-12-03 DIAGNOSIS — M79.7 FIBROMYALGIA: Primary | ICD-10-CM

## 2018-12-03 DIAGNOSIS — E04.1 THYROID NODULE: ICD-10-CM

## 2018-12-03 PROCEDURE — 99213 OFFICE O/P EST LOW 20 MIN: CPT | Mod: PBBFAC,PO | Performed by: FAMILY MEDICINE

## 2018-12-03 PROCEDURE — 99999 PR PBB SHADOW E&M-EST. PATIENT-LVL III: CPT | Mod: PBBFAC,,, | Performed by: FAMILY MEDICINE

## 2018-12-03 PROCEDURE — 99214 OFFICE O/P EST MOD 30 MIN: CPT | Mod: S$PBB,,, | Performed by: FAMILY MEDICINE

## 2018-12-03 NOTE — PROGRESS NOTES
Subjective:       Patient ID: Lavonne Pierre is a 43 y.o. female    Chief Complaint: Follow-up (4 month follow up )    HPI  Here today for interval evaluation  She reports difficulty initiating and maintaining sleep  She is trying a combination of Elavil and Tramadol to assist with sleep.    Using CPAP nightly, dosing adjusted earlier in the year.  Treatment with Nuvigil during the day.    Review of Systems   Constitutional: Negative for activity change.   HENT: Positive for hearing loss and rhinorrhea. Negative for trouble swallowing.         Seeing ENT   Eyes: Negative for discharge and visual disturbance.   Respiratory: Positive for chest tightness and wheezing.         Symptoms are previous and currently controlled   Cardiovascular: Positive for chest pain and palpitations.        Symptoms are historical and currently controlled   Gastrointestinal: Positive for diarrhea. Negative for blood in stool, constipation and vomiting.   Endocrine: Negative for polydipsia and polyuria.   Genitourinary: Negative for difficulty urinating, dysuria, hematuria and menstrual problem.   Musculoskeletal: Positive for arthralgias, joint swelling and neck pain.        Stable, related to fibromyalgia   Neurological: Positive for weakness and headaches.   Psychiatric/Behavioral: Positive for dysphoric mood (improved with freeman based efforts). Negative for confusion.        Objective:   Physical Exam   Constitutional: She is oriented to person, place, and time. She appears well-developed and well-nourished.   Neurological: She is alert and oriented to person, place, and time.   Vitals reviewed.       Assessment:       1. Fibromyalgia     2. Primary insomnia     3. SALVADOR (obstructive sleep apnea)     4. Thyroid nodule  US Soft Tissue Head Neck Thyroid        Plan:       Fibromyalgia  - Continue current therapy  - Continue Rheumatology    Primary insomnia with SALVADOR (obstructive sleep apnea)  - Continue current therapy  - Will review  Data Therapy Summary    Thyroid nodule  -     US Soft Tissue Head Neck Thyroid; Future; Expected date: 12/03/2018  - Continue ENT    Follow-up in about 6 months (around 6/3/2019).

## 2018-12-04 ENCOUNTER — TELEPHONE (OUTPATIENT)
Dept: FAMILY MEDICINE | Facility: CLINIC | Age: 43
End: 2018-12-04

## 2018-12-05 ENCOUNTER — HOSPITAL ENCOUNTER (OUTPATIENT)
Dept: RADIOLOGY | Facility: HOSPITAL | Age: 43
Discharge: HOME | End: 2018-12-05
Attending: FAMILY MEDICINE
Payer: MEDICAID

## 2018-12-05 DIAGNOSIS — E04.1 THYROID NODULE: ICD-10-CM

## 2018-12-05 PROCEDURE — 76536 US EXAM OF HEAD AND NECK: CPT | Mod: TC,PO

## 2018-12-05 PROCEDURE — 76536 US EXAM OF HEAD AND NECK: CPT | Mod: 26,,, | Performed by: RADIOLOGY

## 2018-12-06 ENCOUNTER — TELEPHONE (OUTPATIENT)
Dept: FAMILY MEDICINE | Facility: CLINIC | Age: 43
End: 2018-12-06

## 2018-12-06 NOTE — TELEPHONE ENCOUNTER
Spoke to pt. Pt requesting us to fax the results from her US on 5/8/17 to dr. Vergara. Will get fax number from pt.

## 2018-12-06 NOTE — TELEPHONE ENCOUNTER
----- Message from Uziel Hernandez sent at 12/6/2018  1:09 PM CST -----  Contact: Patient  Type: Needs Medical Advice    Who Called: Patient  Best Call Back Number: [phone]  Additional Information: Patient would like results from 5/8/17. Please fax results to Dr. Vergara.

## 2018-12-07 ENCOUNTER — CLINICAL SUPPORT (OUTPATIENT)
Dept: REHABILITATION | Facility: HOSPITAL | Age: 43
End: 2018-12-07
Attending: INTERNAL MEDICINE
Payer: MEDICAID

## 2018-12-07 DIAGNOSIS — M54.2 NECK PAIN: ICD-10-CM

## 2018-12-07 PROCEDURE — 97110 THERAPEUTIC EXERCISES: CPT | Mod: PO | Performed by: PHYSICAL THERAPIST

## 2018-12-07 NOTE — PROGRESS NOTES
"  Physical Therapy Daily Treatment Note     Name: Lavonne Emery [facility]  Clinic Number: [id number]    Therapy Diagnosis:   Encounter Diagnosis   Name Primary?    Neck pain      Physician: Omer Mcgee MD    Visit Date: 12/7/2018  Physician Orders: PT Eval and Treat   Medical Diagnosis from Referral: Cervical stenosis of spine  Evaluation Date: 11/26/2018  Authorization Period Expiration: 11/30/2018  Plan of Care Expiration: 01/04/2019  Visit # / Visits authorized: 2/ 5    Time In: 12:00 PM   Time Out: 12:48  Total Billable Time: 48 minutes    Precautions: Standard    Subjective     Pt reports: That her neck is a little "looser".  She was compliant with home exercise program.  Response to previous treatment: Initial eval   Functional change: Too soon to tell     Pain: 5/10  Location: bilateral neck      Objective     Lavonne received therapeutic exercises to develop strength, flexibility and posture for 48 minutes including:  UBE 3'/3'  Cervical AROM x 3 2 min ea   UT stretch 3x30"  LS stretch 3x30"  Rows 2x10 red theraband   Supine horizontal abd. 2x10 red theraband  Supine D2 flexion B 2x10 red theraband     Home Exercises Provided and Patient Education Provided     Education provided:   - On possibility of post exercise soreness    Written Home Exercises Provided: Patient instructed to cont prior HEP.  Exercises were reviewed and Lavonne was able to demonstrate them prior to the end of the session.  Lavonne demonstrated good  understanding of the education provided.     See EMR under Patient Instructions for exercises provided prior visit.    Assessment     Pt able to perform all requested exercises with mild discomfort. She will benefit from continued progression as able   Lavonne is not progressing well towards her goals.   Pt prognosis is Fair.     Pt will continue to benefit from skilled outpatient physical therapy to address the deficits listed in the problem list box on initial evaluation, provide pt/family " education and to maximize pt's level of independence in the home and community environment.     Pt's spiritual, cultural and educational needs considered and pt agreeable to plan of care and goals.    Anticipated barriers to physical therapy: None at this time     Goals:     Short Term Goals: 3 weeks   1) Pt will be I with HEP   2) Pain will be no worse then 8/10  3) Pt will tolerate 30 minutes of continuous house cleaning activities with 7/10 pain or less      Long Term Goals: 6 weeks   1) Pt will have pain no worse then 6/10   2) Pt will complete all AD activities with 6/10 pain or less   3) FOTO score will improve by 15%    Plan     Progress with established POC with a focus on improving ADL abilities     Gunner Ludwig, PT

## 2018-12-11 ENCOUNTER — PATIENT MESSAGE (OUTPATIENT)
Dept: FAMILY MEDICINE | Facility: CLINIC | Age: 43
End: 2018-12-11

## 2018-12-11 ENCOUNTER — TELEPHONE (OUTPATIENT)
Dept: FAMILY MEDICINE | Facility: CLINIC | Age: 43
End: 2018-12-11

## 2018-12-11 DIAGNOSIS — G47.33 OSA (OBSTRUCTIVE SLEEP APNEA): Primary | ICD-10-CM

## 2018-12-11 NOTE — TELEPHONE ENCOUNTER
----- Message from Radha Quezada sent at 12/11/2018  7:33 AM CST -----  Contact: Donell @ [facility]  Good morning! We received the note regarding increasing Pt's CPAP pressure. Please put in a signed order for this change. Our fax is [phone]. Thank you!

## 2018-12-14 ENCOUNTER — PATIENT MESSAGE (OUTPATIENT)
Dept: FAMILY MEDICINE | Facility: CLINIC | Age: 43
End: 2018-12-14

## 2018-12-14 ENCOUNTER — CLINICAL SUPPORT (OUTPATIENT)
Dept: REHABILITATION | Facility: HOSPITAL | Age: 43
End: 2018-12-14
Payer: MEDICAID

## 2018-12-14 DIAGNOSIS — M54.2 NECK PAIN: ICD-10-CM

## 2018-12-14 PROCEDURE — 97110 THERAPEUTIC EXERCISES: CPT | Mod: PO | Performed by: PHYSICAL THERAPIST

## 2018-12-14 NOTE — PROGRESS NOTES
"  Physical Therapy Daily Treatment Note     Name: Lavonne Emery [facility]  Clinic Number: [id number]    Therapy Diagnosis:   Encounter Diagnosis   Name Primary?    Neck pain      Physician: Omre Mcgee MD    Visit Date: 12/14/2018  Physician Orders: PT Eval and Treat   Medical Diagnosis from Referral: Cervical stenosis of spine  Evaluation Date: 11/26/2018  Authorization Period Expiration: 11/30/2018  Plan of Care Expiration: 01/04/2019  Visit # / Visits authorized: 3/ 5    Time In: 9:00 AM   Time Out: :48  Total Billable Time: 48 minutes    Precautions: Standard    Subjective     Pt reports: That she was sore for the rest of the day after her last session, but it subsided.   She was compliant with home exercise program.  Response to previous treatment: Soreness post treatment   Functional change: Too soon to tell     Pain: 4/10  Location: bilateral neck      Objective     Lavonne received therapeutic exercises to develop strength, flexibility and posture for 50 minutes including:  UBE 3'/3'  Cervical AROM x 3 20x each plane   UT stretch 3x30"  LS stretch 3x30"  Rows 2x10 red theraband   Supine horizontal abd. 2x10 red theraband  Supine D2 flexion B 2x10 red theraband   Supine dowel flexion 20x 3#   Supine cervical retractions 20x  Supine pec stretch 3 min     Home Exercises Provided and Patient Education Provided     Education provided:   - On "pulling" sensation she is feeling and that it is just a muscle stretch and isn't harmful  - On muscle burn     Written Home Exercises Provided: Patient instructed to cont prior HEP.  Exercises were reviewed and Lavonne was able to demonstrate them prior to the end of the session.  Lavonne demonstrated good  understanding of the education provided.     See EMR under Patient Instructions for exercises provided prior visit.    Assessment     Pt able to perform new exercises with reports of "pulling." She has a poor understanding of what  muscle stretch and muscle work is. She " acknowledged understanding after education, but I am not sure she clearly understands.     Lavonne is not progressing well towards her goals.   Pt prognosis is Fair. Due to fear avoidance behavior    Pt will continue to benefit from skilled outpatient physical therapy to address the deficits listed in the problem list box on initial evaluation, provide pt/family education and to maximize pt's level of independence in the home and community environment.     Pt's spiritual, cultural and educational needs considered and pt agreeable to plan of care and goals.    Anticipated barriers to physical therapy: None at this time     Goals:     Short Term Goals: 3 weeks (not met, all in progress)  1) Pt will be I with HEP   2) Pain will be no worse then 8/10  3) Pt will tolerate 30 minutes of continuous house cleaning activities with 7/10 pain or less      Long Term Goals: 6 weeks (not met, all in progress)   1) Pt will have pain no worse then 6/10   2) Pt will complete all AD activities with 6/10 pain or less   3) FOTO score will improve by 15%    Plan     Progress with established POC with a focus on improving ADL abilities     Gunner Ludwig, PT

## 2018-12-16 ENCOUNTER — PATIENT MESSAGE (OUTPATIENT)
Dept: FAMILY MEDICINE | Facility: CLINIC | Age: 43
End: 2018-12-16

## 2018-12-17 ENCOUNTER — PATIENT MESSAGE (OUTPATIENT)
Dept: FAMILY MEDICINE | Facility: CLINIC | Age: 43
End: 2018-12-17

## 2018-12-21 ENCOUNTER — CLINICAL SUPPORT (OUTPATIENT)
Dept: REHABILITATION | Facility: HOSPITAL | Age: 43
End: 2018-12-21
Attending: FAMILY MEDICINE
Payer: MEDICAID

## 2018-12-21 DIAGNOSIS — M54.2 NECK PAIN: ICD-10-CM

## 2018-12-21 PROCEDURE — 97110 THERAPEUTIC EXERCISES: CPT | Mod: PO | Performed by: PHYSICAL THERAPIST

## 2018-12-21 NOTE — PROGRESS NOTES
"  Physical Therapy Daily Treatment Note     Name: Lavonne Tony  Clinic Number: [id number]    Therapy Diagnosis:   Encounter Diagnosis   Name Primary?    Neck pain      Physician: Omer Mcgee MD    Visit Date: 12/21/2018  Physician Orders: PT Eval and Treat   Medical Diagnosis from Referral: Cervical stenosis of spine  Evaluation Date: 11/26/2018  Authorization Period Expiration: 11/30/2018  Plan of Care Expiration: 01/04/2019  Visit # / Visits authorized: 4/ 5    Time In: 9:00 AM   Time Out: 9:50 AM  Total Billable Time: 50 minutes    Precautions: Standard    Subjective     Pt reports: That she is hurting more then usual today. Some days I just have bad days.   She was compliant with home exercise program.  Response to previous treatment: Soreness post treatment   Functional change: ADL performance with increased time due to pain     Pain: 6/10  Location: bilateral neck      Objective     Lavonne received therapeutic exercises to develop strength, flexibility and posture for 50 minutes including:  UBE 3'/3'  Cervical AROM x 3 20x each plane   UT stretch 3x30"  LS stretch 3x30"  Rows 2x10 red theraband   Supine horizontal abd. 2x10 red theraband  Supine D2 flexion B 2x10 red theraband   Supine dowel flexion 20x 3#   Supine cervical retractions 20x  Supine pec stretch 3 min     Home Exercises Provided and Patient Education Provided     Education provided:   - On "pulling" sensation she is feeling and that it is just a muscle stretch and isn't harmful  - On muscle burn     Written Home Exercises Provided: Patient instructed to cont prior HEP.  Exercises were reviewed and Lavonne was able to demonstrate them prior to the end of the session.  Lavonne demonstrated good  understanding of the education provided.     See EMR under Patient Instructions for exercises provided prior visit.    Assessment     Pt continues to have elevated pain levels that limit ADL performance. Poor tolerance to gentle exercise activity. " She has a difficult time differentiating hurt and harm from exercises. I don't expect her to make significant improvements with physical therapy.     Lavonne is not progressing well towards her goals.   Pt prognosis is Fair. Due to fear avoidance behavior    Pt will continue to benefit from skilled outpatient physical therapy to address the deficits listed in the problem list box on initial evaluation, provide pt/family education and to maximize pt's level of independence in the home and community environment.     Pt's spiritual, cultural and educational needs considered and pt agreeable to plan of care and goals.    Anticipated barriers to physical therapy: None at this time     Goals:     Short Term Goals: 3 weeks (not met, all in progress)  1) Pt will be I with HEP   2) Pain will be no worse then 8/10  3) Pt will tolerate 30 minutes of continuous house cleaning activities with 7/10 pain or less      Long Term Goals: 6 weeks (not met, all in progress)   1) Pt will have pain no worse then 6/10   2) Pt will complete all AD activities with 6/10 pain or less   3) FOTO score will improve by 15%    Plan     Progress with established POC with a focus on improving ADL abilities     Gunner Ludwig, PT

## 2018-12-28 ENCOUNTER — CLINICAL SUPPORT (OUTPATIENT)
Dept: REHABILITATION | Facility: HOSPITAL | Age: 43
End: 2018-12-28
Attending: INTERNAL MEDICINE
Payer: MEDICAID

## 2018-12-28 DIAGNOSIS — M54.2 NECK PAIN: ICD-10-CM

## 2018-12-28 PROCEDURE — 97110 THERAPEUTIC EXERCISES: CPT | Mod: PO | Performed by: PHYSICAL THERAPIST
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
## 2018-12-28 NOTE — PROGRESS NOTES
"  Physical Therapy Daily Treatment Note     Name: Lavonne Emery [facility]  Clinic Number: [id number]    Therapy Diagnosis:   Encounter Diagnosis   Name Primary?    Neck pain      Physician: Omer Mcgee MD    Visit Date: 12/28/2018  Physician Orders: PT Eval and Treat   Medical Diagnosis from Referral: Cervical stenosis of spine  Evaluation Date: 11/26/2018  Authorization Period Expiration: 11/30/2018  Plan of Care Expiration: 01/04/2019  Visit # / Visits authorized: 5/ 5    Time In: 9:00 AM   Time Out: 9:50 AM  Total Billable Time: 50 minutes    Precautions: Standard    Subjective     Pt reports: That she feels better today then last time, but hasn't noticed any real difference since starting physical therapy.   She was compliant with home exercise program.  Response to previous treatment: Soreness post treatment   Functional change: ADL performance with increased time due to pain     Pain: 4/10  Location: bilateral neck      Objective     Lavonne received therapeutic exercises to develop strength, flexibility and posture for 50 minutes including:  UBE 3'/3'  Cervical AROM x 3 2 min  each plane   UT stretch 3x30"  LS stretch 3x30"  Rows 3x10 red theraband   Supine horizontal abd. 2x10 red theraband  Supine D2 flexion B 2x10 red theraband   Supine dowel flexion 20x 3#   Supine cervical retractions 20x  Supine pec stretch 3 min     Home Exercises Provided and Patient Education Provided     Education provided:   - On "pulling" sensation she is feeling and that it is just a muscle stretch and isn't harmful  - On muscle burn     Written Home Exercises Provided: Patient instructed to cont prior HEP.  Exercises were reviewed and Lavonne was able to demonstrate them prior to the end of the session.  Lavonne demonstrated good  understanding of the education provided.     See EMR under Patient Instructions for exercises provided prior visit.    Assessment     Pt continues to have elevated pain levels that limit ADL performance. " Poor tolerance to gentle exercise activity. She has a difficult time differentiating hurt and harm from exercises. I don't expect her to make significant improvements with physical therapy.     Lavonne is not progressing well towards her goals.   Pt prognosis is Fair. Due to fear avoidance behavior    Pt will continue to benefit from skilled outpatient physical therapy to address the deficits listed in the problem list box on initial evaluation, provide pt/family education and to maximize pt's level of independence in the home and community environment.     Pt's spiritual, cultural and educational needs considered and pt agreeable to plan of care and goals.    Anticipated barriers to physical therapy: None at this time     Goals:     Short Term Goals: 3 weeks (not met, all in progress)  1) Pt will be I with HEP   2) Pain will be no worse then 8/10  3) Pt will tolerate 30 minutes of continuous house cleaning activities with 7/10 pain or less      Long Term Goals: 6 weeks (not met, all in progress)   1) Pt will have pain no worse then 6/10   2) Pt will complete all AD activities with 6/10 pain or less   3) FOTO score will improve by 15%    Plan     Progress with established POC with a focus on improving ADL abilities     Gunner Ludwig, PT

## 2019-01-02 ENCOUNTER — HOSPITAL ENCOUNTER (OUTPATIENT)
Dept: RADIOLOGY | Facility: HOSPITAL | Age: 44
Discharge: HOME | End: 2019-01-02
Attending: FAMILY MEDICINE
Payer: MEDICAID

## 2019-01-02 DIAGNOSIS — D17.71 ANGIOMYOLIPOMA OF KIDNEY: ICD-10-CM

## 2019-01-04 ENCOUNTER — TELEPHONE (OUTPATIENT)
Dept: FAMILY MEDICINE | Facility: CLINIC | Age: 44
End: 2019-01-04

## 2019-01-04 NOTE — TELEPHONE ENCOUNTER
----- Message from Mckayla Candelario sent at 1/4/2019  8:09 AM CST -----  Contact: Patient  Type:  Same Day Appointment Request    Caller is requesting a same day appointment.  Caller declined first available appointment listed below.      Name of Caller:  Patient  When is the first available appointment?  na  Symptoms:  congested/ sore throat/ headache/ body aches  Best Call Back Number:    Additional Information:   Calling to schedule a same day appt today, if possible. Please advise.

## 2019-01-04 NOTE — TELEPHONE ENCOUNTER
Phoned pt in regards to scheduling an appt, no availability in [place] or [place], pt declined [facility]. Instructed pt to go to urgent care facility or I could attempt to schedule for another day. Pt declined. CLC

## 2019-01-07 ENCOUNTER — TELEPHONE (OUTPATIENT)
Dept: FAMILY MEDICINE | Facility: CLINIC | Age: 44
End: 2019-01-07

## 2019-01-07 ENCOUNTER — PATIENT MESSAGE (OUTPATIENT)
Dept: FAMILY MEDICINE | Facility: CLINIC | Age: 44
End: 2019-01-07

## 2019-01-07 NOTE — TELEPHONE ENCOUNTER
Spoke with pt   She will keep appointment with [facility] for tomorrow  Also did you find out anything about the CT of abdomen that was denied by medicaid?  Can she get it rescheduled?

## 2019-01-07 NOTE — TELEPHONE ENCOUNTER
----- Message from RT Shazia sent at 1/7/2019  7:49 AM CST -----  Contact: pt    pt , requesting an appt to be worked in today: [facility] F/U appt : Bronchitis, thanks.

## 2019-01-08 ENCOUNTER — OFFICE VISIT (OUTPATIENT)
Dept: FAMILY MEDICINE | Facility: CLINIC | Age: 44
End: 2019-01-08
Payer: MEDICAID

## 2019-01-08 VITALS
WEIGHT: 166.88 LBS | BODY MASS INDEX: 36 KG/M2 | OXYGEN SATURATION: 96 % | HEART RATE: 76 BPM | TEMPERATURE: 98 F | HEIGHT: 57 IN | SYSTOLIC BLOOD PRESSURE: 120 MMHG | DIASTOLIC BLOOD PRESSURE: 82 MMHG

## 2019-01-08 DIAGNOSIS — J40 BRONCHITIS: Primary | ICD-10-CM

## 2019-01-08 DIAGNOSIS — H65.01 RIGHT ACUTE SEROUS OTITIS MEDIA, RECURRENCE NOT SPECIFIED: ICD-10-CM

## 2019-01-08 PROCEDURE — 99214 OFFICE O/P EST MOD 30 MIN: CPT | Mod: PBBFAC,PO | Performed by: PHYSICIAN ASSISTANT

## 2019-01-08 PROCEDURE — 99214 OFFICE O/P EST MOD 30 MIN: CPT | Mod: S$PBB,,, | Performed by: PHYSICIAN ASSISTANT

## 2019-01-08 PROCEDURE — 99999 PR PBB SHADOW E&M-EST. PATIENT-LVL IV: ICD-10-PCS | Mod: PBBFAC,,, | Performed by: PHYSICIAN ASSISTANT

## 2019-01-08 PROCEDURE — 99999 PR PBB SHADOW E&M-EST. PATIENT-LVL IV: CPT | Mod: PBBFAC,,, | Performed by: PHYSICIAN ASSISTANT

## 2019-01-08 PROCEDURE — 99214 PR OFFICE/OUTPT VISIT, EST, LEVL IV, 30-39 MIN: ICD-10-PCS | Mod: S$PBB,,, | Performed by: PHYSICIAN ASSISTANT

## 2019-01-08 RX ORDER — AZITHROMYCIN 250 MG/1
TABLET, FILM COATED ORAL
Refills: 0 | COMMUNITY
Start: 2019-01-04 | End: 2019-01-08

## 2019-01-08 RX ORDER — CHLORHEXIDINE GLUCONATE ORAL RINSE 1.2 MG/ML
SOLUTION DENTAL
Refills: 0 | COMMUNITY
Start: 2018-10-31 | End: 2019-01-08

## 2019-01-08 RX ORDER — METHYLPREDNISOLONE 4 MG/1
TABLET ORAL
Qty: 1 PACKAGE | Refills: 0 | Status: SHIPPED | OUTPATIENT
Start: 2019-01-08 | End: 2019-01-29

## 2019-01-08 RX ORDER — LIDOCAINE AND PRILOCAINE 25; 25 MG/G; MG/G
CREAM TOPICAL
Refills: 1 | COMMUNITY
Start: 2018-12-06 | End: 2020-02-05

## 2019-01-08 RX ORDER — ALBUTEROL SULFATE 90 UG/1
AEROSOL, METERED RESPIRATORY (INHALATION)
COMMUNITY
End: 2020-09-16

## 2019-01-08 RX ORDER — BETAMETHASONE DIPROPIONATE 0.5 MG/ML
LOTION, AUGMENTED TOPICAL
Refills: 0 | COMMUNITY
Start: 2018-11-28 | End: 2019-01-08

## 2019-01-08 RX ORDER — HYDROCODONE BITARTRATE AND HOMATROPINE METHYLBROMIDE 5; 1.5 MG/5ML; MG/5ML
SOLUTION ORAL
Refills: 0 | COMMUNITY
Start: 2019-01-04 | End: 2019-01-08

## 2019-01-08 RX ORDER — PROMETHAZINE HYDROCHLORIDE AND DEXTROMETHORPHAN HYDROBROMIDE 6.25; 15 MG/5ML; MG/5ML
5 SYRUP ORAL
Qty: 118 ML | Refills: 0 | Status: SHIPPED | OUTPATIENT
Start: 2019-01-08 | End: 2019-01-18

## 2019-01-08 RX ORDER — TRIAMCINOLONE ACETONIDE 1 MG/G
CREAM TOPICAL
Refills: 1 | COMMUNITY
Start: 2018-12-18 | End: 2019-01-08
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
## 2019-01-09 ENCOUNTER — PATIENT MESSAGE (OUTPATIENT)
Dept: FAMILY MEDICINE | Facility: CLINIC | Age: 44
End: 2019-01-09

## 2019-01-10 RX ORDER — ARMODAFINIL 250 MG/1
250 TABLET ORAL DAILY
Qty: 30 TABLET | Refills: 3 | Status: SHIPPED | OUTPATIENT
Start: 2019-01-10 | End: 2019-08-05

## 2019-01-16 ENCOUNTER — PATIENT MESSAGE (OUTPATIENT)
Dept: FAMILY MEDICINE | Facility: CLINIC | Age: 44
End: 2019-01-16

## 2019-01-16 NOTE — TELEPHONE ENCOUNTER
----- Message from Briana Arteaga sent at 1/16/2019  9:52 AM CST -----  Type: Needs Medical Advice    Who Called: Patient    Best Call Back Number: [phone] (home)     Additional Information: Patient called regarding the message she submitted on the portal regarding her ct scan, stating no one is giving her answers she need regarding if her medicaid will cover her ct scan. Stating previously it scheduled and cancelled due to not being covered and she was not told this information. Please contact to discuss

## 2019-01-20 ENCOUNTER — PATIENT MESSAGE (OUTPATIENT)
Dept: RHEUMATOLOGY | Facility: CLINIC | Age: 44
End: 2019-01-20

## 2019-01-24 NOTE — TELEPHONE ENCOUNTER
Sent patient a message about possible plantar fasciitis if she emails back yes then let me know and I will refer to podiatry. And we can try to book this as soon as can.     Dr. KELLY

## 2019-01-28 NOTE — TELEPHONE ENCOUNTER
We can try to find appt. If not helping please see if podiatry can offer any earlier appts.     Thanks Dr. KELLY

## 2019-02-04 ENCOUNTER — OFFICE VISIT (OUTPATIENT)
Dept: RHEUMATOLOGY | Facility: CLINIC | Age: 44
End: 2019-02-04
Payer: MEDICAID

## 2019-02-04 ENCOUNTER — PATIENT MESSAGE (OUTPATIENT)
Dept: RHEUMATOLOGY | Facility: CLINIC | Age: 44
End: 2019-02-04

## 2019-02-04 VITALS
BODY MASS INDEX: 36.03 KG/M2 | WEIGHT: 167 LBS | HEART RATE: 64 BPM | SYSTOLIC BLOOD PRESSURE: 140 MMHG | DIASTOLIC BLOOD PRESSURE: 100 MMHG | HEIGHT: 57 IN

## 2019-02-04 DIAGNOSIS — M79.7 FIBROMYALGIA: ICD-10-CM

## 2019-02-04 DIAGNOSIS — M48.02 CERVICAL STENOSIS OF SPINAL CANAL: ICD-10-CM

## 2019-02-04 DIAGNOSIS — M77.52 LEFT ANKLE TENDONITIS: ICD-10-CM

## 2019-02-04 DIAGNOSIS — M79.7 MUSCLE PAIN, FIBROMYALGIA: Primary | ICD-10-CM

## 2019-02-04 DIAGNOSIS — G89.29 OTHER CHRONIC PAIN: ICD-10-CM

## 2019-02-04 PROCEDURE — 99214 PR OFFICE/OUTPT VISIT, EST, LEVL IV, 30-39 MIN: ICD-10-PCS | Mod: S$PBB,,, | Performed by: INTERNAL MEDICINE

## 2019-02-04 PROCEDURE — 99213 OFFICE O/P EST LOW 20 MIN: CPT | Mod: PBBFAC,PO | Performed by: INTERNAL MEDICINE

## 2019-02-04 PROCEDURE — 99999 PR PBB SHADOW E&M-EST. PATIENT-LVL III: CPT | Mod: PBBFAC,,, | Performed by: INTERNAL MEDICINE

## 2019-02-04 PROCEDURE — 99999 PR PBB SHADOW E&M-EST. PATIENT-LVL III: ICD-10-PCS | Mod: PBBFAC,,, | Performed by: INTERNAL MEDICINE

## 2019-02-04 PROCEDURE — 99214 OFFICE O/P EST MOD 30 MIN: CPT | Mod: S$PBB,,, | Performed by: INTERNAL MEDICINE

## 2019-02-04 RX ORDER — TRAMADOL HYDROCHLORIDE 50 MG/1
50 TABLET ORAL EVERY 12 HOURS PRN
Qty: 60 TABLET | Refills: 3 | Status: SHIPPED | OUTPATIENT
Start: 2019-02-04 | End: 2019-08-05 | Stop reason: SDUPTHER

## 2019-02-04 RX ORDER — DICLOFENAC SODIUM 10 MG/G
2 GEL TOPICAL 3 TIMES DAILY
Qty: 100 G | Refills: 5 | Status: SHIPPED | OUTPATIENT
Start: 2019-02-04 | End: 2020-02-05 | Stop reason: SDUPTHER

## 2019-02-04 RX ORDER — METHOCARBAMOL 500 MG/1
500 TABLET, FILM COATED ORAL 2 TIMES DAILY PRN
Qty: 40 TABLET | Refills: 6 | Status: SHIPPED | OUTPATIENT
Start: 2019-02-04 | End: 2019-08-05

## 2019-02-04 NOTE — PROGRESS NOTES
Subjective:          Chief Complaint: Lavonne Pierre is a 43 y.o. female who had concerns including Disease Management.    HPI:    Patient here today for follow-up with a history of fibromyalgia, positive MIRIAN 1:160 homogeneous/speckled. negative MIRIAN profile. + thyroid Antibodies. No evidence of CTD.     Interval events.       The Aquatic therapy did help, not going now.   She is also having pain in her feet worse with walking, present with standing and sitting. Pain located on the dorsum of the foot to the ankle. She is noting pain in: feet as of recently. Top and bottom both feet. Duration: chronic. Exacerbated with waling. Weakness sensation at the ankles. Left > Right.   Notes swelling later in day and will elevate no improvement. Left lateral ankle and medial ankle with pain in the malleolar region.   No prior fractures/sprain   Previous complaint regarding feet: Left does have some plantar involvement. She does endorse pain first thing in the AM with shearing pain.     She states that she did speak with Psych. But involved with [facility] group feels she has good supoprt for pains.   She is following with Dr. RONY Arteaga-not sleeping well   Increased anxiety as of recent.     We did referred to Neurosurgery which has been difficult to get appointment given her insurance: congenital cervical stenosis see imaging report. Completed PT for cervical spine with limited relief. Does have appt with Dr. Frankenvella April 2019.     Current medications   tramadol 50 mg once daily. This does help only takes at night. Not helping as much.   Celebrex 200 mg daily which she states is not working discontinued.   Gabapentin 300 mg t.i.d. which she feels is not working discontinued.   Nuvigil at 250mg daily  Lyrica ASE with weight gain. No relief.   Elavil 25mg for HS  NSAIDs with gastric ulcers in past.   Failed Cymbalta, Effexor, Zoloft, wellbutrin. Tizanidine, savella,  Prozac  States she did well with Topamax but concern on long  "term safety. For ocular migraines. Still having HA.       Did have steroid injections in past no relief with elbows, hands, feet or knees. Does help some with hip busitis.     She's had various myalgias as well as joint pain in the ankles, feet, wrists, shoulders and neck and legs.  She also has an underlying history of depression she's had no constitutional symptoms, rashes, oral ulcers, serositis, Raynaud's.   She is not sleeping at night.   She is having significant fatigue and feel "shaky" in her body. She is noting significant brain fog.  Trouble with restless leg type symptoms, but occur during the day.  She is noting pain in her feet (diffusely), knees at the superior pole of the patella. And bilateral elbows she is going to meet with Dr. Jack as right elbow is doing better , now needing the left.    She is c/o lumps in her skin tender, no redness but she does try to massage them. She is feeling increased pain in the knees with "lumps" in gastrocnemius region. Do feel swollen.              Component      Latest Ref Rng & Units 1/17/2017 1/15/2016   Anti Sm Antibody      0.00 - 19.99 EU  0.97   Anti-Sm Interpretation      Negative  Negative   Anti-SSA Antibody      0.00 - 19.99 EU  1.09   Anti-SSA Interpretation      Negative  Negative   Anti-SSB Antibody      0.00 - 19.99 EU  0.30   Anti-SSB Interpretation      Negative  Negative   ds DNA Ab      Negative 1:10  Negative 1:10   Anti Sm/RNP Antibody      0.00 - 19.99 EU  0.11   Anti-Sm/RNP Interpretation      Negative  Negative   Sed Rate      0 - 20 mm/Hr  9   CRP      0.0 - 8.2 mg/L  5.7   MIRIAN Screen      Negative <1:160 Positive (A) Positive (A)   Rheumatoid Factor      0.0 - 15.0 IU/mL  <10.0   MIRIAN HEP-2 Titer       Positive 1:320 Homogeneous Positive 1:160 Homogeneous       REVIEW OF SYSTEMS:    Review of Systems   Constitutional: Positive for malaise/fatigue and weight loss. Negative for chills and fever.   HENT: Negative for sore throat.    Eyes: " Negative for double vision, photophobia, discharge and redness.   Respiratory: Negative for cough, shortness of breath and wheezing.    Cardiovascular: Negative for chest pain, palpitations and orthopnea.   Gastrointestinal: Positive for heartburn. Negative for abdominal pain, constipation and diarrhea.   Genitourinary: Negative for dysuria, hematuria and urgency.   Musculoskeletal: Positive for back pain, joint pain, myalgias and neck pain.   Skin: Negative for rash.   Neurological: Positive for headaches. Negative for dizziness, tingling, focal weakness and weakness.   Endo/Heme/Allergies: Does not bruise/bleed easily.   Psychiatric/Behavioral: Negative for depression, hallucinations and suicidal ideas. The patient is nervous/anxious.                Objective:            Past Medical History:   Diagnosis Date    Anxiety 7/16/2013    Asthma     Depression     Fibromyalgia     GERD (gastroesophageal reflux disease)     History of pneumonia 2015    HTN (hypertension)     Preeclampsia     Sleep apnea     Uses CPAP     Family History   Problem Relation Age of Onset    Pancreatic cancer Mother     Lymphoma Father     Diabetes Father     Hypertension Father     Pancreatic cancer Maternal Grandmother     Pancreatic cancer Maternal Uncle     Breast cancer Maternal Aunt     Breast cancer Paternal Aunt      Social History     Tobacco Use    Smoking status: Never Smoker    Smokeless tobacco: Never Used   Substance Use Topics    Alcohol use: No    Drug use: No         Current Outpatient Medications on File Prior to Visit   Medication Sig Dispense Refill    albuterol (PROVENTIL/VENTOLIN HFA) 90 mcg/actuation inhaler Ventolin HFA 90 mcg/actuation aerosol inhaler   INHALE 2 PUFFS EVERY 4 TO 6 HOURS AS NEEDED      amitriptyline (ELAVIL) 25 MG tablet TAKE 1 TABLET(25 MG) BY MOUTH EVERY NIGHT AS NEEDED FOR INSOMNIA 30 tablet 11    armodafinil (NUVIGIL) 250 mg tablet Take 1 tablet (250 mg total) by mouth once  daily. 30 tablet 3    fluticasone/vilanterol (BREO ELLIPTA INHL) Inhale 1 puff into the lungs 2 (two) times daily.      lidocaine-prilocaine (EMLA) cream SARA EXT AA 1 TIME Q 4 WKS 30 MIN B TREATMENT FOR 1 DAY  1    lisinopril (PRINIVIL,ZESTRIL) 5 MG tablet TAKE 1 TABLET BY MOUTH EVERY DAY 90 tablet 3    methocarbamol (ROBAXIN) 500 MG Tab Take 1 tablet (500 mg total) by mouth 2 (two) times daily as needed. 40 tablet 6    multivitamin (THERAGRAN) per tablet Take 1 tablet by mouth once daily.      traMADol (ULTRAM) 50 mg tablet Take 1 tablet (50 mg total) by mouth every 12 (twelve) hours as needed for Pain. 60 tablet 3    [DISCONTINUED] VENTOLIN HFA 90 mcg/actuation inhaler INL 1 PUFF PO Q 4 TO 6 H PRN  2     No current facility-administered medications on file prior to visit.        Vitals:    02/04/19 0939   BP: (!) 140/100   Pulse: 64       Physical Exam:    Physical Exam   Constitutional: She appears well-developed and well-nourished.   HENT:   Nose: No septal deviation.   Mouth/Throat: Mucous membranes are normal. No oral lesions.   Eyes: Pupils are equal, round, and reactive to light. Right conjunctiva is not injected. Left conjunctiva is not injected.   Neck: No JVD present. No thyroid mass and no thyromegaly present.   Cardiovascular: Normal rate, regular rhythm and normal pulses.   No edema   Pulmonary/Chest: Effort normal and breath sounds normal.   Abdominal: Soft. Normal appearance. There is no hepatosplenomegaly.   Musculoskeletal:        Right shoulder: She exhibits tenderness. She exhibits normal range of motion and no swelling.        Left shoulder: She exhibits tenderness. She exhibits normal range of motion and no swelling.        Right elbow: She exhibits normal range of motion and no swelling. Tenderness found. Medial epicondyle tenderness noted.        Left elbow: She exhibits normal range of motion and no swelling. Tenderness found. Medial epicondyle tenderness noted.        Right wrist:  She exhibits normal range of motion, no tenderness and no swelling.        Left wrist: She exhibits normal range of motion, no tenderness and no swelling.        Right hip: She exhibits normal range of motion, normal strength and no swelling.        Left hip: She exhibits normal range of motion, no tenderness and no swelling.        Right knee: She exhibits normal range of motion and no swelling. Tenderness found. Medial joint line tenderness noted.        Left knee: She exhibits normal range of motion and no swelling. Tenderness found. Medial joint line tenderness noted.        Right ankle: She exhibits normal range of motion and no swelling. No tenderness.        Left ankle: She exhibits normal range of motion and no swelling. No tenderness.        Cervical back: She exhibits bony tenderness.   14/18 FMS tenderpoints w/o No synovitis, restriction in ROM, tenderness of wrist, MCP, PIP, DIP. No weakness in . Able to fully curl fingers.      Lymphadenopathy:     She has no cervical adenopathy.     She has no axillary adenopathy.   Neurological: She has normal strength and normal reflexes.   Skin: Skin is dry and intact.   Psychiatric: Her mood appears anxious.             Assessment:       No diagnosis found.       Plan:        Muscle pain, fibromyalgia    Cervical stenosis of spinal canal  Comments:  appears congenital.     Left ankle tendonitis  -     Ambulatory Referral to Podiatry    Other orders  -     diclofenac sodium (VOLTAREN) 1 % Gel; Apply 2 g topically 3 (three) times daily.  Dispense: 100 g; Refill: 5           Off gabapentin no help  Off Prozac no help.   Continue with Elavil hs   Continue Tramadol to 50mg BID PRN-states it is not working well anymore.   Trial of Robaxin for myalgias-not much help.     Left ankle: discussed some OTC braces to try, voltaren gel and if not helping speak with Podiatry as she may need insert or bracing.     I do want her to speak with Psych if we can get appt for any  pharmacologic input for depression/myalgia -she has declined but working closely with [facility] group for support.     Patient just started with [facility] PT-did well with aquatic therapy.       TNo Follow-up on file.        30min consultation with greater than 50% spent in counseling, chart review and coordination of care. All questions answered.

## 2019-02-06 ENCOUNTER — TELEPHONE (OUTPATIENT)
Dept: PODIATRY | Facility: CLINIC | Age: 44
End: 2019-02-06

## 2019-02-06 ENCOUNTER — PATIENT MESSAGE (OUTPATIENT)
Dept: RHEUMATOLOGY | Facility: CLINIC | Age: 44
End: 2019-02-06

## 2019-02-06 ENCOUNTER — TELEPHONE (OUTPATIENT)
Dept: OBSTETRICS AND GYNECOLOGY | Facility: CLINIC | Age: 44
End: 2019-02-06

## 2019-02-06 NOTE — TELEPHONE ENCOUNTER
----- Message from Rajeev Tesfaye sent at 2/6/2019 12:05 PM CST -----  Type: Needs Medical Advice    Who Called:  Patient  Symptoms (please be specific):  Ankle pain, left foot  How long has patient had these symptoms:  Several months    Best Call Back Number: [phone]  Additional Information: Caller states that she has a referral from Dr. Ross but when I tried to schedule an appointment, [facility] would not allow me to do so.

## 2019-02-06 NOTE — TELEPHONE ENCOUNTER
----- Message from Susana Camarena sent at 2/6/2019 11:52 AM CST -----  Type:  Patient Returning Call    Who Called:  Patient  Who Left Message for Patient:  no  Does the patient know what this is regarding?:  appointment  Best Call Back Number:  [phone]

## 2019-02-06 NOTE — TELEPHONE ENCOUNTER
----- Message from Susana Camarena sent at 2/6/2019 10:36 AM CST -----  Type:  Patient Returning Call    Who Called:  Patient  Who Left Message for Patient:  N/a  Does the patient know what this is regarding?:  no  Best Call Back Number:  [phone]

## 2019-02-13 ENCOUNTER — PATIENT MESSAGE (OUTPATIENT)
Dept: RHEUMATOLOGY | Facility: CLINIC | Age: 44
End: 2019-02-13

## 2019-02-26 ENCOUNTER — OFFICE VISIT (OUTPATIENT)
Dept: PODIATRY | Facility: CLINIC | Age: 44
End: 2019-02-26
Payer: MEDICAID

## 2019-02-26 VITALS
BODY MASS INDEX: 33.43 KG/M2 | SYSTOLIC BLOOD PRESSURE: 150 MMHG | WEIGHT: 165.81 LBS | HEART RATE: 73 BPM | HEIGHT: 59 IN | DIASTOLIC BLOOD PRESSURE: 98 MMHG

## 2019-02-26 DIAGNOSIS — G57.82 SURAL NEURITIS, LEFT: ICD-10-CM

## 2019-02-26 DIAGNOSIS — M54.31 SCIATICA OF RIGHT SIDE: ICD-10-CM

## 2019-02-26 DIAGNOSIS — M54.16 LUMBAR RADICULOPATHY, CHRONIC: ICD-10-CM

## 2019-02-26 DIAGNOSIS — G57.53 TARSAL TUNNEL SYNDROME OF BOTH LOWER EXTREMITIES: Primary | ICD-10-CM

## 2019-02-26 PROCEDURE — 99999 PR PBB SHADOW E&M-EST. PATIENT-LVL III: CPT | Mod: PBBFAC,,, | Performed by: PODIATRIST

## 2019-02-26 PROCEDURE — 64450 NJX AA&/STRD OTHER PN/BRANCH: CPT | Mod: PBBFAC,PN,LT | Performed by: PODIATRIST

## 2019-02-26 PROCEDURE — 99999 PR PBB SHADOW E&M-EST. PATIENT-LVL III: ICD-10-PCS | Mod: PBBFAC,,, | Performed by: PODIATRIST

## 2019-02-26 PROCEDURE — 99203 OFFICE O/P NEW LOW 30 MIN: CPT | Mod: 25,S$PBB,, | Performed by: PODIATRIST

## 2019-02-26 PROCEDURE — 64450 NJX AA&/STRD OTHER PN/BRANCH: CPT | Mod: S$PBB,LT,, | Performed by: PODIATRIST

## 2019-02-26 PROCEDURE — 99203 PR OFFICE/OUTPT VISIT, NEW, LEVL III, 30-44 MIN: ICD-10-PCS | Mod: 25,S$PBB,, | Performed by: PODIATRIST

## 2019-02-26 PROCEDURE — 99213 OFFICE O/P EST LOW 20 MIN: CPT | Mod: PBBFAC,PN | Performed by: PODIATRIST

## 2019-02-26 PROCEDURE — 64450 PR NERVE BLOCK INJ, ANES/STEROID, OTHER PERIPHERAL: ICD-10-PCS | Mod: S$PBB,LT,, | Performed by: PODIATRIST

## 2019-02-26 RX ORDER — DEXAMETHASONE SODIUM PHOSPHATE 4 MG/ML
2 INJECTION, SOLUTION INTRA-ARTICULAR; INTRALESIONAL; INTRAMUSCULAR; INTRAVENOUS; SOFT TISSUE ONCE
Status: COMPLETED | OUTPATIENT
Start: 2019-02-26 | End: 2019-02-26

## 2019-02-26 RX ORDER — METHYLPREDNISOLONE ACETATE 40 MG/ML
20 INJECTION, SUSPENSION INTRA-ARTICULAR; INTRALESIONAL; INTRAMUSCULAR; SOFT TISSUE
Status: COMPLETED | OUTPATIENT
Start: 2019-02-26 | End: 2019-02-26

## 2019-02-26 RX ADMIN — METHYLPREDNISOLONE ACETATE 20 MG: 40 INJECTION, SUSPENSION INTRA-ARTICULAR; INTRALESIONAL; INTRAMUSCULAR; SOFT TISSUE at 02:02

## 2019-02-26 RX ADMIN — DEXAMETHASONE SODIUM PHOSPHATE 2 MG: 4 INJECTION, SOLUTION INTRA-ARTICULAR; INTRALESIONAL; INTRAMUSCULAR; INTRAVENOUS; SOFT TISSUE at 02:02

## 2019-02-26 NOTE — LETTER
February 26, 2019      Sheri Ross, DO  [address] [facility] [address] [place] [address]           [facility]  [address] [facility] [address] [place] [address]  Phone: [phone]          Patient: Lavonne Pierre   MR Number: [id number]   YOB: 1975   Date of Visit: 2/26/2019       Dear Dr. Sheri Ross:    Thank you for referring Lavonne Pierre to me for evaluation. Attached you will find relevant portions of my assessment and plan of care.    If you have questions, please do not hesitate to call me. I look forward to following Lavonne Pierre along with you.    Sincerely,    Lester De Jesus, DPLETICIA    Enclosure  CC:  No Recipients    If you would like to receive this communication electronically, please contact [email or web address] or [phone] to request more information on [facility] [employer] access.    For providers and/or their staff who would like to refer a patient to [facility], please contact us through our one-stop-shop provider referral line, Dale Acosta, at [phone].    If you feel you have received this communication in error or would no longer like to receive these types of communications, please e-mail [email or web address]

## 2019-02-26 NOTE — PROGRESS NOTES
Subjective:      Patient ID: Lavonne Pierre is a 43 y.o. female.    Chief Complaint: Foot Pain (Bilateral foot pain, Left foot pain worse, PCP-(MARI Winters)-01/08/2019) and Ankle Pain (Left ankle pain)    Lavonne is a 43 y.o. female who presents to the podiatry clinic  with complaint of  bilateral foot pain left worse than right. She relates she has had generalized bilateral pain ever since she was diagnosed with fibro myalgia, however recently she has had an increase in pain to the left lateral ankle. The pain is intermittent and can come out of nowhere. She can have the pain at rest or when ambulating, the pain is sharp and can radiate into her toes. She has bilateral foot pain along the entire bottom of her feet into her toes that has been going on for much longer periods of time. She has a history of back pain and sciatica, has had injections in the back in the past.     Review of Systems   Constitution: Negative for chills and fever.   Cardiovascular: Positive for leg swelling. Negative for claudication.   Respiratory: Negative for shortness of breath.    Skin: Negative for itching, nail changes and rash.   Musculoskeletal: Positive for back pain, joint pain, muscle weakness and myalgias. Negative for muscle cramps.   Gastrointestinal: Negative for nausea and vomiting.   Neurological: Positive for paresthesias. Negative for focal weakness, loss of balance and numbness.           Objective:      Physical Exam   Constitutional: She is oriented to person, place, and time. She appears well-developed and well-nourished. No distress.   Cardiovascular:   Pulses:       Dorsalis pedis pulses are 2+ on the right side, and 2+ on the left side.        Posterior tibial pulses are 2+ on the right side, and 2+ on the left side.   < 3 sec capillary refill time to toes 1-5 bilateral. Toes and feet are warm to touch proximally with normal distal cooling b/l. There is some hair growth on the feet and toes b/l. There  is no edema b/l. No spider veins or varicosities present b/l.      Musculoskeletal:   Diffuse pain to palpation along the plantar foot and medial and lateral ankle. There is increased tenderness to the tarsal tunnel and abductor muscle belly bilateral. There is laos increased tenderness to the left lateral ankle and course of the sural nerve.    Equinus noted b/l ankles with < 5 deg DF noted. MMT 5/5 in DF/PF/Inv/Ev resistance with no reproduction of pain in any direction. Passive range of motion of ankle and pedal joints is painless b/l.     Neurological: She is alert and oriented to person, place, and time. She has normal strength. She displays no atrophy and no tremor. No sensory deficit. She exhibits normal muscle tone.   Positive tinel sign bilateral.   Skin: Skin is warm, dry and intact. No abrasion, no bruising, no burn, no ecchymosis, no laceration, no lesion, no petechiae and no rash noted. She is not diaphoretic. No cyanosis or erythema. No pallor. Nails show no clubbing.   Skin temperature, texture and turgor within normal limits.   Psychiatric: She has a normal mood and affect. Her behavior is normal.             Assessment:       Encounter Diagnoses   Name Primary?    Tarsal tunnel syndrome of both lower extremities Yes    Lumbar radiculopathy, chronic     Sciatica of right side     Sural neuritis, left          Plan:       Lavonne was seen today for foot pain and ankle pain.    Diagnoses and all orders for this visit:    Tarsal tunnel syndrome of both lower extremities  -     EMG W/ ULTRASOUND AND NERVE CONDUCTION TEST 2 Extremities    Lumbar radiculopathy, chronic  -     EMG W/ ULTRASOUND AND NERVE CONDUCTION TEST 2 Extremities    Sciatica of right side  -     EMG W/ ULTRASOUND AND NERVE CONDUCTION TEST 2 Extremities    Sural neuritis, left    Other orders  -     dexamethasone injection 2 mg  -     methylPREDNISolone acetate injection 20 mg      I counseled the patient on her conditions, their  implications and medical management.    A steroid injection was performed at lateral ankle around the posterior aspect at the coursing sural nerve using 1% plain Lidocaine and 20 mg depo medro with 2 mg dexamethasone. This was well tolerated.    Dispensed, fitted and gait trained with ankle brace. Instructed to wear with supportive shoe at all times when placing weight on the left foot.    Will order EMG to help distinguish how much radiculopathy versus tarsal tunnel is causing the LE pain    Return in 6 weeks follow up    Lester De Jesus DPM

## 2019-02-28 DIAGNOSIS — G89.29 OTHER CHRONIC PAIN: ICD-10-CM

## 2019-02-28 DIAGNOSIS — M79.7 FIBROMYALGIA: ICD-10-CM

## 2019-02-28 RX ORDER — TRAMADOL HYDROCHLORIDE 50 MG/1
TABLET ORAL
Qty: 60 TABLET | Refills: 2 | Status: SHIPPED | OUTPATIENT
Start: 2019-02-28 | End: 2019-03-13 | Stop reason: SDUPTHER

## 2019-03-04 NOTE — TELEPHONE ENCOUNTER
----- Message from Nessa Felipe sent at 2/6/2017  1:13 PM CST -----  Patient requesting to speak with nurse concerning the ordering of medications/please call back at [phone] to advise.   Detail Level: Generalized

## 2019-03-13 ENCOUNTER — TELEPHONE (OUTPATIENT)
Dept: FAMILY MEDICINE | Facility: CLINIC | Age: 44
End: 2019-03-13

## 2019-03-13 ENCOUNTER — OFFICE VISIT (OUTPATIENT)
Dept: FAMILY MEDICINE | Facility: CLINIC | Age: 44
End: 2019-03-13
Payer: MEDICAID

## 2019-03-13 VITALS
OXYGEN SATURATION: 98 % | SYSTOLIC BLOOD PRESSURE: 134 MMHG | BODY MASS INDEX: 33.87 KG/M2 | HEART RATE: 75 BPM | TEMPERATURE: 98 F | DIASTOLIC BLOOD PRESSURE: 84 MMHG | WEIGHT: 168 LBS | HEIGHT: 59 IN

## 2019-03-13 DIAGNOSIS — J45.41 MODERATE PERSISTENT ASTHMA WITH EXACERBATION: Primary | ICD-10-CM

## 2019-03-13 PROCEDURE — 99213 OFFICE O/P EST LOW 20 MIN: CPT | Mod: S$PBB,,, | Performed by: NURSE PRACTITIONER

## 2019-03-13 PROCEDURE — 99213 PR OFFICE/OUTPT VISIT, EST, LEVL III, 20-29 MIN: ICD-10-PCS | Mod: S$PBB,,, | Performed by: NURSE PRACTITIONER

## 2019-03-13 PROCEDURE — 99999 PR PBB SHADOW E&M-EST. PATIENT-LVL IV: CPT | Mod: PBBFAC,,, | Performed by: NURSE PRACTITIONER

## 2019-03-13 PROCEDURE — 99999 PR PBB SHADOW E&M-EST. PATIENT-LVL IV: ICD-10-PCS | Mod: PBBFAC,,, | Performed by: NURSE PRACTITIONER

## 2019-03-13 PROCEDURE — 99214 OFFICE O/P EST MOD 30 MIN: CPT | Mod: PBBFAC,PO | Performed by: NURSE PRACTITIONER

## 2019-03-13 RX ORDER — METHYLPREDNISOLONE 4 MG/1
TABLET ORAL
Qty: 1 PACKAGE | Refills: 0 | Status: SHIPPED | OUTPATIENT
Start: 2019-03-13 | End: 2019-04-03

## 2019-03-13 RX ORDER — DOXYCYCLINE HYCLATE 100 MG
100 TABLET ORAL 2 TIMES DAILY
Qty: 20 TABLET | Refills: 0 | Status: SHIPPED | OUTPATIENT
Start: 2019-03-13 | End: 2019-03-28

## 2019-03-13 RX ORDER — PROMETHAZINE HYDROCHLORIDE AND DEXTROMETHORPHAN HYDROBROMIDE 6.25; 15 MG/5ML; MG/5ML
5 SYRUP ORAL 3 TIMES DAILY PRN
Qty: 240 ML | Refills: 0 | Status: SHIPPED | OUTPATIENT
Start: 2019-03-13 | End: 2019-03-23

## 2019-03-13 NOTE — PROGRESS NOTES
Subjective:       Patient ID: Lavonne Pierre is a 43 y.o. female.    Chief Complaint: Cough and Chest Congestion    Treated for flu and bronchitis in January, treated at [facility] with Tamiflu. She was then diagnosed flu 19 with tamiflu. Continuing to have cough and chest congestion. She has chronic moderate persistant asthma, she sees pulmonology,  uses Breo daily, and albuterol  Prn, recently has been using it about 6 times a day.    TETANUS VACCINE due on 2019    Past Medical History:  Past Medical History:  2013: Anxiety  No date: Asthma  No date: Depression  No date: Fibromyalgia  No date: GERD (gastroesophageal reflux disease)  2015: History of pneumonia  No date: HTN (hypertension)  No date: Preeclampsia  No date: Sleep apnea      Comment:  Uses CPAP  Past Surgical History:  No date: BREAST BIOPSY      Comment:  Benign  2017: CARPAL TUNNEL RELEASE; Right  2008:  SECTION, LOW TRANSVERSE      Comment:  for PREE  2017: RELEASE-CARPAL TUNNEL; Right      Comment:  Performed by Carlos Deng II, MD at [facility]  2009: TUBAL LIGATION  Review of patient's allergies indicates:   -- Ibuprofen -- Other (See Comments)    --  Affects stomach ulcer, avoids all NSAIDS   -- Latex, natural rubber -- Rash    --  Small red bumps on skin contact  Current Outpatient Medications on File Prior to Visit:  albuterol (PROVENTIL/VENTOLIN HFA) 90 mcg/actuation inhaler, Ventolin HFA 90 mcg/actuation aerosol inhaler INHALE 2 PUFFS EVERY 4 TO 6 HOURS AS NEEDED, Disp: , Rfl:   amitriptyline (ELAVIL) 25 MG tablet, TAKE 1 TABLET(25 MG) BY MOUTH EVERY NIGHT AS NEEDED FOR INSOMNIA, Disp: 30 tablet, Rfl: 11  armodafinil (NUVIGIL) 250 mg tablet, Take 1 tablet (250 mg total) by mouth once daily., Disp: 30 tablet, Rfl: 3  BREO ELLIPTA 100-25 mcg/dose diskus inhaler, INL 1 PUFF PO AT THE SAME TIME QD, Disp: , Rfl: 11  diclofenac sodium (VOLTAREN) 1 % Gel, Apply 2 g topically 3 (three) times daily., Disp: 100  g, Rfl: 5  lidocaine-prilocaine (EMLA) cream, SARA EXT AA 1 TIME Q 4 WKS 30 MIN B TREATMENT FOR 1 DAY, Disp: , Rfl: 1  lisinopril (PRINIVIL,ZESTRIL) 5 MG tablet, TAKE 1 TABLET BY MOUTH EVERY DAY, Disp: 90 tablet, Rfl: 3  methocarbamol (ROBAXIN) 500 MG Tab, Take 1 tablet (500 mg total) by mouth 2 (two) times daily as needed., Disp: 40 tablet, Rfl: 6  multivitamin (THERAGRAN) per tablet, Take 1 tablet by mouth once daily., Disp: , Rfl:   traMADol (ULTRAM) 50 mg tablet, Take 1 tablet (50 mg total) by mouth every 12 (twelve) hours as needed for Pain., Disp: 60 tablet, Rfl: 3  (DISCONTINUED) traMADol (ULTRAM) 50 mg tablet, TAKE 1 TABLET(50 MG) BY MOUTH EVERY 12 HOURS AS NEEDED FOR PAIN, Disp: 60 tablet, Rfl: 2    No current facility-administered medications on file prior to visit.     Social History    Socioeconomic History      Marital status:       Spouse name: Not on file      Number of children: Not on file      Years of education: Not on file      Highest education level: Not on file    Social Needs      Financial resource strain: Not on file      Food insecurity - worry: Not on file      Food insecurity - inability: Not on file      Transportation needs - medical: Not on file      Transportation needs - non-medical: Not on file    Occupational History      Not on file    Tobacco Use      Smoking status: Never Smoker      Smokeless tobacco: Never Used    Substance and Sexual Activity      Alcohol use: No      Drug use: No      Sexual activity: No        Birth control/protection: See Surgical Hx    Other Topics      Concerns:        Not on file    Social History Narrative      Not on file    Review of patient's family history indicates:  Problem: Pancreatic cancer      Relation: Mother          Age of Onset: (Not Specified)  Problem: Lymphoma      Relation: Father          Age of Onset: (Not Specified)  Problem: Diabetes      Relation: Father          Age of Onset: (Not Specified)  Problem: Hypertension       Relation: Father          Age of Onset: (Not Specified)  Problem: Pancreatic cancer      Relation: Maternal Grandmother          Age of Onset: (Not Specified)  Problem: Pancreatic cancer      Relation: Maternal Uncle          Age of Onset: (Not Specified)  Problem: Breast cancer      Relation: Maternal Aunt          Age of Onset: (Not Specified)  Problem: Breast cancer      Relation: Paternal Aunt          Age of Onset: (Not Specified)            Review of Systems   Constitutional: Positive for fatigue. Negative for chills, diaphoresis and fever.   HENT: Negative.    Respiratory: Positive for cough, chest tightness, shortness of breath and wheezing.    Cardiovascular: Negative.  Negative for leg swelling.   Gastrointestinal: Negative.  Negative for abdominal pain, constipation, diarrhea and nausea.   Genitourinary: Negative.        Objective:      Physical Exam   Constitutional: She is oriented to person, place, and time. No distress.   HENT:   Head: Normocephalic and atraumatic.   Eyes: Pupils are equal, round, and reactive to light.   Neck: Normal range of motion.   Cardiovascular: Normal rate and regular rhythm.   Pulmonary/Chest: Effort normal. She has wheezes.   Abdominal: Soft. Bowel sounds are normal. She exhibits no distension. There is no tenderness.   Neurological: She is alert and oriented to person, place, and time.   Skin: She is not diaphoretic. No erythema. No pallor.   Psychiatric: She has a normal mood and affect. Her behavior is normal.   Vitals reviewed.      Assessment:       1. Moderate persistent asthma with exacerbation        Plan:       1. Moderate persistent asthma with exacerbation  Continue breo and albuterol, follow up if not resolving, immediately for worsening.   - doxycycline (VIBRA-TABS) 100 MG tablet; Take 1 tablet (100 mg total) by mouth 2 (two) times daily.  Dispense: 20 tablet; Refill: 0  - methylPREDNISolone (MEDROL DOSEPACK) 4 mg tablet; use as directed  Dispense: 1 Package;  Refill: 0  - promethazine-dextromethorphan (PROMETHAZINE-DM) 6.25-15 mg/5 mL Syrp; Take 5 mLs by mouth 3 (three) times daily as needed.  Dispense: 240 mL; Refill: 0

## 2019-03-13 NOTE — TELEPHONE ENCOUNTER
----- Message from Nessa Felipe sent at 3/13/2019  8:04 AM CDT -----  Type:  Same Day Appointment Request    Caller is requesting a same day appointment.    Name of Caller:  Patient  When is the first available appointment?  March 27th (patient has medicaid)  Symptoms:  Persistent cough with chest congestion  Best Call Back Number:  [phone]  Additional Information:

## 2019-03-20 ENCOUNTER — TELEPHONE (OUTPATIENT)
Dept: FAMILY MEDICINE | Facility: CLINIC | Age: 44
End: 2019-03-20

## 2019-03-20 RX ORDER — AMOXICILLIN AND CLAVULANATE POTASSIUM 875; 125 MG/1; MG/1
1 TABLET, FILM COATED ORAL EVERY 12 HOURS
Qty: 14 TABLET | Refills: 0 | Status: SHIPPED | OUTPATIENT
Start: 2019-03-20 | End: 2019-03-27

## 2019-03-20 NOTE — TELEPHONE ENCOUNTER
----- Message from Steven Sepulveda sent at 3/20/2019  2:44 PM CDT -----  Type:  Pharmacy Calling to Clarify an RX    Name of Caller:  Marry   [facility] Name:  [facility]   Prescription Name:  Doxycycline   What do they need to clarify?:    Best Call Back Number:  [phone]  Additional Information:  Rx doxycycline is not covered by the insurance.

## 2019-03-28 ENCOUNTER — TELEPHONE (OUTPATIENT)
Dept: FAMILY MEDICINE | Facility: CLINIC | Age: 44
End: 2019-03-28

## 2019-03-28 RX ORDER — DOXYCYCLINE 100 MG/1
100 CAPSULE ORAL 2 TIMES DAILY
Qty: 20 CAPSULE | Refills: 0 | Status: SHIPPED | OUTPATIENT
Start: 2019-03-28 | End: 2019-08-05

## 2019-03-28 NOTE — TELEPHONE ENCOUNTER
----- Message from Keylaashish Madrigal sent at 3/28/2019 12:30 PM CDT -----  Contact: Cecilia  ..Type: Needs Medical Advice    Who Called:KYE  Pharmacy name and phone #:  ..  [facility] [id number] [facility] [id number] [place] [address] AT [facility] [address]  [id number] [place] [id number]  Phone: [phone]    Pt Best Call Back Number: [phone]  Additional Information: Need to know if ok to change pt's medication( doxycycline (VIBRA-TABS) 100 MG tablet) to ( Doxycycline  MONOHYDRATE 100 MG)  Please call pharmacy to advise  Thanks

## 2019-04-10 ENCOUNTER — OFFICE VISIT (OUTPATIENT)
Dept: PODIATRY | Facility: CLINIC | Age: 44
End: 2019-04-10
Payer: MEDICAID

## 2019-04-10 VITALS
BODY MASS INDEX: 33.87 KG/M2 | SYSTOLIC BLOOD PRESSURE: 135 MMHG | HEIGHT: 59 IN | HEART RATE: 83 BPM | DIASTOLIC BLOOD PRESSURE: 68 MMHG | WEIGHT: 168 LBS

## 2019-04-10 DIAGNOSIS — B35.3 TINEA PEDIS OF BOTH FEET: Primary | ICD-10-CM

## 2019-04-10 DIAGNOSIS — M54.16 LUMBAR RADICULOPATHY, CHRONIC: ICD-10-CM

## 2019-04-10 DIAGNOSIS — G57.53 TARSAL TUNNEL SYNDROME OF BOTH LOWER EXTREMITIES: ICD-10-CM

## 2019-04-10 PROCEDURE — 99999 PR PBB SHADOW E&M-EST. PATIENT-LVL III: ICD-10-PCS | Mod: PBBFAC,,, | Performed by: PODIATRIST

## 2019-04-10 PROCEDURE — 99213 OFFICE O/P EST LOW 20 MIN: CPT | Mod: S$PBB,,, | Performed by: PODIATRIST

## 2019-04-10 PROCEDURE — 99213 PR OFFICE/OUTPT VISIT, EST, LEVL III, 20-29 MIN: ICD-10-PCS | Mod: S$PBB,,, | Performed by: PODIATRIST

## 2019-04-10 PROCEDURE — 99999 PR PBB SHADOW E&M-EST. PATIENT-LVL III: CPT | Mod: PBBFAC,,, | Performed by: PODIATRIST

## 2019-04-10 PROCEDURE — 99213 OFFICE O/P EST LOW 20 MIN: CPT | Mod: PBBFAC,PN | Performed by: PODIATRIST

## 2019-04-10 RX ORDER — KETOCONAZOLE 20 MG/G
CREAM TOPICAL DAILY
Qty: 60 G | Refills: 3 | Status: SHIPPED | OUTPATIENT
Start: 2019-04-10 | End: 2019-11-20 | Stop reason: ALTCHOICE

## 2019-04-10 NOTE — PROGRESS NOTES
Subjective:      Patient ID: Lavonne Pierre is a 43 y.o. female.    Chief Complaint: Follow-up (left ankle pain   PCP  Omer Mcgee  12/3/18  Nikki Biswas(DAMI)  3/13/19) and Foot Problem (Darshan foot itching and scaling)    Lavonne is a 43 y.o. female who presents to the podiatry clinic  with complaint of  bilateral foot pain left worse than right. She relates she has had generalized bilateral pain ever since she was diagnosed with fibro myalgia, however recently she has had an increase in pain to the left lateral ankle. The pain is intermittent and can come out of nowhere. She can have the pain at rest or when ambulating, the pain is sharp and can radiate into her toes. She has bilateral foot pain along the entire bottom of her feet into her toes that has been going on for much longer periods of time. She has a history of back pain and sciatica, has had injections in the back in the past.     4/10/19: Patient returns for follow up left ankle pain, she relates no pain to the area today the injection and the brace have helped greatly. She reports new symptoms of scaling and itching skin to the plantar feet. No open wounds, no self treatment. Duration of several weeks. She relates continue burning and tingling pains to the plantar feet with weight bearing, however is planning on back surgery and would like to get that done to see if it helps before working up with EMG for tarsal tunnel.    Review of Systems   Constitution: Negative for chills and fever.   Cardiovascular: Positive for leg swelling. Negative for claudication.   Respiratory: Negative for shortness of breath.    Skin: Negative for itching, nail changes and rash.   Musculoskeletal: Positive for back pain, joint pain, muscle weakness and myalgias. Negative for muscle cramps.   Gastrointestinal: Negative for nausea and vomiting.   Neurological: Positive for paresthesias. Negative for focal weakness, loss of balance and numbness.           Objective:       Physical Exam   Constitutional: She is oriented to person, place, and time. She appears well-developed and well-nourished. No distress.   Cardiovascular:   Pulses:       Dorsalis pedis pulses are 2+ on the right side, and 2+ on the left side.        Posterior tibial pulses are 2+ on the right side, and 2+ on the left side.   < 3 sec capillary refill time to toes 1-5 bilateral. Toes and feet are warm to touch proximally with normal distal cooling b/l. There is some hair growth on the feet and toes b/l. There is no edema b/l. No spider veins or varicosities present b/l.      Musculoskeletal:   Resolved pain to palpation along the plantar foot and medial and lateral ankle.     There is tenderness to the tarsal tunnel and abductor muscle belly bilateral. \    Equinus noted b/l ankles with < 5 deg DF noted. MMT 5/5 in DF/PF/Inv/Ev resistance with no reproduction of pain in any direction. Passive range of motion of ankle and pedal joints is painless b/l.     Neurological: She is alert and oriented to person, place, and time. She has normal strength. She displays no atrophy and no tremor. No sensory deficit. She exhibits normal muscle tone.   Positive tinel sign bilateral.   Skin: Skin is warm, dry and intact. No abrasion, no bruising, no burn, no ecchymosis, no laceration, no lesion, no petechiae and no rash noted. She is not diaphoretic. No cyanosis or erythema. No pallor. Nails show no clubbing.   Skin temperature, texture and turgor within normal limits.    Dry scale with superficial flakes over an erythematous base in a moccasin pattern bilateral  without ulceration, drainage, pus, tracking, fluctuance, malodor, or cardinal signs infection.     Psychiatric: She has a normal mood and affect. Her behavior is normal.             Assessment:       Encounter Diagnoses   Name Primary?    Tinea pedis of both feet Yes    Tarsal tunnel syndrome of both lower extremities     Lumbar radiculopathy, chronic          Plan:        Lavonne was seen today for follow-up and foot problem.    Diagnoses and all orders for this visit:    Tinea pedis of both feet    Tarsal tunnel syndrome of both lower extremities    Lumbar radiculopathy, chronic    Other orders  -     ketoconazole (NIZORAL) 2 % cream; Apply topically once daily.      I counseled the patient on her conditions, their implications and medical management.    Nizoral cream daily to bilateral feet for tinea    Continue with left ankle brace as needed    Will work up the tarsal tunnel with EMG, she first wants to have her back surgery    Return BLADIMIR De Jesus, BELÉNM

## 2019-04-22 ENCOUNTER — PATIENT MESSAGE (OUTPATIENT)
Dept: FAMILY MEDICINE | Facility: CLINIC | Age: 44
End: 2019-04-22

## 2019-04-25 RX ORDER — MODAFINIL 200 MG/1
200 TABLET ORAL DAILY
Qty: 30 TABLET | Refills: 3 | Status: SHIPPED | OUTPATIENT
Start: 2019-04-25 | End: 2019-05-25

## 2019-04-26 ENCOUNTER — DOCUMENTATION ONLY (OUTPATIENT)
Dept: FAMILY MEDICINE | Facility: CLINIC | Age: 44
End: 2019-04-26

## 2019-04-26 NOTE — PROGRESS NOTES
PA initiated for Modafinil 200mg tablets  CMM: [id number]   [id number]  [facility]  ------------------------------------------------------  DENIED

## 2019-05-01 ENCOUNTER — PATIENT MESSAGE (OUTPATIENT)
Dept: FAMILY MEDICINE | Facility: CLINIC | Age: 44
End: 2019-05-01

## 2019-05-06 ENCOUNTER — PATIENT MESSAGE (OUTPATIENT)
Dept: FAMILY MEDICINE | Facility: CLINIC | Age: 44
End: 2019-05-06

## 2019-05-11 ENCOUNTER — PATIENT MESSAGE (OUTPATIENT)
Dept: FAMILY MEDICINE | Facility: CLINIC | Age: 44
End: 2019-05-11

## 2019-05-13 NOTE — TELEPHONE ENCOUNTER
PA initiated for Modafinil 200mg tablets  M: [id number]   [id number]  [facility]  ------------------------------------------------------  DENIED      Please advise what you would like to do.

## 2019-05-19 NOTE — TELEPHONE ENCOUNTER
The options I have recommended have all been denied.  She will have to contact her Medicaid  and appeal the decision

## 2019-05-20 ENCOUNTER — PATIENT MESSAGE (OUTPATIENT)
Dept: FAMILY MEDICINE | Facility: CLINIC | Age: 44
End: 2019-05-20

## 2019-05-21 ENCOUNTER — PATIENT OUTREACH (OUTPATIENT)
Dept: ADMINISTRATIVE | Facility: HOSPITAL | Age: 44
End: 2019-05-21

## 2019-05-23 NOTE — TELEPHONE ENCOUNTER
We need to contact her insurance.  Her issue is hypersomnia.  She does not need those other medications.  Can we see if there is any chance they can cover it and if so, what do we need to document.  Also, maybe Lorna can assist.

## 2019-06-03 ENCOUNTER — OFFICE VISIT (OUTPATIENT)
Dept: FAMILY MEDICINE | Facility: CLINIC | Age: 44
End: 2019-06-03
Payer: MEDICAID

## 2019-06-03 VITALS
HEART RATE: 72 BPM | SYSTOLIC BLOOD PRESSURE: 114 MMHG | WEIGHT: 176.38 LBS | BODY MASS INDEX: 35.56 KG/M2 | HEIGHT: 59 IN | DIASTOLIC BLOOD PRESSURE: 82 MMHG

## 2019-06-03 DIAGNOSIS — J45.41 MODERATE PERSISTENT ASTHMA WITH EXACERBATION: ICD-10-CM

## 2019-06-03 DIAGNOSIS — M79.7 FIBROMYALGIA: Primary | ICD-10-CM

## 2019-06-03 DIAGNOSIS — M50.30 DDD (DEGENERATIVE DISC DISEASE), CERVICAL: ICD-10-CM

## 2019-06-03 DIAGNOSIS — G47.33 OSA (OBSTRUCTIVE SLEEP APNEA): ICD-10-CM

## 2019-06-03 PROCEDURE — 99999 PR PBB SHADOW E&M-EST. PATIENT-LVL III: CPT | Mod: PBBFAC,,, | Performed by: FAMILY MEDICINE

## 2019-06-03 PROCEDURE — 99214 OFFICE O/P EST MOD 30 MIN: CPT | Mod: S$PBB,,, | Performed by: FAMILY MEDICINE

## 2019-06-03 PROCEDURE — 99213 OFFICE O/P EST LOW 20 MIN: CPT | Mod: PBBFAC,PO | Performed by: FAMILY MEDICINE

## 2019-06-03 PROCEDURE — 99214 PR OFFICE/OUTPT VISIT, EST, LEVL IV, 30-39 MIN: ICD-10-PCS | Mod: S$PBB,,, | Performed by: FAMILY MEDICINE

## 2019-06-03 PROCEDURE — 99999 PR PBB SHADOW E&M-EST. PATIENT-LVL III: ICD-10-PCS | Mod: PBBFAC,,, | Performed by: FAMILY MEDICINE

## 2019-06-03 NOTE — PROGRESS NOTES
Subjective:       Patient ID: Lavonne Pierre is a 43 y.o. female    Chief Complaint: Fibromyalgia (follow up)    HPI  Here today for interval evaluation  She has pending spinal surgery with Neurosurgery on cervical spine with possible discectomy/possible fusion  She has had modafinil denied by her insurance who is trying to force her to take amphetamine salts despite multiple discussions regarding diagnosis of hypersomnia, not ADD    Review of Systems   Constitutional: Positive for activity change. Negative for unexpected weight change.   HENT: Positive for hearing loss and rhinorrhea. Negative for trouble swallowing.    Eyes: Positive for discharge. Negative for visual disturbance.   Respiratory: Negative for chest tightness and wheezing.    Cardiovascular: Positive for chest pain. Negative for palpitations.   Gastrointestinal: Positive for constipation. Negative for blood in stool, diarrhea and vomiting.   Endocrine: Negative for polydipsia and polyuria.   Genitourinary: Negative for difficulty urinating, dysuria, hematuria and menstrual problem.   Musculoskeletal: Positive for arthralgias, joint swelling and neck pain.   Neurological: Positive for weakness and headaches.   Psychiatric/Behavioral: Negative for confusion and dysphoric mood.        Objective:   Physical Exam   Constitutional: She is oriented to person, place, and time. She appears well-developed and well-nourished.   HENT:   Head: Normocephalic and atraumatic.   Eyes: Pupils are equal, round, and reactive to light. Conjunctivae and EOM are normal. No scleral icterus.   Neck: Normal range of motion. Neck supple. No thyromegaly present.   Cardiovascular: Normal rate, regular rhythm and normal heart sounds. Exam reveals no gallop and no friction rub.   No murmur heard.  Pulmonary/Chest: Effort normal and breath sounds normal. No respiratory distress. She has no wheezes. She has no rales.   Lymphadenopathy:     She has no cervical adenopathy.    Neurological: She is alert and oriented to person, place, and time.   Vitals reviewed.       Assessment:       1. Fibromyalgia     2. Moderate persistent asthma with exacerbation     3. SALVADOR (obstructive sleep apnea)     4. DDD (degenerative disc disease), cervical          Plan:       Fibromyalgia  - Continue current therapy  - Seeing Rheumatology    Moderate persistent asthma with exacerbation  - Stable, Continue current therapy  - Continue Pulmonary    SALVADOR (obstructive sleep apnea)  - Continue CPAP  - Discussed options such as alternative stimulants    DDD (degenerative disc disease), cervical  - Await surgical intervention  - Follow up in about 3 months (around 9/3/2019).

## 2019-06-07 DIAGNOSIS — I10 ESSENTIAL HYPERTENSION: ICD-10-CM

## 2019-06-10 RX ORDER — LISINOPRIL 5 MG/1
TABLET ORAL
Qty: 90 TABLET | Refills: 3 | Status: SHIPPED | OUTPATIENT
Start: 2019-06-10 | End: 2020-04-09 | Stop reason: SDUPTHER

## 2019-07-09 DIAGNOSIS — M50.90 DISC DISORDER OF CERVICAL REGION: Primary | ICD-10-CM

## 2019-07-15 ENCOUNTER — CLINICAL SUPPORT (OUTPATIENT)
Dept: REHABILITATION | Facility: HOSPITAL | Age: 44
End: 2019-07-15
Payer: MEDICAID

## 2019-07-15 DIAGNOSIS — M43.6 NECK STIFFNESS: ICD-10-CM

## 2019-07-15 DIAGNOSIS — M54.2 NECK PAIN ON RIGHT SIDE: Primary | ICD-10-CM

## 2019-07-15 DIAGNOSIS — R29.3 POSTURE ABNORMALITY: ICD-10-CM

## 2019-07-15 PROCEDURE — 97162 PT EVAL MOD COMPLEX 30 MIN: CPT | Mod: PO

## 2019-07-15 PROCEDURE — 97110 THERAPEUTIC EXERCISES: CPT | Mod: PO

## 2019-07-15 NOTE — PLAN OF CARE
[facility]  Physical Therapy Initial Evaluation    Name: Lavonne Pierre  Clinic Number: [id number]    Therapy Diagnosis:   Encounter Diagnoses   Name Primary?    Neck pain on right side Yes    Posture abnormality     Neck stiffness      Physician: Gurjit Avalos,*    Physician Orders: PT Eval and Treat   Medical Diagnosis from Referral: Disc disorder of cervical region   Evaluation Date: 7/15/2019  Authorization Period Expiration: 2019   Plan of Care Expiration: 2019  Visit # / Visits authorized:     Time In: 1000  Time Out: 1054  Total Billable Time: 50 minutes    Precautions: Standard    Subjective   Date of onset: Chronic, began several years ago  History of current condition - Lavonne reports: Chronic neck pain, which began years ago. She had a disc replacement ~2019 of C5/C6. She reports she was not able to lift anything after surgery for 3 weeks, but all of her restrictions have been cleared at this time. Lavonne reports the surgery has improved her pain levels, but she is still limited with her range of motion due to pain and stiffness. She reports she has been doing her neck stretches that she learned in PT to improve her range of motion. She states her pain is mostly in the (R) side of her posterior neck. She reports pain with laying on her right side.    Medical History:   Past Medical History:   Diagnosis Date    Anxiety 2013    Asthma     Depression     Fibromyalgia     GERD (gastroesophageal reflux disease)     History of pneumonia     HTN (hypertension)     Preeclampsia     Sleep apnea     Uses CPAP       Surgical History:   Lavonne Pierre  has a past surgical history that includes  section, low transverse (); Breast biopsy; Tubal ligation (); and Carpal tunnel release (Right, 2017).    Medications:   Lavonne has a current medication list which includes the following prescription(s): albuterol,  amitriptyline, armodafinil, breo ellipta, diclofenac sodium, doxycycline, ketoconazole, lidocaine-prilocaine, lisinopril, methocarbamol, multivitamin, and tramadol.    Allergies:   Review of patient's allergies indicates:   Allergen Reactions    Ibuprofen Other (See Comments)     Affects stomach ulcer, avoids all NSAIDS    Latex, natural rubber Rash     Small red bumps on skin contact        Imaging, MRI studies: MRI studies: There is mild anterior osteophyte formation at C4/C5 through C6/C7.  There is loss of disc space height at C5/C6 and C6/C7.  The craniocervical junction and cervical cord display normal signal and morphology.  The AP bony canal is somewhat decreased in the mid cervical region suggesting mild congenital spinal stenosis.  At the C5 level it measures 10 mm.  The cervical vertebrae otherwise display normal signal, morphology and alignment.    Prior Therapy: Multiple times, most recent in December of 2018  Social History: Lives with son and adult    Occupation: Disabled  Prior Level of Function: Able to perform her ADLs with increased pain  Current Level of Function: She reports she still cannot  anything heavy, reaching overhead, pain with driving if she has to turn her head. She reports she spends a lot of her day sitting    Pain:  Current 4/10, worst 5/10, best 3/10   Location: right neck   Description: Aching, Dull and pulling, pins   Aggravating Factors: Sitting, Laying, Coughing/Sneezing, Eating and Lifting  Easing Factors: ice, hot bath and rest    Pts goals: To relieve the pain     Red Flag Screening:   Cough  Sneeze  Strain: (+)  Bladder/ bowel: (--)  Falls: (--)  Night pain: (--)  Unexplained weight loss: (--)      Objective     Observation: Patient in no acute distress, incision healing well    Posture: Rounded shoulders increased, sits with increased thoracic kyphosis     Cervical Range of Motion:    Degrees Pain   Flexion 25 Anterior neck     Extension 50 Anterior  neck     Right Rotation 45 Anterior neck      Left Rotation 55 Anterior neck        Shoulder Range of Motion:   Shoulder Left Right   Flexion WFL WFL   Abduction WFL WFL   ER + flexion WFL Moderately limited, able to reach spine of scapula   IR WFL WFL     Strength:  Upper Extremity Strength  (R) UE  (L) UE    Shoulder flexion: 4-/5 Shoulder flexion: 4-/5   Shoulder Abduction: 4-/5 Shoulder abduction: 4-/5   Shoulder ER 4-/5 Shoulder ER 4-/5   Shoulder IR 4+/5 Shoulder IR 4+/5   Elbow flexion: 4-/5 Elbow flexion: 4-/5   Elbow extension: 4/5 Elbow extension: 4/5   Wrist flexion: 4-/5 Wrist flexion: 4-/5   Wrist extension: 4-/5 Wrist extension: 4-/5    4-/5 : 4-/5     Special Tests: S/p anterior disc replacement C5/C6      Joint Mobility: Hypomobile grossly cervical spine PAs    Palpation: TTP at (R) UT, LS, cervical paraspinals, suboccipitals       Sensation: Intact to LT C3 - T2    Flexibility: Limited grossly at cervical spine    CMS Impairment/Limitation/Restriction for FOTO Cervical Survey    Therapist reviewed FOTO scores for Lavonne Emery Ignacio on 7/15/2019.   FOTO documents entered into [employer] - see Media section.    Limitation Score: 51%  Category: Mobility       TREATMENT   Treatment Time In: 1035  Treatment Time Out: 1048  Total Treatment time separate from Evaluation: 13 minutes    Lavonne received therapeutic exercises to develop strength, ROM and posture for 13 minutes including:  Scap retractions 2 x 10 (3 second hold)   AROM cervical rotation x 2 minutes   Seated chin tucks x 10 - mild pain on (R)  Shoulder extension YTB 2 x 10     Home Exercises and Patient Education Provided    Education provided:   - Role of PT, PT POC, HEP    Written Home Exercises Provided: yes.  Exercises were reviewed and Lavonne was able to demonstrate them prior to the end of the session.  Lavonne demonstrated good  understanding of the education provided.     See EMR under Media for exercises provided  7/15/2019.    Assessment   Lavonne is a 43 y.o. female referred to outpatient Physical Therapy with a medical diagnosis of Disc disorder of cervical region. Physical exam is consistent with chronic neck pain s/p anterior disc replacement of C5/C6. She has tried PT multiple times in the past, which she reports has not helped her pain at all. She presents with significantly decreased cervical range of motion, UE weakness, postural abnormality, joint mobility, and soft tissue restrictions, and pain which limits functional mobility. This pt is an good candidate for skilled PT tx and stands to benefit from a combination of manual therapy including joint mobilizations with trigger point/myofacscial release, therapeutic exercise to establish core/joint stability, neuromuscular re-education, and modalities Prn. The pt has been educated on their dx/POC and consents to further PT tx.    Pt prognosis is Guarded.   Pt will benefit from skilled outpatient Physical Therapy to address the deficits stated above and in the chart below, provide pt/family education, and to maximize pt's level of independence.     Plan of care discussed with patient: Yes  Pt's spiritual, cultural and educational needs considered and patient is agreeable to the plan of care and goals as stated below:     Anticipated Barriers for therapy: Fibromyalgia limiting tolerance to exercises     Medical Necessity is demonstrated by the following  History  Co-morbidities and personal factors that may impact the plan of care Co-morbidities:   anxiety, HTN and fibromyalgia    Personal Factors:   lifestyle     moderate   Examination  Body Structures and Functions, activity limitations and participation restrictions that may impact the plan of care Body Regions:   neck  upper extremities    Body Systems:    ROM  strength  gross coordinated movement    Participation Restrictions:   Pain with all ADLs, significantly impacted by pain     Activity limitations:   Learning  and applying knowledge  no deficits    General Tasks and Commands  no deficits    Communication  no deficits    Mobility  lifting and carrying objects  driving (bike, car, motorcycle)    Self care  dressing    Domestic Life  doing house work (cleaning house, washing dishes, laundry)    Interactions/Relationships  no deficits    Life Areas  no deficits    Community and Social Life  recreation and leisure         moderate   Clinical Presentation evolving clinical presentation with changing clinical characteristics moderate   Decision Making/ Complexity Score: moderate     Goals:  Short Term Goals (3 Weeks):   1. Pt to increase strength to least 4/5 of muscles tested to allow for improvement in functional activities such as performing chores and ADLS.  3. Pt to improve gross cervical motion in rotation by 10 degrees to allow for improved functional mobility including driving and reading.  4. Pt to report compliance with HEP 3x/week and demonstrate proper exercise technique to PT to show competence with self management of condition.     Long Term Goals (6 Weeks):   1. Pt to achieve <45% limitation as measured by the FOTO to demonstrate decreased disability.  2. Pt to increase strength to at least 4+/5 of muscles tested to allow for improvement in functional activities such as overhead lifting and cooking/cleaning.  3. Pt to improve gross cervical motion to <20% limitations to allow for improved functional mobility with performing ADL's with pain <2/10.    Plan   Plan of care Certification: 7/15/2019 to 09/06/2019.    Outpatient Physical Therapy 2 times weekly 12 visits to include the following interventions: Electrical Stimulation prn, Manual Therapy, Moist Heat/ Ice, Neuromuscular Re-ed, Patient Education, Therapeutic Activites and Therapeutic Exercise.     Benja Brooks, PT

## 2019-07-15 NOTE — PROGRESS NOTES
Physical Therapy Daily Treatment Note     Name: Lavonne Emery [facility]  Clinic Number: [id number]    Therapy Diagnosis:   Encounter Diagnoses   Name Primary?    Neck stiffness     Posture abnormality Yes     Physician: Gurjit Avalos,*    Visit Date: 7/18/2019  Physician Orders: PT Eval and Treat   Medical Diagnosis from Referral: Disc disorder of cervical region   Evaluation Date: 7/15/2019  Authorization Period Expiration: 12/31/2019   Plan of Care Expiration: 09/06/2019  Visit # / Visits authorized: 2/ 12    Time In: 1301  Time Out: 1403  Total Billable Time: 55 minutes    Precautions: Standard    Subjective     Pt reports: She had some muslce soreness following last visit, but no significant increase in her neck pain.   She was compliant with home exercise program.  Response to previous treatment: Muscle soreness  Functional change: No change    Pain: 3/10  Location: bilateral neck      Objective     Lavonne received therapeutic exercises to develop strength, ROM, posture and core stabilization for 55 minutes including:  Pulleys x 3 minutes  Scap retractions 2 x 10 (3 second hold)   AROM cervical rotation, flexion/extension, cervical side bending x 2 minutes each  Seated chin tucks x 10 - mild pain on (R) UT  Shoulder extension YTB 2 x 10   Shoulder IR 2 x 10 YTB - (Verbal and tactile cueing for scapular position and form)  Shoulder ER 2 x 10 YTB - (Verbal and tactile cueing for scapular positioning and form)    UT stretch 3 x 30s   LS stretch 3x30s    Supine wand flexion 2 x10 1#  Breuggers 2 x 10 YTB    Lavonne received hot pack for 10 minutes to cervical spine to increase circulation and promote tissue healing at the end of treatment.       Home Exercises Provided and Patient Education Provided     Education provided:   - Differentiating between pain and muscle soreness  - Continuing HEP     Written Home Exercises Provided: yes.  Exercises were reviewed and Lavonne was able to demonstrate them prior to the  end of the session.  Lavonne demonstrated fair  understanding of the education provided.     See EMR under Media for exercises provided prior visit.    Assessment     Lavonne needs significant tactile and verbal cueing to complete exercises with appropriate form. She reported mild pain in her (B) UTs with exercise, which improved with cueing for form. Will continue to progress her strengthening exercises as tolerated.     Lavonne is progressing well towards her goals.   Pt prognosis is Fair.     Pt will continue to benefit from skilled outpatient physical therapy to address the deficits listed in the problem list box on initial evaluation, provide pt/family education and to maximize pt's level of independence in the home and community environment.     Pt's spiritual, cultural and educational needs considered and pt agreeable to plan of care and goals.    Anticipated barriers to physical therapy: None    Goals:   Short Term Goals (3 Weeks):   1. Pt to increase strength to least 4/5 of muscles tested to allow for improvement in functional activities such as performing chores and ADLS.  3. Pt to improve gross cervical motion in rotation by 10 degrees to allow for improved functional mobility including driving and reading.  4. Pt to report compliance with HEP 3x/week and demonstrate proper exercise technique to PT to show competence with self management of condition.      Long Term Goals (6 Weeks):   1. Pt to achieve <45% limitation as measured by the FOTO to demonstrate decreased disability.  2. Pt to increase strength to at least 4+/5 of muscles tested to allow for improvement in functional activities such as overhead lifting and cooking/cleaning.  3. Pt to improve gross cervical motion to <20% limitations to allow for improved functional mobility with performing ADL's with pain <2/10.    Plan     Continue UE strengthening and focusing on improving cervical ROM     Benja Brooks, PT

## 2019-07-18 ENCOUNTER — CLINICAL SUPPORT (OUTPATIENT)
Dept: REHABILITATION | Facility: HOSPITAL | Age: 44
End: 2019-07-18
Payer: MEDICAID

## 2019-07-18 DIAGNOSIS — M43.6 NECK STIFFNESS: ICD-10-CM

## 2019-07-18 DIAGNOSIS — R29.3 POSTURE ABNORMALITY: Primary | ICD-10-CM

## 2019-07-18 PROCEDURE — 97110 THERAPEUTIC EXERCISES: CPT | Mod: PO

## 2019-07-22 ENCOUNTER — CLINICAL SUPPORT (OUTPATIENT)
Dept: REHABILITATION | Facility: HOSPITAL | Age: 44
End: 2019-07-22
Payer: MEDICAID

## 2019-07-22 DIAGNOSIS — M43.6 NECK STIFFNESS: ICD-10-CM

## 2019-07-22 NOTE — PROGRESS NOTES
"  Physical Therapy Daily Treatment Note     Name: Lavonne Emery [facility]  Clinic Number: [id number]    Therapy Diagnosis:   Encounter Diagnoses   Name Primary?    Neck stiffness     Posture abnormality Yes     Physician: Gurjit Avalos,*    Visit Date: 7/24/2019  Physician Orders: PT Eval and Treat   Medical Diagnosis from Referral: Disc disorder of cervical region   Evaluation Date: 7/15/2019  Authorization Period Expiration: 12/31/2019   Plan of Care Expiration: 09/06/2019  Visit # / Visits authorized: 4/ 12    Time In: 0855  Time Out: 1000  Total Billable Time: 55 minutes    Precautions: Standard    Subjective     Pt reports: She drove to [place] yesterday and was in the car all day. "My neck feels stiff."  She was compliant with home exercise program.  Response to previous treatment: Muscle soreness  Functional change: No change    Pain: 4/10  Location: bilateral neck      Objective     Lavonne received therapeutic exercises to develop strength, ROM, posture and core stabilization for 55 minutes including:  Pulleys x 3 minutes  Scap retractions 2 x 10 (3 second hold)   AROM cervical rotation, flexion/extension, cervical side bending x 2 minutes each  Seated chin tucks x 10 - mild pain on (R) UT  Shoulder extension YTB 2 x 10   Shoulder IR 2 x 10 YTB - (Verbal and tactile cueing for scapular position and form)  Shoulder ER 2 x 10 YTB - (Verbal and tactile cueing for scapular positioning and form)  Tricep pushdowns YTB 2 x 10   Bicep curls YTB 2 x 10   Rows 2 x 10 YTB    Scaption 2 x10 1#      UT stretch 3 x 30s   LS stretch 3x30s    Supine wand flexion 2 x10 1#  Serratus punches 2 x 10 3#  Breuggers 2 x 10 Y TB  Supine horizontal abduction YTB 2 x 10   Supine X's 2 x 10 YTB    Lavonne received hot pack for 10 minutes to cervical spine to increase circulation and promote tissue healing at the end of treatment.       Home Exercises Provided and Patient Education Provided     Education provided:   - " Differentiating between pain and muscle soreness  - Continuing HEP     Written Home Exercises Provided: yes.  Exercises were reviewed and Lavonne was able to demonstrate them prior to the end of the session.  Lavonne demonstrated fair  understanding of the education provided.     See EMR under Media for exercises provided prior visit.    Assessment     Progressed with supine scapular strengthening exercises and standing scaption, which she tolerated well with no increase in pain. She continues to demonstrate impaired postural awareness and needs cueing for correct form with exercises to achieve appropriate movement pattern.    Lavonne is progressing well towards her goals.   Pt prognosis is Fair.     Pt will continue to benefit from skilled outpatient physical therapy to address the deficits listed in the problem list box on initial evaluation, provide pt/family education and to maximize pt's level of independence in the home and community environment.     Pt's spiritual, cultural and educational needs considered and pt agreeable to plan of care and goals.    Anticipated barriers to physical therapy: None    Goals:   Short Term Goals (3 Weeks):   1. Pt to increase strength to least 4/5 of muscles tested to allow for improvement in functional activities such as performing chores and ADLS.  3. Pt to improve gross cervical motion in rotation by 10 degrees to allow for improved functional mobility including driving and reading.  4. Pt to report compliance with HEP 3x/week and demonstrate proper exercise technique to PT to show competence with self management of condition.      Long Term Goals (6 Weeks):   1. Pt to achieve <45% limitation as measured by the FOTO to demonstrate decreased disability.  2. Pt to increase strength to at least 4+/5 of muscles tested to allow for improvement in functional activities such as overhead lifting and cooking/cleaning.  3. Pt to improve gross cervical motion to <20% limitations to allow  for improved functional mobility with performing ADL's with pain <2/10.    Plan     Continue UE strengthening and focusing on improving cervical ROM     Benja Brooks, PT

## 2019-07-22 NOTE — PROGRESS NOTES
Physical Therapy Daily Treatment Note     Name: Lavonne Tony  Clinic Number: [id number]    Therapy Diagnosis:   Encounter Diagnosis   Name Primary?    Neck stiffness      Physician: Gurjit Avalos,*    Visit Date: 7/22/2019  Physician Orders: PT Eval and Treat   Medical Diagnosis from Referral: Disc disorder of cervical region   Evaluation Date: 7/15/2019  Authorization Period Expiration: 12/31/2019   Plan of Care Expiration: 09/06/2019  Visit # / Visits authorized: 3/ 12    Time In: 1400  Time Out: 1505  Total Billable Time: 55 minutes    Precautions: Standard    Subjective     Pt reports: She had mild muslce soreness following last visit and a significant increase in her pain levels.   She was compliant with home exercise program.  Response to previous treatment: Muscle soreness  Functional change: No change    Pain: 0/10  Location: bilateral neck      Objective     Lavonne received therapeutic exercises to develop strength, ROM, posture and core stabilization for 55 minutes including:  Pulleys x 3 minutes  Scap retractions 2 x 10 (3 second hold)   AROM cervical rotation, flexion/extension, cervical side bending x 2 minutes each  Seated chin tucks x 10 - mild pain on (R) UT  Shoulder extension YTB 2 x 10   Shoulder IR 2 x 10 YTB - (Verbal and tactile cueing for scapular position and form)  Shoulder ER 2 x 10 YTB - (Verbal and tactile cueing for scapular positioning and form)  Tricep pushdowns YTB 2 x 10   Bicep curls YTB 2 x 10     UT stretch 3 x 30s   LS stretch 3x30s    Supine wand flexion 2 x10 1#  Serratus punches 2 x 10 1#  Breuggers 2 x 10 YTB    Lavonne received hot pack for 10 minutes to cervical spine to increase circulation and promote tissue healing at the end of treatment.       Home Exercises Provided and Patient Education Provided     Education provided:   - Differentiating between pain and muscle soreness  - Continuing HEP     Written Home Exercises Provided: yes.  Exercises were reviewed  and Lavonne was able to demonstrate them prior to the end of the session.  Lavonne demonstrated fair  understanding of the education provided.     See EMR under Media for exercises provided prior visit.    Assessment     Lavonne responded very well to treatment including progression of UE strengthening. Will increase resistance as tolerated. She will continue to benefit from improving her cervical ROM, UE strengthening, and postural strengthening.     Lavonne is progressing well towards her goals.   Pt prognosis is Fair.     Pt will continue to benefit from skilled outpatient physical therapy to address the deficits listed in the problem list box on initial evaluation, provide pt/family education and to maximize pt's level of independence in the home and community environment.     Pt's spiritual, cultural and educational needs considered and pt agreeable to plan of care and goals.    Anticipated barriers to physical therapy: None    Goals:   Short Term Goals (3 Weeks):   1. Pt to increase strength to least 4/5 of muscles tested to allow for improvement in functional activities such as performing chores and ADLS.  3. Pt to improve gross cervical motion in rotation by 10 degrees to allow for improved functional mobility including driving and reading.  4. Pt to report compliance with HEP 3x/week and demonstrate proper exercise technique to PT to show competence with self management of condition.      Long Term Goals (6 Weeks):   1. Pt to achieve <45% limitation as measured by the FOTO to demonstrate decreased disability.  2. Pt to increase strength to at least 4+/5 of muscles tested to allow for improvement in functional activities such as overhead lifting and cooking/cleaning.  3. Pt to improve gross cervical motion to <20% limitations to allow for improved functional mobility with performing ADL's with pain <2/10.    Plan     Continue UE strengthening and focusing on improving cervical ROM     Benja Brooks, PT

## 2019-07-24 ENCOUNTER — CLINICAL SUPPORT (OUTPATIENT)
Dept: REHABILITATION | Facility: HOSPITAL | Age: 44
End: 2019-07-24
Payer: MEDICAID

## 2019-07-24 DIAGNOSIS — R29.3 POSTURE ABNORMALITY: Primary | ICD-10-CM

## 2019-07-24 DIAGNOSIS — M43.6 NECK STIFFNESS: ICD-10-CM

## 2019-07-24 PROCEDURE — 97110 THERAPEUTIC EXERCISES: CPT | Mod: PO

## 2019-07-26 NOTE — PROGRESS NOTES
Physical Therapy Daily Treatment Note     Name: Lavonne Pierre  Clinic Number: [id number]    Therapy Diagnosis:   Encounter Diagnoses   Name Primary?    Neck stiffness     Posture abnormality Yes     Physician: Gurjit Avalos,*    Visit Date: 7/29/2019  Physician Orders: PT Eval and Treat   Medical Diagnosis from Referral: Disc disorder of cervical region   Evaluation Date: 7/15/2019  Authorization Period Expiration: 12/31/2019   Plan of Care Expiration: 09/06/2019  Visit # / Visits authorized: 5/ 12    Time In: 0858  Time Out: 0958  Total Billable Time: 50 minutes    Precautions: Standard    Subjective     Pt reports: She is continuing to have her usual neck pain. She states she feels like cooking and cleaning around the house are becoming easier to perform.   She was compliant with home exercise program.  Response to previous treatment: Muscle soreness  Functional change: No change    Pain: 2/10  Location: bilateral neck      Objective     Lavonne received therapeutic exercises to develop strength, ROM, posture and core stabilization for 50 minutes including:  Pulleys x 3 minutes  Scap retractions 2 x 10 (3 second hold)   AROM cervical rotation, flexion/extension, cervical side bending x 2 minutes each  Seated chin tucks x 10 - mild pain on (R) UT  Shoulder extension YTB 2 x 10   Shoulder IR 2 x 10 YTB - (Verbal and tactile cueing for scapular position and form)  Shoulder ER 2 x 10 YTB - (Verbal and tactile cueing for scapular positioning and form)  Tricep pushdowns YTB 2 x 10   Bicep curls YTB 2 x 10   Rows 2 x 10 YTB    Scaption 2 x10 1#      UT stretch 3 x 30s   LS stretch 3x30s    Supine wand flexion 2 x10 1#  Serratus punches 2 x 10 3#  Breuggers 2 x 10 Y TB  Supine horizontal abduction YTB 2 x 10   Supine X's 2 x 10 YTB    Lavonne received hot pack for 10 minutes to cervical spine to increase circulation and promote tissue healing at the end of treatment.       Home Exercises Provided and Patient  Education Provided     Education provided:   - Differentiating between pain and muscle soreness  - Continuing HEP     Written Home Exercises Provided: yes.  Exercises were reviewed and Lavonne was able to demonstrate them prior to the end of the session.  Lavonne demonstrated fair  understanding of the education provided.     See EMR under Media for exercises provided prior visit.    Assessment     Lavonne reports improvements with her functional mobility since beginning physical therapy. She continues to have mild neck pain, but is progressing well with her strengthening exercises. Will continue to progress as tolerated.     Lavonne is progressing well towards her goals.   Pt prognosis is Fair.     Pt will continue to benefit from skilled outpatient physical therapy to address the deficits listed in the problem list box on initial evaluation, provide pt/family education and to maximize pt's level of independence in the home and community environment.     Pt's spiritual, cultural and educational needs considered and pt agreeable to plan of care and goals.    Anticipated barriers to physical therapy: None    Goals:   Short Term Goals (3 Weeks):   1. Pt to increase strength to least 4/5 of muscles tested to allow for improvement in functional activities such as performing chores and ADLS.  3. Pt to improve gross cervical motion in rotation by 10 degrees to allow for improved functional mobility including driving and reading.  4. Pt to report compliance with HEP 3x/week and demonstrate proper exercise technique to PT to show competence with self management of condition.      Long Term Goals (6 Weeks):   1. Pt to achieve <45% limitation as measured by the FOTO to demonstrate decreased disability.  2. Pt to increase strength to at least 4+/5 of muscles tested to allow for improvement in functional activities such as overhead lifting and cooking/cleaning.  3. Pt to improve gross cervical motion to <20% limitations to allow  for improved functional mobility with performing ADL's with pain <2/10.    Plan     Continue UE strengthening and focusing on improving cervical ROM     Benja Brooks, PT

## 2019-07-29 ENCOUNTER — CLINICAL SUPPORT (OUTPATIENT)
Dept: REHABILITATION | Facility: HOSPITAL | Age: 44
End: 2019-07-29
Payer: MEDICAID

## 2019-07-29 DIAGNOSIS — M43.6 NECK STIFFNESS: ICD-10-CM

## 2019-07-29 DIAGNOSIS — R29.3 POSTURE ABNORMALITY: Primary | ICD-10-CM

## 2019-07-29 PROCEDURE — 97110 THERAPEUTIC EXERCISES: CPT | Mod: PO

## 2019-07-29 NOTE — PROGRESS NOTES
"  Physical Therapy Daily Treatment Note     Name: Lavonne Emery [facility]  Clinic Number: [id number]    Therapy Diagnosis:   Encounter Diagnoses   Name Primary?    Neck stiffness     Posture abnormality Yes     Physician: Gurjit Avalos,*    Visit Date: 7/31/2019  Physician Orders: PT Eval and Treat   Medical Diagnosis from Referral: Disc disorder of cervical region   Evaluation Date: 7/15/2019  Authorization Period Expiration: 12/31/2019   Plan of Care Expiration: 09/06/2019  Visit # / Visits authorized: 6/ 12    Time In: 900  Time Out: 1002  Total Billable Time: 30 minutes    Precautions: Standard    Subjective     Pt reports: She has stiffness in her neck with a decrease in her pain levels.  She was compliant with home exercise program.  Response to previous treatment: Muscle soreness  Functional change: No change    Pain: 1/10 "Stiffness"  Location: bilateral neck      Objective     Lavonne received therapeutic exercises to develop strength, ROM, posture and core stabilization for 52 minutes including:  UBE 2'forward/2'backward   Pulleys flexion x 3 minutes  Scap retractions 2 x 10 (3 second hold)   AROM cervical rotation, flexion/extension, cervical side bending x 2 minutes each  Seated chin tucks x 10   Shoulder extension RTB 2 x 10    Shoulder IR 2 x 10 RTB - (Verbal and tactile cueing for scapular position and form)  Shoulder ER 2 x 10 YTB - (Verbal and tactile cueing for scapular positioning and form)  Tricep pushdowns RTB 2 x 10   Bicep curls RTB 2 x 10   Rows 2 x 10 RTB    Scaption 2 x10 1#      UT stretch 3 x 30s   LS stretch 3x30s    Supine wand flexion 2 x10 1#  Serratus punches 2 x 10 3#  Breuggers 2 x 10 RTB  Supine horizontal abduction YTB 2 x 10   Supine X's 2 x 10 YTB    Lavonne received hot pack for 10 minutes to cervical spine to increase circulation and promote tissue healing at the end of treatment.       Home Exercises Provided and Patient Education Provided     Education provided:   - " Differentiating between pain and muscle soreness  - Continuing HEP     Written Home Exercises Provided: yes.  Exercises were reviewed and Lavonne was able to demonstrate them prior to the end of the session.  Lavonne demonstrated fair  understanding of the education provided.     See EMR under Media for exercises provided prior visit.    Assessment     Increased resistance with shoulder and scapular exercises, which Lavonne tolerated very well. She reported muscle soreness following therapy. Will continue to progress her exercises as tolerated.     Lavonne is progressing well towards her goals.   Pt prognosis is Fair.     Pt will continue to benefit from skilled outpatient physical therapy to address the deficits listed in the problem list box on initial evaluation, provide pt/family education and to maximize pt's level of independence in the home and community environment.     Pt's spiritual, cultural and educational needs considered and pt agreeable to plan of care and goals.    Anticipated barriers to physical therapy: None    Goals:   Short Term Goals (3 Weeks):   1. Pt to increase strength to least 4/5 of muscles tested to allow for improvement in functional activities such as performing chores and ADLS.  3. Pt to improve gross cervical motion in rotation by 10 degrees to allow for improved functional mobility including driving and reading.  4. Pt to report compliance with HEP 3x/week and demonstrate proper exercise technique to PT to show competence with self management of condition.      Long Term Goals (6 Weeks):   1. Pt to achieve <45% limitation as measured by the FOTO to demonstrate decreased disability.  2. Pt to increase strength to at least 4+/5 of muscles tested to allow for improvement in functional activities such as overhead lifting and cooking/cleaning.  3. Pt to improve gross cervical motion to <20% limitations to allow for improved functional mobility with performing ADL's with pain <2/10.    Plan      Continue UE strengthening and focusing on improving cervical ROM     Benja Brooks, PT

## 2019-07-31 ENCOUNTER — CLINICAL SUPPORT (OUTPATIENT)
Dept: REHABILITATION | Facility: HOSPITAL | Age: 44
End: 2019-07-31
Payer: MEDICAID

## 2019-07-31 DIAGNOSIS — M43.6 NECK STIFFNESS: ICD-10-CM

## 2019-07-31 DIAGNOSIS — R29.3 POSTURE ABNORMALITY: Primary | ICD-10-CM

## 2019-07-31 PROCEDURE — 97110 THERAPEUTIC EXERCISES: CPT | Mod: PO

## 2019-08-05 ENCOUNTER — CLINICAL SUPPORT (OUTPATIENT)
Dept: REHABILITATION | Facility: HOSPITAL | Age: 44
End: 2019-08-05
Payer: MEDICAID

## 2019-08-05 ENCOUNTER — OFFICE VISIT (OUTPATIENT)
Dept: RHEUMATOLOGY | Facility: CLINIC | Age: 44
End: 2019-08-05
Payer: MEDICAID

## 2019-08-05 VITALS
DIASTOLIC BLOOD PRESSURE: 72 MMHG | BODY MASS INDEX: 35.68 KG/M2 | WEIGHT: 177 LBS | SYSTOLIC BLOOD PRESSURE: 113 MMHG | HEIGHT: 59 IN | HEART RATE: 69 BPM

## 2019-08-05 DIAGNOSIS — M43.6 NECK STIFFNESS: ICD-10-CM

## 2019-08-05 DIAGNOSIS — G89.29 OTHER CHRONIC PAIN: ICD-10-CM

## 2019-08-05 DIAGNOSIS — M79.7 MUSCLE PAIN, FIBROMYALGIA: ICD-10-CM

## 2019-08-05 DIAGNOSIS — M79.7 FIBROMYALGIA: ICD-10-CM

## 2019-08-05 PROCEDURE — 99213 OFFICE O/P EST LOW 20 MIN: CPT | Mod: PBBFAC,PO | Performed by: INTERNAL MEDICINE

## 2019-08-05 PROCEDURE — 99999 PR PBB SHADOW E&M-EST. PATIENT-LVL III: CPT | Mod: PBBFAC,,, | Performed by: INTERNAL MEDICINE

## 2019-08-05 PROCEDURE — 99999 PR PBB SHADOW E&M-EST. PATIENT-LVL III: ICD-10-PCS | Mod: PBBFAC,,, | Performed by: INTERNAL MEDICINE

## 2019-08-05 PROCEDURE — 99214 PR OFFICE/OUTPT VISIT, EST, LEVL IV, 30-39 MIN: ICD-10-PCS | Mod: S$PBB,,, | Performed by: INTERNAL MEDICINE

## 2019-08-05 PROCEDURE — 97110 THERAPEUTIC EXERCISES: CPT | Mod: PO

## 2019-08-05 PROCEDURE — 99214 OFFICE O/P EST MOD 30 MIN: CPT | Mod: S$PBB,,, | Performed by: INTERNAL MEDICINE

## 2019-08-05 RX ORDER — TRAMADOL HYDROCHLORIDE 50 MG/1
100 TABLET ORAL EVERY 12 HOURS PRN
Qty: 120 TABLET | Refills: 3 | Status: SHIPPED | OUTPATIENT
Start: 2019-08-05 | End: 2020-02-05 | Stop reason: SDUPTHER

## 2019-08-05 RX ORDER — HYDROCODONE BITARTRATE AND ACETAMINOPHEN 7.5; 325 MG/1; MG/1
TABLET ORAL
Refills: 0 | COMMUNITY
Start: 2019-06-18 | End: 2019-10-09

## 2019-08-05 RX ORDER — GABAPENTIN 600 MG/1
TABLET ORAL
Refills: 0 | COMMUNITY
Start: 2019-06-18 | End: 2019-10-09

## 2019-08-05 RX ORDER — CYCLOBENZAPRINE HCL 10 MG
TABLET ORAL
Refills: 0 | COMMUNITY
Start: 2019-06-26 | End: 2019-10-09

## 2019-08-05 RX ORDER — MONTELUKAST SODIUM 10 MG/1
10 TABLET ORAL
COMMUNITY
End: 2020-11-11

## 2019-08-05 NOTE — PROGRESS NOTES
"Subjective:          Chief Complaint: Lavonne Pierre is a 43 y.o. female who had concerns including Fibromyalgia (6 months).    HPI:    Patient here today for follow-up with a history of fibromyalgia, positive MIRIAN 1:160 homogeneous/speckled. negative MIRIAN profile. + thyroid Antibodies. No evidence of CTD.     Interval events.  S/p cervical surgery with Dr. Vasquez 6/17/19 with Disc replacement no fusion. Hands are definitely better. Legs with some calf and knee pain describing lumps under the skin but not as much "weakness" more tendon and joint complaints some focal soft tissue issues. Has seen podiatry for this in past.      Previous complaint regarding feet: Left does have some plantar involvement. She does endorse pain first thing in the AM with shearing pain.     She states that she did speak with Psych. But involved with [employer] group feels she has good supoprt for pains.   She is following with Dr. RONY Arteaga-not sleeping well still using CPAP   Follows with Dr. Mcgee also: insurance declining Nuvigil despite multiple appeals. Patient with hypersomnia not ADHD.        Current medications   tramadol 50 mg once daily. This does help only takes at night. Not helping as much. Discontinued felt not helping.   Celebrex 200 mg daily which she states is not working discontinued.   Gabapentin 300 mg t.i.d. which she feels is not working discontinued.   Nuvigil at 250mg daily-discontinued due to insurance  Lyrica ASE with weight gain. No relief.   Elavil 25mg for HS- discontinued no help  NSAIDs with gastric ulcers in past.   Failed Cymbalta, Effexor, Zoloft, wellbutrin. Tizanidine, savella,  Prozac  States she did well with Topamax but concern on long term safety. For ocular migraines. Still having HA.       Did have steroid injections in past no relief with elbows, hands, feet or knees. Does help some with hip busitis.     She's had various myalgias as well as joint pain in the ankles, feet, wrists, shoulders and " "neck and legs.  She also has an underlying history of depression she's had no constitutional symptoms, rashes, oral ulcers, serositis, Raynaud's.   She is not sleeping at night.   She is having significant fatigue and feel "shaky" in her body. She is noting significant brain fog.  Trouble with restless leg type symptoms, but occur during the day.  She is noting pain in her feet (diffusely), knees at the superior pole of the patella. And bilateral elbows she is going to meet with Dr. Jack as right elbow is doing better , now needing the left.    She is c/o lumps in her skin tender, no redness but she does try to massage them. She is feeling increased pain in the knees with "lumps" in gastrocnemius region. Do feel swollen.              Component      Latest Ref Rng & Units 1/17/2017 1/15/2016   Anti Sm Antibody      0.00 - 19.99 EU  0.97   Anti-Sm Interpretation      Negative  Negative   Anti-SSA Antibody      0.00 - 19.99 EU  1.09   Anti-SSA Interpretation      Negative  Negative   Anti-SSB Antibody      0.00 - 19.99 EU  0.30   Anti-SSB Interpretation      Negative  Negative   ds DNA Ab      Negative 1:10  Negative 1:10   Anti Sm/RNP Antibody      0.00 - 19.99 EU  0.11   Anti-Sm/RNP Interpretation      Negative  Negative   Sed Rate      0 - 20 mm/Hr  9   CRP      0.0 - 8.2 mg/L  5.7   MIRIAN Screen      Negative <1:160 Positive (A) Positive (A)   Rheumatoid Factor      0.0 - 15.0 IU/mL  <10.0   MIRIAN HEP-2 Titer       Positive 1:320 Homogeneous Positive 1:160 Homogeneous       REVIEW OF SYSTEMS:    Review of Systems   Constitutional: Positive for malaise/fatigue and weight loss. Negative for chills and fever.   HENT: Negative for sore throat.    Eyes: Negative for double vision, photophobia, discharge and redness.   Respiratory: Negative for cough, shortness of breath and wheezing.    Cardiovascular: Negative for chest pain, palpitations and orthopnea.   Gastrointestinal: Positive for heartburn. Negative for " abdominal pain, constipation and diarrhea.   Genitourinary: Negative for dysuria, hematuria and urgency.   Musculoskeletal: Positive for back pain, joint pain, myalgias and neck pain.   Skin: Negative for rash.   Neurological: Positive for headaches. Negative for dizziness, tingling, focal weakness and weakness.   Endo/Heme/Allergies: Does not bruise/bleed easily.   Psychiatric/Behavioral: Negative for depression, hallucinations and suicidal ideas. The patient is nervous/anxious.                Objective:            Past Medical History:   Diagnosis Date    Anxiety 7/16/2013    Asthma     Depression     Fibromyalgia     GERD (gastroesophageal reflux disease)     History of pneumonia 2015    HTN (hypertension)     Preeclampsia     Sleep apnea     Uses CPAP     Family History   Problem Relation Age of Onset    Pancreatic cancer Mother     Lymphoma Father     Diabetes Father     Hypertension Father     Pancreatic cancer Maternal Grandmother     Pancreatic cancer Maternal Uncle     Breast cancer Maternal Aunt     Breast cancer Paternal Aunt      Social History     Tobacco Use    Smoking status: Never Smoker    Smokeless tobacco: Never Used   Substance Use Topics    Alcohol use: No    Drug use: No         Current Outpatient Medications on File Prior to Visit   Medication Sig Dispense Refill    albuterol (PROVENTIL/VENTOLIN HFA) 90 mcg/actuation inhaler Ventolin HFA 90 mcg/actuation aerosol inhaler   INHALE 2 PUFFS EVERY 4 TO 6 HOURS AS NEEDED      BREO ELLIPTA 100-25 mcg/dose diskus inhaler INL 1 PUFF PO AT THE SAME TIME QD  11    ketoconazole (NIZORAL) 2 % cream Apply topically once daily. 60 g 3    lidocaine-prilocaine (EMLA) cream SARA EXT AA 1 TIME Q 4 WKS 30 MIN B TREATMENT FOR 1 DAY  1    lisinopril (PRINIVIL,ZESTRIL) 5 MG tablet TAKE 1 TABLET BY MOUTH EVERY DAY 90 tablet 3    montelukast (SINGULAIR) 10 mg tablet 10 mg.      multivitamin (THERAGRAN) per tablet Take 1 tablet by mouth once  daily.      amitriptyline (ELAVIL) 25 MG tablet TAKE 1 TABLET(25 MG) BY MOUTH EVERY NIGHT AS NEEDED FOR INSOMNIA 30 tablet 11    cyclobenzaprine (FLEXERIL) 10 MG tablet   0    diclofenac sodium (VOLTAREN) 1 % Gel Apply 2 g topically 3 (three) times daily. 100 g 5    gabapentin (NEURONTIN) 600 MG tablet TK 1 T PO  BID FOR 3 DAYS  0    HYDROcodone-acetaminophen (NORCO) 7.5-325 mg per tablet TK 1 T PO  Q 4 TO 6 H PRN FOR PAIN  0    traMADol (ULTRAM) 50 mg tablet Take 1 tablet (50 mg total) by mouth every 12 (twelve) hours as needed for Pain. 60 tablet 3    [DISCONTINUED] armodafinil (NUVIGIL) 250 mg tablet Take 1 tablet (250 mg total) by mouth once daily. 30 tablet 3    [DISCONTINUED] doxycycline (MONODOX) 100 MG capsule Take 1 capsule (100 mg total) by mouth 2 (two) times daily. 20 capsule 0    [DISCONTINUED] methocarbamol (ROBAXIN) 500 MG Tab Take 1 tablet (500 mg total) by mouth 2 (two) times daily as needed. 40 tablet 6     No current facility-administered medications on file prior to visit.        Vitals:    08/05/19 0953   BP: 113/72   Pulse: 69       Physical Exam:    Physical Exam   Constitutional: She appears well-developed and well-nourished.   HENT:   Nose: No septal deviation.   Mouth/Throat: Mucous membranes are normal. No oral lesions.   Eyes: Pupils are equal, round, and reactive to light. Right conjunctiva is not injected. Left conjunctiva is not injected.   Neck: No JVD present. No thyroid mass and no thyromegaly present.   Cardiovascular: Normal rate, regular rhythm and normal pulses.   No edema   Pulmonary/Chest: Effort normal and breath sounds normal.   Abdominal: Soft. Normal appearance. There is no hepatosplenomegaly.   Musculoskeletal:        Right shoulder: She exhibits tenderness. She exhibits normal range of motion and no swelling.        Left shoulder: She exhibits tenderness. She exhibits normal range of motion and no swelling.        Right elbow: She exhibits normal range of motion  and no swelling. Tenderness found. Medial epicondyle tenderness noted.        Left elbow: She exhibits normal range of motion and no swelling. Tenderness found. Medial epicondyle tenderness noted.        Right wrist: She exhibits normal range of motion, no tenderness and no swelling.        Left wrist: She exhibits normal range of motion, no tenderness and no swelling.        Right hip: She exhibits normal range of motion, normal strength and no swelling.        Left hip: She exhibits normal range of motion, no tenderness and no swelling.        Right knee: She exhibits normal range of motion and no swelling. Tenderness found. Medial joint line tenderness noted.        Left knee: She exhibits normal range of motion and no swelling. Tenderness found. Medial joint line tenderness noted.        Right ankle: She exhibits normal range of motion and no swelling. No tenderness.        Left ankle: She exhibits normal range of motion and no swelling. No tenderness.        Cervical back: She exhibits bony tenderness.   14/18 FMS tenderpoints w/o No synovitis, restriction in ROM, tenderness of wrist, MCP, PIP, DIP. No weakness in . Able to fully curl fingers.      Lymphadenopathy:     She has no cervical adenopathy.     She has no axillary adenopathy.   Neurological: She has normal strength and normal reflexes.   Skin: Skin is dry and intact.   Psychiatric: Her mood appears anxious.             Assessment:       No diagnosis found.       Plan:        Fibromyalgia  -     traMADol (ULTRAM) 50 mg tablet; Take 2 tablets (100 mg total) by mouth every 12 (twelve) hours as needed for Pain.  Dispense: 120 tablet; Refill: 3    Other chronic pain  -     traMADol (ULTRAM) 50 mg tablet; Take 2 tablets (100 mg total) by mouth every 12 (twelve) hours as needed for Pain.  Dispense: 120 tablet; Refill: 3    Muscle pain, fibromyalgia  -     traMADol (ULTRAM) 50 mg tablet; Take 2 tablets (100 mg total) by mouth every 12 (twelve) hours as  needed for Pain.  Dispense: 120 tablet; Refill: 3           Off gabapentin no help  Off Prozac no help.   Off Elvail no help  Off Gabapentin no help  Off Flexeriol no help    Continue Tramadol; Increase to 100mg BID PRN-will try titration of Tramadol as this is last treatment option I would have to offer her.       I do want her to speak with Psych if we can get appt for any pharmacologic input for depression/myalgia -she has declined but working closely with [employer] group for support.     S/p cervical surgery and doing well.       TFollow up in about 4 months (around 12/5/2019).        30min consultation with greater than 50% spent in counseling, chart review and coordination of care. All questions answered.

## 2019-08-05 NOTE — PROGRESS NOTES
"  Physical Therapy Daily Treatment Note     Name: Lavonne Emery [facility]  Clinic Number: [id number]    Therapy Diagnosis:   Encounter Diagnosis   Name Primary?    Neck stiffness      Physician: Gurjit Avalos,*    Visit Date: 8/5/2019  Physician Orders: PT Eval and Treat   Medical Diagnosis from Referral: Disc disorder of cervical region   Evaluation Date: 7/15/2019  Authorization Period Expiration: 12/31/2019   Plan of Care Expiration: 09/06/2019  Visit # / Visits authorized: 7/ 12    Time In: 900  Time Out: 950  Total Billable Time: 46 minutes    Precautions: Standard    Subjective     Pt reports: pain is about the same.  Pt reports the left side of the neck consistently hurts more.  She was compliant with home exercise program.  Response to previous treatment: Muscle soreness for about a day  Functional change: No change    Pain: 2/10 "Stiffness"  Location: bilateral neck      Objective     Lavonne received therapeutic exercises to develop strength, ROM, posture and core stabilization for 52 minutes including:    UBE 2'forward/2'backward     Scap retractions 2 x 10 (3 second hold)   AROM cervical rotation, flexion/extension, cervical side bending x 2 minutes each  Seated chin tucks x 10   Shoulder extension RTB 2 x 10    Shoulder IR 2 x 10 RTB - (Verbal and tactile cueing for scapular position and form)  Shoulder ER 2 x 10 YTB - (Verbal and tactile cueing for scapular positioning and form)  Tricep pushdowns RTB 2 x 10   Bicep curls RTB 2 x 10   Rows 2 x 10 RTB       UT stretch 3 x 30s   LS stretch 3x30s  Breuggers 2 x 10 RTB    Supine wand flexion 2 x10 1#  Serratus punches 2 x 10 3#  Supine horizontal abduction YTB 2 x 10   Supine X's 2 x 10 YTB     Not performed due to time:  Pulleys flexion x 3 minutes  Scaption 2 x10 1#     Lavonne received hot pack for 10 minutes to cervical spine to increase circulation and promote tissue healing at the end of treatment.- Not performed today      Home Exercises Provided and " Patient Education Provided     Education provided:   - Differentiating between pain and muscle soreness  - Continuing HEP     Written Home Exercises Provided: yes.  Exercises were reviewed and Lavonne was able to demonstrate them prior to the end of the session.  Lavonne demonstrated fair  understanding of the education provided.     See EMR under Media for exercises provided prior visit.    Assessment     Tolerated treatment without any complaints of increased pain or soreness.  Pt requires cueing with band exercises to prevent upper trap compensations. Will continue to progress her exercises as tolerated.     Lavonne is progressing well towards her goals.   Pt prognosis is Fair.     Pt will continue to benefit from skilled outpatient physical therapy to address the deficits listed in the problem list box on initial evaluation, provide pt/family education and to maximize pt's level of independence in the home and community environment.     Pt's spiritual, cultural and educational needs considered and pt agreeable to plan of care and goals.    Anticipated barriers to physical therapy: None    Goals:   Short Term Goals (3 Weeks):   1. Pt to increase strength to least 4/5 of muscles tested to allow for improvement in functional activities such as performing chores and ADLS.  3. Pt to improve gross cervical motion in rotation by 10 degrees to allow for improved functional mobility including driving and reading.  4. Pt to report compliance with HEP 3x/week and demonstrate proper exercise technique to PT to show competence with self management of condition.      Long Term Goals (6 Weeks):   1. Pt to achieve <45% limitation as measured by the FOTO to demonstrate decreased disability.  2. Pt to increase strength to at least 4+/5 of muscles tested to allow for improvement in functional activities such as overhead lifting and cooking/cleaning.  3. Pt to improve gross cervical motion to <20% limitations to allow for improved  functional mobility with performing ADL's with pain <2/10.    Plan     Continue UE strengthening and focusing on improving cervical ROM     Carla Nina, PTA

## 2019-08-05 NOTE — PROGRESS NOTES
"  Physical Therapy Daily Treatment Note     Name: Lavonne Tony  Clinic Number: [id number]    Therapy Diagnosis:   Encounter Diagnosis   Name Primary?    Neck stiffness      Physician: Gurjit Avalos,*    Visit Date: 8/7/2019  Physician Orders: PT Eval and Treat   Medical Diagnosis from Referral: Disc disorder of cervical region   Evaluation Date: 7/15/2019  Authorization Period Expiration: 12/31/2019   Plan of Care Expiration: 09/06/2019  Visit # / Visits authorized: 8/ 12    Time In: 0858  Time Out: 1004  Total Billable Time: 55 minutes    Precautions: Standard    Subjective     Pt reports: She reports no pain in her neck. "The red band is tough." "I still have trouble with cooking, stirring, and stiffness in the morning."  She was compliant with home exercise program.  Response to previous treatment: Muscle soreness for about a day  Functional change: No change    Pain: 0/10 "Stiffness"  Location: bilateral neck      Objective     Lavonne received therapeutic exercises to develop strength, ROM, posture and core stabilization for 54  minutes including:    UBE 2'forward/2'backward     Seated chin tucks x 10   Shoulder extension RTB 2 x 10    Shoulder IR 2 x 10 RTB  Shoulder ER 2 x 10 RTB  Tricep pushdowns RTB 2 x 10   Bicep curls RTB 2 x 10   Rows 2 x 10 RTB    UT stretch 3 x 30s   LS stretch 3x30s  Breuggers 2 x 10 RTB    Supine wand flexion 2 x10 3#  Serratus punches 2 x 10 3#  Supine horizontal abduction YTB 2 x 10   Supine X's 2 x 10 YTB  SL Shoulder ER 2 x 10 1# - cues for form    Scaption 2 x10 1#     Ball circles on wall 30x CW/ 30x CCW with green ball    Lavonne received hot pack for 10 minutes to cervical spine to increase circulation and promote tissue healing at the end of treatment      Home Exercises Provided and Patient Education Provided     Education provided:   - Differentiating between pain and muscle soreness  - Continuing HEP     Written Home Exercises Provided: yes.  Exercises were " reviewed and Lavonne was able to demonstrate them prior to the end of the session.  Lavonne demonstrated fair  understanding of the education provided.     See EMR under Media for exercises provided prior visit.    Assessment     She is progressing well with UE strengthening and cervical ROM. Added SL ER and serratus ball on wall today, which she tolerated well. Will continue to emphasize posture and UE strengthening.      Lavonne is progressing well towards her goals.   Pt prognosis is Fair.     Pt will continue to benefit from skilled outpatient physical therapy to address the deficits listed in the problem list box on initial evaluation, provide pt/family education and to maximize pt's level of independence in the home and community environment.     Pt's spiritual, cultural and educational needs considered and pt agreeable to plan of care and goals.    Anticipated barriers to physical therapy: None    Goals:   Short Term Goals (3 Weeks):   1. Pt to increase strength to least 4/5 of muscles tested to allow for improvement in functional activities such as performing chores and ADLS. (Met)  3. Pt to improve gross cervical motion in rotation by 10 degrees to allow for improved functional mobility including driving and reading. (Met)  4. Pt to report compliance with HEP 3x/week and demonstrate proper exercise technique to PT to show competence with self management of condition. (Met)    Long Term Goals (6 Weeks):   1. Pt to achieve <45% limitation as measured by the FOTO to demonstrate decreased disability. (met)  2. Pt to increase strength to at least 4+/5 of muscles tested to allow for improvement in functional activities such as overhead lifting and cooking/cleaning. (Progressing, not met)  3. Pt to improve gross cervical motion to <20% limitations to allow for improved functional mobility with performing ADL's with pain <2/10. (Progressing, not met)      Plan     Continue UE strengthening and focusing on improving  cervical ROM     Benja Brooks, PT

## 2019-08-07 ENCOUNTER — CLINICAL SUPPORT (OUTPATIENT)
Dept: REHABILITATION | Facility: HOSPITAL | Age: 44
End: 2019-08-07
Payer: MEDICAID

## 2019-08-07 DIAGNOSIS — M43.6 NECK STIFFNESS: ICD-10-CM

## 2019-08-07 PROCEDURE — 97110 THERAPEUTIC EXERCISES: CPT | Mod: PO

## 2019-08-07 NOTE — PLAN OF CARE
"[facility]  Physical Therapy Reassessment    Name: Lavonne Pierre  Clinic Number: [id number]    Therapy Diagnosis:   Encounter Diagnosis   Name Primary?    Neck stiffness      Physician: Gurjit Avalos,*  Visit Date: 2019  Physician Orders: PT Eval and Treat   Medical Diagnosis from Referral: Disc disorder of cervical region   Evaluation Date: 7/15/2019  Authorization Period Expiration: 2019   Plan of Care Expiration: 2019  Visit # / Visits authorized:     Precautions: Standard    Subjective   Date of onset: Chronic, began several years ago  History of current condition - Lavonne reports: Chronic neck pain, which began years ago. She had a disc replacement ~2019 of C5/C6. She reports she was not able to lift anything after surgery for 3 weeks, but all of her restrictions have been cleared at this time. Lavonne reports the surgery has improved her pain levels, but she is still limited with her range of motion due to pain and stiffness. She reports she has been doing her neck stretches that she learned in PT to improve her range of motion. She states her pain is mostly in the (R) side of her posterior neck. She reports pain with laying on her right side.    Follow up: She reports she feels about 80% better since beginning physical therapy. "I still have trouble with cooking, stirring, and with some stiffness in my neck in the morning."    Medical History:   Past Medical History:   Diagnosis Date    Anxiety 2013    Asthma     Depression     Fibromyalgia     GERD (gastroesophageal reflux disease)     History of pneumonia     HTN (hypertension)     Preeclampsia     Sleep apnea     Uses CPAP       Surgical History:   Lavonne Pierre  has a past surgical history that includes  section, low transverse (); Breast biopsy; Tubal ligation (); and Carpal tunnel release (Right, 2017).    Medications:   Lavonne has a " current medication list which includes the following prescription(s): albuterol, amitriptyline, breo ellipta, cyclobenzaprine, diclofenac sodium, gabapentin, hydrocodone-acetaminophen, ketoconazole, lidocaine-prilocaine, lisinopril, montelukast, multivitamin, and tramadol.    Allergies:   Review of patient's allergies indicates:   Allergen Reactions    Ibuprofen Other (See Comments)     Affects stomach ulcer, avoids all NSAIDS    Latex, natural rubber Rash     Small red bumps on skin contact        Imaging, MRI studies: MRI studies: There is mild anterior osteophyte formation at C4/C5 through C6/C7.  There is loss of disc space height at C5/C6 and C6/C7.  The craniocervical junction and cervical cord display normal signal and morphology.  The AP bony canal is somewhat decreased in the mid cervical region suggesting mild congenital spinal stenosis.  At the C5 level it measures 10 mm.  The cervical vertebrae otherwise display normal signal, morphology and alignment.    Prior Therapy: Multiple times, most recent in December of 2018  Social History: Lives with son and adult    Occupation: Disabled  Prior Level of Function: Able to perform her ADLs with increased pain  Current Level of Function: She reports she still cannot  anything heavy, reaching overhead, pain with driving if she has to turn her head. She reports she spends a lot of her day sitting    Pain:  Current 4/10, worst 5/10, best 3/10   Location: right neck   Description: Aching, Dull and pulling, pins   Aggravating Factors: Sitting, Laying, Coughing/Sneezing, Eating and Lifting  Easing Factors: ice, hot bath and rest    Pts goals: To relieve the pain     Red Flag Screening:   Cough  Sneeze  Strain: (+)  Bladder/ bowel: (--)  Falls: (--)  Night pain: (--)  Unexplained weight loss: (--)      Objective     Observation: Patient in no acute distress, incision healing well    Posture: Rounded shoulders increased, sits with increased thoracic  kyphosis     Cervical Range of Motion:    Degrees Pain   Flexion 45 Anterior neck     Extension 50 Anterior neck     Right Rotation 55 UT pulling      Left Rotation 60 UT pulling        Shoulder Range of Motion:   Shoulder Left Right   Flexion WFL WFL   Abduction WFL WFL   ER + flexion WFL WFL   IR WFL WFL     Strength:  Upper Extremity Strength  (R) UE  (L) UE    Shoulder flexion: 4+/5 Shoulder flexion: 4/5   Shoulder Abduction: 4/5 Shoulder abduction: 4/5   Shoulder ER 4/5 Shoulder ER 4/5   Shoulder IR 4+/5 Shoulder IR 4+/5   Elbow flexion: 4+/5 Elbow flexion: 4+/5   Elbow extension: 4+/5 Elbow extension: 4+/5   Wrist flexion: 4+/5 Wrist flexion: 4+/5   Wrist extension: 4+/5 Wrist extension: 4+/5    4+/5 : 4+/5     Special Tests: S/p anterior disc replacement C5/C6      Joint Mobility: Hypomobile grossly cervical spine PAs    Palpation: TTP at (R) UT, LS, cervical paraspinals, suboccipitals       Sensation: Intact to LT C3 - T2    Flexibility: Limited grossly at cervical spine    CMS Impairment/Limitation/Restriction for FOTO Cervical Survey    Therapist reviewed FOTO scores for Lavonne Pierre on 8/7/2019.   FOTO documents entered into EP[employer] - see Media section.    Limitation Score: 37%  Category: Mobility       TREATMENT   See Daily Note    Assessment   Lavonne is a 43 y.o. female referred to outpatient Physical Therapy with a medical diagnosis of Disc disorder of cervical region. Physical exam is consistent with chronic neck pain s/p anterior disc replacement of C5/C6. Since beginning PT, Lavonne has been seen 8 times since initial evaluation on 07/15/2019. Overall, Lavonne  has made good, steady progress with her PT treatments and has worked hard towards all of her PT goals as evidenced by subjective and objective improvements. Despite these improvements, she remains with deficits with her UE strength, cervical ROM, posture, and pain, which limits her ability to perform her ADLs pain free. Goals updated.  Will continue focusing on improving UE strength, posture, and cervical ROM.     Pt prognosis is Guarded.   Pt will benefit from skilled outpatient Physical Therapy to address the deficits stated above and in the chart below, provide pt/family education, and to maximize pt's level of independence.     Plan of care discussed with patient: Yes  Pt's spiritual, cultural and educational needs considered and patient is agreeable to the plan of care and goals as stated below:     Anticipated Barriers for therapy: Fibromyalgia limiting tolerance to exercises     Medical Necessity is demonstrated by the following  History  Co-morbidities and personal factors that may impact the plan of care Co-morbidities:   anxiety, HTN and fibromyalgia    Personal Factors:   lifestyle     moderate   Examination  Body Structures and Functions, activity limitations and participation restrictions that may impact the plan of care Body Regions:   neck  upper extremities    Body Systems:    ROM  strength  gross coordinated movement    Participation Restrictions:   Pain with all ADLs, significantly impacted by pain     Activity limitations:   Learning and applying knowledge  no deficits    General Tasks and Commands  no deficits    Communication  no deficits    Mobility  lifting and carrying objects  driving (bike, car, motorcycle)    Self care  dressing    Domestic Life  doing house work (cleaning house, washing dishes, laundry)    Interactions/Relationships  no deficits    Life Areas  no deficits    Community and Social Life  recreation and leisure         moderate   Clinical Presentation evolving clinical presentation with changing clinical characteristics moderate   Decision Making/ Complexity Score: moderate     Goals:  Short Term Goals (3 Weeks):   1. Pt to increase strength to least 4/5 of muscles tested to allow for improvement in functional activities such as performing chores and ADLS. (Met)  3. Pt to improve gross cervical motion in  rotation by 10 degrees to allow for improved functional mobility including driving and reading. (Met)  4. Pt to report compliance with HEP 3x/week and demonstrate proper exercise technique to PT to show competence with self management of condition. (Met)    Long Term Goals (6 Weeks):   1. Pt to achieve <45% limitation as measured by the FOTO to demonstrate decreased disability. (met)  2. Pt to increase strength to at least 4+/5 of muscles tested to allow for improvement in functional activities such as overhead lifting and cooking/cleaning. (Progressing, not met)  3. Pt to improve gross cervical motion to <20% limitations to allow for improved functional mobility with performing ADL's with pain <2/10. (Progressing, not met)    Plan   Continue focusing on posture, cervical ROM, and UE strengthening    Benja Brooks, PT

## 2019-08-09 NOTE — PROGRESS NOTES
"  Physical Therapy Daily Treatment Note     Name: Lavonne Tony  Clinic Number: [id number]    Therapy Diagnosis:   No diagnosis found.  Physician: Gurjit Avalos,*    Visit Date: 8/12/2019  Physician Orders: PT Eval and Treat   Medical Diagnosis from Referral: Disc disorder of cervical region   Evaluation Date: 7/15/2019  Authorization Period Expiration: 12/31/2019   Plan of Care Expiration: 09/06/2019  Visit # / Visits authorized: 9/ 12    Time In: ***  Time Out: ***  Total Billable Time: *** minutes    Precautions: Standard    Subjective     Pt reports: She reports no pain in her neck. "The red band is tough." "I still have trouble with cooking, stirring, and stiffness in the morning."  She was compliant with home exercise program.  Response to previous treatment: Muscle soreness for about a day  Functional change: No change    Pain: 0/10 "Stiffness"  Location: bilateral neck      Objective     Lavonne received therapeutic exercises to develop strength, ROM, posture and core stabilization for 54  minutes including:    UBE 2'forward/2'backward     Seated chin tucks x 10   Shoulder extension RTB 2 x 10    Shoulder IR 2 x 10 RTB  Shoulder ER 2 x 10 RTB  Tricep pushdowns RTB 2 x 10   Bicep curls RTB 2 x 10   Rows 2 x 10 RTB    UT stretch 3 x 30s   LS stretch 3x30s  Breuggers 2 x 10 RTB    Supine wand flexion 2 x10 3#  Serratus punches 2 x 10 3#  Supine horizontal abduction YTB 2 x 10   Supine X's 2 x 10 YTB  SL Shoulder ER 2 x 10 1# - cues for form    Scaption 2 x10 1#     Ball circles on wall 30x CW/ 30x CCW with green ball    Lavonne received hot pack for 10 minutes to cervical spine to increase circulation and promote tissue healing at the end of treatment      Home Exercises Provided and Patient Education Provided     Education provided:   - Differentiating between pain and muscle soreness  - Continuing HEP     Written Home Exercises Provided: yes.  Exercises were reviewed and Lavonne was able to demonstrate " them prior to the end of the session.  Lavonne demonstrated fair  understanding of the education provided.     See EMR under Media for exercises provided prior visit.    Assessment     She is progressing well with UE strengthening and cervical ROM. Added SL ER and serratus ball on wall today, which she tolerated well. Will continue to emphasize posture and UE strengthening.      Lavonne is progressing well towards her goals.   Pt prognosis is Fair.     Pt will continue to benefit from skilled outpatient physical therapy to address the deficits listed in the problem list box on initial evaluation, provide pt/family education and to maximize pt's level of independence in the home and community environment.     Pt's spiritual, cultural and educational needs considered and pt agreeable to plan of care and goals.    Anticipated barriers to physical therapy: None    Goals:   Short Term Goals (3 Weeks):   1. Pt to increase strength to least 4/5 of muscles tested to allow for improvement in functional activities such as performing chores and ADLS. (Met)  3. Pt to improve gross cervical motion in rotation by 10 degrees to allow for improved functional mobility including driving and reading. (Met)  4. Pt to report compliance with HEP 3x/week and demonstrate proper exercise technique to PT to show competence with self management of condition. (Met)    Long Term Goals (6 Weeks):   1. Pt to achieve <45% limitation as measured by the FOTO to demonstrate decreased disability. (met)  2. Pt to increase strength to at least 4+/5 of muscles tested to allow for improvement in functional activities such as overhead lifting and cooking/cleaning. (Progressing, not met)  3. Pt to improve gross cervical motion to <20% limitations to allow for improved functional mobility with performing ADL's with pain <2/10. (Progressing, not met)      Plan     Continue UE strengthening and focusing on improving cervical ROM     Benja Brooks, PT

## 2019-08-12 ENCOUNTER — CLINICAL SUPPORT (OUTPATIENT)
Dept: REHABILITATION | Facility: HOSPITAL | Age: 44
End: 2019-08-12
Payer: MEDICAID

## 2019-08-12 DIAGNOSIS — M43.6 NECK STIFFNESS: ICD-10-CM

## 2019-08-12 PROCEDURE — 97110 THERAPEUTIC EXERCISES: CPT | Mod: PO

## 2019-08-12 NOTE — PROGRESS NOTES
"  Physical Therapy Daily Treatment Note     Name: Lavonne Emery [facility]  Clinic Number: [id number]    Therapy Diagnosis:   Encounter Diagnosis   Name Primary?    Neck stiffness      Physician: Gurjit Avalos,*    Visit Date: 8/14/2019  Physician Orders: PT Eval and Treat   Medical Diagnosis from Referral: Disc disorder of cervical region   Evaluation Date: 7/15/2019  Authorization Period Expiration: 12/31/2019   Plan of Care Expiration: 09/06/2019  Visit # / Visits authorized: 10/ 12    Time In: 855  Time Out: 955  Total Billable Time: 50 minutes    Precautions: Standard    Subjective     Pt reports: She states her hands are feeling better and reports no pain in her neck this morning.   She was compliant with home exercise program.  Response to previous treatment: soreness the day of treatment and the next day  Functional change: No change    Pain: 0/10 "Stiffness"  Location: bilateral neck      Objective     Lavonne received therapeutic exercises to develop strength, ROM, posture and core stabilization for 50 minutes including:    UBE 3''forward/3'backward     Seated chin tucks x 10   Shoulder extension RTB 2 x 10    Shoulder IR 2 x 10 RTB  Shoulder ER 2 x 10 YTB- decreased resistance  Tricep pushdowns RTB 2 x 10   Bicep curls with 1# weight 2 x 10  Rows 2 x 10 RTB    UT stretch 3 x 30s    LS stretch 3x30s   Breuggers 2 x 10 YTB- decreased resistance    Supine wand flexion 2 x10 3#  Serratus punches 2 x 10 3#  Supine horizontal abduction YTB 2 x 10   Supine X's 2 x 10 YTB  SL Shoulder ER 2 x 10 1# - cues for form    Scaption 2 x10 1#     Ball circles on wall 30x CW/ 30x CCW with green ball    Lavonne received hot pack for 10 minutes to cervical spine to increase circulation and promote tissue healing at the end of treatment      Home Exercises Provided and Patient Education Provided     Education provided:   - Differentiating between pain and muscle soreness  - Continuing HEP     Written Home Exercises Provided: " Patient instructed to cont prior HEP.  Exercises were reviewed and Lavonne was able to demonstrate them prior to the end of the session.  Lavonne demonstrated fair  understanding of the education provided.     See EMR under Media for exercises provided prior visit.    Assessment     Pt reports she is happy with her progress in physical therapy and is independent with her HEP and symptom management. She requests discharge from PT and verbalizes understanding to continue with exercises to maximize her UE strength and focus on her posture. She is discharged from PT.     Lavonne is progressing well towards her goals.   Pt prognosis is Fair.     Pt will continue to benefit from skilled outpatient physical therapy to address the deficits listed in the problem list box on initial evaluation, provide pt/family education and to maximize pt's level of independence in the home and community environment.     Pt's spiritual, cultural and educational needs considered and pt agreeable to plan of care and goals.    Anticipated barriers to physical therapy: None    Goals:   Short Term Goals (3 Weeks):   1. Pt to increase strength to least 4/5 of muscles tested to allow for improvement in functional activities such as performing chores and ADLS. (Met)  3. Pt to improve gross cervical motion in rotation by 10 degrees to allow for improved functional mobility including driving and reading. (Met)  4. Pt to report compliance with HEP 3x/week and demonstrate proper exercise technique to PT to show competence with self management of condition. (Met)    Long Term Goals (6 Weeks):   1. Pt to achieve <45% limitation as measured by the FOTO to demonstrate decreased disability. (met)  2. Pt to increase strength to at least 4+/5 of muscles tested to allow for improvement in functional activities such as overhead lifting and cooking/cleaning. (Progressing, not met)  3. Pt to improve gross cervical motion to <20% limitations to allow for improved  functional mobility with performing ADL's with pain <2/10. (Progressing, not met)      Plan     Discharge from PT     Benja Brooks, PT

## 2019-08-12 NOTE — PROGRESS NOTES
"  Physical Therapy Daily Treatment Note     Name: Lavonne Emery [facility]  Clinic Number: [id number]    Therapy Diagnosis:   Encounter Diagnosis   Name Primary?    Neck stiffness      Physician: Gurjit Avalos,*    Visit Date: 8/12/2019  Physician Orders: PT Eval and Treat   Medical Diagnosis from Referral: Disc disorder of cervical region   Evaluation Date: 7/15/2019  Authorization Period Expiration: 12/31/2019   Plan of Care Expiration: 09/06/2019  Visit # / Visits authorized: 9/ 12    Time In: 0900  Time Out: 1005  Total Billable Time: 55 minutes    Precautions: Standard    Subjective     Pt reports: She had a busy weekend.  Pt reports the highest the pain was over the weekend was a 5/10.  Has noticed every time she uses the red band, her wrist and hands bother her.  She thinks this may be due to her carpal tunnel.  She also starts to get tingling in the L hand.  Pt reports she started to notice this the second time she started using the increased resistance.  She doesn't notice the symptoms as much with the yellow band.  She was compliant with home exercise program.  Response to previous treatment: soreness the day of treatment and the next day  Functional change: No change    Pain: 3/10 "Stiffness"  Location: bilateral neck      Objective     Lavonne received therapeutic exercises to develop strength, ROM, posture and core stabilization for 54  minutes including:    UBE 2'forward/2'backward     Seated chin tucks x 10   Shoulder extension RTB 2 x 10    Shoulder IR 2 x 10 RTB  Shoulder ER 2 x 10 YTB- decreased resistance  Tricep pushdowns RTB 2 x 10   Bicep curls RTB 2 x 10 - unable to coordinate  Bicep curls with 1# weight 2 x 10  Rows 2 x 10 RTB    UT stretch 3 x 30s   LS stretch 3x30s   Breuggers 2 x 10 YTB- decreased resistance    Supine wand flexion 2 x10 3#  Serratus punches 2 x 10 3#  Supine horizontal abduction YTB 2 x 10   Supine X's 2 x 10 YTB  SL Shoulder ER 2 x 10 1# - cues for form    Scaption 2 x10 " 1#     Ball circles on wall 30x CW/ 30x CCW with green ball    Lavonne received hot pack for 10 minutes to cervical spine to increase circulation and promote tissue healing at the end of treatment      Home Exercises Provided and Patient Education Provided     Education provided:   - Differentiating between pain and muscle soreness  - Continuing HEP     Written Home Exercises Provided: Patient instructed to cont prior HEP.  Exercises were reviewed and Lavonne was able to demonstrate them prior to the end of the session.  Lavonne demonstrated fair  understanding of the education provided.     See EMR under Media for exercises provided prior visit.    Assessment     Pt still requires cueing to perform scapular strengthening exercises correctly and without compensation.  Pt with no complaints of an increase in symptoms with exercises.  Resistance was decreased in a few exercises due to patient being unable to perform correctly without compensation and without tense gripping.  Pt advised to  more with thumb and second digit instead of gripping with entire first to help with carpal tunnel symptoms. Will continue to emphasize posture and UE strengthening.      Lavonne is progressing well towards her goals.   Pt prognosis is Fair.     Pt will continue to benefit from skilled outpatient physical therapy to address the deficits listed in the problem list box on initial evaluation, provide pt/family education and to maximize pt's level of independence in the home and community environment.     Pt's spiritual, cultural and educational needs considered and pt agreeable to plan of care and goals.    Anticipated barriers to physical therapy: None    Goals:   Short Term Goals (3 Weeks):   1. Pt to increase strength to least 4/5 of muscles tested to allow for improvement in functional activities such as performing chores and ADLS. (Met)  3. Pt to improve gross cervical motion in rotation by 10 degrees to allow for improved  functional mobility including driving and reading. (Met)  4. Pt to report compliance with HEP 3x/week and demonstrate proper exercise technique to PT to show competence with self management of condition. (Met)    Long Term Goals (6 Weeks):   1. Pt to achieve <45% limitation as measured by the FOTO to demonstrate decreased disability. (met)  2. Pt to increase strength to at least 4+/5 of muscles tested to allow for improvement in functional activities such as overhead lifting and cooking/cleaning. (Progressing, not met)  3. Pt to improve gross cervical motion to <20% limitations to allow for improved functional mobility with performing ADL's with pain <2/10. (Progressing, not met)      Plan     Continue UE strengthening and focusing on improving cervical ROM     Carla Nina, PTA

## 2019-08-14 ENCOUNTER — CLINICAL SUPPORT (OUTPATIENT)
Dept: REHABILITATION | Facility: HOSPITAL | Age: 44
End: 2019-08-14
Payer: MEDICAID

## 2019-08-14 DIAGNOSIS — M43.6 NECK STIFFNESS: ICD-10-CM

## 2019-08-14 DIAGNOSIS — R29.3 POSTURE ABNORMALITY: Primary | ICD-10-CM

## 2019-08-14 PROCEDURE — 97110 THERAPEUTIC EXERCISES: CPT | Mod: PO

## 2019-10-09 ENCOUNTER — OFFICE VISIT (OUTPATIENT)
Dept: PODIATRY | Facility: CLINIC | Age: 44
End: 2019-10-09
Payer: MEDICAID

## 2019-10-09 ENCOUNTER — HOSPITAL ENCOUNTER (OUTPATIENT)
Dept: RADIOLOGY | Facility: HOSPITAL | Age: 44
Discharge: HOME | End: 2019-10-09
Attending: PODIATRIST
Payer: MEDICAID

## 2019-10-09 VITALS — BODY MASS INDEX: 35.68 KG/M2 | HEIGHT: 59 IN | WEIGHT: 177 LBS

## 2019-10-09 DIAGNOSIS — M21.6X2 ACQUIRED EQUINUS DEFORMITY OF BOTH FEET: ICD-10-CM

## 2019-10-09 DIAGNOSIS — M76.62 ACHILLES TENDINITIS OF BOTH LOWER EXTREMITIES: ICD-10-CM

## 2019-10-09 DIAGNOSIS — M21.6X1 ACQUIRED EQUINUS DEFORMITY OF BOTH FEET: ICD-10-CM

## 2019-10-09 DIAGNOSIS — M76.61 ACHILLES TENDINITIS OF BOTH LOWER EXTREMITIES: ICD-10-CM

## 2019-10-09 DIAGNOSIS — G57.53 TARSAL TUNNEL SYNDROME OF BOTH LOWER EXTREMITIES: Primary | ICD-10-CM

## 2019-10-09 DIAGNOSIS — M54.31 SCIATICA OF RIGHT SIDE: ICD-10-CM

## 2019-10-09 PROCEDURE — 73650 X-RAY EXAM OF HEEL: CPT | Mod: 50,TC,FY,PO

## 2019-10-09 PROCEDURE — 99213 OFFICE O/P EST LOW 20 MIN: CPT | Mod: PBBFAC,25,PN | Performed by: PODIATRIST

## 2019-10-09 PROCEDURE — 73650 X-RAY EXAM OF HEEL: CPT | Mod: 26,50,, | Performed by: RADIOLOGY

## 2019-10-09 PROCEDURE — 99213 PR OFFICE/OUTPT VISIT, EST, LEVL III, 20-29 MIN: ICD-10-PCS | Mod: S$PBB,,, | Performed by: PODIATRIST

## 2019-10-09 PROCEDURE — 99999 PR PBB SHADOW E&M-EST. PATIENT-LVL III: ICD-10-PCS | Mod: PBBFAC,,, | Performed by: PODIATRIST

## 2019-10-09 PROCEDURE — 99999 PR PBB SHADOW E&M-EST. PATIENT-LVL III: CPT | Mod: PBBFAC,,, | Performed by: PODIATRIST

## 2019-10-09 PROCEDURE — 99213 OFFICE O/P EST LOW 20 MIN: CPT | Mod: S$PBB,,, | Performed by: PODIATRIST

## 2019-10-09 PROCEDURE — 73650 XR CALCANEUS BILATERAL: ICD-10-PCS | Mod: 26,50,, | Performed by: RADIOLOGY

## 2019-10-09 RX ORDER — ERYTHROMYCIN 5 MG/G
OINTMENT OPHTHALMIC
Refills: 0 | COMMUNITY
Start: 2019-10-01 | End: 2019-11-20 | Stop reason: ALTCHOICE

## 2019-10-09 RX ORDER — METHYLPREDNISOLONE 4 MG/1
TABLET ORAL
Qty: 1 PACKAGE | Refills: 0 | Status: SHIPPED | OUTPATIENT
Start: 2019-10-09 | End: 2019-10-30

## 2019-10-09 RX ORDER — FLUTICASONE PROPIONATE 50 MCG
SPRAY, SUSPENSION (ML) NASAL
Refills: 5 | COMMUNITY
Start: 2019-09-18

## 2019-10-10 ENCOUNTER — PATIENT MESSAGE (OUTPATIENT)
Dept: PHYSICAL MEDICINE AND REHAB | Facility: CLINIC | Age: 44
End: 2019-10-10

## 2019-10-10 ENCOUNTER — PATIENT MESSAGE (OUTPATIENT)
Dept: ORTHOPEDICS | Facility: CLINIC | Age: 44
End: 2019-10-10

## 2019-10-16 ENCOUNTER — CLINICAL SUPPORT (OUTPATIENT)
Dept: REHABILITATION | Facility: HOSPITAL | Age: 44
End: 2019-10-16
Attending: PODIATRIST
Payer: MEDICAID

## 2019-10-16 DIAGNOSIS — R29.898 ANKLE WEAKNESS: ICD-10-CM

## 2019-10-16 DIAGNOSIS — R26.9 GAIT ABNORMALITY: ICD-10-CM

## 2019-10-16 PROBLEM — M43.6 NECK STIFFNESS: Status: RESOLVED | Noted: 2019-07-15 | Resolved: 2019-10-16

## 2019-10-16 PROBLEM — R68.89 DECREASED FUNCTIONAL ACTIVITY TOLERANCE: Status: RESOLVED | Noted: 2017-04-20 | Resolved: 2019-10-16

## 2019-10-16 PROBLEM — G89.29 CHRONIC PAIN OF RIGHT ELBOW: Status: RESOLVED | Noted: 2017-04-20 | Resolved: 2019-10-16

## 2019-10-16 PROBLEM — M25.60 DECREASED RANGE OF MOTION: Status: RESOLVED | Noted: 2017-04-20 | Resolved: 2019-10-16

## 2019-10-16 PROBLEM — R29.3 POSTURE ABNORMALITY: Status: RESOLVED | Noted: 2018-05-22 | Resolved: 2019-10-16

## 2019-10-16 PROBLEM — M25.521 CHRONIC PAIN OF RIGHT ELBOW: Status: RESOLVED | Noted: 2017-04-20 | Resolved: 2019-10-16

## 2019-10-16 PROBLEM — G89.29 CHRONIC PAIN: Status: RESOLVED | Noted: 2018-05-22 | Resolved: 2019-10-16

## 2019-10-16 PROBLEM — M54.2 NECK PAIN ON RIGHT SIDE: Status: RESOLVED | Noted: 2018-11-26 | Resolved: 2019-10-16

## 2019-10-16 PROCEDURE — 97162 PT EVAL MOD COMPLEX 30 MIN: CPT | Mod: PO

## 2019-10-16 PROCEDURE — 97110 THERAPEUTIC EXERCISES: CPT | Mod: PO

## 2019-10-16 NOTE — PATIENT INSTRUCTIONS
Towel Scrunches    Place both foot flat on towel, knee pointed forward. Use forefoot and toes to pull towel backward.  Do not allow heel or knee to move. Repetitions should be slow and controlled.  Repeat 10 times each side per set. Do 1 sets per session. Do 3 sessions per day.      Gastroc, Sitting (Passive)    Sit with leg straight out in front of you and a towel under your heel and around ball of foot. Gently pull toward body. Hold 30 seconds.   Repeat 2 times each side per set. Do 1 sets per session. Do 3 sessions per day.      Resistance Band Ankle Movements, 4 ways    1. Wrap band around foot and attach below the foot. Pull foot up against resistance band. Slowly release for 3-5 seconds.  2. Wrap band around foot and hold band in your hand. Push foot down against resistance band. Slowly release for 3-5 seconds.  3. Wrap band around foot and loop around other foot and hold the end of the band. Pull foot out to side against resistance band. Slowly release for 3-5 seconds.   4. Cross one leg over the other, wrap band around bottom foot, step top of foot on band and hold the end of the band. Pull foot to the inside against resistance band. Slowly release for 3-5 seconds.     Use Yellow resistance band.  Repeat 10 times each side per set. Do 1 sets per session. Do 3 sessions per day.    Copyright © [employer]. All rights reserved.

## 2019-10-16 NOTE — PLAN OF CARE
[facility]  Physical Therapy Initial Evaluation    Name: Lavonne Pierre  Clinic Number: [id number]    Therapy Diagnosis:   Encounter Diagnoses   Name Primary?    Gait abnormality     Ankle weakness      Physician: Lester De Jesus DPM    Physician Orders: PT Eval and Treat   Medical Diagnosis from Referral: G57.53 (ICD-10-CM) - Tarsal tunnel syndrome of both lower extremities  M54.31 (ICD-10-CM) - Sciatica of right side  M76.61,M76.62 (ICD-10-CM) - Achilles tendinitis of both lower extremities  M21.6X1,M21.6X2 (ICD-10-CM) - Acquired equinus deformity of both feet  Evaluation Date: 10/16/2019  Authorization Period Expiration: 10/8/2020  Plan of Care Expiration: 12/13/19  Visit # / Visits authorized: 1/ 1    Time In: 9:00 am  Time Out: 10:00 am  Total Billable Time: 60 minutes    Precautions: Standard    Subjective   Date of onset: dr cain 10/9/19  History of current condition - Lavonne reports: she has been having a lot of problems with both of her feet, that has been going on for a long time. The top, bottom, and back of the feet have been hurting her, especially the back of the feet recently. She occasionally gets to the point where she can't stand and walk. She has to shift side to side and move her feet to relieve the pain. She has been using bands that Benja gave her to stretch out her calves. She does ankle circles, and puts eucalyptus lotion on the feet, which helps for a little. She has no numbness or tingling in the feet. She has an EMG study scheduled with Dr. Estrada 11/21/19. She has been taking steroids prescribed by Dr. De Jesus, and is almost done with those. She feels there hasn't been any change with the steroids, but feels she has swelling in the feet by the end of the day. PT is familiar with patient due to previous therapy trials. She has a history of poor overall motor control and awareness, with pain catastrophizing tendencies and difficulty differentiating  between pain and expected muscle fatigue and muscle stretching with exercises.      Medical History:   Past Medical History:   Diagnosis Date    Anxiety 2013    Asthma     Depression     Fibromyalgia     GERD (gastroesophageal reflux disease)     History of pneumonia     HTN (hypertension)     Preeclampsia     Sleep apnea     Uses CPAP       Surgical History:   Lavonne Pierre  has a past surgical history that includes  section, low transverse (); Breast biopsy; Tubal ligation (); and Carpal tunnel release (Right, 2017).    Medications:   Lavonne has a current medication list which includes the following prescription(s): albuterol, breo ellipta, diclofenac sodium, erythromycin, fluticasone propionate, ketoconazole, lidocaine-prilocaine, lisinopril, methylprednisolone, montelukast, multivitamin, and tramadol.    Allergies:   Review of patient's allergies indicates:   Allergen Reactions    Ibuprofen Other (See Comments)     Affects stomach ulcer, avoids all NSAIDS    Latex, natural rubber Rash     Small red bumps on skin contact        Imaging, x-ray: Minimal Achilles enthesophyte formation.    Prior Therapy: yes for back, shoulders  Social History: lives with son and fiance   Occupation: on disability  Prior Level of Function: difficulty with all activities due to fibromyalgia  Current Level of Function: increased difficulty with ADLs requiring standing and walking, like shopping and cleaning    Pain:  Current 6/10, worst 9/10, best 6/10   Location: R>L heel and posterior ankle  Description: Shooting  Aggravating Factors: Standing, Walking and Getting out of bed/chair  Easing Factors: tylenol extra strength    Pts goals: subsiding of pain; walking without pain    Objective     Observation: good arch maintenance in standing, no visible calcaneal eversion, but bilateral foot abduction    Gait: R>L foot abduction, wide stance, decreased toe-off bilaterally    PROM Right Left  Comment   DF: 8* degrees 8* degrees Calf stretch   PF: 50* degrees 50* degrees Anterior foot, tingling   Eversion: 25* degrees 30* degrees Anterior foot   Inversion: 12* degrees 15* degrees Anterior foot   1st MTP Ext: 70 degrees 60 degrees    1st MTP Flex: 0 degrees 10 degrees    *pain     Right Left Comment   DF: 4+/5 4/5    PF: 4/5 4+/5    Eversion: 4+/5 4+/5    Inversion: 4+/5 4+/5    1st MTP Ext: 5/5 5/5    1st MTP Flex: 5/5 5/5      - Anterior Drawer: right/left negative  - Posterior Drawer: right/left negative  - Talar Tilt: right/left negative  Bilateral talar hypomobility, but testing is limited due to tenderness with hand placement    Functional movements:   Squat: bilateral knee valgus and functional arch collapse, unable to correct with verbal cueing  Calf raise: able to perform bilateral heel raise; unable to perform SL heel raise without UE support due to coordination deficits    Palpation: global tenderness to palpation with tenderness reported at all sites tested- achilles tendon, calcaneal insertion, plantar fascia, posterior tibialis tendon, anterior tibialis tendon, dorsal foot, phalanges      CMS Impairment/Limitation/Restriction for FOTO Foot Survey    Therapist reviewed FOTO scores for Lavonne Emery Ignacio on 10/16/2019.   FOTO documents entered into [employer] - see Media section.    Limitation Score: 65%  Category: Body Position         TREATMENT   Treatment Time In: 9:45 am  Treatment Time Out: 10:00 am  Total Treatment time separate from Evaluation: 15 minutes    Lavonne received therapeutic exercises to develop strength, ROM and flexibility for 15 minutes including:  Towel plantar fascia stretch 2x30 sec each  4-way ankle yellow theraband x10 each BLE  Towel scrunches x2 min    Home Exercises and Patient Education Provided    Education provided:   - pathophysiology    Written Home Exercises Provided: yes.  Exercises were reviewed and Lavonne was able to demonstrate them prior to the end of the  session.  Lavonne demonstrated fair  understanding of the education provided.     See EMR under Patient Instructions for exercises provided 10/16/2019.    Assessment   Lavonne is a 44 y.o. female referred to outpatient Physical Therapy with a medical diagnosis of tarsal tunnel syndrome, sciatica of right side, achilles tendinitis of both lower extremities, acquired equinus deformity of both feet. Pt presents with decreased LE strength and ROM, gait abnormality, balance deficits, and decreased tolerance for functional mobility. She would benefit from skilled PT services to improve LE functional alignment, arch stability, postural awareness, strength, ROM, and gait, in order to return patient to her prior level of function. Her progress may be limited by her comorbid fibromyalgia and chronic widespread pain symptoms.     Pt prognosis is Guarded.   Pt will benefit from skilled outpatient Physical Therapy to address the deficits stated above and in the chart below, provide pt/family education, and to maximize pt's level of independence.     Plan of care discussed with patient: Yes  Pt's spiritual, cultural and educational needs considered and patient is agreeable to the plan of care and goals as stated below:     Anticipated Barriers for therapy: fibromyalgia, chronicity of symptoms    Medical Necessity is demonstrated by the following  History  Co-morbidities and personal factors that may impact the plan of care Co-morbidities:   anxiety, HTN, fibromyalgia, asthma    Personal Factors:   coping style     high   Examination  Body Structures and Functions, activity limitations and participation restrictions that may impact the plan of care Body Regions:   back  lower extremities    Body Systems:    gross symmetry  ROM  strength  gross coordinated movement  balance  gait  transfers  transitions  motor control  motor learning    Participation Restrictions:   Difficulty with community ambulation    Activity limitations:    Learning and applying knowledge  no deficits    General Tasks and Commands  undertaking multiple tasks    Communication  no deficits    Mobility  lifting and carrying objects  walking    Self care  no deficits    Domestic Life  shopping  cooking  doing house work (cleaning house, washing dishes, laundry)  assisting others    Interactions/Relationships  family relationships    Life Areas  no deficits    Community and Social Life  community life  recreation and leisure         high   Clinical Presentation evolving clinical presentation with changing clinical characteristics moderate   Decision Making/ Complexity Score: moderate     Goals:  Short Term Goals: 4 weeks   - amb 2 blocks on level tile without pain provocation or need for rest  - do SLS >10 sec without LOB or pain  - do anterior step up of 6  without pain provocation    Long Term Goals: 8 weeks   - pt will be able to perform squat with arch control and no visible knee valgus for improved functional stability  - pt will demonstrate a 5 degree improvement in ankle dorsiflexion ROM for improved mobility  - pt will demonstrate 5/5 LE strength via MMT for improved stability  - pt will report <5/10 pain with a full day of household activities and shopping for improved functional tolerance    Plan   Plan of care Certification: 10/16/2019 to 12/13/19.    Outpatient Physical Therapy 2 times weekly for 8 weeks to include the following interventions: Gait Training, Manual Therapy, Moist Heat/ Ice, Neuromuscular Re-ed, Patient Education, Therapeutic Activites, Therapeutic Exercise and dry needling.     Fela Tavarez, PT

## 2019-10-18 ENCOUNTER — CLINICAL SUPPORT (OUTPATIENT)
Dept: REHABILITATION | Facility: HOSPITAL | Age: 44
End: 2019-10-18
Attending: PODIATRIST
Payer: MEDICAID

## 2019-10-18 DIAGNOSIS — R26.9 GAIT ABNORMALITY: ICD-10-CM

## 2019-10-18 DIAGNOSIS — R29.898 ANKLE WEAKNESS: ICD-10-CM

## 2019-10-18 PROCEDURE — 97110 THERAPEUTIC EXERCISES: CPT | Mod: PO

## 2019-10-18 NOTE — PATIENT INSTRUCTIONS
Ankle Dorsiflexion, Self-Mobilization, Standing        Stand, one foot on step. Lean forward. Hold _5__ seconds.   Repeat _10__ times per session. Do _3__ sessions per day.    Copyright © [employer]. All rights reserved.

## 2019-10-18 NOTE — PROGRESS NOTES
Physical Therapy Daily Treatment Note     Name: Lavonne Pierre  Clinic Number: [id number]    Therapy Diagnosis:   Encounter Diagnoses   Name Primary?    Gait abnormality     Ankle weakness      Physician: Lester De Jesus DPM    Visit Date: 10/18/2019  Physician Orders: PT Eval and Treat   Medical Diagnosis from Referral: G57.53 (ICD-10-CM) - Tarsal tunnel syndrome of both lower extremities  M54.31 (ICD-10-CM) - Sciatica of right side  M76.61,M76.62 (ICD-10-CM) - Achilles tendinitis of both lower extremities  M21.6X1,M21.6X2 (ICD-10-CM) - Acquired equinus deformity of both feet  Evaluation Date: 10/16/2019  Authorization Period Expiration: 10/8/2020  Plan of Care Expiration: 12/13/19  Visit # / Visits authorized: 1/ 12     Time In: 8:00 am  Time Out: 8:54 am  Total Billable Time: 54 minutes     Precautions: Standard    Subjective     Pt reports: The most pain in the feet is posteriorly.  Pt reports the pain is about the same.   She was compliant with home exercise program.  Pt reports doing exercises three times a day.  Response to previous treatment: sore  Functional change: too soon to tell    Pain: 6/10  Location: B feet    Objective     Lavonne received therapeutic exercises to develop strength, ROM and flexibility for 54 minutes including:    Towel plantar fascia stretch 2x30 sec each  4-way ankle yellow theraband 2x10 each BLE  Towel scrunches x2 min  BAPS board Level 2 dorsiflexion/plantarflexion, inversion/eversion x 2 min  Gastroc stretch on incline 3 x 30 sec  Dorsiflexion mobs on 2nd step x 10 5 sec hold      Home Exercises Provided and Patient Education Provided     Education provided:   - HEP    Written Home Exercises Provided: yes.  Exercises were reviewed and Lavonne was able to demonstrate them prior to the end of the session.  Lavonne demonstrated good  understanding of the education provided.     See EMR under Patient Instructions for exercises provided 10/18/2019.    Assessment     Pt with  fair treatment of exercises.  Pt reported increased pain following treatment.  Pt unable to tolerate manual therapy techniques so this was halted at this time.  May try and incorporate mobilizations in a future session.  Will have to progress patient slowly as patient's symptoms are highly irritable.  Lavonne is progressing well towards her goals.   Pt prognosis is Guarded.     Pt will continue to benefit from skilled outpatient physical therapy to address the deficits listed in the problem list box on initial evaluation, provide pt/family education and to maximize pt's level of independence in the home and community environment.     Pt's spiritual, cultural and educational needs considered and pt agreeable to plan of care and goals.    Anticipated barriers to physical therapy: fibromyalgia, chronicity of symptoms    Goals:     Short Term Goals: 4 weeks   - amb 2 blocks on level tile without pain provocation or need for rest  - do SLS >10 sec without LOB or pain  - do anterior step up of 6  without pain provocation     Long Term Goals: 8 weeks   - pt will be able to perform squat with arch control and no visible knee valgus for improved functional stability  - pt will demonstrate a 5 degree improvement in ankle dorsiflexion ROM for improved mobility  - pt will demonstrate 5/5 LE strength via MMT for improved stability  - pt will report <5/10 pain with a full day of household activities and shopping for improved functional tolerance    Plan     Continue with FERDINAND Nina PTA

## 2019-10-21 ENCOUNTER — HOSPITAL ENCOUNTER (OUTPATIENT)
Dept: RADIOLOGY | Facility: HOSPITAL | Age: 44
Discharge: HOME | End: 2019-10-21
Attending: FAMILY MEDICINE
Payer: MEDICAID

## 2019-10-21 ENCOUNTER — OFFICE VISIT (OUTPATIENT)
Dept: FAMILY MEDICINE | Facility: CLINIC | Age: 44
End: 2019-10-21
Payer: MEDICAID

## 2019-10-21 ENCOUNTER — TELEPHONE (OUTPATIENT)
Dept: ADMINISTRATIVE | Facility: OTHER | Age: 44
End: 2019-10-21

## 2019-10-21 VITALS
TEMPERATURE: 98 F | HEIGHT: 59 IN | BODY MASS INDEX: 35.96 KG/M2 | HEART RATE: 86 BPM | SYSTOLIC BLOOD PRESSURE: 116 MMHG | OXYGEN SATURATION: 96 % | DIASTOLIC BLOOD PRESSURE: 72 MMHG | WEIGHT: 178.38 LBS

## 2019-10-21 DIAGNOSIS — M51.36 DDD (DEGENERATIVE DISC DISEASE), LUMBAR: ICD-10-CM

## 2019-10-21 DIAGNOSIS — G57.50 TARSAL TUNNEL SYNDROME, UNSPECIFIED LATERALITY: ICD-10-CM

## 2019-10-21 DIAGNOSIS — M79.7 FIBROMYALGIA: ICD-10-CM

## 2019-10-21 DIAGNOSIS — R13.10 DYSPHAGIA, UNSPECIFIED TYPE: Primary | ICD-10-CM

## 2019-10-21 PROCEDURE — 90471 IMMUNIZATION ADMIN: CPT | Mod: PBBFAC,PO

## 2019-10-21 PROCEDURE — 72100 X-RAY EXAM L-S SPINE 2/3 VWS: CPT | Mod: TC,FY,PO

## 2019-10-21 PROCEDURE — 72100 X-RAY EXAM L-S SPINE 2/3 VWS: CPT | Mod: 26,,, | Performed by: RADIOLOGY

## 2019-10-21 PROCEDURE — 99999 PR PBB SHADOW E&M-EST. PATIENT-LVL IV: CPT | Mod: PBBFAC,,, | Performed by: FAMILY MEDICINE

## 2019-10-21 PROCEDURE — 72100 XR LUMBAR SPINE AP AND LATERAL: ICD-10-PCS | Mod: 26,,, | Performed by: RADIOLOGY

## 2019-10-21 PROCEDURE — 99214 OFFICE O/P EST MOD 30 MIN: CPT | Mod: PBBFAC,PO | Performed by: FAMILY MEDICINE

## 2019-10-21 PROCEDURE — 99999 PR PBB SHADOW E&M-EST. PATIENT-LVL IV: ICD-10-PCS | Mod: PBBFAC,,, | Performed by: FAMILY MEDICINE

## 2019-10-21 PROCEDURE — 99214 PR OFFICE/OUTPT VISIT, EST, LEVL IV, 30-39 MIN: ICD-10-PCS | Mod: S$PBB,,, | Performed by: FAMILY MEDICINE

## 2019-10-21 PROCEDURE — 99214 OFFICE O/P EST MOD 30 MIN: CPT | Mod: S$PBB,,, | Performed by: FAMILY MEDICINE

## 2019-10-21 RX ORDER — MODAFINIL 200 MG/1
TABLET ORAL
Refills: 3 | COMMUNITY
Start: 2019-10-14 | End: 2019-10-21

## 2019-10-21 RX ORDER — AZELASTINE 1 MG/ML
SPRAY, METERED NASAL
Refills: 0 | COMMUNITY
Start: 2019-10-14

## 2019-10-21 RX ORDER — OMEPRAZOLE 40 MG/1
CAPSULE, DELAYED RELEASE ORAL
Refills: 2 | COMMUNITY
Start: 2019-10-16 | End: 2020-11-10

## 2019-10-21 RX ORDER — MODAFINIL 100 MG/1
TABLET ORAL
Qty: 15 TABLET | Refills: 3 | Status: SHIPPED | OUTPATIENT
Start: 2019-10-21 | End: 2019-12-03 | Stop reason: SDUPTHER

## 2019-10-21 NOTE — TELEPHONE ENCOUNTER
----- Message from Briana Arteaga MA sent at 10/21/2019  8:42 AM CDT -----  Type: Needs Medical Advice    Who Called: Patient    Best Call Back Number: [phone] (home)     Additional Information: Patient seen today and advised to follow up in 3 months. Please contact to schedule, has Medicaid

## 2019-10-21 NOTE — PROGRESS NOTES
Subjective:      Patient ID: Lavonne Pierre is a 44 y.o. female.    Chief Complaint: Ankle Pain (worse, both feet, f/u Dr Mcgee 6/2019 )    Lavonne is a 44 y.o. female who presents to the podiatry clinic  with complaint of  bilateral foot pain left worse than right. She relates she has had generalized bilateral pain ever since she was diagnosed with fibro myalgia, however recently she has had an increase in pain to the left lateral ankle. The pain is intermittent and can come out of nowhere. She can have the pain at rest or when ambulating, the pain is sharp and can radiate into her toes. She has bilateral foot pain along the entire bottom of her feet into her toes that has been going on for much longer periods of time. She has a history of back pain and sciatica, has had injections in the back in the past.     4/10/19: Patient returns for follow up left ankle pain, she relates no pain to the area today the injection and the brace have helped greatly. She reports new symptoms of scaling and itching skin to the plantar feet. No open wounds, no self treatment. Duration of several weeks. She relates continue burning and tingling pains to the plantar feet with weight bearing, however is planning on back surgery and would like to get that done to see if it helps before working up with EMG for tarsal tunnel.    10/9/19: Patient returns with continued pain bilateral ankle and feet, worse with weight bearing long periods of time. Unable to get the EMG earlier due to complications with other health issues bit would like to begin working up the pain again    Review of Systems   Constitution: Negative for chills and fever.   Cardiovascular: Positive for leg swelling. Negative for claudication.   Respiratory: Negative for shortness of breath.    Skin: Negative for itching, nail changes and rash.   Musculoskeletal: Positive for back pain, joint pain, muscle weakness and myalgias. Negative for muscle cramps.    Gastrointestinal: Negative for nausea and vomiting.   Neurological: Positive for paresthesias. Negative for focal weakness, loss of balance and numbness.           Objective:      Physical Exam   Constitutional: She is oriented to person, place, and time. She appears well-developed and well-nourished. No distress.   Cardiovascular:   Pulses:       Dorsalis pedis pulses are 2+ on the right side, and 2+ on the left side.        Posterior tibial pulses are 2+ on the right side, and 2+ on the left side.   < 3 sec capillary refill time to toes 1-5 bilateral. Toes and feet are warm to touch proximally with normal distal cooling b/l. There is some hair growth on the feet and toes b/l. There is no edema b/l. No spider veins or varicosities present b/l.      Musculoskeletal:       There is tenderness to the tarsal tunnel and abductor muscle belly bilateral. \    Bilateral posterior heel pain at the achilles insertion    Equinus noted b/l ankles with < 5 deg DF noted. MMT 5/5 in DF/PF/Inv/Ev resistance with no reproduction of pain in any direction. Passive range of motion of ankle and pedal joints is painless b/l.     Neurological: She is alert and oriented to person, place, and time. She has normal strength. She displays no atrophy and no tremor. No sensory deficit. She exhibits normal muscle tone.   Positive tinel sign bilateral.   Skin: Skin is warm, dry and intact. No abrasion, no bruising, no burn, no ecchymosis, no laceration, no lesion, no petechiae and no rash noted. She is not diaphoretic. No cyanosis or erythema. No pallor. Nails show no clubbing.   Skin temperature, texture and turgor within normal limits.    Dry scale with superficial flakes over an erythematous base in a moccasin pattern bilateral  without ulceration, drainage, pus, tracking, fluctuance, malodor, or cardinal signs infection.     Psychiatric: She has a normal mood and affect. Her behavior is normal.             Assessment:       Encounter  Diagnoses   Name Primary?    Tarsal tunnel syndrome of both lower extremities Yes    Sciatica of right side     Achilles tendinitis of both lower extremities     Acquired equinus deformity of both feet          Plan:       Lavonne was seen today for ankle pain.    Diagnoses and all orders for this visit:    Tarsal tunnel syndrome of both lower extremities  -     Ambulatory consult to Physical Therapy    Sciatica of right side  -     Ambulatory consult to Physical Therapy    Achilles tendinitis of both lower extremities  -     Ambulatory consult to Physical Therapy  -     X-Ray Calcaneus Bilateral; Future    Acquired equinus deformity of both feet  -     Ambulatory consult to Physical Therapy    Other orders  -     methylPREDNISolone (MEDROL DOSEPACK) 4 mg tablet; use as directed      I counseled the patient on her conditions, their implications and medical management.      Will work up the tarsal tunnel with EMG, she first wants to have her back surgery    PT to mireille with her pain    Patient will stretch the tendo achilles complex three times daily as demonstrated in the office.  Literature was dispensed illustrating proper stretching technique.    Patient will obtain over the counter arch supports and wear them in shoes whenever possible.  Athletic shoes intended for walking or running are usually best.      Return after EMG    Lester De Jesus DPM

## 2019-10-21 NOTE — PROGRESS NOTES
Subjective:       Patient ID: Lavonne Pierre is a 44 y.o. female    Chief Complaint: Back Pain (more than normal); Follow-up (3mth); and Medication Refill (discuss MODAFIL)    HPI  Here today for interval evaluation  Underwent C5-C6 cervical disc surgery since our last visit.  Still with some swallowing difficulty.  Following thyroid nodule.  No change noted.  Seeing ENT and GI.  Swallowing study pending, repeat EGD pending.  Seems to have worsened since surgery.  Neck pain improved, but incompletely.  Radiculopathy much better.  Worsening of foot pain associated with both tarsal tunnel and Achilles tendonitis.  EMG pending.  Treated with physical therapy and steroid dose trav.     Review of Systems   Constitutional: Negative for activity change and unexpected weight change.        Recently took Provigil and experienced palpitations and hyperactivity   HENT: Positive for rhinorrhea and trouble swallowing. Negative for hearing loss.    Eyes: Positive for discharge. Negative for visual disturbance.   Respiratory: Positive for chest tightness. Negative for wheezing.    Cardiovascular: Positive for chest pain (chronic musculoskeletal). Negative for palpitations.   Gastrointestinal: Positive for constipation. Negative for blood in stool, diarrhea and vomiting.   Endocrine: Negative for polydipsia and polyuria.   Genitourinary: Negative for difficulty urinating, dysuria, hematuria and menstrual problem.   Musculoskeletal: Positive for arthralgias, back pain, joint swelling and neck pain.   Neurological: Negative for weakness and headaches.   Psychiatric/Behavioral: Negative for confusion and dysphoric mood.        Objective:   Physical Exam   Constitutional: She appears well-developed and well-nourished.   HENT:   Head: Normocephalic and atraumatic.   Eyes: Pupils are equal, round, and reactive to light. Conjunctivae and EOM are normal. No scleral icterus.   Neck: Normal range of motion. Neck supple. No thyromegaly  present.   Cardiovascular: Normal rate, regular rhythm and normal heart sounds. Exam reveals no gallop and no friction rub.   No murmur heard.  Pulmonary/Chest: Effort normal and breath sounds normal. No respiratory distress. She has no wheezes. She has no rales.   Lymphadenopathy:     She has no cervical adenopathy.   Vitals reviewed.       Assessment:       1. Dysphagia, unspecified type     2. Fibromyalgia     3. Tarsal tunnel syndrome, unspecified laterality     4. DDD (degenerative disc disease), lumbar  X-Ray Lumbar Spine Ap And Lateral    Ambulatory Referral to Physical/Occupational Therapy        Plan:       Dysphagia, unspecified type  - Await EGD, continue GI  - Continue ENT    Fibromyalgia  -     modafinil (PROVIGIL) 100 MG Tab; Take one half daily (50mg)  Dispense: 15 tablet; Refill: 3    Tarsal tunnel syndrome, unspecified laterality  - Continue Podiatry  - Continue current therapy  - Await EMG    DDD (degenerative disc disease), lumbar  -     X-Ray Lumbar Spine Ap And Lateral; Future; Expected date: 10/21/2019  -     Ambulatory Referral to Physical/Occupational Therapy  - Follow up in about 3 months (around 1/21/2020).

## 2019-10-24 ENCOUNTER — CLINICAL SUPPORT (OUTPATIENT)
Dept: REHABILITATION | Facility: HOSPITAL | Age: 44
End: 2019-10-24
Attending: PODIATRIST
Payer: MEDICAID

## 2019-10-24 DIAGNOSIS — R26.9 GAIT ABNORMALITY: ICD-10-CM

## 2019-10-24 DIAGNOSIS — R29.898 ANKLE WEAKNESS: ICD-10-CM

## 2019-10-24 PROCEDURE — 97110 THERAPEUTIC EXERCISES: CPT | Mod: PO

## 2019-10-24 NOTE — PROGRESS NOTES
Physical Therapy Daily Treatment Note     Name: Lavonne Tony  Clinic Number: [id number]    Therapy Diagnosis:   Encounter Diagnoses   Name Primary?    Gait abnormality     Ankle weakness      Physician: Lester De Jesus DPM    Visit Date: 10/24/2019  Physician Orders: PT Eval and Treat   Medical Diagnosis from Referral: G57.53 (ICD-10-CM) - Tarsal tunnel syndrome of both lower extremities  M54.31 (ICD-10-CM) - Sciatica of right side  M76.61,M76.62 (ICD-10-CM) - Achilles tendinitis of both lower extremities  M21.6X1,M21.6X2 (ICD-10-CM) - Acquired equinus deformity of both feet  Evaluation Date: 10/16/2019  Authorization Period Expiration: 10/8/2020  Plan of Care Expiration: 12/13/19  Visit # / Visits authorized: 3/ 12     Time In: 8:00 am  Time Out: 8:55 am  Total Billable Time: 55 minutes     Precautions: Standard    Subjective     Pt reports: She got a new order for her back, and wants to start working on that next week too. Educated her that we will have to do 1x/week for the back, 1x/week for the ankles.   She was compliant with home exercise program.  Pt reports doing exercises three times a day.  Response to previous treatment: sore  Functional change: too soon to tell    Pain: 4/10  Location: B feet    Objective     Lavonne received therapeutic exercises to develop strength, ROM and flexibility for 55 minutes including:  Upright bike x5 min for LE muscular endurance  Towel plantar fascia stretch 2x30 sec each  Towel calf stretch 2x30 sec each  4-way ankle red theraband 2x10 each BLE  SL clamshell red theraband 2x10 each  Towel scrunches x2 min  BAPS board Level 2 dorsiflexion/plantarflexion, inversion/eversion x 2 min  Seated SL eccentric heel raise 10# kettlebell on knee 2x10 each  Tandem stance balance on Airex 2x30 sec each  Gastroc stretch on incline 3 x 30 sec  Dorsiflexion mobs on 2nd step x 10 5 sec hold    Home Exercises Provided and Patient Education Provided     Education provided:   -  HEP    Written Home Exercises Provided: yes.  Exercises were reviewed and Lavonne was able to demonstrate them prior to the end of the session.  Lavonne demonstrated good  understanding of the education provided.     See EMR under Patient Instructions for exercises provided 10/18/2019.    Assessment     Fair tolerance for exercises today with continued education necessary for the difference between expected muscle use and stretching vs. Pain. Able to add some LE strengthening. Will continue to progress as tolerated.   Lavonne is progressing well towards her goals.   Pt prognosis is Guarded.     Pt will continue to benefit from skilled outpatient physical therapy to address the deficits listed in the problem list box on initial evaluation, provide pt/family education and to maximize pt's level of independence in the home and community environment.     Pt's spiritual, cultural and educational needs considered and pt agreeable to plan of care and goals.    Anticipated barriers to physical therapy: fibromyalgia, chronicity of symptoms    Goals:     Short Term Goals: 4 weeks   - amb 2 blocks on level tile without pain provocation or need for rest  - do SLS >10 sec without LOB or pain  - do anterior step up of 6  without pain provocation     Long Term Goals: 8 weeks   - pt will be able to perform squat with arch control and no visible knee valgus for improved functional stability  - pt will demonstrate a 5 degree improvement in ankle dorsiflexion ROM for improved mobility  - pt will demonstrate 5/5 LE strength via MMT for improved stability  - pt will report <5/10 pain with a full day of household activities and shopping for improved functional tolerance    Plan     Continue with POC    Fela Tavarez, PT

## 2019-10-29 ENCOUNTER — TELEPHONE (OUTPATIENT)
Dept: OBSTETRICS AND GYNECOLOGY | Facility: CLINIC | Age: 44
End: 2019-10-29

## 2019-10-29 ENCOUNTER — CLINICAL SUPPORT (OUTPATIENT)
Dept: REHABILITATION | Facility: HOSPITAL | Age: 44
End: 2019-10-29
Attending: PODIATRIST
Payer: MEDICAID

## 2019-10-29 DIAGNOSIS — R29.898 ANKLE WEAKNESS: ICD-10-CM

## 2019-10-29 DIAGNOSIS — R26.9 GAIT ABNORMALITY: ICD-10-CM

## 2019-10-29 PROCEDURE — 97140 MANUAL THERAPY 1/> REGIONS: CPT | Mod: PO

## 2019-10-29 PROCEDURE — 97110 THERAPEUTIC EXERCISES: CPT | Mod: PO

## 2019-10-29 NOTE — PROGRESS NOTES
Physical Therapy Daily Treatment Note     Name: Lavonne Pierre  Clinic Number: [id number]    Therapy Diagnosis:   No diagnosis found.  Physician: Lester De Jesus DPM    Visit Date: 10/29/2019  Physician Orders: PT Eval and Treat   Medical Diagnosis from Referral: G57.53 (ICD-10-CM) - Tarsal tunnel syndrome of both lower extremities  M54.31 (ICD-10-CM) - Sciatica of right side  M76.61,M76.62 (ICD-10-CM) - Achilles tendinitis of both lower extremities  M21.6X1,M21.6X2 (ICD-10-CM) - Acquired equinus deformity of both feet  Evaluation Date: 10/16/2019  Authorization Period Expiration: 10/8/2020  Plan of Care Expiration: 12/13/19  Visit # / Visits authorized: 4/ 12     Time In: 8:05 am  Time Out: 9:00 am  Total Billable Time: 55 minutes     Precautions: Standard    Subjective     Pt reports: Pain is at a lower level today.  She was compliant with home exercise program.  Pt reports doing exercises three times a day.  Response to previous treatment: sore for about a day  Functional change: too soon to tell    Pain: 4/10  Location: B feet    Objective     Lavonne received therapeutic exercises to develop strength, ROM and flexibility for 55 minutes including:    Upright bike x 5 min for LE muscular endurance  Towel plantar fascia stretch 2x30 sec each  Towel calf stretch 2x30 sec each  4-way ankle red theraband 2x15 each BLE  SL clamshell red theraband 2x15 each  Towel scrunches x 2 min  BAPS board Level 2 dorsiflexion/plantarflexion, inversion/eversion x 2 min  Seated SL eccentric heel raise 10# kettlebell on knee 2x10 each  Tandem stance balance on Airex 2x30 sec each  Gastroc stretch on incline 3 x 30 sec  Dorsiflexion mobs on 2nd step x 10 5 sec hold    Home Exercises Provided and Patient Education Provided     Education provided:   - HEP    Written Home Exercises Provided: Patient instructed to cont prior HEP.  Exercises were reviewed and Lavonne was able to demonstrate them prior to the end of the session.  Lavonne  demonstrated good  understanding of the education provided.     See EMR under Patient Instructions for exercises provided 10/18/2019.    Assessment     Good tolerance for exercises today.  Pt able to tolerate increased reps with two exercises.  Pt hesitant to increase reps; however didn't report any pain.  Will continue to progress as tolerated.   Lavonne is progressing well towards her goals.   Pt prognosis is Guarded.     Pt will continue to benefit from skilled outpatient physical therapy to address the deficits listed in the problem list box on initial evaluation, provide pt/family education and to maximize pt's level of independence in the home and community environment.     Pt's spiritual, cultural and educational needs considered and pt agreeable to plan of care and goals.    Anticipated barriers to physical therapy: fibromyalgia, chronicity of symptoms    Goals:     Short Term Goals: 4 weeks   - amb 2 blocks on level tile without pain provocation or need for rest  - do SLS >10 sec without LOB or pain  - do anterior step up of 6  without pain provocation     Long Term Goals: 8 weeks   - pt will be able to perform squat with arch control and no visible knee valgus for improved functional stability  - pt will demonstrate a 5 degree improvement in ankle dorsiflexion ROM for improved mobility  - pt will demonstrate 5/5 LE strength via MMT for improved stability  - pt will report <5/10 pain with a full day of household activities and shopping for improved functional tolerance    Plan     Continue with FERDINAND Nina PTA

## 2019-10-29 NOTE — TELEPHONE ENCOUNTER
----- Message from Lisa Dorsey sent at 10/29/2019 11:17 AM CDT -----  Contact: patient  Patient called to schedule WWE and mammo. Wants both in office on same day- was unable to schedule anything in [facility]. Please call to schedule, thank you    Call back at: [phone]

## 2019-10-31 ENCOUNTER — CLINICAL SUPPORT (OUTPATIENT)
Dept: REHABILITATION | Facility: HOSPITAL | Age: 44
End: 2019-10-31
Attending: PODIATRIST
Payer: MEDICAID

## 2019-10-31 DIAGNOSIS — R29.898 ANKLE WEAKNESS: ICD-10-CM

## 2019-10-31 DIAGNOSIS — R26.9 GAIT ABNORMALITY: ICD-10-CM

## 2019-10-31 PROCEDURE — 97110 THERAPEUTIC EXERCISES: CPT | Mod: PO

## 2019-10-31 NOTE — PROGRESS NOTES
"  Physical Therapy Daily Treatment Note     Name: Lavonne Emery [facility]  Clinic Number: [id number]    Therapy Diagnosis:   Encounter Diagnoses   Name Primary?    Gait abnormality     Ankle weakness      Physician: Lester De Jesus DPM    Visit Date: 10/31/2019  Physician Orders: PT Eval and Treat   Medical Diagnosis from Referral: G57.53 (ICD-10-CM) - Tarsal tunnel syndrome of both lower extremities  M54.31 (ICD-10-CM) - Sciatica of right side  M76.61,M76.62 (ICD-10-CM) - Achilles tendinitis of both lower extremities  M21.6X1,M21.6X2 (ICD-10-CM) - Acquired equinus deformity of both feet  Evaluation Date: 10/16/2019  Authorization Period Expiration: 10/8/2020  Plan of Care Expiration: 12/13/19  Visit # / Visits authorized: 5/ 12     Time In: 8:05 am  Time Out: 9:00 am  Total Billable Time: 55 minutes     Precautions: Standard    Subjective     Pt reports: Everything is the same.  Pt wants to follow up with scheduling an appointment to do the evaluation for the back.  She was compliant with home exercise program.  Pt reports doing exercises twice times a day.  Response to previous treatment: sore for about a day  Functional change: too soon to tell    Pain: 3/10  Location: B feet    Objective     Lavonne received therapeutic exercises to develop strength, ROM and flexibility for 55 minutes including:    Upright bike x 5 min for LE muscular endurance  Towel plantar fascia stretch 2x30 sec each  Towel calf stretch 2x30 sec each  4-way ankle red theraband 2x15 each BLE  SL clamshell red theraband 2x15 each  Towel scrunches x 2 min  BAPS board Level 2 dorsiflexion/plantarflexion, inversion/eversion x 2 min  Seated SL eccentric heel raise 10# kettlebell on knee 2x15 each  Tandem stance balance on Airex 2x30 sec each  Gastroc stretch on incline 3 x 30 sec  Dorsiflexion mobs on 2nd step x 10 5 sec hold  Step downs from 6" step x 10    Home Exercises Provided and Patient Education Provided     Education provided:   - " HEP    Written Home Exercises Provided: Patient instructed to cont prior HEP.  Exercises were reviewed and Lavonne was able to demonstrate them prior to the end of the session.  Lavonne demonstrated good  understanding of the education provided.     See EMR under Patient Instructions for exercises provided 10/18/2019.    Assessment     Good tolerance for exercises today.  Pt able to tolerate increased reps with two exercises.  Will continue to progress as tolerated.   Lavonne is progressing well towards her goals.   Pt prognosis is Guarded.     Pt will continue to benefit from skilled outpatient physical therapy to address the deficits listed in the problem list box on initial evaluation, provide pt/family education and to maximize pt's level of independence in the home and community environment.     Pt's spiritual, cultural and educational needs considered and pt agreeable to plan of care and goals.    Anticipated barriers to physical therapy: fibromyalgia, chronicity of symptoms    Goals:     Short Term Goals: 4 weeks   - amb 2 blocks on level tile without pain provocation or need for rest  - do SLS >10 sec without LOB or pain  - do anterior step up of 6  without pain provocation     Long Term Goals: 8 weeks   - pt will be able to perform squat with arch control and no visible knee valgus for improved functional stability  - pt will demonstrate a 5 degree improvement in ankle dorsiflexion ROM for improved mobility  - pt will demonstrate 5/5 LE strength via MMT for improved stability  - pt will report <5/10 pain with a full day of household activities and shopping for improved functional tolerance    Plan     Continue with FERDINAND Nina PTA

## 2019-11-05 NOTE — PROGRESS NOTES
"  Physical Therapy Daily Treatment Note     Name: Lavonne Emery [facility]  Clinic Number: [id number]    Therapy Diagnosis:   Encounter Diagnoses   Name Primary?    Gait abnormality     Ankle weakness      Physician: Lester De Jesus DPM    Visit Date: 11/7/2019  Physician Orders: PT Eval and Treat   Medical Diagnosis from Referral: G57.53 (ICD-10-CM) - Tarsal tunnel syndrome of both lower extremities  M54.31 (ICD-10-CM) - Sciatica of right side  M76.61,M76.62 (ICD-10-CM) - Achilles tendinitis of both lower extremities  M21.6X1,M21.6X2 (ICD-10-CM) - Acquired equinus deformity of both feet  Evaluation Date: 10/16/2019  Authorization Period Expiration: 10/8/2020  Plan of Care Expiration: 12/13/19  Visit # / Visits authorized: 6/ 12     Time In: 800 am  Time Out: 900 am  Total Billable Time: 55 minutes     Precautions: Standard    Subjective     Pt reports: Her feet have been feeling better since beginning physical therapy.  She was compliant with home exercise program.  Pt reports doing exercises twice times a day.  Response to previous treatment: sore for about a day  Functional change: too soon to tell    Pain: 3/10  Location: B feet    Objective     Lavonne received therapeutic exercises to develop strength, ROM and flexibility for 55 minutes including:    Upright bike x 5 min for LE muscular endurance  Towel plantar fascia stretch 2x30 sec each  Towel calf stretch 2x30 sec each  4-way ankle red theraband 2x15 each BLE  SL clamshell red theraband 2x15 each  Towel scrunches x 2 min  BAPS board Level 2 dorsiflexion/plantarflexion, inversion/eversion x 2 min  Seated SL eccentric heel raise 10# kettlebell on knee 2x15 each  Tandem stance balance on Airex 2x30 sec each  Gastroc stretch on incline 3 x 30 sec  Dorsiflexion mobs on 2nd step x 10 5 sec hold  Step downs from 6" step x 10    Home Exercises Provided and Patient Education Provided     Education provided:   - HEP    Written Home Exercises Provided: Patient instructed " to cont prior HEP.  Exercises were reviewed and Lavonne was able to demonstrate them prior to the end of the session.  Lavonne demonstrated good  understanding of the education provided.     See EMR under Patient Instructions for exercises provided 10/18/2019.    Assessment     Lavonne reports mild to mod improvements with her ankle since beginning physical therapy. She tolerated all treatment well with no exacerbations of her signs and symptoms. Will reassess next visit.     Lavonne is progressing well towards her goals.   Pt prognosis is Guarded.     Pt will continue to benefit from skilled outpatient physical therapy to address the deficits listed in the problem list box on initial evaluation, provide pt/family education and to maximize pt's level of independence in the home and community environment.     Pt's spiritual, cultural and educational needs considered and pt agreeable to plan of care and goals.    Anticipated barriers to physical therapy: fibromyalgia, chronicity of symptoms    Goals:     Short Term Goals: 4 weeks   - amb 2 blocks on level tile without pain provocation or need for rest  - do SLS >10 sec without LOB or pain  - do anterior step up of 6  without pain provocation     Long Term Goals: 8 weeks   - pt will be able to perform squat with arch control and no visible knee valgus for improved functional stability  - pt will demonstrate a 5 degree improvement in ankle dorsiflexion ROM for improved mobility  - pt will demonstrate 5/5 LE strength via MMT for improved stability  - pt will report <5/10 pain with a full day of household activities and shopping for improved functional tolerance    Plan     Continue with FERDINAND Brooks, PT

## 2019-11-07 ENCOUNTER — CLINICAL SUPPORT (OUTPATIENT)
Dept: REHABILITATION | Facility: HOSPITAL | Age: 44
End: 2019-11-07
Attending: PODIATRIST
Payer: MEDICARE

## 2019-11-07 DIAGNOSIS — R26.9 GAIT ABNORMALITY: ICD-10-CM

## 2019-11-07 DIAGNOSIS — R29.898 ANKLE WEAKNESS: ICD-10-CM

## 2019-11-07 PROCEDURE — 97110 THERAPEUTIC EXERCISES: CPT | Mod: PO

## 2019-11-11 NOTE — PROGRESS NOTES
"  Physical Therapy Daily Treatment Note     Name: Lavonne Emery [facility]  Clinic Number: [id number]    Therapy Diagnosis:   Encounter Diagnoses   Name Primary?    Gait abnormality     Ankle weakness      Physician: Lester De Jesus DPM    Visit Date: 11/12/2019  Physician Orders: PT Eval and Treat   Medical Diagnosis from Referral: G57.53 (ICD-10-CM) - Tarsal tunnel syndrome of both lower extremities  M54.31 (ICD-10-CM) - Sciatica of right side  M76.61,M76.62 (ICD-10-CM) - Achilles tendinitis of both lower extremities  M21.6X1,M21.6X2 (ICD-10-CM) - Acquired equinus deformity of both feet  Evaluation Date: 10/16/2019  Authorization Period Expiration: 10/8/2020  Plan of Care Expiration: 12/13/19  Visit # / Visits authorized: 7/ 12     Time In: 800 am  Time Out: 858 am  Total Billable Time: 58  minutes     Precautions: Standard    Subjective     Pt reports: Her feet feel better with no increase in pain over the weekend.   She was compliant with home exercise program.  Pt reports doing exercises twice times a day.  Response to previous treatment: sore for about a day  Functional change: too soon to tell    Pain: 2/10  Location: B feet    Objective     Lavonne received therapeutic exercises to develop strength, ROM and flexibility for 55 minutes including:     Upright bike x 5 min for LE muscular endurance  Towel plantar fascia stretch 2x30 sec each  Towel calf stretch 2x30 sec each  4-way ankle red theraband 2x15 each BLE  SL clamshell red theraband 2x15 each  Towel scrunches x 2 min  BAPS board Level 3 dorsiflexion/plantarflexion, inversion/eversion x 30  Seated SL eccentric heel raise 10# kettlebell on knee 2x15 each  Tandem stance balance on Airex 2x30 sec each  Gastroc stretch on incline 3 x 30 sec  Dorsiflexion mobs on 2nd step x 10 5 sec hold  Step downs from 6" step x 10    Home Exercises Provided and Patient Education Provided     Education provided:   - HEP    Written Home Exercises Provided: Patient instructed to " cont prior HEP.  Exercises were reviewed and Lavonne was able to demonstrate them prior to the end of the session.  Lavonne demonstrated good  understanding of the education provided.     See EMR under Patient Instructions for exercises provided 10/18/2019.    Assessment     Lavonne is improving with her ankle ROM and gait with physical therapy. She continues to have mild pain with ADLs and prolonged standing and walking. Will continue to treat her ankle and focus on LE strength and functional mobility. Goals updated to reflect progress.      Lavonne is progressing well towards her goals.   Pt prognosis is Guarded.     Pt will continue to benefit from skilled outpatient physical therapy to address the deficits listed in the problem list box on initial evaluation, provide pt/family education and to maximize pt's level of independence in the home and community environment.     Pt's spiritual, cultural and educational needs considered and pt agreeable to plan of care and goals.    Anticipated barriers to physical therapy: fibromyalgia, chronicity of symptoms    Goals:   Short Term Goals: 4 weeks   - amb 2 blocks on level tile without pain provocation or need for rest - (Met)  - do SLS >10 sec without LOB or pain (Progressing, not met)  - do anterior step up of 6  without pain provocation (Met)    Long Term Goals: 8 weeks   - pt will be able to perform squat with arch control and no visible knee valgus for improved functional stability (Progressing, not met)  - pt will demonstrate a 5 degree improvement in ankle dorsiflexion ROM for improved mobility. (Progressing, not met)  - pt will demonstrate 5/5 LE strength via MMT for improved stability. (Progressing, not met)  - pt will report <5/10 pain with a full day of household activities and shopping for improved functional tolerance. (Progressing, not met)    Plan     Continue with FERDINAND Brooks, PT

## 2019-11-12 ENCOUNTER — CLINICAL SUPPORT (OUTPATIENT)
Dept: REHABILITATION | Facility: HOSPITAL | Age: 44
End: 2019-11-12
Attending: PODIATRIST
Payer: MEDICARE

## 2019-11-12 DIAGNOSIS — R29.898 ANKLE WEAKNESS: ICD-10-CM

## 2019-11-12 DIAGNOSIS — R26.9 GAIT ABNORMALITY: ICD-10-CM

## 2019-11-12 PROCEDURE — 97110 THERAPEUTIC EXERCISES: CPT | Mod: PO

## 2019-11-12 NOTE — PLAN OF CARE
[facility]  Physical Therapy Reassessment    Name: Lavonne Pierre  Clinic Number: [id number]    Therapy Diagnosis:   Encounter Diagnoses   Name Primary?    Gait abnormality     Ankle weakness      Physician: Lester De Jesus DPM    Visit Date: 11/12/2019  Physician Orders: PT Eval and Treat   Medical Diagnosis from Referral: G57.53 (ICD-10-CM) - Tarsal tunnel syndrome of both lower extremities  M54.31 (ICD-10-CM) - Sciatica of right side  M76.61,M76.62 (ICD-10-CM) - Achilles tendinitis of both lower extremities  M21.6X1,M21.6X2 (ICD-10-CM) - Acquired equinus deformity of both feet  Evaluation Date: 10/16/2019  Authorization Period Expiration: 10/8/2020  Plan of Care Expiration: 12/13/19  Visit # / Visits authorized: 7/ 12    Precautions: Standard    Subjective   Date of onset: dr cain 10/9/19  History of current condition - Lavonne reports: she has been having a lot of problems with both of her feet, that has been going on for a long time. The top, bottom, and back of the feet have been hurting her, especially the back of the feet recently. She occasionally gets to the point where she can't stand and walk. She has to shift side to side and move her feet to relieve the pain. She has been using bands that Benja gave her to stretch out her calves. She does ankle circles, and puts eucalyptus lotion on the feet, which helps for a little. She has no numbness or tingling in the feet. She has an EMG study scheduled with Dr. Estrada 11/21/19. She has been taking steroids prescribed by Dr. De Jesus, and is almost done with those. She feels there hasn't been any change with the steroids, but feels she has swelling in the feet by the end of the day. PT is familiar with patient due to previous therapy trials. She has a history of poor overall motor control and awareness, with pain catastrophizing tendencies and difficulty differentiating between pain and expected muscle fatigue and muscle  "stretching with exercises.     Follow up : She reports better movement and with walking since beginning physical therapy. She reports her feet have been getting better with performing ADLs. She reports she has more pain if she is on her feet for too long. "I have not had any issues where I have not been able to walk."      Medical History:   Past Medical History:   Diagnosis Date    Anxiety 2013    Asthma     Depression     Fibromyalgia     GERD (gastroesophageal reflux disease)     History of pneumonia     HTN (hypertension)     Preeclampsia     Sleep apnea     Uses CPAP       Surgical History:   Lavonne Pierre  has a past surgical history that includes  section, low transverse (); Breast biopsy; Tubal ligation (); and Carpal tunnel release (Right, 2017).    Medications:   Lavonne has a current medication list which includes the following prescription(s): albuterol, azelastine, breo ellipta, diclofenac sodium, erythromycin, fluticasone propionate, ketoconazole, lidocaine-prilocaine, lisinopril, modafinil, montelukast, multivitamin, omeprazole, and tramadol.    Allergies:   Review of patient's allergies indicates:   Allergen Reactions    Ibuprofen Other (See Comments)     Affects stomach ulcer, avoids all NSAIDS    Latex, natural rubber Rash     Small red bumps on skin contact        Imaging, x-ray: Minimal Achilles enthesophyte formation.    Prior Therapy: yes for back, shoulders  Social History: lives with son and fiance   Occupation: on disability  Prior Level of Function: difficulty with all activities due to fibromyalgia  Current Level of Function: increased difficulty with ADLs requiring standing and walking, like shopping and cleaning    Pain:  Current 6/10, worst 9/10, best 6/10   Location: R>L heel and posterior ankle  Description: Shooting  Aggravating Factors: Standing, Walking and Getting out of bed/chair  Easing Factors: tylenol extra strength    Pts " goals: subsiding of pain; walking without pain    Objective     Observation: good arch maintenance in standing, no visible calcaneal eversion, but bilateral foot abduction    Gait: R>L foot abduction, wide stance, decreased toe-off bilaterally    PROM Right Left Comment   DF: 8* degrees 12* degrees Calf stretch   PF: 50* degrees 50* degrees Anterior foot, tingling   Eversion: 25* degrees 30* degrees Anterior foot   Inversion: 22* degrees 20* degrees Anterior foot   1st MTP Ext: 70 degrees 60 degrees    1st MTP Flex: 0 degrees 10 degrees    *pain     Right Left Comment   DF: 4+/5 4/5    PF: 4+/5 4+/5    Eversion: 4+/5 4+/5    Inversion: 4+/5 4+/5    1st MTP Ext: 5/5 5/5    1st MTP Flex: 5/5 5/5      - Anterior Drawer: right/left negative  - Posterior Drawer: right/left negative  - Talar Tilt: right/left negative  Bilateral talar hypomobility, but testing is limited due to tenderness with hand placement    Functional movements:   Squat: bilateral knee valgus and functional arch collapse, unable to correct with verbal cueing  Calf raise: able to perform bilateral heel raise; unable to perform SL heel raise without UE support due to coordination deficits    Palpation: global tenderness to palpation with tenderness reported at all sites tested- achilles tendon, calcaneal insertion, plantar fascia, posterior tibialis tendon, anterior tibialis tendon, dorsal foot, phalanges      CMS Impairment/Limitation/Restriction for FOTO Foot Survey    Therapist reviewed FOTO scores for Lavonne Pierre on 11/12/2019.   FOTO documents entered into [employer] - see Media section.    Limitation Score: 49%  Category: Body Position         TREATMENT   See Daily Note     Assessment   Lavonne is a 44 y.o. female referred to outpatient Physical Therapy with a medical diagnosis of tarsal tunnel syndrome, sciatica of right side, achilles tendinitis of both lower extremities, acquired equinus deformity of both feet. Since beginning PT, Lavonne has been  seen 7 times since initial evaluation on 10/16/2019. Overall, Lavonne has made good, steady progress with her PT treatments and has worked hard towards all of her PT goals as evidenced by subjective and objective improvements. She has made mild improvements with her gait and ankle ROM. Despite these improvements, she remains with deficits with her  LE and ankle strength and pain with functional mobility and will continue to benefit from skilled PT to address remaining limitations and increase functional mobility.    Pt prognosis is Guarded.   Pt will benefit from skilled outpatient Physical Therapy to address the deficits stated above and in the chart below, provide pt/family education, and to maximize pt's level of independence.     Plan of care discussed with patient: Yes  Pt's spiritual, cultural and educational needs considered and patient is agreeable to the plan of care and goals as stated below:     Anticipated Barriers for therapy: fibromyalgia, chronicity of symptoms    Medical Necessity is demonstrated by the following  History  Co-morbidities and personal factors that may impact the plan of care Co-morbidities:   anxiety, HTN, fibromyalgia, asthma    Personal Factors:   coping style     high   Examination  Body Structures and Functions, activity limitations and participation restrictions that may impact the plan of care Body Regions:   back  lower extremities    Body Systems:    gross symmetry  ROM  strength  gross coordinated movement  balance  gait  transfers  transitions  motor control  motor learning    Participation Restrictions:   Difficulty with community ambulation    Activity limitations:   Learning and applying knowledge  no deficits    General Tasks and Commands  undertaking multiple tasks    Communication  no deficits    Mobility  lifting and carrying objects  walking    Self care  no deficits    Domestic Life  shopping  cooking  doing house work (cleaning house, washing dishes,  laundry)  assisting others    Interactions/Relationships  family relationships    Life Areas  no deficits    Community and Social Life  community life  recreation and leisure         high   Clinical Presentation evolving clinical presentation with changing clinical characteristics moderate   Decision Making/ Complexity Score: moderate     Goals:  Short Term Goals: 4 weeks   - amb 2 blocks on level tile without pain provocation or need for rest - (Met)  - do SLS >10 sec without LOB or pain (Progressing, not met)  - do anterior step up of 6  without pain provocation (Met)    Long Term Goals: 8 weeks   - pt will be able to perform squat with arch control and no visible knee valgus for improved functional stability (Progressing, not met)  - pt will demonstrate a 5 degree improvement in ankle dorsiflexion ROM for improved mobility. (Progressing, not met)  - pt will demonstrate 5/5 LE strength via MMT for improved stability. (Progressing, not met)  - pt will report <5/10 pain with a full day of household activities and shopping for improved functional tolerance. (Progressing, not met)    Plan   Plan of care Certification: 11/12/2019 to 12/13/19.    Outpatient Physical Therapy 2 times weekly for 8 weeks to include the following interventions: Gait Training, Manual Therapy, Moist Heat/ Ice, Neuromuscular Re-ed, Patient Education, Therapeutic Activites, Therapeutic Exercise and dry needling.     Benja Brooks, PT

## 2019-11-14 ENCOUNTER — CLINICAL SUPPORT (OUTPATIENT)
Dept: REHABILITATION | Facility: HOSPITAL | Age: 44
End: 2019-11-14
Attending: PODIATRIST
Payer: MEDICARE

## 2019-11-14 DIAGNOSIS — M54.41 CHRONIC BILATERAL LOW BACK PAIN WITH RIGHT-SIDED SCIATICA: ICD-10-CM

## 2019-11-14 DIAGNOSIS — G89.29 CHRONIC BILATERAL LOW BACK PAIN WITH RIGHT-SIDED SCIATICA: ICD-10-CM

## 2019-11-14 DIAGNOSIS — R29.3 POSTURE ABNORMALITY: Primary | ICD-10-CM

## 2019-11-14 DIAGNOSIS — R29.898 WEAKNESS OF BOTH HIPS: ICD-10-CM

## 2019-11-14 PROCEDURE — 97110 THERAPEUTIC EXERCISES: CPT | Mod: PO

## 2019-11-14 PROCEDURE — 97161 PT EVAL LOW COMPLEX 20 MIN: CPT | Mod: PO
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
## 2019-11-18 ENCOUNTER — OFFICE VISIT (OUTPATIENT)
Dept: OBSTETRICS AND GYNECOLOGY | Facility: CLINIC | Age: 44
End: 2019-11-18
Payer: MEDICAID

## 2019-11-18 ENCOUNTER — HOSPITAL ENCOUNTER (OUTPATIENT)
Dept: RADIOLOGY | Facility: HOSPITAL | Age: 44
Discharge: HOME | End: 2019-11-18
Attending: OBSTETRICS & GYNECOLOGY
Payer: MEDICAID

## 2019-11-18 VITALS
BODY MASS INDEX: 35.87 KG/M2 | WEIGHT: 177.94 LBS | DIASTOLIC BLOOD PRESSURE: 82 MMHG | SYSTOLIC BLOOD PRESSURE: 128 MMHG | HEIGHT: 59 IN

## 2019-11-18 VITALS — WEIGHT: 177.94 LBS | BODY MASS INDEX: 35.87 KG/M2 | HEIGHT: 59 IN

## 2019-11-18 DIAGNOSIS — K58.1 IRRITABLE BOWEL SYNDROME WITH CONSTIPATION: ICD-10-CM

## 2019-11-18 DIAGNOSIS — Z12.31 VISIT FOR SCREENING MAMMOGRAM: ICD-10-CM

## 2019-11-18 DIAGNOSIS — Z01.419 GYNECOLOGIC EXAM NORMAL: Primary | ICD-10-CM

## 2019-11-18 DIAGNOSIS — R21 RASH: ICD-10-CM

## 2019-11-18 DIAGNOSIS — K59.04 CHRONIC IDIOPATHIC CONSTIPATION: ICD-10-CM

## 2019-11-18 DIAGNOSIS — M79.7 FIBROMYALGIA: ICD-10-CM

## 2019-11-18 DIAGNOSIS — R10.30 LOWER ABDOMINAL PAIN: ICD-10-CM

## 2019-11-18 PROCEDURE — 99214 OFFICE O/P EST MOD 30 MIN: CPT | Mod: PBBFAC,PN | Performed by: OBSTETRICS & GYNECOLOGY

## 2019-11-18 PROCEDURE — 88142 CYTOPATH C/V THIN LAYER: CPT

## 2019-11-18 PROCEDURE — 99396 PREV VISIT EST AGE 40-64: CPT | Mod: S$PBB,,, | Performed by: OBSTETRICS & GYNECOLOGY

## 2019-11-18 PROCEDURE — 77067 SCR MAMMO BI INCL CAD: CPT | Mod: TC,PN

## 2019-11-18 PROCEDURE — 77063 BREAST TOMOSYNTHESIS BI: CPT | Mod: 26,,, | Performed by: RADIOLOGY

## 2019-11-18 PROCEDURE — 77067 MAMMO DIGITAL SCREENING BILAT WITH TOMOSYNTHESIS_CAD: ICD-10-PCS | Mod: 26,,, | Performed by: RADIOLOGY

## 2019-11-18 PROCEDURE — 87624 HPV HI-RISK TYP POOLED RSLT: CPT

## 2019-11-18 PROCEDURE — 99999 PR PBB SHADOW E&M-EST. PATIENT-LVL IV: ICD-10-PCS | Mod: PBBFAC,,, | Performed by: OBSTETRICS & GYNECOLOGY

## 2019-11-18 PROCEDURE — 99999 PR PBB SHADOW E&M-EST. PATIENT-LVL IV: CPT | Mod: PBBFAC,,, | Performed by: OBSTETRICS & GYNECOLOGY

## 2019-11-18 PROCEDURE — 77067 SCR MAMMO BI INCL CAD: CPT | Mod: 26,,, | Performed by: RADIOLOGY

## 2019-11-18 PROCEDURE — 99396 PR PREVENTIVE VISIT,EST,40-64: ICD-10-PCS | Mod: S$PBB,,, | Performed by: OBSTETRICS & GYNECOLOGY

## 2019-11-18 PROCEDURE — 77063 MAMMO DIGITAL SCREENING BILAT WITH TOMOSYNTHESIS_CAD: ICD-10-PCS | Mod: 26,,, | Performed by: RADIOLOGY

## 2019-11-18 RX ORDER — TRIAMCINOLONE ACETONIDE 1 MG/G
CREAM TOPICAL 2 TIMES DAILY
Qty: 45 G | Refills: 0 | Status: SHIPPED | OUTPATIENT
Start: 2019-11-18 | End: 2019-11-20 | Stop reason: ALTCHOICE

## 2019-11-18 NOTE — PROGRESS NOTES
Chief Complaint   Patient presents with    Well Woman    Mammogram    has a rash on right breast    has been havin glower abdominal cramping       History of Present Illness: Lavonne Pierre is a 44 y.o. female that presents today 2019 for well gyn visit.  She reports 7 days of bleeding.  She reports longer since last child with pad changes 4 times daily. She reports itchy patch near right nipple.    Past Medical History:   Diagnosis Date    Anxiety 2013    Asthma     Depression     Fibromyalgia     GERD (gastroesophageal reflux disease)     History of pneumonia     HTN (hypertension)     Preeclampsia     Sleep apnea     Uses CPAP       Past Surgical History:   Procedure Laterality Date    BREAST BIOPSY      Benign    CARPAL TUNNEL RELEASE Right 2017     SECTION, LOW TRANSVERSE      for PREE    NECK SURGERY      TUBAL LIGATION  2009    upper GI series         Current Outpatient Medications   Medication Sig Dispense Refill    albuterol (PROVENTIL/VENTOLIN HFA) 90 mcg/actuation inhaler Ventolin HFA 90 mcg/actuation aerosol inhaler   INHALE 2 PUFFS EVERY 4 TO 6 HOURS AS NEEDED      azelastine (ASTELIN) 137 mcg (0.1 %) nasal spray U 1 TO 2 SPRAYS IN EACH NOSTRIL BID  0    BREO ELLIPTA 100-25 mcg/dose diskus inhaler INL 1 PUFF PO AT THE SAME TIME QD  11    fluticasone propionate (FLONASE) 50 mcg/actuation nasal spray INSTILL 1 TO 2 SPRAYS IN EACH NOSTRIL QD  5    lidocaine-prilocaine (EMLA) cream SARA EXT AA 1 TIME Q 4 WKS 30 MIN B TREATMENT FOR 1 DAY  1    lisinopril (PRINIVIL,ZESTRIL) 5 MG tablet TAKE 1 TABLET BY MOUTH EVERY DAY 90 tablet 3    modafinil (PROVIGIL) 100 MG Tab Take one half daily (50mg) 15 tablet 3    montelukast (SINGULAIR) 10 mg tablet 10 mg.      multivitamin (THERAGRAN) per tablet Take 1 tablet by mouth once daily.      omeprazole (PRILOSEC) 40 MG capsule TK 1 C PO QD  2    traMADol (ULTRAM) 50 mg tablet Take 2 tablets (100 mg total) by  mouth every 12 (twelve) hours as needed for Pain. 120 tablet 3    diclofenac sodium (VOLTAREN) 1 % Gel Apply 2 g topically 3 (three) times daily. (Patient not taking: Reported on 10/21/2019) 100 g 5    erythromycin (ROMYCIN) ophthalmic ointment APPLY A SMALL AMOUNT INTO BOTH EYES HS UTD  0    ketoconazole (NIZORAL) 2 % cream Apply topically once daily. (Patient not taking: Reported on 10/21/2019) 60 g 3    triamcinolone acetonide 0.1% (KENALOG) 0.1 % cream Apply topically 2 (two) times daily. 45 g 0     No current facility-administered medications for this visit.        Review of patient's allergies indicates:   Allergen Reactions    Ibuprofen Other (See Comments)     Affects stomach ulcer, avoids all NSAIDS    Latex, natural rubber Rash     Small red bumps on skin contact       Family History   Problem Relation Age of Onset    Pancreatic cancer Mother     Lymphoma Father     Diabetes Father     Hypertension Father     Pancreatic cancer Maternal Grandmother     Pancreatic cancer Maternal Uncle     Breast cancer Maternal Aunt     Breast cancer Paternal Aunt        Social History     Socioeconomic History    Marital status:      Spouse name: Not on file    Number of children: Not on file    Years of education: Not on file    Highest education level: Not on file   Occupational History    Not on file   Social Needs    Financial resource strain: Not on file    Food insecurity:     Worry: Not on file     Inability: Not on file    Transportation needs:     Medical: No     Non-medical: No   Tobacco Use    Smoking status: Never Smoker    Smokeless tobacco: Never Used   Substance and Sexual Activity    Alcohol use: No     Frequency: Never     Drinks per session: Patient refused     Binge frequency: Never    Drug use: No    Sexual activity: Never     Birth control/protection: See Surgical Hx   Lifestyle    Physical activity:     Days per week: Not on file     Minutes per session: Not on file  "   Stress: Not on file   Relationships    Social connections:     Talks on phone: Once a week     Gets together: Once a week     Attends Sikhism service: Not on file     Active member of club or organization: Yes     Attends meetings of clubs or organizations: 1 to 4 times per year     Relationship status:    Other Topics Concern    Not on file   Social History Narrative    Not on file       OB History    Para Term  AB Living   3 3 2 1   2   SAB TAB Ectopic Multiple Live Births           2      # Outcome Date GA Lbr Ron/2nd Weight Sex Delivery Anes PTL Lv   3 Term            2   29w0d   M CS-Unspec   ALEX   1 Term     M Vag-Spont   ALEX      Obstetric Comments    delivery for PREE       Review of Symptoms:  GENERAL: Denies weight gain or weight loss. Feeling well overall.   SKIN: Denies rash or lesions.   HEAD: Denies head injury or headache.   NODES: Denies enlarged lymph nodes.   CHEST: Denies chest pain or shortness of breath.   CARDIOVASCULAR: Denies palpitations or left sided chest pain.   ABDOMEN: ++lower abdominal pain, ++ constipation, no diarrhea, nausea, vomiting or rectal bleeding.   URINARY: No frequency, dysuria, hematuria, or burning on urination.  HEMATOLOGIC: No easy bruisability or excessive bleeding.   MUSCULOSKELETAL: Denies joint pain or swelling.     /82   Ht 4' 11" (1.499 m)   Wt 80.7 kg (177 lb 14.6 oz)   LMP 2019   Physical Exam:  APPEARANCE: Well nourished, well developed, in no acute distress.  SKIN: Normal skin turgor, no lesions.  NECK: Neck symmetric without masses   RESPIRATORY: Normal respiratory effort with no retractions or use of accessory muscles  CARDIOVASCULAR: Peripheral vascular system with no swelling no varicosities and palpation of pulses normal  LYMPHATIC: No enlargements of the lymph nodes noted in the neck, axillae, or groin  ABDOMEN: Soft. Mild diffuse tenderness no or masses. No hepatosplenomegaly. No " hernias.  BREASTS: Symmetrical, no skin changes or visible lesions. No palpable masses, nipple discharge or adenopathy bilaterally.  PELVIC: Normal external female genitalia without lesions. Normal hair distribution. Adequate perineal body, normal urethral meatus. Urethra with no masses.  Bladder nontender. Vagina moist and well rugated without lesions or discharge. Cervix pink and without lesions. No significant cystocele or rectocele. Bimanual exam showed uterus normal size, shape, position, mobile and nontender. Adnexa without masses or tenderness. Urethra and bladder normal.   EXTREMITIES: No clubbing cyanosis or edema.    ASSESSMENT/PLAN:  Gynecologic exam normal  -     Liquid-Based Pap Smear, Screening  -     HPV High Risk Genotypes, PCR    Visit for screening mammogram  -     Mammo Digital Screening Bilat w/ Rojas; Future; Expected date: 11/18/2019    Chronic idiopathic constipation    Fibromyalgia    Irritable bowel syndrome with constipation    Lower abdominal pain  -     US Pelvis Comp with Transvag NON-OB (xpd; Future; Expected date: 11/18/2019    Rash  -     triamcinolone acetonide 0.1% (KENALOG) 0.1 % cream; Apply topically 2 (two) times daily.  Dispense: 45 g; Refill: 0    we discussed IUD again she does not want birth control pills as she contributes it to increased hair growth.       Patient was counseled today on Pelvic exams and Pap Smear guidelines.   We discussed STD screening if at high risk for a STD.  We discussed recommendation for breast cancer screening with mammogram every other year after the age of 40 and annually after the age of 50.    We discussed colon cancer screening when indicated.   Osteoporosis screening discussed when indicated.   She was advised to see her primary care physician for all other health maintenance.     FOLLOW-UP with me for next routine visit.

## 2019-11-19 NOTE — PROGRESS NOTES
"  Physical Therapy Daily Treatment Note     Name: Lavonne Emery [facility]  Clinic Number: [id number]    Therapy Diagnosis:   No diagnosis found.  Physician: Omer Mcgee MD    Visit Date: 11/21/2019  Physician Orders: PT Eval and Treat   Medical Diagnosis from Referral: G57.53 (ICD-10-CM) - Tarsal tunnel syndrome of both lower extremities  M54.31 (ICD-10-CM) - Sciatica of right side  M76.61,M76.62 (ICD-10-CM) - Achilles tendinitis of both lower extremities  M21.6X1,M21.6X2 (ICD-10-CM) - Acquired equinus deformity of both feet  Evaluation Date: 10/16/2019  Authorization Period Expiration: 10/8/2020  Plan of Care Expiration: 12/13/19  Visit # / Visits authorized: 8/ 12     Time In: *** am  Time Out: *** am  Total Billable Time: ***  minutes     Precautions: Standard    Subjective     Pt reports: Her feet feel better with no increase in pain over the weekend.   She was compliant with home exercise program.  Pt reports doing exercises twice times a day.  Response to previous treatment: sore for about a day  Functional change: too soon to tell    Pain: 2/10  Location: B feet    Objective     Lavonne received therapeutic exercises to develop strength, ROM and flexibility for 55 minutes including:     Upright bike x 5 min for LE muscular endurance  Towel plantar fascia stretch 2x30 sec each  Towel calf stretch 2x30 sec each  4-way ankle red theraband 2x15 each BLE  SL clamshell red theraband 2x15 each  Towel scrunches x 2 min  BAPS board Level 3 dorsiflexion/plantarflexion, inversion/eversion x 30  Seated SL eccentric heel raise 10# kettlebell on knee 2x15 each  Tandem stance balance on Airex 2x30 sec each  Gastroc stretch on incline 3 x 30 sec  Dorsiflexion mobs on 2nd step x 10 5 sec hold  Step downs from 6" step x 10    Home Exercises Provided and Patient Education Provided     Education provided:   - HEP    Written Home Exercises Provided: Patient instructed to cont prior HEP.  Exercises were reviewed and Lavonne was able " to demonstrate them prior to the end of the session.  Lavonne demonstrated good  understanding of the education provided.     See EMR under Patient Instructions for exercises provided 10/18/2019.    Assessment     Lavonne is improving with her ankle ROM and gait with physical therapy. She continues to have mild pain with ADLs and prolonged standing and walking. Will continue to treat her ankle and focus on LE strength and functional mobility. Goals updated to reflect progress.      Lavonne is progressing well towards her goals.   Pt prognosis is Guarded.     Pt will continue to benefit from skilled outpatient physical therapy to address the deficits listed in the problem list box on initial evaluation, provide pt/family education and to maximize pt's level of independence in the home and community environment.     Pt's spiritual, cultural and educational needs considered and pt agreeable to plan of care and goals.    Anticipated barriers to physical therapy: fibromyalgia, chronicity of symptoms    Goals:   Short Term Goals: 4 weeks   - amb 2 blocks on level tile without pain provocation or need for rest - (Met)  - do SLS >10 sec without LOB or pain (Progressing, not met)  - do anterior step up of 6  without pain provocation (Met)    Long Term Goals: 8 weeks   - pt will be able to perform squat with arch control and no visible knee valgus for improved functional stability (Progressing, not met)  - pt will demonstrate a 5 degree improvement in ankle dorsiflexion ROM for improved mobility. (Progressing, not met)  - pt will demonstrate 5/5 LE strength via MMT for improved stability. (Progressing, not met)  - pt will report <5/10 pain with a full day of household activities and shopping for improved functional tolerance. (Progressing, not met)    Plan     Continue with FERDINAND Brooks, PT

## 2019-11-19 NOTE — PROGRESS NOTES
"    Physical Therapy Daily Treatment Note     Name: Lavonne Pierre  Clinic Number: [id number]    Therapy Diagnosis: No diagnosis found.  Physician: Omer Mcgee MD    Physician Orders: PT Eval and Treat  Medical Diagnosis from Referral: DDD (degenerative disc disease), lumbar  Evaluation Date: 11/14/2019  Authorization Period Expiration: 12/31/2019  Plan of Care Expiration: 01/10/2019  Visit # / Visits authorized: 2/ 20    Time In: ***  Time Out: ***  Total Billable Time: *** minutes    Precautions: {IP WOUND PRECAUTIONS OHS:96362}    Subjective     Pt reports: ***.  She {Actions; was/was not:65230} compliant with home exercise program.  Response to previous treatment: ***  Functional change: ***    Pain: {0-10:78337::"0"}/10  Location: {RIGHT/LEFT/BILATERAL:94745} {LOCATION ON BODY:68268}     Objective     Lavonne received therapeutic exercises to develop {AMB PT PROGRESS OBJECTIVE:74514} for *** minutes including:  All exercises (B) LEs   SLR x 10   Bridges x 10   SL clamshells RTB 2 x 10   Ball Squeezes x 10 (3 second)  Piriformis stretch 2x 30s  Supine hip flexor stretch with strap x 1 minute       Lavonne received the following manual therapy techniques: {AMB PT PROGRESS MANUAL THERAPY:78527} were applied to the: *** for *** minutes, including:  ***    Lavonne participated in neuromuscular re-education activities to improve: {AMB PT PROGRESS NEURO RE-ED:77290} for *** minutes. The following activities were included:  ***    Lavonne participated in dynamic functional therapeutic activities to improve functional performance for ***  minutes, including:  ***    Lavonne participated in gait training to improve functional mobility and safety for ***  minutes, including:  ***    Lavonne received {Blank single:67805::"hot","cold"} pack for *** minutes to {Blank single:19197::"increase circulation and promote tissue healing","to decrease circulation, pain, and swelling"}.    Lavonne received the following direct " "contact modalities after being cleared for contraindications: {AMB PT PROGRESS DIRECT CONTACT MODES:14553}    Lavonne received the following supervised modalities after being cleared for contradictions: {AMB PT SUPERVISED MODES:41633}       Home Exercises Provided and Patient Education Provided     Education provided:   - ***    Written Home Exercises Provided: {Blank single:33217::"yes","Patient instructed to cont prior HEP"}.  Exercises were reviewed and Lavonne was able to demonstrate them prior to the end of the session.  Lavonne demonstrated {Desc; good/fair/poor:29407} understanding of the education provided.     See EMR under {Blank single:78454::"Media","Patient Instructions"} for exercises provided {Blank single:42734::"11/19/2019","prior visit"}.    Assessment     ***  Lavonne {IS/IS NOT:80809} progressing well towards her goals.   Pt prognosis is {REHAB PROGNOSIS OHS:80287}.     Pt will continue to benefit from skilled outpatient physical therapy to address the deficits listed in the problem list box on initial evaluation, provide pt/family education and to maximize pt's level of independence in the home and community environment.     Pt's spiritual, cultural and educational needs considered and pt agreeable to plan of care and goals.    Anticipated barriers to physical therapy: ***    Goals:   Short Term Goals (3 Weeks):   1. Pt to increase strength to at least 4+/5 of muscles tested to allow for improvement in functional activities such as performing chores.  2. Pt to improve gross spinal motion in rotation by 25% to allow for improved functional mobility.  3. Pt to tolerate sitting for >1 hour with <3/10 pain in low back to allow for improvement in IADLs.     Long Term Goals (6 Weeks):   1. Pt to increase strength to at least 5/5 of muscles tested to allow for improvement in functional activities such as performing chores.  2. Pt to improve gross spinal motion to <10% limitations to allow for improved " functional mobility with performing ADL's  3. Pt to tolerate standing and walking for community distances with <3/10 pain in low back to improve mobility with IADL's.   4. Pt to demo good squat form with no knee valgus or trunk flexion collapse to improve ADLs.    5. Pt to report compliance with HEP 3x/week and demonstrate proper exercise technique to PT to show independence with self management of condition.       Plan     ***    Benja Brooks, PT

## 2019-11-20 ENCOUNTER — OFFICE VISIT (OUTPATIENT)
Dept: PODIATRY | Facility: CLINIC | Age: 44
End: 2019-11-20
Payer: MEDICARE

## 2019-11-20 VITALS
SYSTOLIC BLOOD PRESSURE: 118 MMHG | HEIGHT: 59 IN | WEIGHT: 179.13 LBS | HEART RATE: 78 BPM | RESPIRATION RATE: 20 BRPM | DIASTOLIC BLOOD PRESSURE: 74 MMHG | BODY MASS INDEX: 36.11 KG/M2

## 2019-11-20 DIAGNOSIS — M21.6X1 ACQUIRED EQUINUS DEFORMITY OF BOTH FEET: ICD-10-CM

## 2019-11-20 DIAGNOSIS — M54.31 SCIATICA OF RIGHT SIDE: ICD-10-CM

## 2019-11-20 DIAGNOSIS — G57.53 TARSAL TUNNEL SYNDROME OF BOTH LOWER EXTREMITIES: Primary | ICD-10-CM

## 2019-11-20 DIAGNOSIS — M21.6X2 ACQUIRED EQUINUS DEFORMITY OF BOTH FEET: ICD-10-CM

## 2019-11-20 PROCEDURE — 99213 OFFICE O/P EST LOW 20 MIN: CPT | Mod: PBBFAC,PN | Performed by: PODIATRIST

## 2019-11-20 PROCEDURE — 99999 PR PBB SHADOW E&M-EST. PATIENT-LVL III: ICD-10-PCS | Mod: PBBFAC,,, | Performed by: PODIATRIST

## 2019-11-20 PROCEDURE — 99213 PR OFFICE/OUTPT VISIT, EST, LEVL III, 20-29 MIN: ICD-10-PCS | Mod: S$PBB,,, | Performed by: PODIATRIST

## 2019-11-20 PROCEDURE — 99999 PR PBB SHADOW E&M-EST. PATIENT-LVL III: CPT | Mod: PBBFAC,,, | Performed by: PODIATRIST

## 2019-11-20 PROCEDURE — 99213 OFFICE O/P EST LOW 20 MIN: CPT | Mod: S$PBB,,, | Performed by: PODIATRIST

## 2019-11-21 ENCOUNTER — CLINICAL SUPPORT (OUTPATIENT)
Dept: REHABILITATION | Facility: HOSPITAL | Age: 44
End: 2019-11-21
Attending: PODIATRIST
Payer: MEDICARE

## 2019-11-21 ENCOUNTER — OFFICE VISIT (OUTPATIENT)
Dept: PHYSICAL MEDICINE AND REHAB | Facility: CLINIC | Age: 44
End: 2019-11-21
Payer: MEDICARE

## 2019-11-21 DIAGNOSIS — M25.571 CHRONIC PAIN OF BOTH ANKLES: Primary | ICD-10-CM

## 2019-11-21 DIAGNOSIS — G89.29 CHRONIC PAIN OF BOTH ANKLES: Primary | ICD-10-CM

## 2019-11-21 DIAGNOSIS — R29.898 ANKLE WEAKNESS: ICD-10-CM

## 2019-11-21 DIAGNOSIS — M25.572 CHRONIC PAIN OF BOTH ANKLES: Primary | ICD-10-CM

## 2019-11-21 DIAGNOSIS — R29.898 WEAKNESS OF BOTH HIPS: ICD-10-CM

## 2019-11-21 DIAGNOSIS — M54.41 CHRONIC BILATERAL LOW BACK PAIN WITH RIGHT-SIDED SCIATICA: ICD-10-CM

## 2019-11-21 DIAGNOSIS — G89.29 CHRONIC BILATERAL LOW BACK PAIN WITH RIGHT-SIDED SCIATICA: ICD-10-CM

## 2019-11-21 PROCEDURE — 95886 MUSC TEST DONE W/N TEST COMP: CPT | Mod: PBBFAC,PN | Performed by: PHYSICAL MEDICINE & REHABILITATION

## 2019-11-21 PROCEDURE — 99212 OFFICE O/P EST SF 10 MIN: CPT | Mod: PBBFAC,PN,25 | Performed by: PHYSICAL MEDICINE & REHABILITATION

## 2019-11-21 PROCEDURE — 95886 MUSC TEST DONE W/N TEST COMP: CPT | Mod: 26,S$PBB,, | Performed by: PHYSICAL MEDICINE & REHABILITATION

## 2019-11-21 PROCEDURE — 95911 NRV CNDJ TEST 9-10 STUDIES: CPT | Mod: 26,S$PBB,, | Performed by: PHYSICAL MEDICINE & REHABILITATION

## 2019-11-21 PROCEDURE — 97110 THERAPEUTIC EXERCISES: CPT | Mod: PO

## 2019-11-21 PROCEDURE — 99499 UNLISTED E&M SERVICE: CPT | Mod: S$PBB,,, | Performed by: PHYSICAL MEDICINE & REHABILITATION

## 2019-11-21 PROCEDURE — 99999 PR PBB SHADOW E&M-EST. PATIENT-LVL II: CPT | Mod: PBBFAC,,, | Performed by: PHYSICAL MEDICINE & REHABILITATION

## 2019-11-21 PROCEDURE — 95911 PR NERVE CONDUCTION STUDY; 9-10 STUDIES: ICD-10-PCS | Mod: 26,S$PBB,, | Performed by: PHYSICAL MEDICINE & REHABILITATION

## 2019-11-21 PROCEDURE — 99999 PR PBB SHADOW E&M-EST. PATIENT-LVL II: ICD-10-PCS | Mod: PBBFAC,,, | Performed by: PHYSICAL MEDICINE & REHABILITATION

## 2019-11-21 PROCEDURE — 95886 PR EMG COMPLETE, W/ NERVE CONDUCTION STUDIES, 5+ MUSCLES: ICD-10-PCS | Mod: 26,S$PBB,, | Performed by: PHYSICAL MEDICINE & REHABILITATION

## 2019-11-21 PROCEDURE — 95911 NRV CNDJ TEST 9-10 STUDIES: CPT | Mod: PBBFAC,PN | Performed by: PHYSICAL MEDICINE & REHABILITATION

## 2019-11-21 PROCEDURE — 99499 NO LOS: ICD-10-PCS | Mod: S$PBB,,, | Performed by: PHYSICAL MEDICINE & REHABILITATION

## 2019-11-21 NOTE — PROGRESS NOTES
[facility]  [address] [place] [address]             Full Name: FLIP RAUSCH Gender: Female  Patient ID: [id number] YOB: 1975  History: Pt reports parasthesias in b/l feet. Burning, tingling from level of the ankle down into all of the toes, both along the dorsum and sole of feet.  Denies h/o DM, thyroid dz, heavy EtOH.      Visit Date: 11/21/2019 09:50  Age: 44 Years 2 Months Old  Examining Physician: Hieu  Referring Physician: Jonatan  Height: 4 feet 9 inch      Sensory NCS      Nerve / Sites Rec. Site Onset Lat Peak Lat NP Amp PP Amp Segments Distance Peak Diff Velocity     ms ms µV µV  cm ms m/s   L Sural - Ankle (Calf)      Calf Ankle 3.07 3.39 17.0 5.1 Calf - Ankle 14  46      2 Ankle 2.97 3.33 23.7 26.7       R Sural - Ankle (Calf)      Calf Ankle 1.88 2.76 32.0 13.0 Calf - Ankle 14  75      2 Ankle 1.98 2.76 31.2 11.6       L Medial plantar, Lateral plantar - Ankle (Medial, lateral sole)      Medial plantar Sole Ankle 3.07 3.70 11.7 10.2 Medial plantar Sole - Ankle 14  46      Lateral plantar Sole Ankle 3.18 3.70 9.2 1.1 Lateral plantar Sole - Ankle 14  44         Medial plantar Sole - Lateral plantar Sole  0.00    R Medial plantar, Lateral plantar - Ankle (Medial, lateral sole)      Medial plantar Sole Ankle 2.76 3.33 14.0 41.0 Medial plantar Sole - Ankle 14  51      Lateral plantar Sole Ankle 3.13 3.65 8.5 0.31 Lateral plantar Sole - Ankle 14  45         Medial plantar Sole - Lateral plantar Sole  -0.31        Motor NCS      Nerve / Sites Muscle Latency Amplitude Amp % Duration Segments Distance Lat Diff Velocity     ms mV % ms  cm ms m/s   L Peroneal - EDB      Ankle EDB 5.99 4.1 100 5.16 Ankle - EDB 8        Fib head EDB 10.78 4.2 101 5.78 Fib head - Ankle 25 4.79 52   R Peroneal - EDB      Ankle EDB 5.52 8.6 100 5.10 Ankle - EDB 8        Fib head EDB 10.68 8.6 101 5.16 Fib head - Ankle 25 5.16 48   L Tibial - AH      Ankle AH 4.22 9.6 100 6.82 Ankle - AH 8         Pop fossa AH 11.56 4.9 51.2 7.24 Pop fossa - Ankle 34 7.34 46   R Tibial - AH      Ankle AH 5.52 11.6 100 5.63 Ankle - AH 8        Pop fossa AH 12.14 9.9 85 6.20 Pop fossa - Ankle 33 6.61 50       EMG         EMG Summary Table     Spontaneous MUAP Recruitment   Muscle IA Fib PSW Fasc H.F. Amp Dur. PPP Pattern   R. Vastus medialis N None None None None N N N N   R. Tibialis anterior N None None None None N N N N   R. Peroneus longus N None None None None N N N N   R. Gastrocnemius (Medial head) N None None None None N N N N   R. First dorsal interosseous N None None None None       R. Gluteus medius N None None None None N N N N   R. Gluteus sugar N None None None None       R. Lumbar paraspinals N None None None None           Summary    The motor conduction test was normal in all 4 of the tested nerves: L Peroneal - EDB, R Peroneal - EDB, L Tibial - AH, R Tibial - AH.    The sensory conduction test was normal in all 4 of the tested nerves: L Sural - Ankle (Calf), R Sural - Ankle (Calf), L Medial plantar, Lateral plantar - Ankle (Medial, lateral sole), R Medial plantar, Lateral plantar - Ankle (Medial, lateral sole).    The needle EMG study was normal in all 8 tested muscles: R. Vastus medialis, R. Tibialis anterior, R. Peroneus longus, R. Gastrocnemius (Medial head), R. First dorsal interosseous, R. Gluteus medius, R. Gluteus sugar, R. Lumbar paraspinals.      Electrodiagnostic Impression:  1. Normal electrophysiologic study.  2. There is insufficient electrodiagnostic evidence for diagnoses of peripheral polyneuropathy, focal mononeuropathy (tarsal tunnel syndrome), myopathy, or active axonal lumbosacral radiculopathy/plexopathy of the right lower extremity.    Plan:  Today's test results will be sent to her referring provider, Dr. De Jesus, for further review and direction in her treatment.    Thank you very much for the referral. Please call if you have any questions regarding this study or the report.        -------------------------------  Trevor Estrada M.D.

## 2019-11-21 NOTE — LETTER
November 21, 2019      Lester De Jesus, LEONARDO  [address] [facility] [address] [place] [address]           [facility]  [address] [place] [address]  Phone: [phone]  Fax: [phone]          Patient: Lavonne Pierre   MR Number: [id number]   YOB: 1975   Date of Visit: 11/21/2019       Dear Dr. Lester De Jesus:    Thank you for referring Lavonne Pierre to me for evaluation. Attached you will find relevant portions of my assessment and plan of care.    If you have questions, please do not hesitate to call me. I look forward to following Lavonne Pierre along with you.    Sincerely,    Trevor Estrada MD    Enclosure  CC:  No Recipients    If you would like to receive this communication electronically, please contact [email or web address] or [phone] to request more information on [employer] access.    For providers and/or their staff who would like to refer a patient to [facility], please contact us through our one-stop-shop provider referral line, [facility], at [phone].    If you feel you have received this communication in error or would no longer like to receive these types of communications, please e-mail [email or web address]

## 2019-11-21 NOTE — PROGRESS NOTES
Physical Therapy Daily Treatment Note     Name: Lavonne Tony  Clinic Number: [id number]    Therapy Diagnosis:   Encounter Diagnoses   Name Primary?    Chronic bilateral low back pain with right-sided sciatica     Weakness of both hips      Physician: Omer Mcgee MD    Visit Date: 11/21/2019  Physician Orders: PT Eval and Treat   Medical Diagnosis from Referral: G57.53 (ICD-10-CM) - Tarsal tunnel syndrome of both lower extremities  M54.31 (ICD-10-CM) - Sciatica of right side  M76.61,M76.62 (ICD-10-CM) - Achilles tendinitis of both lower extremities  M21.6X1,M21.6X2 (ICD-10-CM) - Acquired equinus deformity of both feet  Evaluation Date: 10/16/2019  Authorization Period Expiration: 10/8/2020  Plan of Care Expiration: 12/13/19  Visit # / Visits authorized: 8/ 12     Time In: 800 am  Time Out:900 am  Total Billable Time: 60 minutes     Precautions: Standard    Subjective     Pt reports: Saw the foot doctor yesterday.  He said he was going to look at her EMG results( taking EMG today) and would decide then if he needs to go in and release anything.  Pt goes back to the doctor December 4th.  Pt reports she has noticed she tends to shift her weight more on the R foot when she is standing.   She was compliant with home exercise program.  Pt reports doing exercises twice times a day.  Response to previous treatment: very little soreness  Functional change: none new noted    Pain: 4/10  Location: B feet    Objective     Lavonne received therapeutic exercises to develop strength, ROM and flexibility for 60 minutes including:     Upright bike x 5 min for LE muscular endurance  Towel plantar fascia stretch 2x30 sec each  Towel calf stretch 2x30 sec each  4-way ankle red theraband 2x15 each BLE  SL clamshell red theraband 2x15 each  Towel scrunches x 2 min  BAPS board Level 3 dorsiflexion/plantarflexion, inversion/eversion x 30  Seated SL eccentric heel raise 10# kettlebell on knee 2x15 each  Tandem stance balance on  "Airex 2x30 sec each  Gastroc stretch on incline 3 x 30 sec  Dorsiflexion mobs on 2nd step x 10 5 sec hold  Step downs from 6" step x 10    Home Exercises Provided and Patient Education Provided     Education provided:   - HEP    Written Home Exercises Provided: Patient instructed to cont prior HEP.  Exercises were reviewed and Lavonne was able to demonstrate them prior to the end of the session.  Lavonne demonstrated good  understanding of the education provided.     See EMR under Patient Instructions for exercises provided 10/18/2019.    Assessment     Pt tolerated treatment very well.  Pt with no complaints of pain with exercises.  May try and progress exercises next visit to more WB activities.  Will continue to treat her ankle and focus on LE strength and functional mobility.     Lavonne is progressing well towards her goals.   Pt prognosis is Guarded.     Pt will continue to benefit from skilled outpatient physical therapy to address the deficits listed in the problem list box on initial evaluation, provide pt/family education and to maximize pt's level of independence in the home and community environment.     Pt's spiritual, cultural and educational needs considered and pt agreeable to plan of care and goals.    Anticipated barriers to physical therapy: fibromyalgia, chronicity of symptoms    Goals:   Short Term Goals: 4 weeks   - amb 2 blocks on level tile without pain provocation or need for rest - (Met)  - do SLS >10 sec without LOB or pain (Progressing, not met)  - do anterior step up of 6  without pain provocation (Met)    Long Term Goals: 8 weeks   - pt will be able to perform squat with arch control and no visible knee valgus for improved functional stability (Progressing, not met)  - pt will demonstrate a 5 degree improvement in ankle dorsiflexion ROM for improved mobility. (Progressing, not met)  - pt will demonstrate 5/5 LE strength via MMT for improved stability. (Progressing, not met)  - pt will " report <5/10 pain with a full day of household activities and shopping for improved functional tolerance. (Progressing, not met)    Plan     Continue with FERDINAND Nina, NANO

## 2019-11-22 LAB
HPV HR 12 DNA SPEC QL NAA+PROBE: NEGATIVE
HPV16 AG SPEC QL: NEGATIVE
HPV18 DNA SPEC QL NAA+PROBE: NEGATIVE

## 2019-11-25 NOTE — PROGRESS NOTES
Physical Therapy Daily Treatment Note     Name: Lavonne Tony  Clinic Number: [id number]    Therapy Diagnosis:   No diagnosis found.  Physician: Omer Mcgee MD    Visit Date: 11/26/2019  Physician Orders: PT Eval and Treat   Medical Diagnosis from Referral: G57.53 (ICD-10-CM) - Tarsal tunnel syndrome of both lower extremities  M54.31 (ICD-10-CM) - Sciatica of right side  M76.61,M76.62 (ICD-10-CM) - Achilles tendinitis of both lower extremities  M21.6X1,M21.6X2 (ICD-10-CM) - Acquired equinus deformity of both feet  Evaluation Date: 10/16/2019  Authorization Period Expiration: 10/8/2020  Plan of Care Expiration: 12/13/19  Visit # / Visits authorized: 9/ 12     Time In: *** am  Time Out: *** am  Total Billable Time: *** minutes     Precautions: Standard    Subjective     Pt reports: Saw the foot doctor yesterday.  He said he was going to look at her EMG results( taking EMG today) and would decide then if he needs to go in and release anything.  Pt goes back to the doctor December 4th.  Pt reports she has noticed she tends to shift her weight more on the R foot when she is standing.   She was compliant with home exercise program.  Pt reports doing exercises twice times a day.  Response to previous treatment: very little soreness  Functional change: none new noted    Pain: 4/10  Location: B feet    Objective     Lavonne received therapeutic exercises to develop strength, ROM and flexibility for 60 minutes including:     Upright bike x 5 min for LE muscular endurance  Towel plantar fascia stretch 2x30 sec each  Towel calf stretch 2x30 sec each  4-way ankle red theraband 2x15 each BLE  SL clamshell red theraband 2x15 each  Towel scrunches x 2 min  BAPS board Level 3 dorsiflexion/plantarflexion, inversion/eversion x 30  Seated SL eccentric heel raise 10# kettlebell on knee 2x15 each  Tandem stance balance on Airex 2x30 sec each  Gastroc stretch on incline 3 x 30 sec  Dorsiflexion mobs on 2nd step x 10 5 sec  "hold  Step downs from 6" step x 10    Home Exercises Provided and Patient Education Provided     Education provided:   - HEP    Written Home Exercises Provided: Patient instructed to cont prior HEP.  Exercises were reviewed and Lavonne was able to demonstrate them prior to the end of the session.  Lavonne demonstrated good  understanding of the education provided.     See EMR under Patient Instructions for exercises provided 10/18/2019.    Assessment     Pt tolerated treatment very well.  Pt with no complaints of pain with exercises.  May try and progress exercises next visit to more WB activities.  Will continue to treat her ankle and focus on LE strength and functional mobility.     Lavonne is progressing well towards her goals.   Pt prognosis is Guarded.     Pt will continue to benefit from skilled outpatient physical therapy to address the deficits listed in the problem list box on initial evaluation, provide pt/family education and to maximize pt's level of independence in the home and community environment.     Pt's spiritual, cultural and educational needs considered and pt agreeable to plan of care and goals.    Anticipated barriers to physical therapy: fibromyalgia, chronicity of symptoms    Goals:   Short Term Goals: 4 weeks   - amb 2 blocks on level tile without pain provocation or need for rest - (Met)  - do SLS >10 sec without LOB or pain (Progressing, not met)  - do anterior step up of 6  without pain provocation (Met)    Long Term Goals: 8 weeks   - pt will be able to perform squat with arch control and no visible knee valgus for improved functional stability (Progressing, not met)  - pt will demonstrate a 5 degree improvement in ankle dorsiflexion ROM for improved mobility. (Progressing, not met)  - pt will demonstrate 5/5 LE strength via MMT for improved stability. (Progressing, not met)  - pt will report <5/10 pain with a full day of household activities and shopping for improved functional " tolerance. (Progressing, not met)    Plan     Continue with POC    Benja Brooks, PT

## 2019-11-25 NOTE — PROGRESS NOTES
Physical Therapy Daily Treatment Note     Name: Lavonne Pierre  Clinic Number: [id number]    Therapy Diagnosis:   Encounter Diagnoses   Name Primary?    Chronic bilateral low back pain with right-sided sciatica     Weakness of both hips      Physician: Omer Mcgee MD    Visit Date: 11/26/2019  Physician Orders: PT Eval and Treat  Medical Diagnosis from Referral: DDD (degenerative disc disease), lumbar  Evaluation Date: 11/14/2019  Authorization Period Expiration: 12/31/2019  Plan of Care Expiration: 01/10/2019  Visit # / Visits authorized: 2/20    Time In: 1300  Time Out:  1355  Total Billable Time: 55 minutes    Precautions: Standard    Subjective     Pt reports: She is having pretty constant low back pain.  She was compliant with home exercise program.  Response to previous treatment: Too soon to tell  Functional change: No change    Pain: 4/10  Location: bilateral back      Objective     Lavonne received therapeutic exercises to develop strength, flexibility and core stabilization for 55 minutes including:  Upright bike x 5 min for endurance and increased tissue perfusion   DKTC x 2 minutes (Green ball)  LTR x 2 minutes (green ball)   SLR 2 x 10   Bridges 2 x 10   SL clamshells RTB 2 x 10   Ball Squeezes 2 x 10 (3 second)  Piriformis stretch 2x 30s  Supine hip flexor stretch with strap x 2 minute  PPT 2 x10 (3 second hold)     Standing hip abduction 2 x 10 RTB  Standing hip extension 2 x 10 RTB  Leg Press 2 x 10 20#   Home Exercises Provided and Patient Education Provided     Education provided:   - Continuing HEP    Written Home Exercises Provided: yes.  Exercises were reviewed and Lavonne was able to demonstrate them prior to the end of the session.  Lavonne demonstrated good  understanding of the education provided.     See EMR under Media for exercises provided 11/26/2019.    Assessment     Lavonne tolerated treatment very well with no increase in low back pain. She continues to fatigue quickly with  hip strengthening exercises and remains significantly limited due to tissue elasticity dysfunction of hip flexors. Will continue to progress with LE strengthening as tolerated.   Lavonne is progressing well towards her goals.   Pt prognosis is Good.     Pt will continue to benefit from skilled outpatient physical therapy to address the deficits listed in the problem list box on initial evaluation, provide pt/family education and to maximize pt's level of independence in the home and community environment.     Pt's spiritual, cultural and educational needs considered and pt agreeable to plan of care and goals.    Anticipated barriers to physical therapy: None    Goals:   Goals:   Short Term Goals (3 Weeks):   1. Pt to increase strength to at least 4+/5 of muscles tested to allow for improvement in functional activities such as performing chores.  2. Pt to improve gross spinal motion in rotation by 25% to allow for improved functional mobility.  3. Pt to tolerate sitting for >1 hour with <3/10 pain in low back to allow for improvement in IADLs.     Long Term Goals (6 Weeks):   1. Pt to increase strength to at least 5/5 of muscles tested to allow for improvement in functional activities such as performing chores.  2. Pt to improve gross spinal motion to <10% limitations to allow for improved functional mobility with performing ADL's  3. Pt to tolerate standing and walking for community distances with <3/10 pain in low back to improve mobility with IADL's.   4. Pt to demo good squat form with no knee valgus or trunk flexion collapse to improve ADLs.    5. Pt to report compliance with HEP 3x/week and demonstrate proper exercise technique to PT to show independence with self management of condition.    Plan     Continue with LE strengthening and improving hip mobility    Benja Brooks, PT

## 2019-11-25 NOTE — PROGRESS NOTES
"  Physical Therapy Daily Treatment Note     Name: Lavonne Emery [facility]  Clinic Number: [id number]    Therapy Diagnosis:   Encounter Diagnoses   Name Primary?    Gait abnormality     Ankle weakness      Physician: Omer Mcgee MD    Visit Date: 11/27/2019  Physician Orders: PT Eval and Treat   Medical Diagnosis from Referral: G57.53 (ICD-10-CM) - Tarsal tunnel syndrome of both lower extremities  M54.31 (ICD-10-CM) - Sciatica of right side  M76.61,M76.62 (ICD-10-CM) - Achilles tendinitis of both lower extremities  M21.6X1,M21.6X2 (ICD-10-CM) - Acquired equinus deformity of both feet  Evaluation Date: 10/16/2019  Authorization Period Expiration: 10/8/2020  Plan of Care Expiration: 12/13/19  Visit # / Visits authorized: 9/ 12     Time In: 850 am  Time Out: 948 am  Total Billable Time: 39 minutes     Precautions: Standard    Subjective     Pt reports: She received her EMG, which she reports did not show any abnormal findings. She reports her foot pain is slightly improved.   She was compliant with home exercise program.  Pt reports doing exercises twice times a day.  Response to previous treatment: very little soreness  Functional change: none new noted    Pain: 3/10  Location: B feet    Objective     Lavonne received therapeutic exercises to develop strength, ROM and flexibility for 60 minutes including:     Upright bike x 5 min for LE muscular endurance  Towel plantar fascia stretch 2x30 sec each  Towel calf stretch 2x30 sec each  4-way ankle green theraband 2x15 each BLE  SL clamshell red theraband 2x15 each  Towel scrunches x 2 min  BAPS board Level 3 dorsiflexion/plantarflexion, inversion/eversion x 30  Seated SL eccentric heel raise 10# kettlebell on knee 2x15 each  Tandem stance balance on Airex 2x30 sec each  Gastroc stretch on incline 3 x 30 sec  Dorsiflexion mobs on 2nd step x 10 5 sec hold  Step downs from 6" step x 10    Home Exercises Provided and Patient Education Provided     Education provided:   - " HEP    Written Home Exercises Provided: Patient instructed to cont prior HEP.  Exercises were reviewed and Lavonne was able to demonstrate them prior to the end of the session.  Lavonne demonstrated good  understanding of the education provided.     See EMR under Patient Instructions for exercises provided 10/18/2019.    Assessment     Pt continues to fatigue quickly with ankle strengthening exercises. She demo mild sway with tandem stance on the foam, but is progressing well with her exercises. She will continue to benefit from ankle strengthening and mobility exercises.     Lavonne is progressing well towards her goals.   Pt prognosis is Guarded.     Pt will continue to benefit from skilled outpatient physical therapy to address the deficits listed in the problem list box on initial evaluation, provide pt/family education and to maximize pt's level of independence in the home and community environment.     Pt's spiritual, cultural and educational needs considered and pt agreeable to plan of care and goals.    Anticipated barriers to physical therapy: fibromyalgia, chronicity of symptoms    Goals:   Short Term Goals: 4 weeks   - amb 2 blocks on level tile without pain provocation or need for rest - (Met)  - do SLS >10 sec without LOB or pain (Progressing, not met)  - do anterior step up of 6  without pain provocation (Met)    Long Term Goals: 8 weeks   - pt will be able to perform squat with arch control and no visible knee valgus for improved functional stability (Progressing, not met)  - pt will demonstrate a 5 degree improvement in ankle dorsiflexion ROM for improved mobility. (Progressing, not met)  - pt will demonstrate 5/5 LE strength via MMT for improved stability. (Progressing, not met)  - pt will report <5/10 pain with a full day of household activities and shopping for improved functional tolerance. (Progressing, not met)    Plan     Continue with FERDINAND Brooks, PT

## 2019-11-26 ENCOUNTER — CLINICAL SUPPORT (OUTPATIENT)
Dept: REHABILITATION | Facility: HOSPITAL | Age: 44
End: 2019-11-26
Attending: PODIATRIST
Payer: MEDICARE

## 2019-11-26 DIAGNOSIS — M54.41 CHRONIC BILATERAL LOW BACK PAIN WITH RIGHT-SIDED SCIATICA: ICD-10-CM

## 2019-11-26 DIAGNOSIS — G89.29 CHRONIC BILATERAL LOW BACK PAIN WITH RIGHT-SIDED SCIATICA: ICD-10-CM

## 2019-11-26 DIAGNOSIS — R29.898 WEAKNESS OF BOTH HIPS: ICD-10-CM

## 2019-11-26 PROCEDURE — 97110 THERAPEUTIC EXERCISES: CPT | Mod: PO

## 2019-11-27 ENCOUNTER — CLINICAL SUPPORT (OUTPATIENT)
Dept: REHABILITATION | Facility: HOSPITAL | Age: 44
End: 2019-11-27
Attending: PODIATRIST
Payer: MEDICARE

## 2019-11-27 DIAGNOSIS — R29.898 ANKLE WEAKNESS: ICD-10-CM

## 2019-11-27 DIAGNOSIS — R26.9 GAIT ABNORMALITY: ICD-10-CM

## 2019-11-27 PROCEDURE — 97110 THERAPEUTIC EXERCISES: CPT | Mod: PO

## 2019-11-29 LAB
FINAL PATHOLOGIC DIAGNOSIS: NORMAL
Lab: NORMAL

## 2019-12-02 NOTE — PROGRESS NOTES
Subjective:      Patient ID: Lavonne Pierre is a 44 y.o. female.    Chief Complaint: Follow-up; Foot Pain (Bilateral); and Ankle Pain    Lavonne is a 44 y.o. female who presents to the podiatry clinic  with complaint of  bilateral foot pain left worse than right. She relates she has had generalized bilateral pain ever since she was diagnosed with fibro myalgia, however recently she has had an increase in pain to the left lateral ankle. The pain is intermittent and can come out of nowhere. She can have the pain at rest or when ambulating, the pain is sharp and can radiate into her toes. She has bilateral foot pain along the entire bottom of her feet into her toes that has been going on for much longer periods of time. She has a history of back pain and sciatica, has had injections in the back in the past.     4/10/19: Patient returns for follow up left ankle pain, she relates no pain to the area today the injection and the brace have helped greatly. She reports new symptoms of scaling and itching skin to the plantar feet. No open wounds, no self treatment. Duration of several weeks. She relates continue burning and tingling pains to the plantar feet with weight bearing, however is planning on back surgery and would like to get that done to see if it helps before working up with EMG for tarsal tunnel.    10/9/19: Patient returns with continued pain bilateral ankle and feet, worse with weight bearing long periods of time. Unable to get the EMG earlier due to complications with other health issues bit would like to begin working up the pain again    11/20/19: Patient returns for follow up bilateral tarsal tunnel, her EMG was normal however. No improvement with inserts and stretching    Review of Systems   Constitution: Negative for chills and fever.   Cardiovascular: Positive for leg swelling. Negative for claudication.   Respiratory: Negative for shortness of breath.    Skin: Negative for itching, nail changes and  rash.   Musculoskeletal: Positive for back pain, joint pain, muscle weakness and myalgias. Negative for muscle cramps.   Gastrointestinal: Negative for nausea and vomiting.   Neurological: Positive for paresthesias. Negative for focal weakness, loss of balance and numbness.           Objective:      Physical Exam   Constitutional: She is oriented to person, place, and time. She appears well-developed and well-nourished. No distress.   Cardiovascular:   Pulses:       Dorsalis pedis pulses are 2+ on the right side, and 2+ on the left side.        Posterior tibial pulses are 2+ on the right side, and 2+ on the left side.   < 3 sec capillary refill time to toes 1-5 bilateral. Toes and feet are warm to touch proximally with normal distal cooling b/l. There is some hair growth on the feet and toes b/l. There is no edema b/l. No spider veins or varicosities present b/l.      Musculoskeletal:       There is tenderness to the tarsal tunnel and abductor muscle belly bilateral. \    Bilateral posterior heel pain at the achilles insertion    Equinus noted b/l ankles with < 5 deg DF noted. MMT 5/5 in DF/PF/Inv/Ev resistance with no reproduction of pain in any direction. Passive range of motion of ankle and pedal joints is painless b/l.     Neurological: She is alert and oriented to person, place, and time. She has normal strength. She displays no atrophy and no tremor. No sensory deficit. She exhibits normal muscle tone.   Positive tinel sign bilateral.   Skin: Skin is warm, dry and intact. No abrasion, no bruising, no burn, no ecchymosis, no laceration, no lesion, no petechiae and no rash noted. She is not diaphoretic. No cyanosis or erythema. No pallor. Nails show no clubbing.   Skin temperature, texture and turgor within normal limits.    Dry scale with superficial flakes over an erythematous base in a moccasin pattern bilateral  without ulceration, drainage, pus, tracking, fluctuance, malodor, or cardinal signs infection.      Psychiatric: She has a normal mood and affect. Her behavior is normal.             Assessment:       Encounter Diagnoses   Name Primary?    Tarsal tunnel syndrome of both lower extremities Yes    Sciatica of right side     Acquired equinus deformity of both feet          Plan:       Lavonne was seen today for follow-up, foot pain and ankle pain.    Diagnoses and all orders for this visit:    Tarsal tunnel syndrome of both lower extremities    Sciatica of right side    Acquired equinus deformity of both feet      I counseled the patient on her conditions, their implications and medical management.      Although EMG was normal clinically she still has signs of tarsal tunnel    Patient will stretch the tendo achilles complex three times daily as demonstrated in the office.  Literature was dispensed illustrating proper stretching technique.    Patient will wear over the counter arch supports and wear them in shoes whenever possible.  Athletic shoes intended for walking or running are usually best.     Can consider injections and surgical release    Return BLADIMIR De Jesus DPM

## 2019-12-02 NOTE — PROGRESS NOTES
"  Physical Therapy Daily Treatment Note     Name: Lavonne Emery [facility]  Clinic Number: [id number]    Therapy Diagnosis:   No diagnosis found.  Physician: Omer Mcgee MD    Visit Date: 12/3/2019  Physician Orders: PT Eval and Treat   Medical Diagnosis from Referral: G57.53 (ICD-10-CM) - Tarsal tunnel syndrome of both lower extremities  M54.31 (ICD-10-CM) - Sciatica of right side  M76.61,M76.62 (ICD-10-CM) - Achilles tendinitis of both lower extremities  M21.6X1,M21.6X2 (ICD-10-CM) - Acquired equinus deformity of both feet  Evaluation Date: 10/16/2019  Authorization Period Expiration: 10/8/2020  Plan of Care Expiration: 12/13/19  Visit # / Visits authorized: 10/ 12     Time In: *** am  Time Out: *** am  Total Billable Time: *** minutes     Precautions: Standard    Subjective     Pt reports: She received her EMG, which she reports did not show any abnormal findings. She reports her foot pain is slightly improved.   She was compliant with home exercise program.  Pt reports doing exercises twice times a day.  Response to previous treatment: very little soreness  Functional change: none new noted    Pain: 3/10  Location: B feet    Objective     Lavonne received therapeutic exercises to develop strength, ROM and flexibility for 60 minutes including:     Upright bike x 5 min for LE muscular endurance  Towel plantar fascia stretch 2x30 sec each  Towel calf stretch 2x30 sec each  4-way ankle green theraband 2x15 each BLE  SL clamshell red theraband 2x15 each  Towel scrunches x 2 min  BAPS board Level 3 dorsiflexion/plantarflexion, inversion/eversion x 30  Seated SL eccentric heel raise 10# kettlebell on knee 2x15 each  Tandem stance balance on Airex 2x30 sec each  Gastroc stretch on incline 3 x 30 sec  Dorsiflexion mobs on 2nd step x 10 5 sec hold  Step downs from 6" step x 10    Home Exercises Provided and Patient Education Provided     Education provided:   - HEP    Written Home Exercises Provided: Patient instructed to " cont prior HEP.  Exercises were reviewed and Lavonne was able to demonstrate them prior to the end of the session.  Lavonne demonstrated good  understanding of the education provided.     See EMR under Patient Instructions for exercises provided 10/18/2019.    Assessment     Pt continues to fatigue quickly with ankle strengthening exercises. She demo mild sway with tandem stance on the foam, but is progressing well with her exercises. She will continue to benefit from ankle strengthening and mobility exercises.     Lavonne is progressing well towards her goals.   Pt prognosis is Guarded.     Pt will continue to benefit from skilled outpatient physical therapy to address the deficits listed in the problem list box on initial evaluation, provide pt/family education and to maximize pt's level of independence in the home and community environment.     Pt's spiritual, cultural and educational needs considered and pt agreeable to plan of care and goals.    Anticipated barriers to physical therapy: fibromyalgia, chronicity of symptoms    Goals:   Short Term Goals: 4 weeks   - amb 2 blocks on level tile without pain provocation or need for rest - (Met)  - do SLS >10 sec without LOB or pain (Progressing, not met)  - do anterior step up of 6  without pain provocation (Met)    Long Term Goals: 8 weeks   - pt will be able to perform squat with arch control and no visible knee valgus for improved functional stability (Progressing, not met)  - pt will demonstrate a 5 degree improvement in ankle dorsiflexion ROM for improved mobility. (Progressing, not met)  - pt will demonstrate 5/5 LE strength via MMT for improved stability. (Progressing, not met)  - pt will report <5/10 pain with a full day of household activities and shopping for improved functional tolerance. (Progressing, not met)    Plan     Continue with FERDINAND Brooks, PT

## 2019-12-03 ENCOUNTER — CLINICAL SUPPORT (OUTPATIENT)
Dept: REHABILITATION | Facility: HOSPITAL | Age: 44
End: 2019-12-03
Attending: PODIATRIST
Payer: MEDICARE

## 2019-12-03 ENCOUNTER — PATIENT MESSAGE (OUTPATIENT)
Dept: FAMILY MEDICINE | Facility: CLINIC | Age: 44
End: 2019-12-03

## 2019-12-03 DIAGNOSIS — R29.898 WEAKNESS OF BOTH HIPS: ICD-10-CM

## 2019-12-03 DIAGNOSIS — G89.29 CHRONIC BILATERAL LOW BACK PAIN WITH RIGHT-SIDED SCIATICA: ICD-10-CM

## 2019-12-03 DIAGNOSIS — M54.41 CHRONIC BILATERAL LOW BACK PAIN WITH RIGHT-SIDED SCIATICA: ICD-10-CM

## 2019-12-03 PROCEDURE — 97110 THERAPEUTIC EXERCISES: CPT | Mod: PO

## 2019-12-03 NOTE — PROGRESS NOTES
"  Physical Therapy Daily Treatment Note     Name: Lavonne Emery [facility]  Clinic Number: [id number]    Therapy Diagnosis:   Encounter Diagnoses   Name Primary?    Gait abnormality     Ankle weakness      Physician: Omer Mcgee MD    Visit Date: 12/5/2019  Physician Orders: PT Eval and Treat   Medical Diagnosis from Referral: G57.53 (ICD-10-CM) - Tarsal tunnel syndrome of both lower extremities  M54.31 (ICD-10-CM) - Sciatica of right side  M76.61,M76.62 (ICD-10-CM) - Achilles tendinitis of both lower extremities  M21.6X1,M21.6X2 (ICD-10-CM) - Acquired equinus deformity of both feet  Evaluation Date: 10/16/2019  Authorization Period Expiration: 10/8/2020  Plan of Care Expiration: 12/13/19  Visit # / Visits authorized: 9/ 12     Time In: 0858 am  Time Out: 0952 am  Total Billable Time: 54 minutes     Precautions: Standard     Subjective     Pt reports: She had a shot in her (R) foot yesterday and she is hurting more today. She reports the podiatrist said if the shot does not help the next step would be an MRI of the foot. "I am not sure how much therapy I can do today."  She was compliant with home exercise program.  Pt reports doing exercises twice times a day.  Response to previous treatment: very little soreness  Functional change: none new noted    Pain: 6/10  Location: B feet    Objective     Lavonne received therapeutic exercises to develop strength, ROM and flexibility for 54 minutes including:     Upright bike x 5 min for LE muscular endurance  Towel plantar fascia stretch 2x30 sec each  Towel calf stretch 2x30 sec each  4-way ankle green theraband 2x15 each BLE  SL clamshell green theraband 2x15 each  Towel scrunches x 2 min  BAPS board Level 3 dorsiflexion/plantarflexion, inversion/eversion x 30  Seated SL eccentric heel raise 15# kettlebell on knee 2x15 each  Tandem stance balance 2x30 sec each  Gastroc stretch on incline x 3 min  Dorsiflexion mobs on 2nd step x 10 5 sec hold  Step downs from 6" step x 10 " - NP    Home Exercises Provided and Patient Education Provided     Education provided:   - HEP    Written Home Exercises Provided: Patient instructed to cont prior HEP.  Exercises were reviewed and Lavonne was able to demonstrate them prior to the end of the session.  Lavonne demonstrated good  understanding of the education provided.     See EMR under Patient Instructions for exercises provided 10/18/2019.    Assessment     Lavonne presented with increased (R) foot pain following her injection yesterday. Held standing exercises due to her increased pain with weight-bearing and antalgic gait today. Will reassess next visit.     Lavonne is progressing well towards her goals.   Pt prognosis is Guarded.     Pt will continue to benefit from skilled outpatient physical therapy to address the deficits listed in the problem list box on initial evaluation, provide pt/family education and to maximize pt's level of independence in the home and community environment.     Pt's spiritual, cultural and educational needs considered and pt agreeable to plan of care and goals.    Anticipated barriers to physical therapy: fibromyalgia, chronicity of symptoms    Goals:   Short Term Goals: 4 weeks   - amb 2 blocks on level tile without pain provocation or need for rest - (Met)  - do SLS >10 sec without LOB or pain (Progressing, not met)  - do anterior step up of 6  without pain provocation (Met)    Long Term Goals: 8 weeks   - pt will be able to perform squat with arch control and no visible knee valgus for improved functional stability (Progressing, not met)  - pt will demonstrate a 5 degree improvement in ankle dorsiflexion ROM for improved mobility. (Progressing, not met)  - pt will demonstrate 5/5 LE strength via MMT for improved stability. (Progressing, not met)  - pt will report <5/10 pain with a full day of household activities and shopping for improved functional tolerance. (Progressing, not met)    Plan     Continue with  POC    Benja Brooks, PT

## 2019-12-03 NOTE — PROGRESS NOTES
Physical Therapy Daily Treatment Note     Name: Lavonne Pierre  Clinic Number: [id number]    Therapy Diagnosis:   Encounter Diagnoses   Name Primary?    Chronic bilateral low back pain with right-sided sciatica     Weakness of both hips      Physician: Omer Mcgee MD    Visit Date: 12/3/2019  Physician Orders: PT Eval and Treat  Medical Diagnosis from Referral: DDD (degenerative disc disease), lumbar  Evaluation Date: 11/14/2019  Authorization Period Expiration: 12/31/2019  Plan of Care Expiration: 01/10/2019  Visit # / Visits authorized: 3/20    Time In: 856  Time Out:  955  Total Billable Time: 59 minutes    Precautions: Standard    Subjective     Pt reports: She did a lot of shopping over the weekend, which aggravated her lower back and feet.   She was compliant with home exercise program.  Response to previous treatment: Too soon to tell  Functional change: No change    Pain: 4/10  Location: bilateral back      Objective     Lavonne received therapeutic exercises to develop strength, flexibility and core stabilization for 55 minutes including:  Upright bike x 5 min for endurance and increased tissue perfusion   DKTC x 2 minutes (Green ball)  LTR x 2 minutes (green ball)   SLR 2 x 10 1#  Bridges 2 x 10   SL clamshells GTB 2 x 10   SL hip abduction x 10   Ball Squeezes 2 x 10 (3 second)  Piriformis stretch 2x 30s  Supine hip flexor stretch with strap x 2 minute  PPT 2 x10 (3 second hold)   TA activation ball (red) press down 2 x 10 (3 second hold)     Standing hip abduction 2 x 10 RTB  Standing hip extension 2 x 10 RTB  Leg Press 2 x 10 20#     For next time: Pallof press    Home Exercises Provided and Patient Education Provided     Education provided:   - Continuing HEP    Written Home Exercises Provided: yes.  Exercises were reviewed and Lavonne was able to demonstrate them prior to the end of the session.  Lavonne demonstrated good  understanding of the education provided.     See EMR under Media for  exercises provided 11/26/2019.    Assessment     Lavonne tolerated progression of exercises very well with no increases in her low back pan. She continues to demo decreased hip ROM due to soft tissue restrictions. Will continue to focus on improving hip mobility and LE strengthening.     Lavonne is progressing well towards her goals.   Pt prognosis is Good.     Pt will continue to benefit from skilled outpatient physical therapy to address the deficits listed in the problem list box on initial evaluation, provide pt/family education and to maximize pt's level of independence in the home and community environment.     Pt's spiritual, cultural and educational needs considered and pt agreeable to plan of care and goals.    Anticipated barriers to physical therapy: None    Goals:   Goals:   Short Term Goals (3 Weeks):   1. Pt to increase strength to at least 4+/5 of muscles tested to allow for improvement in functional activities such as performing chores.  2. Pt to improve gross spinal motion in rotation by 25% to allow for improved functional mobility.  3. Pt to tolerate sitting for >1 hour with <3/10 pain in low back to allow for improvement in IADLs.     Long Term Goals (6 Weeks):   1. Pt to increase strength to at least 5/5 of muscles tested to allow for improvement in functional activities such as performing chores.  2. Pt to improve gross spinal motion to <10% limitations to allow for improved functional mobility with performing ADL's  3. Pt to tolerate standing and walking for community distances with <3/10 pain in low back to improve mobility with IADL's.   4. Pt to demo good squat form with no knee valgus or trunk flexion collapse to improve ADLs.    5. Pt to report compliance with HEP 3x/week and demonstrate proper exercise technique to PT to show independence with self management of condition.    Plan     Continue with LE strengthening and improving hip mobility    Benja Brooks, PT

## 2019-12-04 ENCOUNTER — OFFICE VISIT (OUTPATIENT)
Dept: PODIATRY | Facility: CLINIC | Age: 44
End: 2019-12-04
Payer: MEDICARE

## 2019-12-04 VITALS
HEART RATE: 87 BPM | BODY MASS INDEX: 38.58 KG/M2 | SYSTOLIC BLOOD PRESSURE: 126 MMHG | HEIGHT: 57 IN | WEIGHT: 178.81 LBS | DIASTOLIC BLOOD PRESSURE: 79 MMHG | RESPIRATION RATE: 13 BRPM

## 2019-12-04 DIAGNOSIS — M72.2 PLANTAR FASCIITIS OF LEFT FOOT: ICD-10-CM

## 2019-12-04 DIAGNOSIS — M72.2 PLANTAR FASCIITIS OF RIGHT FOOT: ICD-10-CM

## 2019-12-04 DIAGNOSIS — G57.53 TARSAL TUNNEL SYNDROME OF BOTH LOWER EXTREMITIES: Primary | ICD-10-CM

## 2019-12-04 DIAGNOSIS — M21.6X1 ACQUIRED EQUINUS DEFORMITY OF BOTH FEET: ICD-10-CM

## 2019-12-04 DIAGNOSIS — M21.6X2 ACQUIRED EQUINUS DEFORMITY OF BOTH FEET: ICD-10-CM

## 2019-12-04 PROCEDURE — 20550 NJX 1 TENDON SHEATH/LIGAMENT: CPT | Mod: PBBFAC,PN,RT | Performed by: PODIATRIST

## 2019-12-04 PROCEDURE — 20550 NJX 1 TENDON SHEATH/LIGAMENT: CPT | Mod: S$PBB,RT,, | Performed by: PODIATRIST

## 2019-12-04 PROCEDURE — 99999 PR PBB SHADOW E&M-EST. PATIENT-LVL III: CPT | Mod: PBBFAC,,, | Performed by: PODIATRIST

## 2019-12-04 PROCEDURE — 20550 PR INJECT TENDON SHEATH/LIGAMENT: ICD-10-PCS | Mod: S$PBB,RT,, | Performed by: PODIATRIST

## 2019-12-04 PROCEDURE — 99499 UNLISTED E&M SERVICE: CPT | Mod: S$PBB,,, | Performed by: PODIATRIST

## 2019-12-04 PROCEDURE — 99213 OFFICE O/P EST LOW 20 MIN: CPT | Mod: PBBFAC,PN | Performed by: PODIATRIST

## 2019-12-04 PROCEDURE — 99999 PR PBB SHADOW E&M-EST. PATIENT-LVL III: ICD-10-PCS | Mod: PBBFAC,,, | Performed by: PODIATRIST

## 2019-12-04 PROCEDURE — 99499 NO LOS: ICD-10-PCS | Mod: S$PBB,,, | Performed by: PODIATRIST

## 2019-12-04 RX ORDER — MODAFINIL 100 MG/1
TABLET ORAL
Qty: 15 TABLET | Refills: 3 | Status: SHIPPED | OUTPATIENT
Start: 2019-12-04 | End: 2020-01-27

## 2019-12-04 NOTE — PROGRESS NOTES
Subjective:      Patient ID: Lavonne Pierre is a 44 y.o. female.    Chief Complaint: Foot Pain (bilateral foot pain )    Lavonne is a 44 y.o. female who presents to the podiatry clinic  with complaint of  bilateral foot pain left worse than right. She relates she has had generalized bilateral pain ever since she was diagnosed with fibro myalgia, however recently she has had an increase in pain to the left lateral ankle. The pain is intermittent and can come out of nowhere. She can have the pain at rest or when ambulating, the pain is sharp and can radiate into her toes. She has bilateral foot pain along the entire bottom of her feet into her toes that has been going on for much longer periods of time. She has a history of back pain and sciatica, has had injections in the back in the past.     4/10/19: Patient returns for follow up left ankle pain, she relates no pain to the area today the injection and the brace have helped greatly. She reports new symptoms of scaling and itching skin to the plantar feet. No open wounds, no self treatment. Duration of several weeks. She relates continue burning and tingling pains to the plantar feet with weight bearing, however is planning on back surgery and would like to get that done to see if it helps before working up with EMG for tarsal tunnel.    10/9/19: Patient returns with continued pain bilateral ankle and feet, worse with weight bearing long periods of time. Unable to get the EMG earlier due to complications with other health issues bit would like to begin working up the pain again    11/20/19: Patient returns for follow up bilateral tarsal tunnel, her EMG was normal however. No improvement with inserts and stretching    12/4/19: Patient returns with bilateral tarsal tunnel and plantar fasciitis and relates the PT is helping some, she reports continued pain however whenever weight bearing.    Review of Systems   Constitution: Negative for chills and fever.    Cardiovascular: Positive for leg swelling. Negative for claudication.   Respiratory: Negative for shortness of breath.    Skin: Negative for itching, nail changes and rash.   Musculoskeletal: Positive for back pain, joint pain, muscle weakness and myalgias. Negative for muscle cramps.   Gastrointestinal: Negative for nausea and vomiting.   Neurological: Positive for paresthesias. Negative for focal weakness, loss of balance and numbness.           Objective:      Physical Exam   Constitutional: She is oriented to person, place, and time. She appears well-developed and well-nourished. No distress.   Cardiovascular:   Pulses:       Dorsalis pedis pulses are 2+ on the right side, and 2+ on the left side.        Posterior tibial pulses are 2+ on the right side, and 2+ on the left side.   < 3 sec capillary refill time to toes 1-5 bilateral. Toes and feet are warm to touch proximally with normal distal cooling b/l. There is some hair growth on the feet and toes b/l. There is no edema b/l. No spider veins or varicosities present b/l.      Musculoskeletal:   There is tenderness to the tarsal tunnel and abductor muscle belly bilateral. \    Bilateral posterior heel pain at the achilles insertion    Equinus noted b/l ankles with < 5 deg DF noted. MMT 5/5 in DF/PF/Inv/Ev resistance with no reproduction of pain in any direction. Passive range of motion of ankle and pedal joints is painless b/l.     Neurological: She is alert and oriented to person, place, and time. She has normal strength. She displays no atrophy and no tremor. No sensory deficit. She exhibits normal muscle tone.   Positive tinel sign bilateral.   Skin: Skin is warm, dry and intact. No abrasion, no bruising, no burn, no ecchymosis, no laceration, no lesion, no petechiae and no rash noted. She is not diaphoretic. No cyanosis or erythema. No pallor. Nails show no clubbing.   Skin temperature, texture and turgor within normal limits.    Dry scale with  superficial flakes over an erythematous base in a moccasin pattern bilateral  without ulceration, drainage, pus, tracking, fluctuance, malodor, or cardinal signs infection.     Psychiatric: She has a normal mood and affect. Her behavior is normal.             Assessment:       Encounter Diagnoses   Name Primary?    Tarsal tunnel syndrome of both lower extremities Yes    Acquired equinus deformity of both feet     Plantar fasciitis of right foot     Plantar fasciitis of left foot          Plan:       Lavonne was seen today for foot pain.    Diagnoses and all orders for this visit:    Tarsal tunnel syndrome of both lower extremities    Acquired equinus deformity of both feet    Plantar fasciitis of right foot    Plantar fasciitis of left foot      I counseled the patient on her conditions, their implications and medical management.      Although EMG was normal clinically she still has signs of tarsal tunnel    Patient will stretch the tendo achilles complex three times daily as demonstrated in the office.  Literature was dispensed illustrating proper stretching technique.    Patient will wear over the counter arch supports and wear them in shoes whenever possible.  Athletic shoes intended for walking or running are usually best.     Conservative vs surgical treatment options for tarsal tunnel were explained in detail. Today the patient received an injection in right heel medial aspect around the adeline pedis and deep at the plantar fascia. The area was prepped with alcohol, skin anesthestized with cold spray and a mixture of 20 mg Depomedrol 1 mL 1% plain lidocaine was injected. The patient tolerated the procedure well. Instructed to rest, ice and elevate.    If pain persists consider MRI and possible surgical release    Return 1 month follow up    Lester De Jesus DPM

## 2019-12-05 ENCOUNTER — CLINICAL SUPPORT (OUTPATIENT)
Dept: REHABILITATION | Facility: HOSPITAL | Age: 44
End: 2019-12-05
Attending: PODIATRIST
Payer: MEDICARE

## 2019-12-05 DIAGNOSIS — R29.898 ANKLE WEAKNESS: ICD-10-CM

## 2019-12-05 DIAGNOSIS — R26.9 GAIT ABNORMALITY: ICD-10-CM

## 2019-12-05 PROCEDURE — 97110 THERAPEUTIC EXERCISES: CPT | Mod: PO

## 2019-12-09 NOTE — PROGRESS NOTES
"  Physical Therapy Daily Treatment Note     Name: Lavonne Tony  Clinic Number: [id number]    Therapy Diagnosis:   Encounter Diagnoses   Name Primary?    Gait abnormality     Ankle weakness      Physician: Omer Mcgee MD    Visit Date: 12/12/2019  Physician Orders: PT Eval and Treat   Medical Diagnosis from Referral: G57.53 (ICD-10-CM) - Tarsal tunnel syndrome of both lower extremities  M54.31 (ICD-10-CM) - Sciatica of right side  M76.61,M76.62 (ICD-10-CM) - Achilles tendinitis of both lower extremities  M21.6X1,M21.6X2 (ICD-10-CM) - Acquired equinus deformity of both feet  Evaluation Date: 10/16/2019  Authorization Period Expiration: 10/8/2020  Plan of Care Expiration: 12/13/19  Visit # / Visits authorized: 10/ 12     Time In: 905 am  Time Out: 1000 am  Total Billable Time: 28 minutes     Precautions: Standard     Subjective     Pt reports: She is still having mild pain in her foot, but the pain has been improving since her shot.  She was compliant with home exercise program.  Pt reports doing exercises twice times a day.  Response to previous treatment: very little soreness  Functional change: none new noted    Pain: 2/10  Location: B feet    Objective     Lavonne received therapeutic exercises to develop strength, ROM and flexibility for 54 minutes including:     Upright bike x 5 min for LE muscular endurance  Towel plantar fascia stretch 2x30 sec each  Towel calf stretch 2x30 sec each  4-way ankle green theraband 2x15 each BLE  SL clamshell green theraband 2x15 each  Towel scrunches x 2 min  BAPS board Level 3 dorsiflexion/plantarflexion, inversion/eversion x 30 - NP  Seated SL eccentric heel raise 20# kettlebell on knee 2x15 each  Tandem stance balance 2x30 sec each - NP  Gastroc stretch on incline x 3 min  Dorsiflexion mobs on 2nd step x 10 5 sec hold  Step downs from 6" step x 10 - NP    Home Exercises Provided and Patient Education Provided     Education provided:   - HEP    Written Home Exercises " Provided: Patient instructed to cont prior HEP.  Exercises were reviewed and Lavonne was able to demonstrate them prior to the end of the session.  Lavonne demonstrated good  understanding of the education provided.     See EMR under Patient Instructions for exercises provided 10/18/2019.    Assessment     Lavonne continues to have mild foot with walking increased distances. She has demo an improvement in ankle strengthening and ROM leading to decreased pain with functional mobility. Goals updated to reflect progress. She is discharged from PT for her ankle.     Lavonne is progressing well towards her goals.   Pt prognosis is Guarded.     Pt will continue to benefit from skilled outpatient physical therapy to address the deficits listed in the problem list box on initial evaluation, provide pt/family education and to maximize pt's level of independence in the home and community environment.     Pt's spiritual, cultural and educational needs considered and pt agreeable to plan of care and goals.    Anticipated barriers to physical therapy: fibromyalgia, chronicity of symptoms    Goals:   Short Term Goals: 4 weeks   - amb 2 blocks on level tile without pain provocation or need for rest - (Met)  - do SLS >10 sec without LOB or pain (Progressing, not met)  - do anterior step up of 6  without pain provocation (Met)    Long Term Goals: 8 weeks   - pt will be able to perform squat with arch control and no visible knee valgus for improved functional stability (Progressing, not met)  - pt will demonstrate a 5 degree improvement in ankle dorsiflexion ROM for improved mobility. (Met)  - pt will demonstrate 5/5 LE strength via MMT for improved stability. (Progressing, not met)  - pt will report <5/10 pain with a full day of household activities and shopping for improved functional tolerance. (Met)    Plan     Discharge from PT     Benja Brooks, PT

## 2019-12-10 ENCOUNTER — TELEPHONE (OUTPATIENT)
Dept: PODIATRY | Facility: CLINIC | Age: 44
End: 2019-12-10

## 2019-12-10 ENCOUNTER — CLINICAL SUPPORT (OUTPATIENT)
Dept: REHABILITATION | Facility: HOSPITAL | Age: 44
End: 2019-12-10
Attending: PODIATRIST
Payer: MEDICARE

## 2019-12-10 DIAGNOSIS — G89.29 CHRONIC BILATERAL LOW BACK PAIN WITH RIGHT-SIDED SCIATICA: ICD-10-CM

## 2019-12-10 DIAGNOSIS — R29.898 WEAKNESS OF BOTH HIPS: ICD-10-CM

## 2019-12-10 DIAGNOSIS — M54.41 CHRONIC BILATERAL LOW BACK PAIN WITH RIGHT-SIDED SCIATICA: ICD-10-CM

## 2019-12-10 PROCEDURE — 97110 THERAPEUTIC EXERCISES: CPT | Mod: PO

## 2019-12-10 NOTE — PROGRESS NOTES
Physical Therapy Daily Treatment Note     Name: Lavonne Tony  Clinic Number: [id number]    Therapy Diagnosis:   Encounter Diagnoses   Name Primary?    Chronic bilateral low back pain with right-sided sciatica     Weakness of both hips      Physician: Omer Mcgee MD    Visit Date: 12/10/2019  Physician Orders: PT Eval and Treat  Medical Diagnosis from Referral: DDD (degenerative disc disease), lumbar  Evaluation Date: 11/14/2019  Authorization Period Expiration: 12/31/2019  Plan of Care Expiration: 01/10/2019  Visit # / Visits authorized: 4/20    Time In: 0900 AM  Time Out: 0958 AM  Total Billable Time: 55 minutes    Precautions: Standard    Subjective     Pt reports: her lower back is sore today. Patient states this pain is directly across her lower back. Patient states her R foot is feeling much better today and she feels like the shot is working.   She was compliant with home exercise program.  Response to previous treatment: Too soon to tell  Functional change: No change    Pain: 3/10  Location: bilateral back      Objective     Lavonne received therapeutic exercises to develop strength, flexibility and core stabilization for 55 minutes including:  Upright bike x 5 min for endurance and increased tissue perfusion   DKTC x 2 minutes (Green ball)  LTR x 2 minutes (Green ball)   SLR 2 x 10, 1#  SL hip abduction x 10, 1#  PPT 2 x10 (3 second hold)  Bridges 2 x 10   SL clamshells GTB 2 x 10   Ball Squeezes 2 x 10 (3 second)  Piriformis stretch 2x 30s  Supine hip flexor stretch with strap x 2 minute     TA activation ball (red) press down 2 x 10 (3 second hold)   Standing hip abduction 2 x 10 RTB  Standing hip extension 2 x 10 RTB  Leg Press 2 x 10 20#   Paloff press (Green TB) 2x10, 3 sec pause    Home Exercises Provided and Patient Education Provided     Education provided:   - Continuing HEP    Written Home Exercises Provided: Patient instructed to cont prior HEP.  Exercises were reviewed and Lavonne  was able to demonstrate them prior to the end of the session.  Lavonne demonstrated good  understanding of the education provided.     See EMR under Media for exercises provided 11/26/2019.    Assessment     Lavonne with improved tolerance to bridging when performed following posterior pelvic tilt exercise. Patient encouraged to maintain PPT when performing bridge, but she demonstrated difficulty maintaining tilt with dual task activities. Patient able to perform all exercises without increased lower back pain, but she does achieve training effect very easily in gluteal musculature.    Lavonne is progressing well towards her goals.   Pt prognosis is Good.     Pt will continue to benefit from skilled outpatient physical therapy to address the deficits listed in the problem list box on initial evaluation, provide pt/family education and to maximize pt's level of independence in the home and community environment.     Pt's spiritual, cultural and educational needs considered and pt agreeable to plan of care and goals.    Anticipated barriers to physical therapy: None    Goals:   Goals:   Short Term Goals (3 Weeks):   1. Pt to increase strength to at least 4+/5 of muscles tested to allow for improvement in functional activities such as performing chores.  2. Pt to improve gross spinal motion in rotation by 25% to allow for improved functional mobility.  3. Pt to tolerate sitting for >1 hour with <3/10 pain in low back to allow for improvement in IADLs.     Long Term Goals (6 Weeks):   1. Pt to increase strength to at least 5/5 of muscles tested to allow for improvement in functional activities such as performing chores.  2. Pt to improve gross spinal motion to <10% limitations to allow for improved functional mobility with performing ADL's  3. Pt to tolerate standing and walking for community distances with <3/10 pain in low back to improve mobility with IADL's.   4. Pt to demo good squat form with no knee valgus or trunk  flexion collapse to improve ADLs.    5. Pt to report compliance with HEP 3x/week and demonstrate proper exercise technique to PT to show independence with self management of condition.    Plan     Continue with LE strengthening and improving hip mobility    Fela Goodman, PTA

## 2019-12-10 NOTE — TELEPHONE ENCOUNTER
----- Message from Jeannie Chávez sent at 12/10/2019 11:08 AM CST -----  Contact: self [phone]  Patient is requesting a call back from the nurse requesting to get an earlier appt than (prefer mornings).  No times available, patient requesting message to be sent to nurse.    Please call the patient upon request at phone number [phone].

## 2019-12-12 ENCOUNTER — CLINICAL SUPPORT (OUTPATIENT)
Dept: REHABILITATION | Facility: HOSPITAL | Age: 44
End: 2019-12-12
Attending: PODIATRIST
Payer: MEDICARE

## 2019-12-12 DIAGNOSIS — R26.9 GAIT ABNORMALITY: ICD-10-CM

## 2019-12-12 DIAGNOSIS — R29.898 ANKLE WEAKNESS: ICD-10-CM

## 2019-12-12 PROCEDURE — 97110 THERAPEUTIC EXERCISES: CPT | Mod: PO

## 2019-12-16 NOTE — PROGRESS NOTES
"  Physical Therapy Daily Treatment Note     Name: Lavonne Tony  Clinic Number: [id number]    Therapy Diagnosis:   No diagnosis found.  Physician: Omer Mcgee MD    Visit Date: 12/17/2019  Physician Orders: PT Eval and Treat   Medical Diagnosis from Referral: G57.53 (ICD-10-CM) - Tarsal tunnel syndrome of both lower extremities  M54.31 (ICD-10-CM) - Sciatica of right side  M76.61,M76.62 (ICD-10-CM) - Achilles tendinitis of both lower extremities  M21.6X1,M21.6X2 (ICD-10-CM) - Acquired equinus deformity of both feet  Evaluation Date: 10/16/2019  Authorization Period Expiration: 10/8/2020  Plan of Care Expiration: 12/13/19  Visit # / Visits authorized: 11/ 12     Time In: *** am  Time Out: *** am  Total Billable Time: *** minutes     Precautions: Standard     Subjective     Pt reports: She is still having mild pain in her foot, but the pain has been improving since her shot.  She was compliant with home exercise program.  Pt reports doing exercises twice times a day.  Response to previous treatment: very little soreness  Functional change: none new noted    Pain: 2/10  Location: B feet    Objective     Lavonne received therapeutic exercises to develop strength, ROM and flexibility for 54 minutes including:     Upright bike x 5 min for LE muscular endurance  Towel plantar fascia stretch 2x30 sec each  Towel calf stretch 2x30 sec each  4-way ankle green theraband 2x15 each BLE  SL clamshell green theraband 2x15 each  Towel scrunches x 2 min  BAPS board Level 3 dorsiflexion/plantarflexion, inversion/eversion x 30 - NP  Seated SL eccentric heel raise 20# kettlebell on knee 2x15 each  Tandem stance balance 2x30 sec each - NP  Gastroc stretch on incline x 3 min  Dorsiflexion mobs on 2nd step x 10 5 sec hold  Step downs from 6" step x 10 - NP    Home Exercises Provided and Patient Education Provided     Education provided:   - HEP    Written Home Exercises Provided: Patient instructed to cont prior HEP.  Exercises " were reviewed and Lavonne was able to demonstrate them prior to the end of the session.  Lavonne demonstrated good  understanding of the education provided.     See EMR under Patient Instructions for exercises provided 10/18/2019.    Assessment     Lavonne continues to have mild foot with walking increased distances. She has demo an improvement in ankle strengthening and ROM leading to decreased pain with functional mobility. Goals updated to reflect progress. She is discharged from PT for her ankle.     Lavonne is progressing well towards her goals.   Pt prognosis is Guarded.     Pt will continue to benefit from skilled outpatient physical therapy to address the deficits listed in the problem list box on initial evaluation, provide pt/family education and to maximize pt's level of independence in the home and community environment.     Pt's spiritual, cultural and educational needs considered and pt agreeable to plan of care and goals.    Anticipated barriers to physical therapy: fibromyalgia, chronicity of symptoms    Goals:   Short Term Goals: 4 weeks   - amb 2 blocks on level tile without pain provocation or need for rest - (Met)  - do SLS >10 sec without LOB or pain (Progressing, not met)  - do anterior step up of 6  without pain provocation (Met)    Long Term Goals: 8 weeks   - pt will be able to perform squat with arch control and no visible knee valgus for improved functional stability (Progressing, not met)  - pt will demonstrate a 5 degree improvement in ankle dorsiflexion ROM for improved mobility. (Met)  - pt will demonstrate 5/5 LE strength via MMT for improved stability. (Progressing, not met)  - pt will report <5/10 pain with a full day of household activities and shopping for improved functional tolerance. (Met)    Plan     Discharge from PT     Benja Brooks, PT

## 2019-12-17 ENCOUNTER — CLINICAL SUPPORT (OUTPATIENT)
Dept: REHABILITATION | Facility: HOSPITAL | Age: 44
End: 2019-12-17
Attending: FAMILY MEDICINE
Payer: MEDICARE

## 2019-12-17 DIAGNOSIS — R29.898 WEAKNESS OF BOTH HIPS: ICD-10-CM

## 2019-12-17 DIAGNOSIS — M54.41 CHRONIC BILATERAL LOW BACK PAIN WITH RIGHT-SIDED SCIATICA: ICD-10-CM

## 2019-12-17 DIAGNOSIS — G89.29 CHRONIC BILATERAL LOW BACK PAIN WITH RIGHT-SIDED SCIATICA: ICD-10-CM

## 2019-12-17 PROCEDURE — 97110 THERAPEUTIC EXERCISES: CPT | Mod: PO

## 2019-12-17 NOTE — PROGRESS NOTES
Physical Therapy Daily Treatment Note     Name: Lavonne Emery [facility]  Clinic Number: [id number]    Therapy Diagnosis:   Encounter Diagnoses   Name Primary?    Chronic bilateral low back pain with right-sided sciatica     Weakness of both hips      Physician: Omer Mcgee MD    Visit Date: 12/19/2019  Physician Orders: PT Eval and Treat  Medical Diagnosis from Referral: DDD (degenerative disc disease), lumbar  Evaluation Date: 11/14/2019  Authorization Period Expiration: 12/31/2019  Plan of Care Expiration: 01/10/2019  Visit # / Visits authorized: 6/20    Time In: 858 AM  Time Out: 955 AM  Total Billable Time: 57 minutes    Precautions: Standard    Subjective     Pt reports: No new complaints with the back. She reports her (L) foot is starting to bother her.    She was compliant with home exercise program.  Response to previous treatment: Too soon to tell  Functional change: No change    Pain: 0/10  Location: bilateral back      Objective     Lavonne received therapeutic exercises to develop strength, flexibility and core stabilization for 57 minutes including:  Upright bike x 5 min for endurance and increased tissue perfusion   DKTC x 2 minutes (Green ball)  LTR x 2 minutes (Green ball)   SLR 2 x 10, 2#  SL hip abduction x 10, 1#  PPT 2 x10 (3 second hold)  Bridges 2 x 10   SL clamshells GTB 2 x 10   Ball Squeezes 2 x 10 (3 second)  Piriformis stretch 2x 30s  Supine hip flexor stretch with strap x 2 minute     TA activation ball (red) press down 2 x 10 (3 second hold) - NP  Standing hip abduction 2 x 10 RTB   Standing hip extension 2 x 10 RTB   Leg Press 2 x 10 20#   Paloff press (Green TB) 2x10, 3 sec pause    Home Exercises Provided and Patient Education Provided     Education provided:   - Continuing HEP    Written Home Exercises Provided: Patient instructed to cont prior HEP.  Exercises were reviewed and Lavonne was able to demonstrate them prior to the end of the session.  Lavonne demonstrated good   understanding of the education provided.     See EMR under Media for exercises provided 11/26/2019.    Assessment     Lavonne continues to fatigue quickly with LE strengthening exercises, but she continues to improve with her pain and functional mobility. Will progress as tolerated with resistance exercises and reduce frequency to 1x/week.     Lavonne is progressing well towards her goals.   Pt prognosis is Good.     Pt will continue to benefit from skilled outpatient physical therapy to address the deficits listed in the problem list box on initial evaluation, provide pt/family education and to maximize pt's level of independence in the home and community environment.     Pt's spiritual, cultural and educational needs considered and pt agreeable to plan of care and goals.    Anticipated barriers to physical therapy: None    Goals:   Short Term Goals (3 Weeks):   1. Pt to increase strength to at least 4+/5 of muscles tested to allow for improvement in functional activities such as performing chores. (Met)  2. Pt to improve gross spinal motion in rotation by 25% to allow for improved functional mobility. (Met)  3. Pt to tolerate sitting for >1 hour with <3/10 pain in low back to allow for improvement in IADLs. (Met)    Long Term Goals (6 Weeks):   1. Pt to increase strength to at least 5/5 of muscles tested to allow for improvement in functional activities such as performing chores.(Proressing, not met)  2. Pt to improve gross spinal motion to <10% limitations to allow for improved functional mobility with performing ADL's (Progressing, not met)   3. Pt to tolerate standing and walking for community distances with <3/10 pain in low back to improve mobility with IADL's. (Met)  4. Pt to demo good squat form with no knee valgus or trunk flexion collapse to improve ADLs.  (Progressing, not met)  5. Pt to report compliance with HEP 3x/week and demonstrate proper exercise technique to PT to show independence with self  management of condition. (Progressing, not met)    Plan     Continue with LE strengthening and improving hip mobility    Benja Brooks, PT

## 2019-12-17 NOTE — PROGRESS NOTES
Physical Therapy Daily Treatment Note     Name: Lavonne Emery [facility]  Clinic Number: [id number]    Therapy Diagnosis:   Encounter Diagnoses   Name Primary?    Chronic bilateral low back pain with right-sided sciatica     Weakness of both hips      Physician: Omer Mcgee MD    Visit Date: 12/17/2019  Physician Orders: PT Eval and Treat  Medical Diagnosis from Referral: DDD (degenerative disc disease), lumbar  Evaluation Date: 11/14/2019  Authorization Period Expiration: 12/31/2019  Plan of Care Expiration: 01/10/2019  Visit # / Visits authorized: 5/20    Time In: 1100 AM  Time Out: 1155 AM  Total Billable Time: 55 minutes    Precautions: Standard    Subjective     Pt reports: No pain in her lower back today. She reports she is feeling good today.   She was compliant with home exercise program.  Response to previous treatment: Too soon to tell  Functional change: No change    Pain: 0/10  Location: bilateral back      Objective     Lavonne received therapeutic exercises to develop strength, flexibility and core stabilization for 55 minutes including:  Upright bike x 5 min for endurance and increased tissue perfusion   DKTC x 2 minutes (Green ball)  LTR x 2 minutes (Green ball)   SLR 2 x 10, 1#  SL hip abduction x 10, 1#  PPT 2 x10 (3 second hold)  Bridges 2 x 10   SL clamshells GTB 2 x 10   Ball Squeezes 2 x 10 (3 second)  Piriformis stretch 2x 30s  Supine hip flexor stretch with strap x 2 minute     TA activation ball (red) press down 2 x 10 (3 second hold) - NP  Standing hip abduction 2 x 10 RTB - NP  Standing hip extension 2 x 10 RTB - NP  Leg Press 2 x 10 20#   Paloff press (Green TB) 2x10, 3 sec pause    Home Exercises Provided and Patient Education Provided     Education provided:   - Continuing HEP    Written Home Exercises Provided: Patient instructed to cont prior HEP.  Exercises were reviewed and Lavonne was able to demonstrate them prior to the end of the session.  Lavonne demonstrated good   understanding of the education provided.     See EMR under Media for exercises provided 11/26/2019.    Assessment     Lavonne reports continued reduction in her pain and with her low back signs and symptoms. She demo improving lumbar ROM and LE strength leading to improved functional mobility. Will plan to reduce frequency to 1x/week as she progresses with her exercises.     Lavonne is progressing well towards her goals.   Pt prognosis is Good.     Pt will continue to benefit from skilled outpatient physical therapy to address the deficits listed in the problem list box on initial evaluation, provide pt/family education and to maximize pt's level of independence in the home and community environment.     Pt's spiritual, cultural and educational needs considered and pt agreeable to plan of care and goals.    Anticipated barriers to physical therapy: None    Goals:   Short Term Goals (3 Weeks):   1. Pt to increase strength to at least 4+/5 of muscles tested to allow for improvement in functional activities such as performing chores. (Met)  2. Pt to improve gross spinal motion in rotation by 25% to allow for improved functional mobility. (Met)  3. Pt to tolerate sitting for >1 hour with <3/10 pain in low back to allow for improvement in IADLs. (Met)    Long Term Goals (6 Weeks):   1. Pt to increase strength to at least 5/5 of muscles tested to allow for improvement in functional activities such as performing chores.(Proressing, not met)  2. Pt to improve gross spinal motion to <10% limitations to allow for improved functional mobility with performing ADL's (Progressing, not met)   3. Pt to tolerate standing and walking for community distances with <3/10 pain in low back to improve mobility with IADL's. (Met)  4. Pt to demo good squat form with no knee valgus or trunk flexion collapse to improve ADLs.  (Progressing, not met)  5. Pt to report compliance with HEP 3x/week and demonstrate proper exercise technique to PT to  show independence with self management of condition. (Progressing, not met)    Plan     Continue with LE strengthening and improving hip mobility    Benja Brooks, PT
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
## 2019-12-19 ENCOUNTER — CLINICAL SUPPORT (OUTPATIENT)
Dept: REHABILITATION | Facility: HOSPITAL | Age: 44
End: 2019-12-19
Attending: PODIATRIST
Payer: MEDICARE

## 2019-12-19 DIAGNOSIS — G89.29 CHRONIC BILATERAL LOW BACK PAIN WITH RIGHT-SIDED SCIATICA: ICD-10-CM

## 2019-12-19 DIAGNOSIS — R29.898 WEAKNESS OF BOTH HIPS: ICD-10-CM

## 2019-12-19 DIAGNOSIS — M54.41 CHRONIC BILATERAL LOW BACK PAIN WITH RIGHT-SIDED SCIATICA: ICD-10-CM

## 2019-12-19 PROBLEM — R26.9 GAIT ABNORMALITY: Status: RESOLVED | Noted: 2019-10-16 | Resolved: 2019-12-19

## 2019-12-19 PROCEDURE — 97110 THERAPEUTIC EXERCISES: CPT | Mod: PO

## 2019-12-23 NOTE — PROGRESS NOTES
Physical Therapy Daily Treatment Note     Name: Lavonne Tony  Clinic Number: [id number]    Therapy Diagnosis:   Encounter Diagnoses   Name Primary?    Chronic bilateral low back pain with right-sided sciatica     Weakness of both hips      Physician: Omer Mcgee MD    Visit Date: 12/24/2019  Physician Orders: PT Eval and Treat  Medical Diagnosis from Referral: DDD (degenerative disc disease), lumbar  Evaluation Date: 11/14/2019  Authorization Period Expiration: 12/31/2019  Plan of Care Expiration: 01/10/2019  Visit # / Visits authorized: 7/20    Time In: 850 AM  Time Out: 945 AM  Total Billable Time: 55 minutes    Precautions: Standard    Subjective     Pt reports: She feels stiff in her back today.   She was compliant with home exercise program.  Response to previous treatment: Too soon to tell  Functional change: No change    Pain: 2/10  Location: bilateral back      Objective     Lavonne received therapeutic exercises to develop strength, flexibility and core stabilization for 57 minutes including:  Upright bike x 5 min for endurance and increased tissue perfusion   DKTC x 2 minutes (Green ball)  LTR x 2 minutes (Green ball)   SLR 2 x 10, 2#  SL hip abduction x 10, 1#  PPT 2 x10 (3 second hold)  Bridges 2 x 10   SL clamshells GTB 2 x 10   Ball Squeezes 2 x 10 (3 second)  Piriformis stretch 2x 30s  Supine hip flexor stretch with strap x 2 minute     TA activation ball (red) press down 2 x 10 (3 second hold) - NP  Standing hip abduction 2 x 10 GTB   Standing hip extension 2 x 10 GTB   Leg Press 2 x 10 30#   Paloff press (Green TB) 2x10, 3 sec pause    Home Exercises Provided and Patient Education Provided     Education provided:   - Continuing HEP    Written Home Exercises Provided: Patient instructed to cont prior HEP.  Exercises were reviewed and Lavonne was able to demonstrate them prior to the end of the session.  Lavonne demonstrated good  understanding of the education provided.     See EMR under  Media for exercises provided 11/26/2019.    Assessment     Lavonne has continued to progress with her LE strengthening exercises without any increase in her low back pain. Progressed with standing hip exercise resistance today. Will plan to discharge with HEP next visit.     Lavonne is progressing well towards her goals.   Pt prognosis is Good.     Pt will continue to benefit from skilled outpatient physical therapy to address the deficits listed in the problem list box on initial evaluation, provide pt/family education and to maximize pt's level of independence in the home and community environment.     Pt's spiritual, cultural and educational needs considered and pt agreeable to plan of care and goals.    Anticipated barriers to physical therapy: None    Goals:   Short Term Goals (3 Weeks):   1. Pt to increase strength to at least 4+/5 of muscles tested to allow for improvement in functional activities such as performing chores. (Met)  2. Pt to improve gross spinal motion in rotation by 25% to allow for improved functional mobility. (Met)  3. Pt to tolerate sitting for >1 hour with <3/10 pain in low back to allow for improvement in IADLs. (Met)    Long Term Goals (6 Weeks):   1. Pt to increase strength to at least 5/5 of muscles tested to allow for improvement in functional activities such as performing chores.(Proressing, not met)  2. Pt to improve gross spinal motion to <10% limitations to allow for improved functional mobility with performing ADL's (Progressing, not met)   3. Pt to tolerate standing and walking for community distances with <3/10 pain in low back to improve mobility with IADL's. (Met)  4. Pt to demo good squat form with no knee valgus or trunk flexion collapse to improve ADLs.  (Progressing, not met)  5. Pt to report compliance with HEP 3x/week and demonstrate proper exercise technique to PT to show independence with self management of condition. (Progressing, not met)    Plan     Continue with  LE strengthening and improving hip mobility    Benja Brooks, PT

## 2019-12-24 ENCOUNTER — CLINICAL SUPPORT (OUTPATIENT)
Dept: REHABILITATION | Facility: HOSPITAL | Age: 44
End: 2019-12-24
Attending: FAMILY MEDICINE
Payer: MEDICARE

## 2019-12-24 DIAGNOSIS — R29.898 WEAKNESS OF BOTH HIPS: ICD-10-CM

## 2019-12-24 DIAGNOSIS — G89.29 CHRONIC BILATERAL LOW BACK PAIN WITH RIGHT-SIDED SCIATICA: ICD-10-CM

## 2019-12-24 DIAGNOSIS — M54.41 CHRONIC BILATERAL LOW BACK PAIN WITH RIGHT-SIDED SCIATICA: ICD-10-CM

## 2019-12-24 PROCEDURE — 97110 THERAPEUTIC EXERCISES: CPT | Mod: PO

## 2020-01-13 ENCOUNTER — TELEPHONE (OUTPATIENT)
Dept: FAMILY MEDICINE | Facility: CLINIC | Age: 45
End: 2020-01-13

## 2020-01-13 NOTE — TELEPHONE ENCOUNTER
Per Dr. Mcgee most recent office note, diagnosis is M79.7, Fibromyalgia.     Called pharmacy, notified and they verbally understood, however, the diagnosis code requires a PA. Please advise.

## 2020-01-15 ENCOUNTER — DOCUMENTATION ONLY (OUTPATIENT)
Dept: FAMILY MEDICINE | Facility: CLINIC | Age: 45
End: 2020-01-15

## 2020-01-15 NOTE — PROGRESS NOTES
PA:    Modafinil 100 mg tablets    [facility] key:   [id number]  Case ID:    [id number]  [employer]  ----------------------------  1/17/2020  Determination:   DENIED  -----------------------------------------  1/21/2020  Received fax requesting another diagnosis. Sending message to provider for more information.  --------------------------------------------------------  1/27/2020  Appeal DENIED  Medicare's drug reference books do not support using this drug to treat Fibromyalgia and Chronic Fatigue Syndrome.

## 2020-01-20 ENCOUNTER — TELEPHONE (OUTPATIENT)
Dept: FAMILY MEDICINE | Facility: CLINIC | Age: 45
End: 2020-01-20

## 2020-01-20 NOTE — TELEPHONE ENCOUNTER
Spoke with patient and informed her that the prior authoization form was faxed over to [facility] at [phone].

## 2020-01-21 ENCOUNTER — TELEPHONE (OUTPATIENT)
Dept: FAMILY MEDICINE | Facility: CLINIC | Age: 45
End: 2020-01-21

## 2020-01-21 NOTE — TELEPHONE ENCOUNTER
----- Message from Kassi Pal sent at 1/21/2020  2:59 PM CST -----  Contact: Alysha with [employer]  Type: Needs Medical Advice    Who Called:  Alysha Estes  Best Call Back Number: , [phone]  Additional Information: Calling to speak with the nurse to get clarification on modafinil (PROVIGIL) 100 MG Tab.

## 2020-01-22 ENCOUNTER — TELEPHONE (OUTPATIENT)
Dept: FAMILY MEDICINE | Facility: CLINIC | Age: 45
End: 2020-01-22

## 2020-01-22 NOTE — TELEPHONE ENCOUNTER
----- Message from Nicki Wardmegha sent at 1/22/2020  9:56 AM CST -----  Contact: Jaimie sharp/[employer] Medication Appeal Department  Jaimie is requesting a call back to the medication appeal department for the patients modafinil (PROVIGIL) 100 MG Tab.  Call back at [phone] REF #[id number].  Thanks

## 2020-01-22 NOTE — TELEPHONE ENCOUNTER
Patient's Modafinil has been denied by [employer]. Received a fax requesting another diagnosis other than Fibromyalgia for a redetermination. Does the patient have another condition (diagnosis) that may be covered for this medication? I did not see any in the chart but Fibromyalgia is not a covered condition. Please advise.

## 2020-01-27 ENCOUNTER — PATIENT OUTREACH (OUTPATIENT)
Dept: ADMINISTRATIVE | Facility: OTHER | Age: 45
End: 2020-01-27

## 2020-01-27 ENCOUNTER — OFFICE VISIT (OUTPATIENT)
Dept: FAMILY MEDICINE | Facility: CLINIC | Age: 45
End: 2020-01-27
Payer: MEDICARE

## 2020-01-27 VITALS
BODY MASS INDEX: 38.55 KG/M2 | OXYGEN SATURATION: 97 % | WEIGHT: 178.13 LBS | HEART RATE: 82 BPM | DIASTOLIC BLOOD PRESSURE: 78 MMHG | SYSTOLIC BLOOD PRESSURE: 122 MMHG

## 2020-01-27 DIAGNOSIS — M54.9 BACK PAIN, UNSPECIFIED BACK LOCATION, UNSPECIFIED BACK PAIN LATERALITY, UNSPECIFIED CHRONICITY: Primary | ICD-10-CM

## 2020-01-27 DIAGNOSIS — R41.840 CONCENTRATION DEFICIT: ICD-10-CM

## 2020-01-27 DIAGNOSIS — E66.9 OBESITY (BMI 35.0-39.9 WITHOUT COMORBIDITY): ICD-10-CM

## 2020-01-27 PROCEDURE — 99999 PR PBB SHADOW E&M-EST. PATIENT-LVL V: CPT | Mod: PBBFAC,,, | Performed by: PHYSICIAN ASSISTANT

## 2020-01-27 PROCEDURE — 99214 PR OFFICE/OUTPT VISIT, EST, LEVL IV, 30-39 MIN: ICD-10-PCS | Mod: S$PBB,,, | Performed by: PHYSICIAN ASSISTANT

## 2020-01-27 PROCEDURE — 99999 PR PBB SHADOW E&M-EST. PATIENT-LVL V: ICD-10-PCS | Mod: PBBFAC,,, | Performed by: PHYSICIAN ASSISTANT

## 2020-01-27 PROCEDURE — 90471 IMMUNIZATION ADMIN: CPT | Mod: PBBFAC,PO

## 2020-01-27 PROCEDURE — 99214 OFFICE O/P EST MOD 30 MIN: CPT | Mod: S$PBB,,, | Performed by: PHYSICIAN ASSISTANT

## 2020-01-27 PROCEDURE — 99215 OFFICE O/P EST HI 40 MIN: CPT | Mod: PBBFAC,PO | Performed by: PHYSICIAN ASSISTANT

## 2020-01-27 NOTE — PROGRESS NOTES
Subjective:       Patient ID: Lavonne Pierre is a 44 y.o. female.    Chief Complaint: Fibromyalgia; Asthma; and Sleep Apnea    Fibromyalgia   This is a chronic problem. The current episode started more than 1 year ago. The problem occurs constantly. The problem has been unchanged. Associated symptoms include arthralgias, fatigue, myalgias, neck pain and weakness. Pertinent negatives include no abdominal pain or chest pain. Associated symptoms comments: Back pain. Nothing aggravates the symptoms. She has tried nothing (multiple medication) for the symptoms.   Asthma   There is no chest tightness, difficulty breathing, shortness of breath or wheezing. Associated symptoms include myalgias. Pertinent negatives include no chest pain. Her symptoms are alleviated by beta-agonist, leukotriene antagonist and steroid inhaler. She reports significant improvement on treatment. Her past medical history is significant for asthma.     Past Medical History:   Diagnosis Date    Anxiety 7/16/2013    Asthma     Depression     Fibromyalgia     GERD (gastroesophageal reflux disease)     History of pneumonia 2015    HTN (hypertension)     Preeclampsia     Sleep apnea     Uses CPAP       Review of Systems   Constitutional: Positive for fatigue.   Respiratory: Negative for chest tightness, shortness of breath and wheezing.    Cardiovascular: Negative for chest pain.   Gastrointestinal: Negative for abdominal pain.   Musculoskeletal: Positive for arthralgias, myalgias and neck pain.   Neurological: Positive for weakness.   Psychiatric/Behavioral: Positive for decreased concentration.        Desires ADD testing       Objective:      Physical Exam   Constitutional: She appears well-developed and well-nourished. No distress.   Neck: Neck supple.   Cardiovascular: Normal rate and regular rhythm. Exam reveals no gallop and no friction rub.   No murmur heard.  Pulmonary/Chest: Effort normal and breath sounds normal. No stridor. No  respiratory distress. She has no wheezes. She has no rales. She exhibits no tenderness.   Abdominal: Soft. Bowel sounds are normal. She exhibits no distension and no mass. There is no tenderness. There is no rebound and no guarding. No hernia.   Lymphadenopathy:     She has no cervical adenopathy.   Skin: She is not diaphoretic.   Nursing note and vitals reviewed.      Assessment:       1. Back pain, unspecified back location, unspecified back pain laterality, unspecified chronicity    2. Concentration deficit    3. Obesity (BMI 35.0-39.9 without comorbidity)        Plan:       Back pain, unspecified back location, unspecified back pain laterality, unspecified chronicity  -     Ambulatory referral to Physical Medicine Rehab  -     Ambulatory Referral to [facility]    Concentration deficit  -     Ambulatory referral to Psychiatry  -     CBC auto differential; Future; Expected date: 01/27/2020  -     Comprehensive metabolic panel; Future; Expected date: 01/27/2020    Obesity (BMI 35.0-39.9 without comorbidity)  -     Lipid panel; Future; Expected date: 01/27/2020  -     Ambulatory Referral to [facility] Spine Clinic    Other orders  -     (In Office Administered) Tdap Vaccine

## 2020-01-28 ENCOUNTER — LAB VISIT (OUTPATIENT)
Dept: LAB | Facility: HOSPITAL | Age: 45
End: 2020-01-28
Attending: PHYSICIAN ASSISTANT
Payer: MEDICARE

## 2020-01-28 ENCOUNTER — OFFICE VISIT (OUTPATIENT)
Dept: SPINE | Facility: CLINIC | Age: 45
End: 2020-01-28
Payer: MEDICARE

## 2020-01-28 VITALS
BODY MASS INDEX: 38.43 KG/M2 | HEART RATE: 78 BPM | DIASTOLIC BLOOD PRESSURE: 73 MMHG | SYSTOLIC BLOOD PRESSURE: 113 MMHG | HEIGHT: 57 IN | WEIGHT: 178.13 LBS

## 2020-01-28 DIAGNOSIS — M54.12 CERVICAL RADICULOPATHY: ICD-10-CM

## 2020-01-28 DIAGNOSIS — M47.816 LUMBAR SPONDYLOSIS: ICD-10-CM

## 2020-01-28 DIAGNOSIS — M54.2 CERVICALGIA: ICD-10-CM

## 2020-01-28 DIAGNOSIS — M54.42 CHRONIC BILATERAL LOW BACK PAIN WITH BILATERAL SCIATICA: ICD-10-CM

## 2020-01-28 DIAGNOSIS — G89.29 CHRONIC BILATERAL LOW BACK PAIN WITH BILATERAL SCIATICA: ICD-10-CM

## 2020-01-28 DIAGNOSIS — R41.840 CONCENTRATION DEFICIT: ICD-10-CM

## 2020-01-28 DIAGNOSIS — E66.9 OBESITY (BMI 35.0-39.9 WITHOUT COMORBIDITY): ICD-10-CM

## 2020-01-28 DIAGNOSIS — Z98.890 H/O CERVICAL SPINE SURGERY: Primary | ICD-10-CM

## 2020-01-28 DIAGNOSIS — M54.41 CHRONIC BILATERAL LOW BACK PAIN WITH BILATERAL SCIATICA: ICD-10-CM

## 2020-01-28 LAB
ALBUMIN SERPL BCP-MCNC: 4 G/DL (ref 3.5–5.2)
ALP SERPL-CCNC: 74 U/L (ref 55–135)
ALT SERPL W/O P-5'-P-CCNC: 18 U/L (ref 10–44)
ANION GAP SERPL CALC-SCNC: 9 MMOL/L (ref 8–16)
AST SERPL-CCNC: 18 U/L (ref 10–40)
BASOPHILS # BLD AUTO: 0.05 K/UL (ref 0–0.2)
BASOPHILS NFR BLD: 0.4 % (ref 0–1.9)
BILIRUB SERPL-MCNC: 0.2 MG/DL (ref 0.1–1)
BUN SERPL-MCNC: 13 MG/DL (ref 6–20)
CALCIUM SERPL-MCNC: 9 MG/DL (ref 8.7–10.5)
CHLORIDE SERPL-SCNC: 104 MMOL/L (ref 95–110)
CHOLEST SERPL-MCNC: 205 MG/DL (ref 120–199)
CHOLEST/HDLC SERPL: 4.5 {RATIO} (ref 2–5)
CO2 SERPL-SCNC: 24 MMOL/L (ref 23–29)
CREAT SERPL-MCNC: 1 MG/DL (ref 0.5–1.4)
DIFFERENTIAL METHOD: ABNORMAL
EOSINOPHIL # BLD AUTO: 0.1 K/UL (ref 0–0.5)
EOSINOPHIL NFR BLD: 1.1 % (ref 0–8)
ERYTHROCYTE [DISTWIDTH] IN BLOOD BY AUTOMATED COUNT: 12.6 % (ref 11.5–14.5)
EST. GFR  (AFRICAN AMERICAN): >60 ML/MIN/1.73 M^2
EST. GFR  (NON AFRICAN AMERICAN): >60 ML/MIN/1.73 M^2
GLUCOSE SERPL-MCNC: 97 MG/DL (ref 70–110)
HCT VFR BLD AUTO: 43.5 % (ref 37–48.5)
HDLC SERPL-MCNC: 46 MG/DL (ref 40–75)
HDLC SERPL: 22.4 % (ref 20–50)
HGB BLD-MCNC: 13.8 G/DL (ref 12–16)
IMM GRANULOCYTES # BLD AUTO: 0.04 K/UL (ref 0–0.04)
IMM GRANULOCYTES NFR BLD AUTO: 0.4 % (ref 0–0.5)
LDLC SERPL CALC-MCNC: 124 MG/DL (ref 63–159)
LYMPHOCYTES # BLD AUTO: 2.2 K/UL (ref 1–4.8)
LYMPHOCYTES NFR BLD: 19.2 % (ref 18–48)
MCH RBC QN AUTO: 29.4 PG (ref 27–31)
MCHC RBC AUTO-ENTMCNC: 31.7 G/DL (ref 32–36)
MCV RBC AUTO: 93 FL (ref 82–98)
MONOCYTES # BLD AUTO: 0.7 K/UL (ref 0.3–1)
MONOCYTES NFR BLD: 6.3 % (ref 4–15)
NEUTROPHILS # BLD AUTO: 8.2 K/UL (ref 1.8–7.7)
NEUTROPHILS NFR BLD: 72.6 % (ref 38–73)
NONHDLC SERPL-MCNC: 159 MG/DL
NRBC BLD-RTO: 0 /100 WBC
PLATELET # BLD AUTO: 248 K/UL (ref 150–350)
PMV BLD AUTO: 12 FL (ref 9.2–12.9)
POTASSIUM SERPL-SCNC: 4.4 MMOL/L (ref 3.5–5.1)
PROT SERPL-MCNC: 7.4 G/DL (ref 6–8.4)
RBC # BLD AUTO: 4.7 M/UL (ref 4–5.4)
SODIUM SERPL-SCNC: 137 MMOL/L (ref 136–145)
TRIGL SERPL-MCNC: 175 MG/DL (ref 30–150)
WBC # BLD AUTO: 11.22 K/UL (ref 3.9–12.7)

## 2020-01-28 PROCEDURE — 99203 OFFICE O/P NEW LOW 30 MIN: CPT | Mod: S$PBB,,, | Performed by: PHYSICIAN ASSISTANT

## 2020-01-28 PROCEDURE — 36415 COLL VENOUS BLD VENIPUNCTURE: CPT | Mod: PO

## 2020-01-28 PROCEDURE — 80053 COMPREHEN METABOLIC PANEL: CPT

## 2020-01-28 PROCEDURE — 99203 PR OFFICE/OUTPT VISIT, NEW, LEVL III, 30-44 MIN: ICD-10-PCS | Mod: S$PBB,,, | Performed by: PHYSICIAN ASSISTANT

## 2020-01-28 PROCEDURE — 99999 PR PBB SHADOW E&M-EST. PATIENT-LVL V: ICD-10-PCS | Mod: PBBFAC,,, | Performed by: PHYSICIAN ASSISTANT

## 2020-01-28 PROCEDURE — 85025 COMPLETE CBC W/AUTO DIFF WBC: CPT

## 2020-01-28 PROCEDURE — 80061 LIPID PANEL: CPT

## 2020-01-28 PROCEDURE — 99215 OFFICE O/P EST HI 40 MIN: CPT | Mod: PBBFAC,PN | Performed by: PHYSICIAN ASSISTANT

## 2020-01-28 PROCEDURE — 99999 PR PBB SHADOW E&M-EST. PATIENT-LVL V: CPT | Mod: PBBFAC,,, | Performed by: PHYSICIAN ASSISTANT

## 2020-01-28 NOTE — LETTER
January 31, 2020      Levy Hathaway III, PA-C  [address] [facility] [address]           [place]  [address]  [facility] [address]  Phone: [phone]  Fax: [phone]          Patient: Lavonne Pierre   MR Number: [id number]   YOB: 1975   Date of Visit: 1/28/2020       Dear Levy Hathaway III:    Thank you for referring Lavonne Pierre to me for evaluation. Attached you will find relevant portions of my assessment and plan of care.    If you have questions, please do not hesitate to call me. I look forward to following Lavonne Pierre along with you.    Sincerely,    Janny Adame PA-C    Enclosure  CC:  No Recipients    If you would like to receive this communication electronically, please contact [email or web address] or [phone] to request more information on [employer] access.    For providers and/or their staff who would like to refer a patient to [facility], please contact us through our one-stop-shop provider referral line, [facility], at [phone].    If you feel you have received this communication in error or would no longer like to receive these types of communications, please e-mail [email or web address]

## 2020-01-30 NOTE — PROGRESS NOTES
Back and Spine New Patient    Patient ID: Lavonne Pierre is a 44 y.o. female.    Chief Complaint   Patient presents with    Low-back Pain     She has had low back pain for years that is constant. In 1996 someone fell on her and hit her back and she has had pain ever since then. She had neck surgery 6/2019. Standing and bending down, physical activity, and sitting to long makes pain worse. In the last few weeks her neck pain has gotten worse, and it causes headaches.       Review of Systems   Constitutional: Negative for activity change, appetite change, chills, fever and unexpected weight change.   HENT: Negative for tinnitus, trouble swallowing and voice change.    Respiratory: Negative for apnea, cough, chest tightness and shortness of breath.    Cardiovascular: Negative for chest pain and palpitations.   Gastrointestinal: Negative for constipation, diarrhea, nausea and vomiting.   Genitourinary: Negative for difficulty urinating, dysuria, frequency and urgency.   Musculoskeletal: Positive for back pain, myalgias and neck pain. Negative for gait problem and neck stiffness.   Skin: Negative for wound.   Neurological: Positive for numbness. Negative for dizziness, tremors, seizures, facial asymmetry, speech difficulty, weakness, light-headedness and headaches.   Psychiatric/Behavioral: Negative for confusion and decreased concentration.       Past Medical History:   Diagnosis Date    Anxiety 7/16/2013    Asthma     Depression     Fibromyalgia     GERD (gastroesophageal reflux disease)     History of pneumonia 2015    HTN (hypertension)     Preeclampsia     Sleep apnea     Uses CPAP     Social History     Socioeconomic History    Marital status:      Spouse name: Not on file    Number of children: Not on file    Years of education: Not on file    Highest education level: Not on file   Occupational History    Not on file   Social Needs    Financial resource strain: Not on file    Food  insecurity:     Worry: Not on file     Inability: Not on file    Transportation needs:     Medical: No     Non-medical: No   Tobacco Use    Smoking status: Never Smoker    Smokeless tobacco: Never Used   Substance and Sexual Activity    Alcohol use: No     Frequency: Never     Drinks per session: Patient refused     Binge frequency: Never    Drug use: No    Sexual activity: Never     Birth control/protection: See Surgical Hx   Lifestyle    Physical activity:     Days per week: Not on file     Minutes per session: Not on file    Stress: Not on file   Relationships    Social connections:     Talks on phone: Once a week     Gets together: Once a week     Attends Faith service: Not on file     Active member of club or organization: Yes     Attends meetings of clubs or organizations: 1 to 4 times per year     Relationship status:    Other Topics Concern    Not on file   Social History Narrative    Not on file     Family History   Problem Relation Age of Onset    Pancreatic cancer Mother     Lymphoma Father     Diabetes Father     Hypertension Father     Pancreatic cancer Maternal Grandmother     Pancreatic cancer Maternal Uncle     Breast cancer Maternal Aunt     Breast cancer Paternal Aunt 50     Review of patient's allergies indicates:   Allergen Reactions    Ibuprofen Other (See Comments)     Affects stomach ulcer, avoids all NSAIDS    Latex, natural rubber Rash     Small red bumps on skin contact       Current Outpatient Medications:     albuterol (PROVENTIL/VENTOLIN HFA) 90 mcg/actuation inhaler, Ventolin HFA 90 mcg/actuation aerosol inhaler  INHALE 2 PUFFS EVERY 4 TO 6 HOURS AS NEEDED, Disp: , Rfl:     azelastine (ASTELIN) 137 mcg (0.1 %) nasal spray, U 1 TO 2 SPRAYS IN EACH NOSTRIL BID, Disp: , Rfl: 0    BREO ELLIPTA 100-25 mcg/dose diskus inhaler, INL 1 PUFF PO AT THE SAME TIME QD, Disp: , Rfl: 11    diclofenac sodium (VOLTAREN) 1 % Gel, Apply 2 g topically 3 (three) times  "daily., Disp: 100 g, Rfl: 5    fluticasone propionate (FLONASE) 50 mcg/actuation nasal spray, INSTILL 1 TO 2 SPRAYS IN EACH NOSTRIL QD, Disp: , Rfl: 5    lidocaine-prilocaine (EMLA) cream, SARA EXT AA 1 TIME Q 4 WKS 30 MIN B TREATMENT FOR 1 DAY, Disp: , Rfl: 1    lisinopril (PRINIVIL,ZESTRIL) 5 MG tablet, TAKE 1 TABLET BY MOUTH EVERY DAY, Disp: 90 tablet, Rfl: 3    montelukast (SINGULAIR) 10 mg tablet, 10 mg., Disp: , Rfl:     multivitamin (THERAGRAN) per tablet, Take 1 tablet by mouth once daily., Disp: , Rfl:     omeprazole (PRILOSEC) 40 MG capsule, TK 1 C PO QD, Disp: , Rfl: 2    traMADol (ULTRAM) 50 mg tablet, Take 2 tablets (100 mg total) by mouth every 12 (twelve) hours as needed for Pain., Disp: 120 tablet, Rfl: 3    Vitals:    01/28/20 1005   BP: 113/73   BP Location: Left arm   Patient Position: Sitting   BP Method: Medium (Automatic)   Pulse: 78   Weight: 80.8 kg (178 lb 2.1 oz)   Height: 4' 9" (1.448 m)       Physical Exam   Constitutional: She is oriented to person, place, and time. She appears well-developed and well-nourished.   HENT:   Head: Normocephalic and atraumatic.   Eyes: Pupils are equal, round, and reactive to light.   Neck: Normal range of motion. Neck supple.   Cardiovascular: Normal rate.   Pulmonary/Chest: Effort normal.   Musculoskeletal: Normal range of motion. She exhibits no edema.   Neurological: She is alert and oriented to person, place, and time. She has a normal Finger-Nose-Finger Test, a normal Heel to Shin Test, a normal Romberg Test and a normal Tandem Gait Test. Gait normal.   Reflex Scores:       Tricep reflexes are 2+ on the right side and 2+ on the left side.       Bicep reflexes are 2+ on the right side and 2+ on the left side.       Brachioradialis reflexes are 2+ on the right side and 2+ on the left side.       Patellar reflexes are 2+ on the right side and 2+ on the left side.       Achilles reflexes are 2+ on the right side and 2+ on the left side.  Skin: Skin " is warm, dry and intact.   Psychiatric: She has a normal mood and affect. Her speech is normal and behavior is normal. Judgment and thought content normal.   Nursing note and vitals reviewed.      Neurologic Exam     Mental Status   Oriented to person, place, and time.   Oriented to person.   Oriented to place.   Oriented to time.   Follows 3 step commands.   Attention: normal. Concentration: normal.   Speech: speech is normal   Level of consciousness: alert  Knowledge: consistent with education.   Able to name object. Able to read. Able to repeat. Able to write. Normal comprehension.     Cranial Nerves     CN II   Visual acuity: normal  Right visual field deficit: none  Left visual field deficit: none     CN III, IV, VI   Pupils are equal, round, and reactive to light.  Right pupil: Size: 3 mm. Shape: regular. Reactivity: brisk. Consensual response: intact.   Left pupil: Size: 3 mm. Shape: regular. Reactivity: brisk. Consensual response: intact.   CN III: no CN III palsy  CN VI: no CN VI palsy  Nystagmus: none   Diplopia: none  Ophthalmoparesis: none  Conjugate gaze: present    CN V   Right facial sensation deficit: none  Left facial sensation deficit: none    CN VII   Right facial weakness: none  Left facial weakness: none    CN VIII   Hearing: intact    CN IX, X   CN IX normal.   CN X normal.     CN XI   Right sternocleidomastoid strength: normal  Left sternocleidomastoid strength: normal  Right trapezius strength: normal  Left trapezius strength: normal    CN XII   Fasciculations: absent  Tongue deviation: none    Motor Exam   Muscle bulk: normal  Overall muscle tone: normal  Right arm pronator drift: absent  Left arm pronator drift: absent    Strength   Right neck flexion: 5/5  Left neck flexion: 5/5  Right neck extension: 5/5  Left neck extension: 5/5  Right deltoid: 5/5  Left deltoid: 5/5  Right biceps: 5/5  Left biceps: 5/5  Right triceps: 5/5  Left triceps: 5/5  Right wrist flexion: 5/5  Left wrist flexion:  5/5  Right wrist extension: 5/5  Left wrist extension: 5/5  Right interossei: 5/5  Left interossei: 5/5  Right abdominals: 5/5  Left abdominals: 5/5  Right iliopsoas: 5/5  Left iliopsoas: 5/5  Right quadriceps: 5/5  Left quadriceps: 5/5  Right hamstrin/  Left hamstrin/5  Right glutei: 5/5  Left glutei: 5/5  Right anterior tibial: 5/5  Left anterior tibial: 55  Right posterior tibial: 5/5  Left posterior tibial: 55  Right peroneal: 55  Left peroneal: 5  Right gastroc: 5  Left gastroc: 5    Sensory Exam   Right arm light touch: normal  Left arm light touch: normal  Right leg light touch: normal  Left leg light touch: normal  Right arm vibration: normal  Left arm vibration: normal  Right arm pinprick: normal  Left arm pinprick: normal    Gait, Coordination, and Reflexes     Gait  Gait: normal    Coordination   Romberg: negative  Finger to nose coordination: normal  Heel to shin coordination: normal  Tandem walking coordination: normal    Tremor   Resting tremor: absent  Intention tremor: absent  Action tremor: absent    Reflexes   Right brachioradialis: 2+  Left brachioradialis: 2+  Right biceps: 2+  Left biceps: 2+  Right triceps: 2+  Left triceps: 2+  Right patellar: 2+  Left patellar: 2+  Right achilles: 2+  Left achilles: 2+  Right Greer: absent  Left Greer: absent  Right ankle clonus: absent  Left ankle clonus: absent      Provider dictation:  44 year old female with anxiety, depression, fibromyalgia, GERD, hypertension and asthma is self referred for evaluation of chronic neck/ back pain.  In  someone fell from the top deck of a float hitting her back as he fell.  Since then she has felt a burning sensation in the bilateral hips and lateral legs to the feet.  She underwent PT with dry needling in 2019 for her back without resolution.  Pain increases with standing and walking.  She has also had chronic neck pain and underwent artificial disk replacement with Dr. Martin in  June 2019; this was not good experience for her and pain continues.  She did PT after surgery for her neck, but she continues to have pain in the posterior neck with radiation into the right greater than left shoulder to the arms and hands.  It is associated with numbness/ tingling in the bilateral hands.  She has tried voltaredn gel and tramadol for neck and back pain.  She also underwent right carpal tunnel release in 2017.    Oswestry score: 46%.  PHQ:  14.    She is neurologically intact with 5/5 strength, 2+ DTR and no sensory deficits in the upper/ lower extremities.  Gait and station fluid.  She denies bowel/ bladder dysfunction.    Xray lumbar spine from 10-21-19 reviewed.  L1/2 disk space narrowing and endplate sclerosis is present.    No cervical spine imaging is available.    In conclusion, Ms. Banerjee has chronic neck pain with right greater than left arm radiculopathy and prior surgical intervention without relief.  She also has chronic lower back with bilateral leg pain wihtout benefit with conservative measures thus far.  With continued pain, I do recommend obtaining updated imaging with MRI and xrays of the cervical and lumbar spine areas to assess for any current neural compression that can contribute to symptoms.  Follow up in clinic after imaging is complete.  Current pain can be related to any ongoing neural compression (if present), may be myofascial/ related to fibromyalgia.          Visit Diagnosis:  H/O cervical spine surgery  -     X-Ray Cervical Spine 5 View W Flex Extxt; Future; Expected date: 01/28/2020  -     MRI Cervical Spine Without Contrast; Future; Expected date: 01/28/2020    Cervicalgia  -     X-Ray Cervical Spine 5 View W Flex Extxt; Future; Expected date: 01/28/2020  -     MRI Cervical Spine Without Contrast; Future; Expected date: 01/28/2020    Cervical radiculopathy  -     X-Ray Cervical Spine 5 View W Flex Extxt; Future; Expected date: 01/28/2020  -     MRI Cervical Spine  Without Contrast; Future; Expected date: 01/28/2020    Chronic bilateral low back pain with bilateral sciatica  -     X-Ray Lumbar Complete With Flex And Ext; Future; Expected date: 01/28/2020  -     MRI Lumbar Spine Without Contrast; Future; Expected date: 01/28/2020    Lumbar spondylosis  -     X-Ray Lumbar Complete With Flex And Ext; Future; Expected date: 01/28/2020  -     MRI Lumbar Spine Without Contrast; Future; Expected date: 01/28/2020        Total time spent counseling greater than fifty percent of total visit time.  Counseling included discussion regarding imaging findings, diagnosis possibilities, treatment options, risks and benefits.   The patient had many questions regarding the options and long-term effects.

## 2020-02-05 ENCOUNTER — TELEPHONE (OUTPATIENT)
Dept: FAMILY MEDICINE | Facility: CLINIC | Age: 45
End: 2020-02-05

## 2020-02-05 ENCOUNTER — OFFICE VISIT (OUTPATIENT)
Dept: RHEUMATOLOGY | Facility: CLINIC | Age: 45
End: 2020-02-05
Payer: MEDICARE

## 2020-02-05 VITALS
BODY MASS INDEX: 38.26 KG/M2 | DIASTOLIC BLOOD PRESSURE: 76 MMHG | HEART RATE: 77 BPM | SYSTOLIC BLOOD PRESSURE: 110 MMHG | WEIGHT: 176.81 LBS

## 2020-02-05 DIAGNOSIS — M50.30 DDD (DEGENERATIVE DISC DISEASE), CERVICAL: ICD-10-CM

## 2020-02-05 DIAGNOSIS — M79.7 FIBROMYALGIA: ICD-10-CM

## 2020-02-05 DIAGNOSIS — G89.29 CHRONIC BILATERAL LOW BACK PAIN WITH RIGHT-SIDED SCIATICA: ICD-10-CM

## 2020-02-05 DIAGNOSIS — M54.41 CHRONIC BILATERAL LOW BACK PAIN WITH RIGHT-SIDED SCIATICA: ICD-10-CM

## 2020-02-05 DIAGNOSIS — R76.8 ANA POSITIVE: ICD-10-CM

## 2020-02-05 DIAGNOSIS — M79.7 MUSCLE PAIN, FIBROMYALGIA: Primary | ICD-10-CM

## 2020-02-05 DIAGNOSIS — G89.29 OTHER CHRONIC PAIN: ICD-10-CM

## 2020-02-05 PROBLEM — G56.03 BILATERAL CARPAL TUNNEL SYNDROME: Status: RESOLVED | Noted: 2017-11-24 | Resolved: 2020-02-05

## 2020-02-05 PROCEDURE — 99999 PR PBB SHADOW E&M-EST. PATIENT-LVL III: CPT | Mod: PBBFAC,,, | Performed by: INTERNAL MEDICINE

## 2020-02-05 PROCEDURE — 99999 PR PBB SHADOW E&M-EST. PATIENT-LVL III: ICD-10-PCS | Mod: PBBFAC,,, | Performed by: INTERNAL MEDICINE

## 2020-02-05 PROCEDURE — 99213 OFFICE O/P EST LOW 20 MIN: CPT | Mod: PBBFAC,PO | Performed by: INTERNAL MEDICINE

## 2020-02-05 PROCEDURE — 99214 OFFICE O/P EST MOD 30 MIN: CPT | Mod: S$PBB,,, | Performed by: INTERNAL MEDICINE

## 2020-02-05 PROCEDURE — 99214 PR OFFICE/OUTPT VISIT, EST, LEVL IV, 30-39 MIN: ICD-10-PCS | Mod: S$PBB,,, | Performed by: INTERNAL MEDICINE

## 2020-02-05 RX ORDER — PREGABALIN 25 MG/1
25 CAPSULE ORAL NIGHTLY PRN
Qty: 30 CAPSULE | Refills: 3 | Status: SHIPPED | OUTPATIENT
Start: 2020-02-05 | End: 2020-06-18

## 2020-02-05 RX ORDER — DICLOFENAC SODIUM 10 MG/G
2 GEL TOPICAL 3 TIMES DAILY
Qty: 100 G | Refills: 5 | Status: SHIPPED | OUTPATIENT
Start: 2020-02-05 | End: 2021-03-05 | Stop reason: SDUPTHER

## 2020-02-05 RX ORDER — TRAMADOL HYDROCHLORIDE 50 MG/1
100 TABLET ORAL EVERY 12 HOURS PRN
Qty: 120 TABLET | Refills: 3 | Status: SHIPPED | OUTPATIENT
Start: 2020-02-05 | End: 2020-08-05 | Stop reason: SDUPTHER

## 2020-02-05 ASSESSMENT — ROUTINE ASSESSMENT OF PATIENT INDEX DATA (RAPID3)
TOTAL RAPID3 SCORE: 6.33
PSYCHOLOGICAL DISTRESS SCORE: 5.5
PATIENT GLOBAL ASSESSMENT SCORE: 7.5
PAIN SCORE: 7.5
MDHAQ FUNCTION SCORE: 1.2

## 2020-02-05 NOTE — PROGRESS NOTES
"Subjective:          Chief Complaint: Lavonne Pierre is a 44 y.o. female who had no chief complaint listed for this encounter.    HPI:    Patient here today for follow-up with a history of fibromyalgia, positive MIRIAN 1:160 homogeneous/speckled. negative MIRIAN profile. + thyroid Antibodies. No evidence of CTD.     Interval events.  S/p cervical surgery with Dr. Vasquez 6/17/19 with Disc replacement no fusion. Hands are definitely better. Legs with some calf and knee pain describing lumps under the skin but not as much "weakness" more tendon and joint complaints some focal soft tissue issues. Has seen podiatry more recently with EMG/NCS negative for denervation.   Pending MRI cervical and lumbar spine with Ms. Adame-      Previous complaint regarding feet: Left does have some plantar involvement. She does endorse pain first thing in the AM with shearing pain.     She states that she did speak with Psych. But involved with [facility] feels she has good supoprt for pains.   She is following with Dr. RONY Arteaga-not sleeping well still using CPAP, still having troublw.   Follows with Dr. Mcgee also: insurance declining Nuvigil despite multiple appeals. Patient with hypersomnia not ADHD.         Current medications   tramadol  mg once daily PRN- This does help only takes at night.   Tylenol as needed seems to help day to day.   Celebrex 200 mg daily which she states is not working discontinued.   Gabapentin 300 mg t.i.d. which she feels is not working discontinued.   Nuvigil at 250mg daily-discontinued due to insurance  Lyrica ASE with weight gain. No relief.   Elavil 25mg for HS- discontinued no help  NSAIDs with gastric ulcers in past.   Failed Cymbalta, Effexor, Zoloft, wellbutrin. Tizanidine, savella,  Prozac  States she did well with Topamax but concern on long term safety. For ocular migraines. Still having HA.       Did have steroid injections in past no relief with elbows, hands, feet or knees. Does " "help some with hip busitis.   She's had various myalgias as well as joint pain in the ankles, feet, wrists, shoulders and neck/back and legs.  She also has an underlying history of depression she's had no constitutional symptoms, rashes, oral ulcers, serositis, Raynaud's.   She is not sleeping at night.   She is having significant fatigue and feel "shaky" in her body. She is noting significant brain fog.  Trouble with restless leg type symptoms, but occur during the day.  She is noting pain in her feet (diffusely), knees at the superior pole of the patella. And bilateral elbows she is going to meet with Dr. Jack as right elbow is doing better , now needing the left.    She is c/o lumps in her skin tender, no redness but she does try to massage them. She is feeling increased pain in the knees with "lumps" in gastrocnemius region. Do feel swollen.              Component      Latest Ref Rng & Units 1/17/2017 1/15/2016   Anti Sm Antibody      0.00 - 19.99 EU  0.97   Anti-Sm Interpretation      Negative  Negative   Anti-SSA Antibody      0.00 - 19.99 EU  1.09   Anti-SSA Interpretation      Negative  Negative   Anti-SSB Antibody      0.00 - 19.99 EU  0.30   Anti-SSB Interpretation      Negative  Negative   ds DNA Ab      Negative 1:10  Negative 1:10   Anti Sm/RNP Antibody      0.00 - 19.99 EU  0.11   Anti-Sm/RNP Interpretation      Negative  Negative   Sed Rate      0 - 20 mm/Hr  9   CRP      0.0 - 8.2 mg/L  5.7   MIRIAN Screen      Negative <1:160 Positive (A) Positive (A)   Rheumatoid Factor      0.0 - 15.0 IU/mL  <10.0   MIRIAN HEP-2 Titer       Positive 1:320 Homogeneous Positive 1:160 Homogeneous       REVIEW OF SYSTEMS:    Review of Systems   Constitutional: Positive for malaise/fatigue and weight loss. Negative for chills and fever.   HENT: Negative for sore throat.    Eyes: Negative for double vision, photophobia, discharge and redness.   Respiratory: Negative for cough, shortness of breath and wheezing.  "   Cardiovascular: Negative for chest pain, palpitations and orthopnea.   Gastrointestinal: Positive for heartburn. Negative for abdominal pain, constipation and diarrhea.   Genitourinary: Negative for dysuria, hematuria and urgency.   Musculoskeletal: Positive for back pain, joint pain, myalgias and neck pain.   Skin: Negative for rash.   Neurological: Positive for headaches. Negative for dizziness, tingling, focal weakness and weakness.   Endo/Heme/Allergies: Does not bruise/bleed easily.   Psychiatric/Behavioral: Negative for depression, hallucinations and suicidal ideas. The patient is nervous/anxious.                Objective:            Past Medical History:   Diagnosis Date    Anxiety 7/16/2013    Asthma     Depression     Fibromyalgia     GERD (gastroesophageal reflux disease)     History of pneumonia 2015    HTN (hypertension)     Preeclampsia     Sleep apnea     Uses CPAP     Family History   Problem Relation Age of Onset    Pancreatic cancer Mother     Lymphoma Father     Diabetes Father     Hypertension Father     Pancreatic cancer Maternal Grandmother     Pancreatic cancer Maternal Uncle     Breast cancer Maternal Aunt     Breast cancer Paternal Aunt 50     Social History     Tobacco Use    Smoking status: Never Smoker    Smokeless tobacco: Never Used   Substance Use Topics    Alcohol use: No     Frequency: Never     Drinks per session: Patient refused     Binge frequency: Never    Drug use: No         Current Outpatient Medications on File Prior to Visit   Medication Sig Dispense Refill    albuterol (PROVENTIL/VENTOLIN HFA) 90 mcg/actuation inhaler Ventolin HFA 90 mcg/actuation aerosol inhaler   INHALE 2 PUFFS EVERY 4 TO 6 HOURS AS NEEDED      azelastine (ASTELIN) 137 mcg (0.1 %) nasal spray U 1 TO 2 SPRAYS IN EACH NOSTRIL BID  0    BREO ELLIPTA 100-25 mcg/dose diskus inhaler INL 1 PUFF PO AT THE SAME TIME QD  11    fluticasone propionate (FLONASE) 50 mcg/actuation nasal spray  INSTILL 1 TO 2 SPRAYS IN EACH NOSTRIL QD  5    lisinopril (PRINIVIL,ZESTRIL) 5 MG tablet TAKE 1 TABLET BY MOUTH EVERY DAY 90 tablet 3    montelukast (SINGULAIR) 10 mg tablet 10 mg.      multivitamin (THERAGRAN) per tablet Take 1 tablet by mouth once daily.      omeprazole (PRILOSEC) 40 MG capsule TK 1 C PO QD  2    traMADol (ULTRAM) 50 mg tablet Take 2 tablets (100 mg total) by mouth every 12 (twelve) hours as needed for Pain. 120 tablet 3    diclofenac sodium (VOLTAREN) 1 % Gel Apply 2 g topically 3 (three) times daily. 100 g 5    lidocaine-prilocaine (EMLA) cream SARA EXT AA 1 TIME Q 4 WKS 30 MIN B TREATMENT FOR 1 DAY  1     No current facility-administered medications on file prior to visit.        Vitals:    02/05/20 1022   BP: 110/76   Pulse: 77       Physical Exam:    Physical Exam   Constitutional: She appears well-developed and well-nourished.   HENT:   Nose: No septal deviation.   Mouth/Throat: Mucous membranes are normal. No oral lesions.   Eyes: Pupils are equal, round, and reactive to light. Right conjunctiva is not injected. Left conjunctiva is not injected.   Neck: No JVD present. No thyroid mass and no thyromegaly present.   Cardiovascular: Normal rate, regular rhythm and normal pulses.   No edema   Pulmonary/Chest: Effort normal and breath sounds normal.   Abdominal: Soft. Normal appearance. There is no hepatosplenomegaly.   Musculoskeletal:        Right shoulder: She exhibits tenderness. She exhibits normal range of motion and no swelling.        Left shoulder: She exhibits tenderness. She exhibits normal range of motion and no swelling.        Right elbow: She exhibits normal range of motion and no swelling. Tenderness found. Medial epicondyle tenderness noted.        Left elbow: She exhibits normal range of motion and no swelling. Tenderness found. Medial epicondyle tenderness noted.        Right wrist: She exhibits normal range of motion, no tenderness and no swelling.        Left wrist:  She exhibits normal range of motion, no tenderness and no swelling.        Right hip: She exhibits normal range of motion, normal strength and no swelling.        Left hip: She exhibits normal range of motion, no tenderness and no swelling.        Right knee: She exhibits normal range of motion and no swelling. Tenderness found. Medial joint line tenderness noted.        Left knee: She exhibits normal range of motion and no swelling. Tenderness found. Medial joint line tenderness noted.        Right ankle: She exhibits normal range of motion and no swelling. No tenderness.        Left ankle: She exhibits normal range of motion and no swelling. No tenderness.        Cervical back: She exhibits bony tenderness.   14/18 FMS tenderpoints w/o No synovitis, restriction in ROM, tenderness of wrist, MCP, PIP, DIP. No weakness in . Able to fully curl fingers.      Lymphadenopathy:     She has no cervical adenopathy.     She has no axillary adenopathy.   Neurological: She has normal strength and normal reflexes.   Skin: Skin is dry and intact.   Psychiatric: Her mood appears anxious.             Assessment:       Encounter Diagnoses   Name Primary?    Muscle pain, fibromyalgia Yes    Chronic bilateral low back pain with right-sided sciatica     MIRIAN positive     DDD (degenerative disc disease), cervical     Fibromyalgia     Other chronic pain           Plan:        Muscle pain, fibromyalgia  -     traMADol (ULTRAM) 50 mg tablet; Take 2 tablets (100 mg total) by mouth every 12 (twelve) hours as needed for Pain.  Dispense: 120 tablet; Refill: 3    Chronic bilateral low back pain with right-sided sciatica    MIRIAN positive    DDD (degenerative disc disease), cervical    Fibromyalgia  -     traMADol (ULTRAM) 50 mg tablet; Take 2 tablets (100 mg total) by mouth every 12 (twelve) hours as needed for Pain.  Dispense: 120 tablet; Refill: 3    Other chronic pain  -     traMADol (ULTRAM) 50 mg tablet; Take 2 tablets (100 mg  total) by mouth every 12 (twelve) hours as needed for Pain.  Dispense: 120 tablet; Refill: 3    Other orders  -     diclofenac sodium (VOLTAREN) 1 % Gel; Apply 2 g topically 3 (three) times daily.  Dispense: 100 g; Refill: 5  -     pregabalin (LYRICA) 25 MG capsule; Take 1 capsule (25 mg total) by mouth nightly as needed.  Dispense: 30 capsule; Refill: 3     Off gabapentin no help  Off Prozac no help.   Off Elvail no help  Off Gabapentin no help  Off Flexeriol no help    Continue Tramadol; Increase to 100mg BID PRN-will try titration of Tramadol as this is last treatment option I would have to offer her.   Will re-send Voltaren gel 1%    I do want her to speak with Psych if we can get appt for any pharmacologic input for depression/myalgia -she has declined but working closely with [facility] for support.     S/p cervical surgery and doing well.       TNo follow-ups on file.        30min consultation with greater than 50% spent in counseling, chart review and coordination of care. All questions answered.

## 2020-02-05 NOTE — TELEPHONE ENCOUNTER
PA  Appeal for   Modafinil 100 mg tablets has been DENIED  Medicare's drug reference books do not support using this drug to treat Fibromyalgia and Chronic Fatigue Syndrome.

## 2020-02-07 ENCOUNTER — TELEPHONE (OUTPATIENT)
Dept: PHYSICAL MEDICINE AND REHAB | Facility: CLINIC | Age: 45
End: 2020-02-07

## 2020-02-07 NOTE — TELEPHONE ENCOUNTER
Spoke with Christy at [facility] states was calling in regards to cpap for patient. Informed incorrect department. Christy to call patient.

## 2020-02-07 NOTE — TELEPHONE ENCOUNTER
----- Message from Levy Barrera sent at 2/7/2020  1:20 PM CST -----  Contact: Chandra davis  - [phone]  Last eval, fax

## 2020-02-13 ENCOUNTER — HOSPITAL ENCOUNTER (OUTPATIENT)
Dept: RADIOLOGY | Facility: HOSPITAL | Age: 45
Discharge: HOME | End: 2020-02-13
Attending: PHYSICIAN ASSISTANT
Payer: MEDICARE

## 2020-02-13 DIAGNOSIS — M54.2 CERVICALGIA: ICD-10-CM

## 2020-02-13 DIAGNOSIS — M54.42 CHRONIC BILATERAL LOW BACK PAIN WITH BILATERAL SCIATICA: ICD-10-CM

## 2020-02-13 DIAGNOSIS — M54.12 CERVICAL RADICULOPATHY: ICD-10-CM

## 2020-02-13 DIAGNOSIS — G89.29 CHRONIC BILATERAL LOW BACK PAIN WITH BILATERAL SCIATICA: ICD-10-CM

## 2020-02-13 DIAGNOSIS — Z98.890 H/O CERVICAL SPINE SURGERY: ICD-10-CM

## 2020-02-13 DIAGNOSIS — M47.816 LUMBAR SPONDYLOSIS: ICD-10-CM

## 2020-02-13 DIAGNOSIS — M54.41 CHRONIC BILATERAL LOW BACK PAIN WITH BILATERAL SCIATICA: ICD-10-CM

## 2020-02-13 PROCEDURE — 72148 MRI LUMBAR SPINE W/O DYE: CPT | Mod: TC,PO

## 2020-02-13 PROCEDURE — 72052 X-RAY EXAM NECK SPINE 6/>VWS: CPT | Mod: TC,FY,PO

## 2020-02-13 PROCEDURE — 72148 MRI LUMBAR SPINE W/O DYE: CPT | Mod: 26,,, | Performed by: RADIOLOGY

## 2020-02-13 PROCEDURE — 72141 MRI CERVICAL SPINE WITHOUT CONTRAST: ICD-10-PCS | Mod: 26,,, | Performed by: RADIOLOGY

## 2020-02-13 PROCEDURE — 72148 MRI LUMBAR SPINE WITHOUT CONTRAST: ICD-10-PCS | Mod: 26,,, | Performed by: RADIOLOGY

## 2020-02-13 PROCEDURE — 72052 X-RAY EXAM NECK SPINE 6/>VWS: CPT | Mod: 26,,, | Performed by: RADIOLOGY

## 2020-02-13 PROCEDURE — 72110 XR LUMBAR SPINE 5 VIEW WITH FLEX AND EXT: ICD-10-PCS | Mod: 26,,, | Performed by: RADIOLOGY

## 2020-02-13 PROCEDURE — 72110 X-RAY EXAM L-2 SPINE 4/>VWS: CPT | Mod: 26,,, | Performed by: RADIOLOGY

## 2020-02-13 PROCEDURE — 72052 XR CERVICAL SPINE 5 VIEW WITH FLEX AND EXT: ICD-10-PCS | Mod: 26,,, | Performed by: RADIOLOGY

## 2020-02-13 PROCEDURE — 72141 MRI NECK SPINE W/O DYE: CPT | Mod: TC,PO

## 2020-02-13 PROCEDURE — 72141 MRI NECK SPINE W/O DYE: CPT | Mod: 26,,, | Performed by: RADIOLOGY

## 2020-02-13 PROCEDURE — 72110 X-RAY EXAM L-2 SPINE 4/>VWS: CPT | Mod: TC,FY,PO

## 2020-02-14 ENCOUNTER — OFFICE VISIT (OUTPATIENT)
Dept: SPINE | Facility: CLINIC | Age: 45
End: 2020-02-14
Payer: MEDICARE

## 2020-02-14 VITALS
DIASTOLIC BLOOD PRESSURE: 80 MMHG | BODY MASS INDEX: 37.86 KG/M2 | WEIGHT: 174.94 LBS | HEART RATE: 84 BPM | SYSTOLIC BLOOD PRESSURE: 113 MMHG

## 2020-02-14 DIAGNOSIS — G89.29 CHRONIC BILATERAL LOW BACK PAIN WITH BILATERAL SCIATICA: ICD-10-CM

## 2020-02-14 DIAGNOSIS — M54.2 CERVICALGIA: ICD-10-CM

## 2020-02-14 DIAGNOSIS — Z98.890 H/O CERVICAL SPINE SURGERY: Primary | ICD-10-CM

## 2020-02-14 DIAGNOSIS — M54.41 CHRONIC BILATERAL LOW BACK PAIN WITH BILATERAL SCIATICA: ICD-10-CM

## 2020-02-14 DIAGNOSIS — M54.42 CHRONIC BILATERAL LOW BACK PAIN WITH BILATERAL SCIATICA: ICD-10-CM

## 2020-02-14 PROCEDURE — 99214 OFFICE O/P EST MOD 30 MIN: CPT | Mod: PBBFAC,PN | Performed by: PHYSICIAN ASSISTANT

## 2020-02-14 PROCEDURE — 99214 PR OFFICE/OUTPT VISIT, EST, LEVL IV, 30-39 MIN: ICD-10-PCS | Mod: S$PBB,,, | Performed by: PHYSICIAN ASSISTANT

## 2020-02-14 PROCEDURE — 99999 PR PBB SHADOW E&M-EST. PATIENT-LVL IV: ICD-10-PCS | Mod: PBBFAC,,, | Performed by: PHYSICIAN ASSISTANT

## 2020-02-14 PROCEDURE — 99214 OFFICE O/P EST MOD 30 MIN: CPT | Mod: S$PBB,,, | Performed by: PHYSICIAN ASSISTANT

## 2020-02-14 PROCEDURE — 99999 PR PBB SHADOW E&M-EST. PATIENT-LVL IV: CPT | Mod: PBBFAC,,, | Performed by: PHYSICIAN ASSISTANT

## 2020-02-14 NOTE — PROGRESS NOTES
Back and Spine New Patient    Patient ID: Lavonne Pierre is a 44 y.o. female.    Chief Complaint   Patient presents with    Neck Pain    Back Pain    Follow-up     To discuss Results       Review of Systems   Constitutional: Negative for activity change, appetite change, chills, fever and unexpected weight change.   HENT: Negative for tinnitus, trouble swallowing and voice change.    Respiratory: Negative for apnea, cough, chest tightness and shortness of breath.    Cardiovascular: Negative for chest pain and palpitations.   Gastrointestinal: Negative for constipation, diarrhea, nausea and vomiting.   Genitourinary: Negative for difficulty urinating, dysuria, frequency and urgency.   Musculoskeletal: Positive for back pain, myalgias and neck pain. Negative for gait problem and neck stiffness.   Skin: Negative for wound.   Neurological: Positive for numbness. Negative for dizziness, tremors, seizures, facial asymmetry, speech difficulty, weakness, light-headedness and headaches.   Psychiatric/Behavioral: Negative for confusion and decreased concentration.       Past Medical History:   Diagnosis Date    Anxiety 7/16/2013    Asthma     Depression     Fibromyalgia     GERD (gastroesophageal reflux disease)     History of pneumonia 2015    HTN (hypertension)     Preeclampsia     Sleep apnea     Uses CPAP     Social History     Socioeconomic History    Marital status:      Spouse name: Not on file    Number of children: Not on file    Years of education: Not on file    Highest education level: Not on file   Occupational History    Not on file   Social Needs    Financial resource strain: Not on file    Food insecurity:     Worry: Not on file     Inability: Not on file    Transportation needs:     Medical: No     Non-medical: No   Tobacco Use    Smoking status: Never Smoker    Smokeless tobacco: Never Used   Substance and Sexual Activity    Alcohol use: No     Frequency: Never     Drinks  per session: Patient refused     Binge frequency: Never    Drug use: No    Sexual activity: Never     Birth control/protection: See Surgical Hx   Lifestyle    Physical activity:     Days per week: Not on file     Minutes per session: Not on file    Stress: Not on file   Relationships    Social connections:     Talks on phone: Once a week     Gets together: Once a week     Attends Rastafari service: Not on file     Active member of club or organization: Yes     Attends meetings of clubs or organizations: 1 to 4 times per year     Relationship status:    Other Topics Concern    Not on file   Social History Narrative    Not on file     Family History   Problem Relation Age of Onset    Pancreatic cancer Mother     Lymphoma Father     Diabetes Father     Hypertension Father     Pancreatic cancer Maternal Grandmother     Pancreatic cancer Maternal Uncle     Breast cancer Maternal Aunt     Breast cancer Paternal Aunt 50     Review of patient's allergies indicates:   Allergen Reactions    Ibuprofen Other (See Comments)     Affects stomach ulcer, avoids all NSAIDS    Latex, natural rubber Rash     Small red bumps on skin contact       Current Outpatient Medications:     albuterol (PROVENTIL/VENTOLIN HFA) 90 mcg/actuation inhaler, Ventolin HFA 90 mcg/actuation aerosol inhaler  INHALE 2 PUFFS EVERY 4 TO 6 HOURS AS NEEDED, Disp: , Rfl:     azelastine (ASTELIN) 137 mcg (0.1 %) nasal spray, U 1 TO 2 SPRAYS IN EACH NOSTRIL BID, Disp: , Rfl: 0    BREO ELLIPTA 100-25 mcg/dose diskus inhaler, INL 1 PUFF PO AT THE SAME TIME QD, Disp: , Rfl: 11    diclofenac sodium (VOLTAREN) 1 % Gel, Apply 2 g topically 3 (three) times daily., Disp: 100 g, Rfl: 5    fluticasone propionate (FLONASE) 50 mcg/actuation nasal spray, INSTILL 1 TO 2 SPRAYS IN EACH NOSTRIL QD, Disp: , Rfl: 5    lisinopril (PRINIVIL,ZESTRIL) 5 MG tablet, TAKE 1 TABLET BY MOUTH EVERY DAY, Disp: 90 tablet, Rfl: 3    montelukast (SINGULAIR) 10 mg  tablet, 10 mg., Disp: , Rfl:     multivitamin (THERAGRAN) per tablet, Take 1 tablet by mouth once daily., Disp: , Rfl:     omeprazole (PRILOSEC) 40 MG capsule, TK 1 C PO QD, Disp: , Rfl: 2    pregabalin (LYRICA) 25 MG capsule, Take 1 capsule (25 mg total) by mouth nightly as needed., Disp: 30 capsule, Rfl: 3    traMADol (ULTRAM) 50 mg tablet, Take 2 tablets (100 mg total) by mouth every 12 (twelve) hours as needed for Pain., Disp: 120 tablet, Rfl: 3    Vitals:    02/14/20 0801   BP: 113/80   Pulse: 84   Weight: 79.3 kg (174 lb 15 oz)       Physical Exam   Constitutional: She is oriented to person, place, and time. She appears well-developed and well-nourished.   HENT:   Head: Normocephalic and atraumatic.   Eyes: Pupils are equal, round, and reactive to light.   Neck: Normal range of motion. Neck supple.   Cardiovascular: Normal rate.   Pulmonary/Chest: Effort normal.   Musculoskeletal: Normal range of motion. She exhibits no edema.   Neurological: She is alert and oriented to person, place, and time. She has a normal Finger-Nose-Finger Test, a normal Heel to Shin Test, a normal Romberg Test and a normal Tandem Gait Test. Gait normal.   Reflex Scores:       Tricep reflexes are 2+ on the right side and 2+ on the left side.       Bicep reflexes are 2+ on the right side and 2+ on the left side.       Brachioradialis reflexes are 2+ on the right side and 2+ on the left side.       Patellar reflexes are 2+ on the right side and 2+ on the left side.       Achilles reflexes are 2+ on the right side and 2+ on the left side.  Skin: Skin is warm, dry and intact.   Psychiatric: She has a normal mood and affect. Her speech is normal and behavior is normal. Judgment and thought content normal.   Nursing note and vitals reviewed.      Neurologic Exam     Mental Status   Oriented to person, place, and time.   Oriented to person.   Oriented to place.   Oriented to time.   Follows 3 step commands.   Attention: normal.  Concentration: normal.   Speech: speech is normal   Level of consciousness: alert  Knowledge: consistent with education.   Able to name object. Able to read. Able to repeat. Able to write. Normal comprehension.     Cranial Nerves     CN II   Visual acuity: normal  Right visual field deficit: none  Left visual field deficit: none     CN III, IV, VI   Pupils are equal, round, and reactive to light.  Right pupil: Size: 3 mm. Shape: regular. Reactivity: brisk. Consensual response: intact.   Left pupil: Size: 3 mm. Shape: regular. Reactivity: brisk. Consensual response: intact.   CN III: no CN III palsy  CN VI: no CN VI palsy  Nystagmus: none   Diplopia: none  Ophthalmoparesis: none  Conjugate gaze: present    CN V   Right facial sensation deficit: none  Left facial sensation deficit: none    CN VII   Right facial weakness: none  Left facial weakness: none    CN VIII   Hearing: intact    CN IX, X   CN IX normal.   CN X normal.     CN XI   Right sternocleidomastoid strength: normal  Left sternocleidomastoid strength: normal  Right trapezius strength: normal  Left trapezius strength: normal    CN XII   Fasciculations: absent  Tongue deviation: none    Motor Exam   Muscle bulk: normal  Overall muscle tone: normal  Right arm pronator drift: absent  Left arm pronator drift: absent    Strength   Right neck flexion: 5/5  Left neck flexion: 5/5  Right neck extension: 5/5  Left neck extension: 5/5  Right deltoid: 5/5  Left deltoid: 5/5  Right biceps: 5/5  Left biceps: 5/5  Right triceps: 5/5  Left triceps: 5/5  Right wrist flexion: 5/5  Left wrist flexion: 5/5  Right wrist extension: 5/5  Left wrist extension: 5/5  Right interossei: 5/5  Left interossei: 5/5  Right abdominals: 5/5  Left abdominals: 5/5  Right iliopsoas: 5/5  Left iliopsoas: 5/5  Right quadriceps: 5/5  Left quadriceps: 5/5  Right hamstrin/5  Left hamstrin/5  Right glutei: 5/5  Left glutei: 5/5  Right anterior tibial: 5/5  Left anterior tibial: 5/5  Right  "posterior tibial: 5/5  Left posterior tibial: 5/5  Right peroneal: 5/5  Left peroneal: 5/5  Right gastroc: 5/5  Left gastroc: 5/5    Sensory Exam   Right arm light touch: normal  Left arm light touch: normal  Right leg light touch: normal  Left leg light touch: normal  Right arm vibration: normal  Left arm vibration: normal  Right arm pinprick: normal  Left arm pinprick: normal    Gait, Coordination, and Reflexes     Gait  Gait: normal    Coordination   Romberg: negative  Finger to nose coordination: normal  Heel to shin coordination: normal  Tandem walking coordination: normal    Tremor   Resting tremor: absent  Intention tremor: absent  Action tremor: absent    Reflexes   Right brachioradialis: 2+  Left brachioradialis: 2+  Right biceps: 2+  Left biceps: 2+  Right triceps: 2+  Left triceps: 2+  Right patellar: 2+  Left patellar: 2+  Right achilles: 2+  Left achilles: 2+  Right Greer: absent  Left Greer: absent  Right ankle clonus: absent  Left ankle clonus: absent      Provider dictation:  44 year old female with anxiety, depression, fibromyalgia, GERD, hypertension and asthma presents for follow up of chronic neck/ back pain after undergoing imaging.  There have been no significant changes in symptoms since last assessment.  Per my last note:  "In 1996 someone fell from the top deck of a float hitting her back as he fell.  Since then she has felt a burning sensation in the bilateral hips and lateral legs to the feet.  She underwent PT with dry needling in December 2019 for her back without resolution.  Pain increases with standing and walking.  She has also had chronic neck pain and underwent artificial disk replacement with Dr. Martin in June 2019; this was not good experience for her and pain continues.  She did PT after surgery for her neck, but she continues to have pain in the posterior neck with radiation into the right greater than left shoulder to the arms and hands.  It is associated with " "numbness/ tingling in the bilateral hands.  She has tried voltaredn gel and tramadol for neck and back pain.  She also underwent right carpal tunnel release in 2017."  Oswestry score: 46%.  PHQ:  14.    She is neurologically intact with 5/5 strength, 2+ DTR and no sensory deficits in the upper/ lower extremities.  Gait and station fluid.  She denies bowel/ bladder dysfunction.    X-ray and MRI cervical spine from 02/13/2020 reviewed.  Normal postoperative changes of C5-6 artificial disc replacement is seen with no evidence of instability on flexion or extension.  Mild multilevel degenerative changes.  There is mild at most foraminal narrowing at any given cervical spine level.  C5-6 foramina are not completely evaluated secondary to artifact from artificial disc.    X-ray and MRI lumbar spine from 02/13/2020 reviewed.  There is good alignment with no evidence of instability on flexion or extension.  At L4-5 there is a mild disc osteophyte complex without most mild right foraminal narrowing.      In conclusion, Ms. Banerjee has chronic neck pain with right greater than left arm radiculopathy and prior surgical intervention without relief.  She also has chronic lower back with bilateral leg pain.  She has fairly normal studies of the cervical and lumbar spine for her age and considering postoperative changes in the neck.  No significant neural compression to explain her radicular symptoms.  Therefore pain pattern is more consistent with musculoskeletal/myofascial pain and likely associated with fibromyalgia in the wound is due to any true spinal disorder.  I strongly recommend regular exercise and physical therapy.  She is not interested in further therapy.  I am referring her to physical medicine and rehab to be assessed/consider any muscular injections or procedures they could offer.  Follow-up in clinic with me as needed.        Visit Diagnosis:  H/O cervical spine surgery  -     Ambulatory referral/consult to " Physical Medicine Rehab; Future; Expected date: 02/21/2020    Cervicalgia  -     Ambulatory referral/consult to Physical Medicine Rehab; Future; Expected date: 02/21/2020    Chronic bilateral low back pain with bilateral sciatica  -     Ambulatory referral/consult to Physical Medicine Rehab; Future; Expected date: 02/21/2020        Total time spent counseling greater than fifty percent of total visit time.  Counseling included discussion regarding imaging findings, diagnosis possibilities, treatment options, risks and benefits.   The patient had many questions regarding the options and long-term effects.

## 2020-02-17 ENCOUNTER — OFFICE VISIT (OUTPATIENT)
Dept: PHYSICAL MEDICINE AND REHAB | Facility: CLINIC | Age: 45
End: 2020-02-17
Payer: MEDICARE

## 2020-02-17 VITALS — BODY MASS INDEX: 37.54 KG/M2 | HEIGHT: 57 IN | WEIGHT: 174 LBS

## 2020-02-17 DIAGNOSIS — M54.41 CHRONIC BILATERAL LOW BACK PAIN WITH RIGHT-SIDED SCIATICA: ICD-10-CM

## 2020-02-17 DIAGNOSIS — M79.18 MYOFASCIAL PAIN: Primary | ICD-10-CM

## 2020-02-17 DIAGNOSIS — G89.29 CHRONIC BILATERAL LOW BACK PAIN WITH RIGHT-SIDED SCIATICA: ICD-10-CM

## 2020-02-17 DIAGNOSIS — M54.2 NECK PAIN: ICD-10-CM

## 2020-02-17 PROCEDURE — 99213 OFFICE O/P EST LOW 20 MIN: CPT | Mod: PBBFAC,PN | Performed by: PHYSICAL MEDICINE & REHABILITATION

## 2020-02-17 PROCEDURE — 99999 PR PBB SHADOW E&M-EST. PATIENT-LVL III: CPT | Mod: PBBFAC,,, | Performed by: PHYSICAL MEDICINE & REHABILITATION

## 2020-02-17 PROCEDURE — 99214 OFFICE O/P EST MOD 30 MIN: CPT | Mod: 25,S$PBB,GC, | Performed by: PHYSICAL MEDICINE & REHABILITATION

## 2020-02-17 PROCEDURE — 99214 PR OFFICE/OUTPT VISIT, EST, LEVL IV, 30-39 MIN: ICD-10-PCS | Mod: 25,S$PBB,GC, | Performed by: PHYSICAL MEDICINE & REHABILITATION

## 2020-02-17 PROCEDURE — 20553 NJX 1/MLT TRIGGER POINTS 3/>: CPT | Mod: S$PBB,,, | Performed by: PHYSICAL MEDICINE & REHABILITATION

## 2020-02-17 PROCEDURE — 99999 PR PBB SHADOW E&M-EST. PATIENT-LVL III: ICD-10-PCS | Mod: PBBFAC,,, | Performed by: PHYSICAL MEDICINE & REHABILITATION

## 2020-02-17 PROCEDURE — 20553 PR INJECT TRIGGER POINTS, > 3: ICD-10-PCS | Mod: S$PBB,,, | Performed by: PHYSICAL MEDICINE & REHABILITATION

## 2020-02-17 PROCEDURE — 20553 NJX 1/MLT TRIGGER POINTS 3/>: CPT | Mod: PBBFAC,PN | Performed by: PHYSICAL MEDICINE & REHABILITATION

## 2020-02-17 NOTE — LETTER
February 17, 2020      Levy Hathaway III, PA-C  [address] [facility], [place] [address]           [facility]  [address] [facility] [address]  Phone: [phone]  Fax: [phone]          Patient: Lavonne Pierre   MR Number: [id number]   YOB: 1975   Date of Visit: 2/17/2020       Dear Levy Hathaway III:    Thank you for referring Lavonne Pierre to me for evaluation. Attached you will find relevant portions of my assessment and plan of care.    If you have questions, please do not hesitate to call me. I look forward to following Lavonne Pierre along with you.    Sincerely,    Trevor Estrada MD    Enclosure  CC:  No Recipients    If you would like to receive this communication electronically, please contact [email or web address] or [phone] to request more information on [employer] Link access.    For providers and/or their staff who would like to refer a patient to [facility], please contact us through our one-stop-shop provider referral line, [facility], at [phone].    If you feel you have received this communication in error or would no longer like to receive these types of communications, please e-mail [email or web address]
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
## 2020-03-31 ENCOUNTER — PATIENT MESSAGE (OUTPATIENT)
Dept: PHYSICAL MEDICINE AND REHAB | Facility: CLINIC | Age: 45
End: 2020-03-31

## 2020-04-09 DIAGNOSIS — I10 ESSENTIAL HYPERTENSION: ICD-10-CM

## 2020-04-13 RX ORDER — LISINOPRIL 5 MG/1
5 TABLET ORAL DAILY
Qty: 90 TABLET | Refills: 1 | Status: SHIPPED | OUTPATIENT
Start: 2020-04-13 | End: 2020-11-03

## 2020-04-13 NOTE — PROGRESS NOTES
Refill Authorization Note     is requesting a refill authorization.    Brief assessment and rationale for refill: APPROVE: prr                Medication reconciliation completed: No                         Comments:     Refill Center Care Gap Closure protocols temporarily suspended.   Requested Prescriptions   Pending Prescriptions Disp Refills    lisinopriL (PRINIVIL,ZESTRIL) 5 MG tablet 90 tablet 1     Sig: Take 1 tablet (5 mg total) by mouth once daily.       Cardiovascular:  ACE Inhibitors Passed - 4/9/2020 11:09 AM        Passed - Patient is at least 18 years old        Passed - Negative Pregnancy Status Check        Passed - Last BP in normal range within 360 days.     BP Readings from Last 3 Encounters:   02/14/20 113/80   02/05/20 110/76   01/28/20 113/73              Passed - Office visit in past 12 months or future 90 days.     Recent Outpatient Visits            1 month ago Myofascial pain    [facility] Trevor Estrada MD    1 month ago H/O cervical spine surgery    [facility] Janny Adame PA-C    2 months ago Muscle pain, fibromyalgia    [facility] Sheri Ross DO    2 months ago H/O cervical spine surgery    [facility] Janny Adame PA-C    2 months ago Back pain, unspecified back location, unspecified back pain laterality, unspecified chronicity    [facility] Levy Hathaway III, PA-C          Future Appointments              In 1 month Ranjit Bernabe MD [facility]    In 3 months [facility] Ross DO [facility]                Passed - Cr is 1.3 or below and within 360 days     Creatinine   Date Value Ref Range Status   01/28/2020 1.0 0.5 - 1.4 mg/dL Final   02/20/2019 0.64 0.50 - 1.40 mg/dL Final   12/07/2017 0.85 0.50 - 1.40 mg/dL Final              Passed - K in normal range and within 360 days     Potassium   Date Value Ref Range  Status   01/28/2020 4.4 3.5 - 5.1 mmol/L Final   02/20/2019 3.9 3.5 - 5.1 mmol/L Final   12/07/2017 3.9 3.5 - 5.1 mmol/L Final              Passed - eGFR within 360 days     eGFR if non    Date Value Ref Range Status   01/28/2020 >60.0 >60 mL/min/1.73 m^2 Final     Comment:     Calculation used to obtain the estimated glomerular filtration  rate (eGFR) is the CKD-EPI equation.      02/20/2019 >60 >60 mL/min/1.73 m^2 Final     Comment:     Calculation used to obtain the estimated glomerular filtration  rate (eGFR) is the CKD-EPI equation.      12/07/2017 >60 >60 mL/min/1.73 m^2 Final     Comment:     Calculation used to obtain the estimated glomerular filtration  rate (eGFR) is the CKD-EPI equation.        eGFR if    Date Value Ref Range Status   01/28/2020 >60.0 >60 mL/min/1.73 m^2 Final   02/20/2019 >60 >60 mL/min/1.73 m^2 Final   12/07/2017 >60 >60 mL/min/1.73 m^2 Final               Appointments  past 12m or future 3m with PCP    Date Provider   Last Visit   10/21/2019 Omer Mcgee MD   Next Visit   Visit date not found Omer Mcgee MD   .  ED visits in past 90 days: 0       Note composed:4:12 PM 04/13/2020

## 2020-04-16 ENCOUNTER — PATIENT MESSAGE (OUTPATIENT)
Dept: PODIATRY | Facility: CLINIC | Age: 45
End: 2020-04-16

## 2020-04-22 ENCOUNTER — PATIENT MESSAGE (OUTPATIENT)
Dept: PODIATRY | Facility: CLINIC | Age: 45
End: 2020-04-22

## 2020-04-28 ENCOUNTER — OFFICE VISIT (OUTPATIENT)
Dept: PODIATRY | Facility: CLINIC | Age: 45
End: 2020-04-28
Payer: MEDICARE

## 2020-04-28 VITALS — HEIGHT: 57 IN | BODY MASS INDEX: 37.53 KG/M2 | WEIGHT: 173.94 LBS | TEMPERATURE: 99 F

## 2020-04-28 DIAGNOSIS — M72.2 PLANTAR FASCIITIS OF RIGHT FOOT: ICD-10-CM

## 2020-04-28 DIAGNOSIS — G57.51 TARSAL TUNNEL SYNDROME OF RIGHT SIDE: Primary | ICD-10-CM

## 2020-04-28 DIAGNOSIS — M21.6X1 ACQUIRED EQUINUS DEFORMITY OF BOTH FEET: ICD-10-CM

## 2020-04-28 DIAGNOSIS — M21.6X2 ACQUIRED EQUINUS DEFORMITY OF BOTH FEET: ICD-10-CM

## 2020-04-28 PROCEDURE — 99999 PR PBB SHADOW E&M-EST. PATIENT-LVL III: ICD-10-PCS | Mod: PBBFAC,,, | Performed by: PODIATRIST

## 2020-04-28 PROCEDURE — 99213 OFFICE O/P EST LOW 20 MIN: CPT | Mod: 25,S$PBB,, | Performed by: PODIATRIST

## 2020-04-28 PROCEDURE — 64450 NJX AA&/STRD OTHER PN/BRANCH: CPT | Mod: PBBFAC,PN,RT | Performed by: PODIATRIST

## 2020-04-28 PROCEDURE — 99999 PR PBB SHADOW E&M-EST. PATIENT-LVL III: CPT | Mod: PBBFAC,,, | Performed by: PODIATRIST

## 2020-04-28 PROCEDURE — 64450 PR NERVE BLOCK INJ, ANES/STEROID, OTHER PERIPHERAL: ICD-10-PCS | Mod: S$PBB,RT,, | Performed by: PODIATRIST

## 2020-04-28 PROCEDURE — 99213 PR OFFICE/OUTPT VISIT, EST, LEVL III, 20-29 MIN: ICD-10-PCS | Mod: 25,S$PBB,, | Performed by: PODIATRIST

## 2020-04-28 PROCEDURE — 64450 NJX AA&/STRD OTHER PN/BRANCH: CPT | Mod: S$PBB,RT,, | Performed by: PODIATRIST

## 2020-04-28 PROCEDURE — 99213 OFFICE O/P EST LOW 20 MIN: CPT | Mod: PBBFAC,PN,25 | Performed by: PODIATRIST

## 2020-04-28 RX ORDER — DEXAMETHASONE SODIUM PHOSPHATE 4 MG/ML
2 INJECTION, SOLUTION INTRA-ARTICULAR; INTRALESIONAL; INTRAMUSCULAR; INTRAVENOUS; SOFT TISSUE
Status: COMPLETED | OUTPATIENT
Start: 2020-04-28 | End: 2020-04-28

## 2020-04-28 RX ORDER — LIDOCAINE HYDROCHLORIDE 10 MG/ML
1 INJECTION INFILTRATION; PERINEURAL
Status: COMPLETED | OUTPATIENT
Start: 2020-04-28 | End: 2020-04-28

## 2020-04-28 RX ORDER — METHYLPREDNISOLONE ACETATE 40 MG/ML
20 INJECTION, SUSPENSION INTRA-ARTICULAR; INTRALESIONAL; INTRAMUSCULAR; SOFT TISSUE
Status: COMPLETED | OUTPATIENT
Start: 2020-04-28 | End: 2020-04-28

## 2020-04-28 RX ADMIN — LIDOCAINE HYDROCHLORIDE 1 ML: 10 INJECTION INFILTRATION; PERINEURAL at 09:04

## 2020-04-28 RX ADMIN — DEXAMETHASONE SODIUM PHOSPHATE 2 MG: 4 INJECTION, SOLUTION INTRA-ARTICULAR; INTRALESIONAL; INTRAMUSCULAR; INTRAVENOUS; SOFT TISSUE at 09:04

## 2020-04-28 RX ADMIN — METHYLPREDNISOLONE ACETATE 20 MG: 40 INJECTION, SUSPENSION INTRA-ARTICULAR; INTRALESIONAL; INTRAMUSCULAR; SOFT TISSUE at 09:04

## 2020-04-28 NOTE — PROGRESS NOTES
Subjective:      Patient ID: Lavonne Pierre is a 44 y.o. female.    Chief Complaint: Foot Pain    Lavonne is a 44 y.o. female who presents to the podiatry clinic  with complaint of  bilateral foot pain left worse than right. She relates she has had generalized bilateral pain ever since she was diagnosed with fibro myalgia, however recently she has had an increase in pain to the left lateral ankle. The pain is intermittent and can come out of nowhere. She can have the pain at rest or when ambulating, the pain is sharp and can radiate into her toes. She has bilateral foot pain along the entire bottom of her feet into her toes that has been going on for much longer periods of time. She has a history of back pain and sciatica, has had injections in the back in the past.     4/10/19: Patient returns for follow up left ankle pain, she relates no pain to the area today the injection and the brace have helped greatly. She reports new symptoms of scaling and itching skin to the plantar feet. No open wounds, no self treatment. Duration of several weeks. She relates continue burning and tingling pains to the plantar feet with weight bearing, however is planning on back surgery and would like to get that done to see if it helps before working up with EMG for tarsal tunnel.    10/9/19: Patient returns with continued pain bilateral ankle and feet, worse with weight bearing long periods of time. Unable to get the EMG earlier due to complications with other health issues bit would like to begin working up the pain again    11/20/19: Patient returns for follow up bilateral tarsal tunnel, her EMG was normal however. No improvement with inserts and stretching    12/4/19: Patient returns with bilateral tarsal tunnel and plantar fasciitis and relates the PT is helping some, she reports continued pain however whenever weight bearing.    4/28/20: Patient returns with recurrent right foot pain, the injection last visit helped relieve the  pain until about 2 weeks ago it returned, she relates she can barely walk on the foot and is hoping for another injection.     Review of Systems   Constitution: Negative for chills and fever.   Cardiovascular: Positive for leg swelling. Negative for claudication.   Respiratory: Negative for shortness of breath.    Skin: Negative for itching, nail changes and rash.   Musculoskeletal: Positive for back pain, joint pain, muscle weakness and myalgias. Negative for muscle cramps.   Gastrointestinal: Negative for nausea and vomiting.   Neurological: Positive for paresthesias. Negative for focal weakness, loss of balance and numbness.           Objective:      Physical Exam   Constitutional: She is oriented to person, place, and time. She appears well-developed and well-nourished. No distress.   Cardiovascular:   Pulses:       Dorsalis pedis pulses are 2+ on the right side, and 2+ on the left side.        Posterior tibial pulses are 2+ on the right side, and 2+ on the left side.   < 3 sec capillary refill time to toes 1-5 bilateral. Toes and feet are warm to touch proximally with normal distal cooling b/l. There is some hair growth on the feet and toes b/l. There is no edema b/l. No spider veins or varicosities present b/l.      Musculoskeletal:   There is tenderness to the tarsal tunnel and abductor muscle belly bilateral. \    Bilateral posterior heel pain at the achilles insertion    Equinus noted b/l ankles with < 5 deg DF noted. MMT 5/5 in DF/PF/Inv/Ev resistance with no reproduction of pain in any direction. Passive range of motion of ankle and pedal joints is painless b/l.     Neurological: She is alert and oriented to person, place, and time. She has normal strength. She displays no atrophy and no tremor. No sensory deficit. She exhibits normal muscle tone.   Positive tinel sign bilateral.   Skin: Skin is warm, dry and intact. No abrasion, no bruising, no burn, no ecchymosis, no laceration, no lesion, no petechiae  and no rash noted. She is not diaphoretic. No cyanosis or erythema. No pallor. Nails show no clubbing.   Skin temperature, texture and turgor within normal limits.    Dry scale with superficial flakes over an erythematous base in a moccasin pattern bilateral  without ulceration, drainage, pus, tracking, fluctuance, malodor, or cardinal signs infection.     Psychiatric: She has a normal mood and affect. Her behavior is normal.             Assessment:       Encounter Diagnoses   Name Primary?    Tarsal tunnel syndrome of right side Yes    Plantar fasciitis of right foot     Acquired equinus deformity of both feet          Plan:       Lavonne was seen today for foot pain.    Diagnoses and all orders for this visit:    Tarsal tunnel syndrome of right side    Plantar fasciitis of right foot    Acquired equinus deformity of both feet    Other orders  -     lidocaine HCL 10 mg/ml (1%) injection 1 mL  -     dexamethasone injection 2 mg  -     methylPREDNISolone acetate injection 20 mg      I counseled the patient on her conditions, their implications and medical management.      Although EMG was normal clinically she still has signs of tarsal tunnel    Patient will stretch the tendo achilles complex three times daily as demonstrated in the office.  Literature was dispensed illustrating proper stretching technique.    Patient will wear over the counter arch supports and wear them in shoes whenever possible.  Athletic shoes intended for walking or running are usually best.     Conservative vs surgical treatment options for tarsal tunnel were explained in detail. Today the patient received an injection in right heel medial aspect around the adeline pedis and deep at the plantar fascia. The area was prepped with alcohol, skin anesthestized with cold spray and a mixture of 20 mg Depomedrol, 2 mg dexamethasone and 1 mL 1% plain lidocaine was injected. The patient tolerated the procedure well. Instructed to rest, ice and  elevate.    If pain returns again consider MRI and possible surgical release we discussed this in depth    Return PRN    Lester De Jesus DPM

## 2020-05-06 ENCOUNTER — PATIENT MESSAGE (OUTPATIENT)
Dept: ADMINISTRATIVE | Facility: HOSPITAL | Age: 45
End: 2020-05-06

## 2020-05-18 ENCOUNTER — PATIENT MESSAGE (OUTPATIENT)
Dept: OBSTETRICS AND GYNECOLOGY | Facility: CLINIC | Age: 45
End: 2020-05-18

## 2020-05-26 ENCOUNTER — OFFICE VISIT (OUTPATIENT)
Dept: FAMILY MEDICINE | Facility: CLINIC | Age: 45
End: 2020-05-26
Payer: MEDICARE

## 2020-05-26 VITALS
SYSTOLIC BLOOD PRESSURE: 126 MMHG | TEMPERATURE: 98 F | HEIGHT: 57 IN | BODY MASS INDEX: 35.57 KG/M2 | HEART RATE: 92 BPM | WEIGHT: 164.88 LBS | OXYGEN SATURATION: 97 % | DIASTOLIC BLOOD PRESSURE: 84 MMHG

## 2020-05-26 DIAGNOSIS — G57.12 MERALGIA PARAESTHETICA, LEFT: Primary | ICD-10-CM

## 2020-05-26 DIAGNOSIS — M79.7 FIBROMYALGIA: ICD-10-CM

## 2020-05-26 DIAGNOSIS — R76.8 ANA POSITIVE: ICD-10-CM

## 2020-05-26 DIAGNOSIS — J45.30 MILD PERSISTENT REACTIVE AIRWAY DISEASE WITHOUT COMPLICATION: ICD-10-CM

## 2020-05-26 PROBLEM — J45.909 REACTIVE AIRWAY DISEASE WITHOUT COMPLICATION: Status: ACTIVE | Noted: 2020-05-26

## 2020-05-26 PROBLEM — M77.11 RIGHT LATERAL EPICONDYLITIS: Status: RESOLVED | Noted: 2017-01-24 | Resolved: 2020-05-26

## 2020-05-26 PROCEDURE — 99214 OFFICE O/P EST MOD 30 MIN: CPT | Mod: S$PBB,,, | Performed by: INTERNAL MEDICINE

## 2020-05-26 PROCEDURE — 99214 PR OFFICE/OUTPT VISIT, EST, LEVL IV, 30-39 MIN: ICD-10-PCS | Mod: S$PBB,,, | Performed by: INTERNAL MEDICINE

## 2020-05-26 PROCEDURE — 99999 PR PBB SHADOW E&M-EST. PATIENT-LVL IV: ICD-10-PCS | Mod: PBBFAC,,, | Performed by: INTERNAL MEDICINE

## 2020-05-26 PROCEDURE — 99214 OFFICE O/P EST MOD 30 MIN: CPT | Mod: PBBFAC,PO | Performed by: INTERNAL MEDICINE

## 2020-05-26 PROCEDURE — 99999 PR PBB SHADOW E&M-EST. PATIENT-LVL IV: CPT | Mod: PBBFAC,,, | Performed by: INTERNAL MEDICINE

## 2020-05-26 NOTE — PROGRESS NOTES
"  Subjective     Lavonne Pierre is a 44 y.o. old, female here for Establish Care    Patient is here to [facility]. She was previously seeing Dr. Mcgee and hesitant to change after seeing him for several years. She feels "overwhelmed" at all of her "multiple" medical problems.   She is a 43 y/o with PMH of fibromyalgia, anxiety/depression, HTN, asthma, allergies. All of her medical problems are at baseline. She is followed by Dr. Ross and PMR for her fibromyalgia. She had her tramadol most recently increased and been getting trigger point injections. She does not think tramadol helps. Multiple medication failures in the past as outlined by Dr. Ross's note.    Currently she complains of left sided lateral hip and thigh pain. There is some associated numbness. Sleeping on the side makes it worse, rolling over helps but nothing else. It has been present about a week.    Mother  from pancreatic cancer. She "does not want things sugarcoated".    Review of Systems   HENT: Negative for hearing loss.    Eyes: Negative for discharge.   Respiratory: Negative for wheezing.    Cardiovascular: Negative for chest pain and palpitations.   Gastrointestinal: Negative for blood in stool, constipation, diarrhea and vomiting.   Genitourinary: Negative for dysuria and hematuria.   Musculoskeletal: Positive for neck pain.   Neurological: Positive for headaches. Negative for weakness.   Endo/Heme/Allergies: Negative for polydipsia.   Answers for HPI/ROS submitted by the patient on 2020   activity change: No  unexpected weight change: No  rhinorrhea: No  trouble swallowing: No  visual disturbance: No  chest tightness: No  polyuria: No  difficulty urinating: No  menstrual problem: No  joint swelling: No  arthralgias: Yes  confusion: No  dysphoric mood: No    Medications     Outpatient Medications Marked as Taking for the 20 encounter (Office Visit) with Ranjit Bernabe MD   Medication Sig Dispense Refill    " "albuterol (PROVENTIL/VENTOLIN HFA) 90 mcg/actuation inhaler Ventolin HFA 90 mcg/actuation aerosol inhaler   INHALE 2 PUFFS EVERY 4 TO 6 HOURS AS NEEDED      azelastine (ASTELIN) 137 mcg (0.1 %) nasal spray U 1 TO 2 SPRAYS IN EACH NOSTRIL BID  0    BREO ELLIPTA 100-25 mcg/dose diskus inhaler INL 1 PUFF PO AT THE SAME TIME QD  11    diclofenac sodium (VOLTAREN) 1 % Gel Apply 2 g topically 3 (three) times daily. 100 g 5    fluticasone propionate (FLONASE) 50 mcg/actuation nasal spray INSTILL 1 TO 2 SPRAYS IN EACH NOSTRIL QD  5    lisinopriL (PRINIVIL,ZESTRIL) 5 MG tablet Take 1 tablet (5 mg total) by mouth once daily. 90 tablet 1    montelukast (SINGULAIR) 10 mg tablet 10 mg.      multivitamin (THERAGRAN) per tablet Take 1 tablet by mouth once daily.      omeprazole (PRILOSEC) 40 MG capsule TK 1 C PO QD  2    pregabalin (LYRICA) 25 MG capsule Take 1 capsule (25 mg total) by mouth nightly as needed. 30 capsule 3    traMADol (ULTRAM) 50 mg tablet Take 2 tablets (100 mg total) by mouth every 12 (twelve) hours as needed for Pain. 120 tablet 3     Objective     /84 (BP Location: Right arm, Patient Position: Sitting)   Pulse 92   Temp 98 °F (36.7 °C) (Oral)   Ht 4' 9" (1.448 m)   Wt 74.8 kg (164 lb 14.5 oz)   SpO2 97%   BMI 35.68 kg/m²   Physical Exam   Constitutional: She appears well-developed. No distress.   Neurological: She is alert.   Psychiatric: Her mood appears anxious.     Assessment and Plan     1. Meralgia paraesthetica, left  Reassurance, supportive care.    2. MIRIAN positive    3. Fibromyalgia  Multiple medication failures in the past.  Currently doing fair on lyrica, consider increase at f/u with Dr. Ross.  Would consider another SSRI trial, seroquel, abilify, SGA's and TMS in the future.    4. Mild persistent reactive airway disease without complication  stable    ___________________  Ranjit Bernabe MD  Internal Medicine and Pediatrics      "

## 2020-06-18 NOTE — TELEPHONE ENCOUNTER
----- Message from Natalya Shi sent at 1/13/2020  1:20 PM CST -----  Contact: Marry with [facility]  Type:  Pharmacy Calling to Clarify an RX    Name of Caller:  Marry  Pharmacy Name:  [facility]  Prescription Name:  Modafinil 100 mg #15  What do they need to clarify?:    Best Call Back Number:  [phone]  Additional Information:  Marry states the prescription will not process without a diagnosis code. Please call Marry. Thanks!     PRINCIPAL PROCEDURE  Procedure: Left heart cardiac cath  Findings and Treatment:

## 2020-07-28 NOTE — TELEPHONE ENCOUNTER
Hypertension is improving with treatment.  Continue current treatment regimen.  Blood pressure will be reassessed at the next regular appointment     initialy results seem to be high.  Subsequent result of 130/82   Returned patient's call. Informed patient that the Wellbutrin is at its max. Patient is not taking Savella because it was not working even taking it bid. Informed patient to increase tizanidine to 4mg tid. Informed patient Dr. Ross has denied filling out disability paperwork for fibromyalgia pain. Patient is upset and needs further advisement as to who will fill out paperwork.

## 2020-08-03 ENCOUNTER — PATIENT OUTREACH (OUTPATIENT)
Dept: ADMINISTRATIVE | Facility: OTHER | Age: 45
End: 2020-08-03

## 2020-08-03 NOTE — PROGRESS NOTES
Requested updates within Care Everywhere.  Patient's chart was reviewed for overdue [place] topics.  Immunizations reconciled.

## 2020-08-05 ENCOUNTER — OFFICE VISIT (OUTPATIENT)
Dept: RHEUMATOLOGY | Facility: CLINIC | Age: 45
End: 2020-08-05
Payer: MEDICARE

## 2020-08-05 VITALS — HEIGHT: 60 IN | WEIGHT: 177.94 LBS | BODY MASS INDEX: 34.93 KG/M2

## 2020-08-05 DIAGNOSIS — G89.29 OTHER CHRONIC PAIN: ICD-10-CM

## 2020-08-05 DIAGNOSIS — M54.41 CHRONIC BILATERAL LOW BACK PAIN WITH RIGHT-SIDED SCIATICA: ICD-10-CM

## 2020-08-05 DIAGNOSIS — G89.29 CHRONIC BILATERAL LOW BACK PAIN WITH RIGHT-SIDED SCIATICA: ICD-10-CM

## 2020-08-05 DIAGNOSIS — R76.8 ANA POSITIVE: ICD-10-CM

## 2020-08-05 DIAGNOSIS — M79.7 FIBROMYALGIA: Primary | ICD-10-CM

## 2020-08-05 DIAGNOSIS — M79.7 MUSCLE PAIN, FIBROMYALGIA: ICD-10-CM

## 2020-08-05 PROCEDURE — 99214 OFFICE O/P EST MOD 30 MIN: CPT | Mod: 95,,, | Performed by: INTERNAL MEDICINE

## 2020-08-05 PROCEDURE — 99214 PR OFFICE/OUTPT VISIT, EST, LEVL IV, 30-39 MIN: ICD-10-PCS | Mod: 95,,, | Performed by: INTERNAL MEDICINE

## 2020-08-05 RX ORDER — PREGABALIN 50 MG/1
50 CAPSULE ORAL 2 TIMES DAILY
Qty: 60 CAPSULE | Refills: 5 | Status: SHIPPED | OUTPATIENT
Start: 2020-08-05 | End: 2021-02-12

## 2020-08-05 RX ORDER — TRAMADOL HYDROCHLORIDE 50 MG/1
100 TABLET ORAL EVERY 12 HOURS PRN
Qty: 120 TABLET | Refills: 3 | Status: SHIPPED | OUTPATIENT
Start: 2020-08-05 | End: 2021-03-15

## 2020-08-05 NOTE — PROGRESS NOTES
"Subjective:          Chief Complaint: Lavonne Pierre is a 44 y.o. female who had concerns including Disease Management.    HPI:    Patient here today for follow-up with a history of fibromyalgia, positive MIRIAN 1:160 homogeneous/speckled. negative MIRIAN profile. + thyroid Antibodies. No evidence of CTD.     Interval events.  Not using as much tramadol last fill tramadol 2/2020 using less   Doing well with lyrica will increase  Lost weight on Keto  Upper shoulders, low back and lateral hip with pains.   S/p injection right tarsal tunnel.     S/p cervical surgery with Dr. Vasquez 6/17/19 with Disc replacement no fusion. Hands are definitely better. Legs with some calf and knee pain describing lumps under the skin but not as much "weakness" more tendon and joint complaints some focal soft tissue issues. Has seen podiatry more recently with EMG/NCS negative for denervation.   Pending MRI cervical and lumbar spine with Ms. Adame-        Current medications   tramadol  mg once daily PRN- This does help only takes at night.   Tylenol as needed seems to help day to day.   Celebrex 200 mg daily which she states is not working discontinued.   Gabapentin 300 mg t.i.d. which she folt not working discontinued.   Nuvigil at 250mg daily-discontinued due to insurance  Elavil 25mg for HS- discontinued no help  NSAIDs with gastric ulcers in past.   Failed Cymbalta, Effexor, Zoloft, wellbutrin. Tizanidine, savella,  Prozac  States she did well with Topamax but concern on long term safety. For ocular migraines. Still having HA.       Did have steroid injections in past no relief with elbows, hands, feet or knees. Does help some with hip busitis.   She's had various myalgias as well as joint pain in the ankles, feet, wrists, shoulders and neck/back and legs.  She also has an underlying history of depression she's had no constitutional symptoms, rashes, oral ulcers, serositis, Raynaud's.   She is not sleeping at night.   She is " "having significant fatigue and feel "shaky" in her body. She is noting significant brain fog.  Trouble with restless leg type symptoms, but occur during the day.  She is noting pain in her feet (diffusely), knees at the superior pole of the patella. And bilateral elbows she is going to meet with Dr. Jack as right elbow is doing better , now needing the left.    She is c/o lumps in her skin tender, no redness but she does try to massage them. She is feeling increased pain in the knees with "lumps" in gastrocnemius region. Do feel swollen.              Component      Latest Ref Rng & Units 1/17/2017 1/15/2016   Anti Sm Antibody      0.00 - 19.99 EU  0.97   Anti-Sm Interpretation      Negative  Negative   Anti-SSA Antibody      0.00 - 19.99 EU  1.09   Anti-SSA Interpretation      Negative  Negative   Anti-SSB Antibody      0.00 - 19.99 EU  0.30   Anti-SSB Interpretation      Negative  Negative   ds DNA Ab      Negative 1:10  Negative 1:10   Anti Sm/RNP Antibody      0.00 - 19.99 EU  0.11   Anti-Sm/RNP Interpretation      Negative  Negative   Sed Rate      0 - 20 mm/Hr  9   CRP      0.0 - 8.2 mg/L  5.7   MIRIAN Screen      Negative <1:160 Positive (A) Positive (A)   Rheumatoid Factor      0.0 - 15.0 IU/mL  <10.0   MIRIAN HEP-2 Titer       Positive 1:320 Homogeneous Positive 1:160 Homogeneous       REVIEW OF SYSTEMS:    Review of Systems   Constitutional: Positive for malaise/fatigue and weight loss. Negative for chills and fever.   HENT: Negative for sore throat.    Eyes: Negative for double vision, photophobia, discharge and redness.   Respiratory: Negative for cough, shortness of breath and wheezing.    Cardiovascular: Negative for chest pain, palpitations and orthopnea.   Gastrointestinal: Positive for heartburn. Negative for abdominal pain, constipation and diarrhea.   Genitourinary: Negative for dysuria, hematuria and urgency.   Musculoskeletal: Positive for back pain, joint pain, myalgias and neck pain.   Skin: " Negative for rash.   Neurological: Positive for headaches. Negative for dizziness, tingling, focal weakness and weakness.   Endo/Heme/Allergies: Does not bruise/bleed easily.   Psychiatric/Behavioral: Negative for depression, hallucinations and suicidal ideas. The patient is nervous/anxious.                Objective:            Past Medical History:   Diagnosis Date    Anxiety 7/16/2013    Asthma     Depression     Fibromyalgia     GERD (gastroesophageal reflux disease)     History of pneumonia 2015    HTN (hypertension)     Preeclampsia     Sleep apnea     Uses CPAP     Family History   Problem Relation Age of Onset    Pancreatic cancer Mother     Lymphoma Father     Diabetes Father     Hypertension Father     Pancreatic cancer Maternal Grandmother     Pancreatic cancer Maternal Uncle     Breast cancer Maternal Aunt     Breast cancer Paternal Aunt 50     Social History     Tobacco Use    Smoking status: Never Smoker    Smokeless tobacco: Never Used   Substance Use Topics    Alcohol use: No     Frequency: Never     Drinks per session: Patient refused     Binge frequency: Never    Drug use: No         Current Outpatient Medications on File Prior to Visit   Medication Sig Dispense Refill    BREO ELLIPTA 100-25 mcg/dose diskus inhaler INL 1 PUFF PO AT THE SAME TIME QD  11    lisinopriL (PRINIVIL,ZESTRIL) 5 MG tablet Take 1 tablet (5 mg total) by mouth once daily. 90 tablet 1    montelukast (SINGULAIR) 10 mg tablet 10 mg.      multivitamin (THERAGRAN) per tablet Take 1 tablet by mouth once daily.      pregabalin (LYRICA) 25 MG capsule TAKE 1 CAPSULE(25 MG) BY MOUTH EVERY NIGHT AS NEEDED 30 capsule 3    traMADol (ULTRAM) 50 mg tablet Take 2 tablets (100 mg total) by mouth every 12 (twelve) hours as needed for Pain. 120 tablet 3    albuterol (PROVENTIL/VENTOLIN HFA) 90 mcg/actuation inhaler Ventolin HFA 90 mcg/actuation aerosol inhaler   INHALE 2 PUFFS EVERY 4 TO 6 HOURS AS NEEDED       azelastine (ASTELIN) 137 mcg (0.1 %) nasal spray U 1 TO 2 SPRAYS IN EACH NOSTRIL BID  0    diclofenac sodium (VOLTAREN) 1 % Gel Apply 2 g topically 3 (three) times daily. (Patient not taking: Reported on 8/5/2020) 100 g 5    fluticasone propionate (FLONASE) 50 mcg/actuation nasal spray INSTILL 1 TO 2 SPRAYS IN EACH NOSTRIL QD  5    omeprazole (PRILOSEC) 40 MG capsule TK 1 C PO QD  2     No current facility-administered medications on file prior to visit.        There were no vitals filed for this visit.    Physical Exam:    Physical Exam  Constitutional:       Appearance: She is well-developed.   Eyes:      General: Lids are normal.   Neurological:      Mental Status: She is oriented to person, place, and time.   Psychiatric:         Behavior: Behavior normal.         Thought Content: Thought content normal.               Assessment:       No diagnosis found.       Plan:        There are no diagnoses linked to this encounter. Off gabapentin no help  Off Prozac no help.   Off Elvail no help  Off Gabapentin no help  Off Flexeriol no help    Continue Tramadol; Increase to 100mg BID PRN-will try titration of Tramadol as this is last treatment option I would have to offer her.   Will re-send Voltaren gel 1%  Restarted Lyrica as of February she is tolerating she discontinued in the past for weight gain    I do want her to speak with Psych if we can get appt for any pharmacologic input for depression/myalgia -she has declined but working closely with [employer] [facility] for support.     S/p cervical surgery still with some cervical and thoracic pain trigger points do seem to help with this    Right tarsal tunnel: working with Podiatry trying to avoid surgery but needing repeat injections.     TNo follow-ups on file.        30min consultation with greater than 50% spent in counseling, chart review and coordination of care. All questions answered.

## 2020-08-10 ENCOUNTER — TELEPHONE (OUTPATIENT)
Dept: OBSTETRICS AND GYNECOLOGY | Facility: CLINIC | Age: 45
End: 2020-08-10

## 2020-08-10 ENCOUNTER — PATIENT MESSAGE (OUTPATIENT)
Dept: PHYSICAL MEDICINE AND REHAB | Facility: CLINIC | Age: 45
End: 2020-08-10

## 2020-08-10 NOTE — TELEPHONE ENCOUNTER
----- Message from Michael Pearson sent at 8/10/2020 10:09 AM CDT -----  Contact: patient  Type: Needs Medical Advice  Who Called:  patient  Symptoms (please be specific):    How long has patient had these symptoms:    Pharmacy name and phone #:    Best Call Back Number: [phone]  Additional Information: requesting a call back have question regarding ultrasound order

## 2020-08-11 ENCOUNTER — OFFICE VISIT (OUTPATIENT)
Dept: PHYSICAL MEDICINE AND REHAB | Facility: CLINIC | Age: 45
End: 2020-08-11
Payer: MEDICARE

## 2020-08-11 VITALS — WEIGHT: 177 LBS | HEIGHT: 60 IN | BODY MASS INDEX: 34.75 KG/M2

## 2020-08-11 DIAGNOSIS — M79.18 MYOFASCIAL PAIN: Primary | ICD-10-CM

## 2020-08-11 PROCEDURE — 20553 NJX 1/MLT TRIGGER POINTS 3/>: CPT | Mod: PBBFAC,PN | Performed by: PHYSICAL MEDICINE & REHABILITATION

## 2020-08-11 PROCEDURE — 20553 PR INJECT TRIGGER POINTS, > 3: ICD-10-PCS | Mod: S$PBB,,, | Performed by: PHYSICAL MEDICINE & REHABILITATION

## 2020-08-11 PROCEDURE — 20553 NJX 1/MLT TRIGGER POINTS 3/>: CPT | Mod: S$PBB,,, | Performed by: PHYSICAL MEDICINE & REHABILITATION

## 2020-08-11 PROCEDURE — 99214 PR OFFICE/OUTPT VISIT, EST, LEVL IV, 30-39 MIN: ICD-10-PCS | Mod: 25,S$PBB,GC, | Performed by: PHYSICAL MEDICINE & REHABILITATION

## 2020-08-11 PROCEDURE — 99999 PR PBB SHADOW E&M-EST. PATIENT-LVL III: ICD-10-PCS | Mod: PBBFAC,,, | Performed by: PHYSICAL MEDICINE & REHABILITATION

## 2020-08-11 PROCEDURE — 99213 OFFICE O/P EST LOW 20 MIN: CPT | Mod: PBBFAC,PN | Performed by: PHYSICAL MEDICINE & REHABILITATION

## 2020-08-11 PROCEDURE — 99214 OFFICE O/P EST MOD 30 MIN: CPT | Mod: 25,S$PBB,GC, | Performed by: PHYSICAL MEDICINE & REHABILITATION

## 2020-08-11 PROCEDURE — 99999 PR PBB SHADOW E&M-EST. PATIENT-LVL III: CPT | Mod: PBBFAC,,, | Performed by: PHYSICAL MEDICINE & REHABILITATION
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
## 2020-08-29 ENCOUNTER — PATIENT MESSAGE (OUTPATIENT)
Dept: FAMILY MEDICINE | Facility: CLINIC | Age: 45
End: 2020-08-29

## 2020-09-01 ENCOUNTER — OFFICE VISIT (OUTPATIENT)
Dept: FAMILY MEDICINE | Facility: CLINIC | Age: 45
End: 2020-09-01
Payer: MEDICARE

## 2020-09-01 VITALS
SYSTOLIC BLOOD PRESSURE: 130 MMHG | HEART RATE: 94 BPM | DIASTOLIC BLOOD PRESSURE: 74 MMHG | BODY MASS INDEX: 31.99 KG/M2 | HEIGHT: 60 IN | TEMPERATURE: 98 F | WEIGHT: 162.94 LBS | OXYGEN SATURATION: 98 %

## 2020-09-01 DIAGNOSIS — Z23 NEED FOR VACCINATION: ICD-10-CM

## 2020-09-01 DIAGNOSIS — M54.9 BACK PAIN, UNSPECIFIED BACK LOCATION, UNSPECIFIED BACK PAIN LATERALITY, UNSPECIFIED CHRONICITY: ICD-10-CM

## 2020-09-01 DIAGNOSIS — M79.7 FIBROMYALGIA: Primary | ICD-10-CM

## 2020-09-01 DIAGNOSIS — J45.30 MILD PERSISTENT REACTIVE AIRWAY DISEASE WITHOUT COMPLICATION: ICD-10-CM

## 2020-09-01 PROCEDURE — 99213 PR OFFICE/OUTPT VISIT, EST, LEVL III, 20-29 MIN: ICD-10-PCS | Mod: S$PBB,,, | Performed by: INTERNAL MEDICINE

## 2020-09-01 PROCEDURE — 99213 OFFICE O/P EST LOW 20 MIN: CPT | Mod: S$PBB,,, | Performed by: INTERNAL MEDICINE

## 2020-09-01 PROCEDURE — 99999 PR PBB SHADOW E&M-EST. PATIENT-LVL V: ICD-10-PCS | Mod: PBBFAC,,, | Performed by: INTERNAL MEDICINE

## 2020-09-01 PROCEDURE — 99215 OFFICE O/P EST HI 40 MIN: CPT | Mod: PBBFAC,PO | Performed by: INTERNAL MEDICINE

## 2020-09-01 PROCEDURE — 99999 PR PBB SHADOW E&M-EST. PATIENT-LVL V: CPT | Mod: PBBFAC,,, | Performed by: INTERNAL MEDICINE

## 2020-09-01 PROCEDURE — G0009 ADMIN PNEUMOCOCCAL VACCINE: HCPCS | Mod: PBBFAC,PO

## 2020-09-01 NOTE — PROGRESS NOTES
Subjective     Lavonne Pierre is a 45 y.o. old, female here for Follow-up (3 month f/u)    44 y/o with PMH of fibromyalgia, anxiety/depression, HTN, asthma, allergies.     Fibromyalgia: Followed by Dr. Ross, had lyrica increased. Widespread pain especially in LE's and back. Still getting trigger point injections which seem to help. Difficult case.    Anxiety/Depression: Recently no worse, mother passed recently, does some counseling.    HTN: well controlled.    Asthma: Followed by pulm, no recent exacerbations, allergies managed on meds.    ROS  Medications     Outpatient Medications Marked as Taking for the 9/1/20 encounter (Office Visit) with Ranjit Bernabe MD   Medication Sig Dispense Refill    albuterol (PROVENTIL/VENTOLIN HFA) 90 mcg/actuation inhaler Ventolin HFA 90 mcg/actuation aerosol inhaler   INHALE 2 PUFFS EVERY 4 TO 6 HOURS AS NEEDED      azelastine (ASTELIN) 137 mcg (0.1 %) nasal spray U 1 TO 2 SPRAYS IN EACH NOSTRIL BID  0    BREO ELLIPTA 100-25 mcg/dose diskus inhaler INL 1 PUFF PO AT THE SAME TIME QD  11    diclofenac sodium (VOLTAREN) 1 % Gel Apply 2 g topically 3 (three) times daily. 100 g 5    fluticasone propionate (FLONASE) 50 mcg/actuation nasal spray INSTILL 1 TO 2 SPRAYS IN EACH NOSTRIL QD  5    lisinopriL (PRINIVIL,ZESTRIL) 5 MG tablet Take 1 tablet (5 mg total) by mouth once daily. 90 tablet 1    montelukast (SINGULAIR) 10 mg tablet 10 mg.      multivitamin (THERAGRAN) per tablet Take 1 tablet by mouth once daily.      omeprazole (PRILOSEC) 40 MG capsule TK 1 C PO QD  2    pregabalin (LYRICA) 50 MG capsule Take 1 capsule (50 mg total) by mouth 2 (two) times daily. 60 capsule 5    traMADoL (ULTRAM) 50 mg tablet Take 2 tablets (100 mg total) by mouth every 12 (twelve) hours as needed for Pain. 120 tablet 3     Objective     /74 (BP Location: Right arm, Patient Position: Sitting)   Pulse 94   Temp 98.1 °F (36.7 °C) (Oral)   Ht 5' (1.524 m)   Wt 73.9 kg (162  lb 14.7 oz)   SpO2 98%   BMI 31.82 kg/m²   Physical Exam   Constitutional: She appears well-developed. No distress.   Neurological: She is alert.   Psychiatric: She exhibits a depressed mood.     Assessment and Plan     1. Fibromyalgia  Difficult case, multiple medication trials and failures.  Would consider pramipexole or aripiprazole as next line agents in the future.  Consideration of other SGA's in the future qhs as well.    2. Need for vaccination  - Pneumococcal Polysaccharide Vaccine (23 Valent) (SQ/IM)    3. Mild persistent reactive airway disease without complication    4. Back pain, unspecified back location, unspecified back pain laterality, unspecified chronicity    ___________________  Ranjit Bernabe MD  Internal Medicine and Pediatrics

## 2020-09-02 ENCOUNTER — HOSPITAL ENCOUNTER (OUTPATIENT)
Dept: RADIOLOGY | Facility: HOSPITAL | Age: 45
Discharge: HOME | End: 2020-09-02
Attending: OBSTETRICS & GYNECOLOGY
Payer: MEDICARE

## 2020-09-02 PROCEDURE — 76856 US EXAM PELVIC COMPLETE: CPT | Mod: 26,,, | Performed by: RADIOLOGY

## 2020-09-02 PROCEDURE — 76830 TRANSVAGINAL US NON-OB: CPT | Mod: TC,PO

## 2020-09-02 PROCEDURE — 76856 US PELVIS COMP WITH TRANSVAG NON-OB (XPD): ICD-10-PCS | Mod: 26,,, | Performed by: RADIOLOGY

## 2020-09-02 PROCEDURE — 76830 US PELVIS COMP WITH TRANSVAG NON-OB (XPD): ICD-10-PCS | Mod: 26,,, | Performed by: RADIOLOGY

## 2020-09-02 PROCEDURE — 76830 TRANSVAGINAL US NON-OB: CPT | Mod: 26,,, | Performed by: RADIOLOGY

## 2020-09-04 DIAGNOSIS — Z12.31 VISIT FOR SCREENING MAMMOGRAM: Primary | ICD-10-CM

## 2020-10-01 ENCOUNTER — PATIENT MESSAGE (OUTPATIENT)
Dept: OTHER | Facility: OTHER | Age: 45
End: 2020-10-01

## 2020-10-04 ENCOUNTER — PATIENT MESSAGE (OUTPATIENT)
Dept: FAMILY MEDICINE | Facility: CLINIC | Age: 45
End: 2020-10-04

## 2020-10-05 ENCOUNTER — PATIENT MESSAGE (OUTPATIENT)
Dept: FAMILY MEDICINE | Facility: CLINIC | Age: 45
End: 2020-10-05

## 2020-10-09 ENCOUNTER — IMMUNIZATION (OUTPATIENT)
Dept: FAMILY MEDICINE | Facility: CLINIC | Age: 45
End: 2020-10-09
Payer: MEDICARE

## 2020-10-09 PROCEDURE — 99999 PR PBB SHADOW E&M-EST. PATIENT-LVL I: ICD-10-PCS | Mod: PBBFAC,,,

## 2020-10-09 PROCEDURE — 90686 IIV4 VACC NO PRSV 0.5 ML IM: CPT | Mod: PBBFAC,PO

## 2020-10-09 PROCEDURE — 99999 PR PBB SHADOW E&M-EST. PATIENT-LVL I: CPT | Mod: PBBFAC,,,

## 2020-10-09 PROCEDURE — 99211 OFF/OP EST MAY X REQ PHY/QHP: CPT | Mod: PBBFAC,PO

## 2020-11-02 DIAGNOSIS — I10 ESSENTIAL HYPERTENSION: ICD-10-CM

## 2020-11-03 RX ORDER — LISINOPRIL 5 MG/1
TABLET ORAL
Qty: 90 TABLET | Refills: 1 | Status: SHIPPED | OUTPATIENT
Start: 2020-11-03 | End: 2020-11-04 | Stop reason: SDUPTHER

## 2020-11-03 NOTE — PROGRESS NOTES
Refill Routing Note   Medication(s) are not appropriate for processing by [facility] for the following reason(s):     - Non-participating provider    [facility] actions taken in this encounter: Route          Medication reconciliation completed: No   Automatic Epic Generated Protocol Data:        Requested Prescriptions   Pending Prescriptions Disp Refills    lisinopriL (PRINIVIL,ZESTRIL) 5 MG tablet [Pharmacy Med Name: LISINOPRIL 5MG TABLETS] 90 tablet 1     Sig: TAKE 1 TABLET(5 MG) BY MOUTH EVERY DAY       ACE Inhibitors Refill Protocol Passed - 11/2/2020 11:20 AM        Passed - Patient is not currently pregnant.        Passed - No positive pregnancy test in past 12 months        Passed - Serum Creatinine less than 1.4 on file in the past 12 months     Lab Results   Component Value Date    CREATININE 1.0 01/28/2020    CREATININE 0.64 02/20/2019    CREATININE 0.85 12/07/2017     Lab Results   Component Value Date    EGFRNONAA >60.0 01/28/2020    EGFRNONAA >60 02/20/2019    EGFRNONAA >60 12/07/2017               Passed - Visit with authorizing provider in past 12 months or upcoming 90 days         Passed - Serum Potassium less than 5.2 on file in the past 12 months     Lab Results   Component Value Date    K 4.4 01/28/2020    K 3.9 02/20/2019    K 3.9 12/07/2017                  Passed - Blood Pressure under 139/89 on file in past 12 months      BP Readings from Last 3 Encounters:   09/01/20 130/74   07/29/20 120/80   05/26/20 126/84                   Appointments  past 12m or future 3m with PCP    Date Provider   Last Visit   9/1/2020 Ranjit Bernabe MD   Next Visit   12/1/2020 Ranjit Bernabe MD   ED visits in past 90 days: 0        Note composed:4:29 AM 11/03/2020

## 2020-11-04 ENCOUNTER — PATIENT MESSAGE (OUTPATIENT)
Dept: ADMINISTRATIVE | Facility: OTHER | Age: 45
End: 2020-11-04

## 2020-11-04 DIAGNOSIS — I10 ESSENTIAL HYPERTENSION: ICD-10-CM

## 2020-11-04 RX ORDER — LISINOPRIL 5 MG/1
5 TABLET ORAL DAILY
Qty: 90 TABLET | Refills: 1 | Status: SHIPPED | OUTPATIENT
Start: 2020-11-04 | End: 2021-05-03

## 2020-11-09 ENCOUNTER — PATIENT MESSAGE (OUTPATIENT)
Dept: OTHER | Facility: OTHER | Age: 45
End: 2020-11-09

## 2020-11-10 ENCOUNTER — OFFICE VISIT (OUTPATIENT)
Dept: FAMILY MEDICINE | Facility: CLINIC | Age: 45
End: 2020-11-10
Payer: MEDICARE

## 2020-11-10 VITALS
DIASTOLIC BLOOD PRESSURE: 80 MMHG | SYSTOLIC BLOOD PRESSURE: 120 MMHG | HEART RATE: 70 BPM | OXYGEN SATURATION: 98 % | TEMPERATURE: 98 F | BODY MASS INDEX: 32.76 KG/M2 | HEIGHT: 60 IN | WEIGHT: 166.88 LBS

## 2020-11-10 DIAGNOSIS — R41.840 INATTENTION: Primary | ICD-10-CM

## 2020-11-10 PROCEDURE — 99999 PR PBB SHADOW E&M-EST. PATIENT-LVL IV: CPT | Mod: PBBFAC,,, | Performed by: INTERNAL MEDICINE

## 2020-11-10 PROCEDURE — 99213 OFFICE O/P EST LOW 20 MIN: CPT | Mod: S$PBB,,, | Performed by: INTERNAL MEDICINE

## 2020-11-10 PROCEDURE — 99999 PR PBB SHADOW E&M-EST. PATIENT-LVL IV: ICD-10-PCS | Mod: PBBFAC,,, | Performed by: INTERNAL MEDICINE

## 2020-11-10 PROCEDURE — 99214 OFFICE O/P EST MOD 30 MIN: CPT | Mod: PBBFAC,PO | Performed by: INTERNAL MEDICINE

## 2020-11-10 PROCEDURE — 99213 PR OFFICE/OUTPT VISIT, EST, LEVL III, 20-29 MIN: ICD-10-PCS | Mod: S$PBB,,, | Performed by: INTERNAL MEDICINE

## 2020-11-10 NOTE — PROGRESS NOTES
Subjective     Lavonne Pierre is a 45 y.o. old, female here for Follow-up (3 month)    Patient is coming in with concerns of inattention and difficulty focusing.  She describes lifelong symptoms of the same.  Currently she is unable to perform tasks at home that she is motivated to do such is completing crafts and doing her chores.  She was identified as having dyslexia and perhaps other learning disabilities when she was younger.  She was in Special Education.  She did complete high school with a certificate but never did well in school.  By comparison both of her children seem intelligent and 1 was identified as having ADD.  The son with ADD is been on multiple medications and most recently has been on Adderall which has worked well.  She has a history of anxiety, depression, fibromyalgia.  She is followed by Dr. Ross, rheumatology.  She has been on multiple medications for this which has been reviewed in my past notes and Dr. Ross's notes.  She takes Lyrica at night currently not twice a day as prescribed.  She is trying to wear her CPAP mask consistently.  She takes tramadol on average twice a week.  This does not stimulate her or cause agitation, provides mild pain relief.     ROS  Medications     Outpatient Medications Marked as Taking for the 11/10/20 encounter (Office Visit) with Ranjit Bernabe MD   Medication Sig Dispense Refill    albuterol (PROVENTIL/VENTOLIN HFA) 90 mcg/actuation inhaler INHALE 1 TO 2 PUFFS BY MOUTH EVERY 4 TO 6 HOURS AS NEEDED 8.5 g 3    azelastine (ASTELIN) 137 mcg (0.1 %) nasal spray U 1 TO 2 SPRAYS IN EACH NOSTRIL BID  0    BREO ELLIPTA 100-25 mcg/dose diskus inhaler INL 1 PUFF PO AT THE SAME TIME QD  11    diclofenac sodium (VOLTAREN) 1 % Gel Apply 2 g topically 3 (three) times daily. 100 g 5    fluticasone propionate (FLONASE) 50 mcg/actuation nasal spray INSTILL 1 TO 2 SPRAYS IN EACH NOSTRIL QD  5    lisinopriL (PRINIVIL,ZESTRIL) 5 MG tablet Take 1 tablet (5  mg total) by mouth once daily. 90 tablet 1    montelukast (SINGULAIR) 10 mg tablet 10 mg.      multivitamin (THERAGRAN) per tablet Take 1 tablet by mouth once daily.      pregabalin (LYRICA) 50 MG capsule Take 1 capsule (50 mg total) by mouth 2 (two) times daily. 60 capsule 5    traMADoL (ULTRAM) 50 mg tablet Take 2 tablets (100 mg total) by mouth every 12 (twelve) hours as needed for Pain. 120 tablet 3     Objective     /80 (BP Location: Right arm, Patient Position: Sitting)   Pulse 70   Temp 98.2 °F (36.8 °C) (Oral)   Ht 5' (1.524 m)   Wt 75.7 kg (166 lb 14.2 oz)   SpO2 98%   BMI 32.59 kg/m²   Physical Exam   Constitutional: She appears well-developed. No distress.   Psychiatric: She exhibits a depressed mood.   tearful     Assessment and Plan     1. Inattention  Learning deficits in school, possibility of unidentified ADD earlier in life. Will refer to neuropsych for testing.  Consider Strattera before other stimulants  - Neuropsychological testing; Future    ___________________  Ranjit Bernabe MD  Internal Medicine and Pediatrics

## 2020-11-14 ENCOUNTER — PATIENT MESSAGE (OUTPATIENT)
Dept: FAMILY MEDICINE | Facility: CLINIC | Age: 45
End: 2020-11-14

## 2020-11-14 DIAGNOSIS — R41.840 INATTENTION: Primary | ICD-10-CM

## 2020-11-17 ENCOUNTER — PATIENT MESSAGE (OUTPATIENT)
Dept: FAMILY MEDICINE | Facility: CLINIC | Age: 45
End: 2020-11-17

## 2020-11-19 ENCOUNTER — HOSPITAL ENCOUNTER (OUTPATIENT)
Dept: RADIOLOGY | Facility: HOSPITAL | Age: 45
Discharge: HOME | End: 2020-11-19
Attending: OBSTETRICS & GYNECOLOGY
Payer: MEDICARE

## 2020-11-19 VITALS — BODY MASS INDEX: 32.76 KG/M2 | HEIGHT: 60 IN | WEIGHT: 166.88 LBS

## 2020-11-19 DIAGNOSIS — Z12.31 VISIT FOR SCREENING MAMMOGRAM: ICD-10-CM

## 2020-11-19 PROCEDURE — 77063 BREAST TOMOSYNTHESIS BI: CPT | Mod: 26,,, | Performed by: RADIOLOGY

## 2020-11-19 PROCEDURE — 77067 SCR MAMMO BI INCL CAD: CPT | Mod: 26,,, | Performed by: RADIOLOGY

## 2020-11-19 PROCEDURE — 77063 MAMMO DIGITAL SCREENING BILAT WITH TOMOSYNTHESIS_CAD: ICD-10-PCS | Mod: 26,,, | Performed by: RADIOLOGY

## 2020-11-19 PROCEDURE — 77067 SCR MAMMO BI INCL CAD: CPT | Mod: TC,PN

## 2020-11-19 PROCEDURE — 77067 MAMMO DIGITAL SCREENING BILAT WITH TOMOSYNTHESIS_CAD: ICD-10-PCS | Mod: 26,,, | Performed by: RADIOLOGY

## 2020-11-20 ENCOUNTER — TELEPHONE (OUTPATIENT)
Dept: OBSTETRICS AND GYNECOLOGY | Facility: CLINIC | Age: 45
End: 2020-11-20

## 2020-11-20 DIAGNOSIS — Z91.89 AT HIGH RISK FOR BREAST CANCER: Primary | ICD-10-CM

## 2020-11-20 NOTE — TELEPHONE ENCOUNTER
----- Message from Zach Yin sent at 11/20/2020  1:54 PM CST -----  Contact: patient  Patient called in and stated she read her mammo results on her portal & stated if reading this correctly she needs to have a breast MRI?    Patient would like a call back at [phone]

## 2020-11-24 ENCOUNTER — PATIENT MESSAGE (OUTPATIENT)
Dept: FAMILY MEDICINE | Facility: CLINIC | Age: 45
End: 2020-11-24

## 2020-11-30 ENCOUNTER — PATIENT MESSAGE (OUTPATIENT)
Dept: FAMILY MEDICINE | Facility: CLINIC | Age: 45
End: 2020-11-30

## 2020-12-07 ENCOUNTER — OFFICE VISIT (OUTPATIENT)
Dept: NEUROLOGY | Facility: CLINIC | Age: 45
End: 2020-12-07
Payer: MEDICARE

## 2020-12-07 DIAGNOSIS — R41.9 COGNITIVE COMPLAINTS: Primary | ICD-10-CM

## 2020-12-07 DIAGNOSIS — F41.9 ANXIETY: ICD-10-CM

## 2020-12-07 PROCEDURE — 90791 PR PSYCHIATRIC DIAGNOSTIC EVALUATION: ICD-10-PCS | Mod: 95,,, | Performed by: PSYCHIATRY & NEUROLOGY

## 2020-12-07 PROCEDURE — 90791 PSYCH DIAGNOSTIC EVALUATION: CPT | Mod: 95,,, | Performed by: PSYCHIATRY & NEUROLOGY

## 2020-12-07 PROCEDURE — 99499 NO LOS: ICD-10-PCS | Mod: 95,,, | Performed by: PSYCHIATRY & NEUROLOGY

## 2020-12-07 PROCEDURE — 99499 UNLISTED E&M SERVICE: CPT | Mod: 95,,, | Performed by: PSYCHIATRY & NEUROLOGY
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
## 2020-12-13 PROBLEM — R41.9 COGNITIVE COMPLAINTS: Status: ACTIVE | Noted: 2020-12-13

## 2020-12-13 NOTE — ASSESSMENT & PLAN NOTE
Ms. Pierre has a history of treatment for anxiety and depression but did not report current concerns.    Neuropsychological evaluation: We will further assess mood during the evaluation on 12/22/2020 and provide recommendations for treatment, if needed.

## 2020-12-13 NOTE — ASSESSMENT & PLAN NOTE
"Ms. Pierre reported longstanding difficulty with attention, language, and executive functioning. She was diagnosed with dyslexia and unspecified learning disability as a child but questioned whether she may have a developmental disorder related to an underlying genetic condition diagnosed in her son ("Multiple duplication syndrome"). In addition, as an adult she has reported brain fog in the context of fibromyalgia, depression, anxiety, and obstructive sleep apnea with consistent CPAP use.     Neuropsychological evaluation: Ms. Pierre will complete neuropsychological testing on 12/22/2020 to better characterize cognitive strengths and weaknesses, aid in differential diagnosis, and aid in treatment recommendations for compensatory strategies or further workup.  "

## 2020-12-14 ENCOUNTER — TELEPHONE (OUTPATIENT)
Dept: NEUROLOGY | Facility: CLINIC | Age: 45
End: 2020-12-14

## 2020-12-22 ENCOUNTER — INITIAL CONSULT (OUTPATIENT)
Dept: NEUROLOGY | Facility: CLINIC | Age: 45
End: 2020-12-22
Payer: MEDICARE

## 2020-12-22 DIAGNOSIS — Z91.89 AT HIGH RISK FOR BREAST CANCER: Primary | ICD-10-CM

## 2020-12-22 DIAGNOSIS — F81.9 LEARNING DISABILITY: ICD-10-CM

## 2020-12-22 DIAGNOSIS — R41.840 INATTENTION: ICD-10-CM

## 2020-12-22 DIAGNOSIS — F41.9 ANXIETY: ICD-10-CM

## 2020-12-22 DIAGNOSIS — R41.89 OTHER SYMPTOMS AND SIGNS INVOLVING COGNITIVE FUNCTIONS AND AWARENESS: Primary | ICD-10-CM

## 2020-12-22 PROCEDURE — 99212 OFFICE O/P EST SF 10 MIN: CPT | Mod: PBBFAC,PO | Performed by: PSYCHIATRY & NEUROLOGY

## 2020-12-22 PROCEDURE — 96133 NRPSYC TST EVAL PHYS/QHP EA: CPT | Mod: ,,, | Performed by: PSYCHIATRY & NEUROLOGY

## 2020-12-22 PROCEDURE — 96139 PSYCL/NRPSYC TST TECH EA: CPT | Mod: ,,, | Performed by: PSYCHIATRY & NEUROLOGY

## 2020-12-22 PROCEDURE — 99999 PR PBB SHADOW E&M-EST. PATIENT-LVL II: CPT | Mod: PBBFAC,,, | Performed by: PSYCHIATRY & NEUROLOGY

## 2020-12-22 PROCEDURE — 96132 PR NEUROPSYCHOLOGIC TEST EVAL SVCS, 1ST HR: ICD-10-PCS | Mod: ,,, | Performed by: PSYCHIATRY & NEUROLOGY

## 2020-12-22 PROCEDURE — 99999 PR PBB SHADOW E&M-EST. PATIENT-LVL II: ICD-10-PCS | Mod: PBBFAC,,, | Performed by: PSYCHIATRY & NEUROLOGY

## 2020-12-22 PROCEDURE — 96138 PR PSYCH/NEUROPSYCH TEST ADMIN/SCORING, BY TECH, 2+ TESTS, 1ST 30 MIN: ICD-10-PCS | Mod: ,,, | Performed by: PSYCHIATRY & NEUROLOGY

## 2020-12-22 PROCEDURE — 96139 PR PSYCH/NEUROPSYCH TEST ADMIN/SCORING, BY TECH, 2+ TESTS, EA ADDTL 30 MIN: ICD-10-PCS | Mod: ,,, | Performed by: PSYCHIATRY & NEUROLOGY

## 2020-12-22 PROCEDURE — 99499 UNLISTED E&M SERVICE: CPT | Mod: S$PBB,,, | Performed by: PSYCHIATRY & NEUROLOGY

## 2020-12-22 PROCEDURE — 96133 PR NEUROPSYCHOLOGIC TEST EVAL SVCS, EA ADDTL HR: ICD-10-PCS | Mod: ,,, | Performed by: PSYCHIATRY & NEUROLOGY

## 2020-12-22 PROCEDURE — 96132 NRPSYC TST EVAL PHYS/QHP 1ST: CPT | Mod: ,,, | Performed by: PSYCHIATRY & NEUROLOGY

## 2020-12-22 PROCEDURE — 96138 PSYCL/NRPSYC TECH 1ST: CPT | Mod: ,,, | Performed by: PSYCHIATRY & NEUROLOGY

## 2020-12-22 PROCEDURE — 99499 NO LOS: ICD-10-PCS | Mod: S$PBB,,, | Performed by: PSYCHIATRY & NEUROLOGY

## 2020-12-22 NOTE — PROGRESS NOTES
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 performance scores. Basic attention and working memory were intact or better than expectations, with relatively reduced working memory when solving word problems mentally. Visuomotor processing speed was within normal limits. Ms. Phillipss scores on measures of executive functioning were largely consistent with premorbid estimates. Nonverbal abstract reasoning was relatively reduced, and she had a disorganized approach to copying a complex figure. Word reading and sentence comprehension were below average. Performances on other tests of language were largely within expectation based on premorbid estimates. Visuospatial skills were largely consistent, with the exception of a reduced score on a complex figure-drawing task, in part due to poor planning. Fine motor speed and dexterity were within normal limits. Her general fund of information was relatively reduced. Learning and recall of verbal information not presented in context were consistent with premorbid estimates, with stronger recognition. Learning of information provided in story format was reduced, but she retained the information she initially learned and benefited from recognition.    Regarding emotional functioning, Ms. Pierre did not report significant depression or anxiety  on brief self-report measures. On a longer self-report questionnaire of personality and emotional functioning, review of symptom validity scales suggested that her responses were similar to individuals who deny most difficulties, including minor issues that most people would report. She did not have any significant elevations on clinical scales related to depression, anxiety, physical concerns, or others, but that may have been due to her response style. Her responses were similar to those of others who reported avoiding social interactions.    BILLING  Service Description CPT Code Minutes Units   Test Evaluation Services --  --   Neuropsychological testing evaluation services by physician [phone]   Each additional hour by physician [id number]  2   Test Administration and Scoring --  --   Psychological or neuropsychological test administration and scoring by physician [id number]  0   Each additional 30 minutes by physician [id number]  0   Psychological or neuropsychological test administration and scoring by technician [phone]   Each additional 30 minutes by technician [id number]  8

## 2020-12-23 ENCOUNTER — HOSPITAL ENCOUNTER (OUTPATIENT)
Dept: RADIOLOGY | Facility: HOSPITAL | Age: 45
Discharge: HOME | End: 2020-12-23
Attending: OBSTETRICS & GYNECOLOGY
Payer: MEDICARE

## 2020-12-23 DIAGNOSIS — Z91.89 AT HIGH RISK FOR BREAST CANCER: ICD-10-CM

## 2020-12-23 PROCEDURE — A9577 INJ MULTIHANCE: HCPCS | Mod: PO | Performed by: OBSTETRICS & GYNECOLOGY

## 2020-12-23 PROCEDURE — 77049 MRI BREAST C-+ W/CAD BI: CPT | Mod: 26,,, | Performed by: RADIOLOGY

## 2020-12-23 PROCEDURE — C8937 CAD BREAST MRI: HCPCS | Mod: TC,PO

## 2020-12-23 PROCEDURE — 77049 MRI BREAST W/WO CONTRAST, W/CAD, BILATERAL: ICD-10-PCS | Mod: 26,,, | Performed by: RADIOLOGY

## 2020-12-23 PROCEDURE — 25500020 PHARM REV CODE 255: Mod: PO | Performed by: OBSTETRICS & GYNECOLOGY

## 2020-12-23 RX ADMIN — GADOBENATE DIMEGLUMINE 14 ML: 529 INJECTION, SOLUTION INTRAVENOUS at 10:12

## 2020-12-28 PROBLEM — R41.89 OTHER SYMPTOMS AND SIGNS INVOLVING COGNITIVE FUNCTIONS AND AWARENESS: Status: ACTIVE | Noted: 2020-12-13

## 2020-12-28 PROBLEM — F81.9 LEARNING DISABILITY: Status: ACTIVE | Noted: 2020-12-28

## 2020-12-29 NOTE — ASSESSMENT & PLAN NOTE
Ms. Pierre has a history of treatment for anxiety and depression but did not report current concerns, with question of possible minimization of mood concerns.    Recommendation: Continue to monitor and address symptoms, as needed.

## 2020-12-29 NOTE — ASSESSMENT & PLAN NOTE
"Ms. Pierre reported longstanding difficulty with attention, language, and executive functioning. She was diagnosed with dyslexia and unspecified learning disability as a child but questioned whether she may have a developmental disorder related to an underlying genetic condition diagnosed in her son ("Multiple duplication syndrome") or Attention Deficit/Hyperactivity Disorder. In addition, as an adult she has reported brain fog in the context of fibromyalgia, depression, anxiety, and obstructive sleep apnea with consistent CPAP use.     Current neuropsychological assessment was consistent with her reported learning history, with overall intellectual abilities in the below average range, but stronger working memory and processing speed performances than verbal/nonverbal performances. Cognitive test performance was largely consistent with this history, with relative weaknesses in aspects of executive functioning and learning/attending to large amounts of verbal information that was not repeated multiple times. She exhibited intact ability to retain and recognize learned information.     Overall, her current test performance suggested longstanding areas of difficulty, consistent with her reported history, rather than concern for additional change due to recent medical history or an additional diagnosis of ADHD. However, prior testing was not available for comparison to assess for potential changes.     Recommendations:   · Medical/Genetic Follow-up: Continue usual follow up for current active medical issues. Consider medication interventions for reported cognitive symptoms (e.g., brain fog). Consider genetic testing to assess for an underlying etiology for longstanding cognitive and learning difficulty, given her reported family history and current test results.     1. Recommendations for Ms. Pierre: Note, some recommendations are made based on reported areas of difficulty, as attention and processing speed were " largely intact on testing. Ms. Pierre has built systems of compensatory strategies that have worked well for her and is encouraged to continue to use these strategies (e.g., writing down important information), in addition to considering additional strategies below.  a. Attention: Remember that inattention and lack of focus are major culprits to forgetting information so be sure and practice paying attention for adequate learning of information. If you rely on passive attention to remembering something (e.g., yeah, uh-huh approach), youll find you cannot recall it later. I recommend the following to improve attention, which may aid in later recall:   i. Reduce distractions in the area as much as possible.  ii. Look at the person as they are speaking to you.   iii. Paraphrase as they are speaking  iv. Write down important pieces of information   v. Ask them to repeat if you zone out.   vi. Have them simplify and reduce information that you need to attend to during conversation.   vii. Have visual cues to remind you if you need to do something later.  b. Processing Speed:   i. Using multiple modalities (e.g., listening, writing notes, asking questions, recording) to learn new information is likely to allow additional time for processing, thus improving memory for the material.   ii. Allowing sufficient time to complete tasks will reduce frustration and help to ensure completion.  c. Executive Functioning:  i. Dont attempt to multi-task.  Separate tasks so that each can be completed one at a time.  ii. Consider using a calendar/day planner, as that may be effective to help you plan and stay on track.  Color-coding specific tasks by importance may add additional benefit to your planner.  iii. Break down large projects into smaller tasks and write down the steps to completing the task.  Taking notes while reading can help with recall.  d. Storing Information: Use the below strategies to help you further enhance how  information is stored.  i. Rehearse - Immediately after seeing/hearing something, try to recall it.  Wait a few minutes, then check again.  Gradually lengthen the intervals between rehearsals.  ii. Repetition of learned material is critical to ensure storage of information to be learned. Self-test at home to ensure learning.  iii. Write down important information to improve your attention and focus and to have something to look back on when you need to recall it.  iv. Make sure the person doesnt rattle off, but presents in a clear, logical, and unhurried manner.   e. Recalling Information:  i. Jog your memory - Lose something?  Think back to when you last had it.  What did you do next?  And after that?  Mentally walk yourself through each activity that followed.  Prodding your memory this way may enable you to recall the location of the missing item.  ii. Use a cue - Symbolic reminders (the proverbial string around the finger) are helpful.  So too are memos, timers, calendar notes, etc.--keep them in visible, appropriate places.  iii. Get organized - Have fixed locations for all important papers, key phone numbers, medications, keys, wallet, glasses, tools, etc.  iv. Develop routines - Routines can anchor memories so they do not drift away.    f. Sleep Hygiene: Poor sleep has a negative effect on cognition; Ms. Pierre is encouraged to use her CPAP consistently. Several strategies have been shown to improve sleep:   i. Caffeine intake in the afternoon and evening, as well as stuffing oneself at supper, can decrease the quality of restful sleep throughout the night.   ii. Bedtime and wake-up times should be consistent every night and morning so the body becomes used to a single routine, even on the weekends.  iii. Engage in daily physical activity, but not 2-3 hours before bedtime.   iv. No technology use (television, computer, iPad) 1-2 hours before bed.   v. Have a wind down routine (e.g., soft lights in the  house, bath before bed, reduced fluid intake, songs, reading, less noise) to promote sleep readiness.   vi. Visit the www.sleepfoundation.org for more strategies.     · Practice good cognitive/brain health hygiene:  · Engage in regular exercise, which increases alertness and arousal and can improve attention and focus.  Consider lower impact exercises, such as yoga or light walking or other exercises recommended for individuals with fibromyalgia.  · Get a good nights sleep, as this can enhance alertness and cognition.  · Eat healthy foods and balanced meals. It is notable that research indicates certain nutrients may aid in brain function, such as B vitamins (especially B6, B12, and folic acid), antioxidants (such as vitamins C and E, and beta carotene), and Omega-3 fatty acids. Talk with your physician or nutritionist about whats right for you.   · Keep your brain active. Find activities to stay mentally active, such as reading, games (cards, checkers), puzzles (crosswords, Sudoku, jig saw), crafts (models, woodworking), gardening, or participating in activities in the community.  · Stay socially engaged. Continue staying active with your family and friends.

## 2021-01-11 ENCOUNTER — OFFICE VISIT (OUTPATIENT)
Dept: NEUROLOGY | Facility: CLINIC | Age: 46
End: 2021-01-11
Payer: MEDICARE

## 2021-01-11 DIAGNOSIS — F81.9 LEARNING DISABILITY: ICD-10-CM

## 2021-01-11 DIAGNOSIS — F41.9 ANXIETY: ICD-10-CM

## 2021-01-11 DIAGNOSIS — R41.89 OTHER SYMPTOMS AND SIGNS INVOLVING COGNITIVE FUNCTIONS AND AWARENESS: ICD-10-CM

## 2021-01-11 PROCEDURE — 99499 UNLISTED E&M SERVICE: CPT | Mod: 95,,, | Performed by: PSYCHIATRY & NEUROLOGY

## 2021-01-11 PROCEDURE — 99499 NO LOS: ICD-10-PCS | Mod: 95,,, | Performed by: PSYCHIATRY & NEUROLOGY

## 2021-01-14 ENCOUNTER — OFFICE VISIT (OUTPATIENT)
Dept: FAMILY MEDICINE | Facility: CLINIC | Age: 46
End: 2021-01-14
Payer: MEDICARE

## 2021-01-14 VITALS
WEIGHT: 168.88 LBS | DIASTOLIC BLOOD PRESSURE: 70 MMHG | BODY MASS INDEX: 33.15 KG/M2 | HEART RATE: 76 BPM | OXYGEN SATURATION: 98 % | HEIGHT: 60 IN | SYSTOLIC BLOOD PRESSURE: 100 MMHG

## 2021-01-14 DIAGNOSIS — F81.9 LEARNING DISABILITY: ICD-10-CM

## 2021-01-14 DIAGNOSIS — M79.7 FIBROMYALGIA: Primary | ICD-10-CM

## 2021-01-14 DIAGNOSIS — R41.840 INATTENTION: ICD-10-CM

## 2021-01-14 PROCEDURE — 99213 PR OFFICE/OUTPT VISIT, EST, LEVL III, 20-29 MIN: ICD-10-PCS | Mod: S$PBB,,, | Performed by: INTERNAL MEDICINE

## 2021-01-14 PROCEDURE — 99999 PR PBB SHADOW E&M-EST. PATIENT-LVL III: ICD-10-PCS | Mod: PBBFAC,,, | Performed by: INTERNAL MEDICINE

## 2021-01-14 PROCEDURE — 99213 OFFICE O/P EST LOW 20 MIN: CPT | Mod: PBBFAC,PO | Performed by: INTERNAL MEDICINE

## 2021-01-14 PROCEDURE — 99999 PR PBB SHADOW E&M-EST. PATIENT-LVL III: CPT | Mod: PBBFAC,,, | Performed by: INTERNAL MEDICINE

## 2021-01-14 PROCEDURE — 99213 OFFICE O/P EST LOW 20 MIN: CPT | Mod: S$PBB,,, | Performed by: INTERNAL MEDICINE

## 2021-01-15 ENCOUNTER — PATIENT MESSAGE (OUTPATIENT)
Dept: ADMINISTRATIVE | Facility: OTHER | Age: 46
End: 2021-01-15

## 2021-01-15 ENCOUNTER — HOSPITAL ENCOUNTER (OUTPATIENT)
Dept: RADIOLOGY | Facility: HOSPITAL | Age: 46
Discharge: HOME | End: 2021-01-15
Attending: OBSTETRICS & GYNECOLOGY
Payer: MEDICARE

## 2021-01-15 DIAGNOSIS — R92.8 ABNORMAL MRI, BREAST: ICD-10-CM

## 2021-01-15 PROCEDURE — 76642 ULTRASOUND BREAST LIMITED: CPT | Mod: TC,PO,LT

## 2021-01-15 PROCEDURE — 76642 ULTRASOUND BREAST LIMITED: CPT | Mod: 26,LT,, | Performed by: RADIOLOGY

## 2021-01-15 PROCEDURE — 76642 US BREAST LEFT LIMITED: ICD-10-PCS | Mod: 26,LT,, | Performed by: RADIOLOGY

## 2021-01-19 ENCOUNTER — PATIENT MESSAGE (OUTPATIENT)
Dept: OBSTETRICS AND GYNECOLOGY | Facility: CLINIC | Age: 46
End: 2021-01-19

## 2021-01-19 DIAGNOSIS — Z91.89 AT HIGH RISK FOR BREAST CANCER: Primary | ICD-10-CM

## 2021-01-28 ENCOUNTER — PATIENT MESSAGE (OUTPATIENT)
Dept: FAMILY MEDICINE | Facility: CLINIC | Age: 46
End: 2021-01-28

## 2021-01-28 ENCOUNTER — HOSPITAL ENCOUNTER (OUTPATIENT)
Dept: RADIOLOGY | Facility: HOSPITAL | Age: 46
Discharge: HOME | End: 2021-01-28
Attending: FAMILY MEDICINE
Payer: MEDICARE

## 2021-01-28 ENCOUNTER — OFFICE VISIT (OUTPATIENT)
Dept: FAMILY MEDICINE | Facility: CLINIC | Age: 46
End: 2021-01-28
Payer: MEDICARE

## 2021-01-28 ENCOUNTER — TELEPHONE (OUTPATIENT)
Dept: FAMILY MEDICINE | Facility: CLINIC | Age: 46
End: 2021-01-28

## 2021-01-28 VITALS
BODY MASS INDEX: 33.15 KG/M2 | DIASTOLIC BLOOD PRESSURE: 86 MMHG | OXYGEN SATURATION: 97 % | SYSTOLIC BLOOD PRESSURE: 128 MMHG | WEIGHT: 169.75 LBS | HEART RATE: 79 BPM

## 2021-01-28 DIAGNOSIS — R07.9 LEFT-SIDED CHEST PAIN: ICD-10-CM

## 2021-01-28 DIAGNOSIS — R07.9 LEFT-SIDED CHEST PAIN: Primary | ICD-10-CM

## 2021-01-28 PROCEDURE — 93010 ELECTROCARDIOGRAM REPORT: CPT | Mod: S$PBB,,, | Performed by: INTERNAL MEDICINE

## 2021-01-28 PROCEDURE — 71046 XR CHEST PA AND LATERAL: ICD-10-PCS | Mod: 26,,, | Performed by: RADIOLOGY

## 2021-01-28 PROCEDURE — 93005 ELECTROCARDIOGRAM TRACING: CPT | Mod: PBBFAC,PO | Performed by: INTERNAL MEDICINE

## 2021-01-28 PROCEDURE — 71046 X-RAY EXAM CHEST 2 VIEWS: CPT | Mod: 26,,, | Performed by: RADIOLOGY

## 2021-01-28 PROCEDURE — 99999 PR PBB SHADOW E&M-EST. PATIENT-LVL IV: CPT | Mod: PBBFAC,,, | Performed by: FAMILY MEDICINE

## 2021-01-28 PROCEDURE — 99214 OFFICE O/P EST MOD 30 MIN: CPT | Mod: PBBFAC,25,PO | Performed by: FAMILY MEDICINE

## 2021-01-28 PROCEDURE — 93010 EKG 12-LEAD: ICD-10-PCS | Mod: S$PBB,,, | Performed by: INTERNAL MEDICINE

## 2021-01-28 PROCEDURE — 96372 THER/PROPH/DIAG INJ SC/IM: CPT | Mod: PBBFAC,PO

## 2021-01-28 PROCEDURE — 99999 PR PBB SHADOW E&M-EST. PATIENT-LVL IV: ICD-10-PCS | Mod: PBBFAC,,, | Performed by: FAMILY MEDICINE

## 2021-01-28 PROCEDURE — 99215 OFFICE O/P EST HI 40 MIN: CPT | Mod: S$PBB,,, | Performed by: FAMILY MEDICINE

## 2021-01-28 PROCEDURE — 99215 PR OFFICE/OUTPT VISIT, EST, LEVL V, 40-54 MIN: ICD-10-PCS | Mod: S$PBB,,, | Performed by: FAMILY MEDICINE

## 2021-01-28 PROCEDURE — 71046 X-RAY EXAM CHEST 2 VIEWS: CPT | Mod: TC,FY,PO

## 2021-01-28 RX ORDER — KETOROLAC TROMETHAMINE 30 MG/ML
60 INJECTION, SOLUTION INTRAMUSCULAR; INTRAVENOUS
Status: COMPLETED | OUTPATIENT
Start: 2021-01-28 | End: 2021-01-28

## 2021-01-28 RX ORDER — PREDNISONE 10 MG/1
TABLET ORAL
Qty: 20 TABLET | Refills: 0 | Status: SHIPPED | OUTPATIENT
Start: 2021-01-28 | End: 2021-02-04 | Stop reason: ALTCHOICE

## 2021-01-28 RX ADMIN — KETOROLAC TROMETHAMINE 60 MG: 30 INJECTION, SOLUTION INTRAMUSCULAR at 11:01

## 2021-01-30 ENCOUNTER — PATIENT MESSAGE (OUTPATIENT)
Dept: FAMILY MEDICINE | Facility: CLINIC | Age: 46
End: 2021-01-30

## 2021-02-04 ENCOUNTER — OFFICE VISIT (OUTPATIENT)
Dept: FAMILY MEDICINE | Facility: CLINIC | Age: 46
End: 2021-02-04
Payer: MEDICARE

## 2021-02-04 VITALS
SYSTOLIC BLOOD PRESSURE: 104 MMHG | DIASTOLIC BLOOD PRESSURE: 78 MMHG | BODY MASS INDEX: 37.12 KG/M2 | HEART RATE: 76 BPM | TEMPERATURE: 98 F | WEIGHT: 171.5 LBS | OXYGEN SATURATION: 98 %

## 2021-02-04 DIAGNOSIS — M79.7 FIBROMYALGIA: ICD-10-CM

## 2021-02-04 DIAGNOSIS — M94.0 COSTOCHONDRITIS: Primary | ICD-10-CM

## 2021-02-04 PROCEDURE — 99213 PR OFFICE/OUTPT VISIT, EST, LEVL III, 20-29 MIN: ICD-10-PCS | Mod: S$PBB,,, | Performed by: INTERNAL MEDICINE

## 2021-02-04 PROCEDURE — 99999 PR PBB SHADOW E&M-EST. PATIENT-LVL III: CPT | Mod: PBBFAC,,, | Performed by: INTERNAL MEDICINE

## 2021-02-04 PROCEDURE — 99213 OFFICE O/P EST LOW 20 MIN: CPT | Mod: PBBFAC,PO | Performed by: INTERNAL MEDICINE

## 2021-02-04 PROCEDURE — 99213 OFFICE O/P EST LOW 20 MIN: CPT | Mod: S$PBB,,, | Performed by: INTERNAL MEDICINE

## 2021-02-04 PROCEDURE — 99999 PR PBB SHADOW E&M-EST. PATIENT-LVL III: ICD-10-PCS | Mod: PBBFAC,,, | Performed by: INTERNAL MEDICINE

## 2021-02-04 RX ORDER — NAPROXEN 500 MG/1
500 TABLET ORAL NIGHTLY PRN
Qty: 30 TABLET | Refills: 1 | Status: SHIPPED | OUTPATIENT
Start: 2021-02-04 | End: 2021-03-05

## 2021-02-04 RX ORDER — PANTOPRAZOLE SODIUM 20 MG/1
20 TABLET, DELAYED RELEASE ORAL NIGHTLY
Qty: 30 TABLET | Refills: 1 | Status: SHIPPED | OUTPATIENT
Start: 2021-02-04 | End: 2021-03-15 | Stop reason: SDUPTHER

## 2021-02-18 ENCOUNTER — TELEPHONE (OUTPATIENT)
Dept: FAMILY MEDICINE | Facility: CLINIC | Age: 46
End: 2021-02-18

## 2021-03-02 ENCOUNTER — PATIENT MESSAGE (OUTPATIENT)
Dept: RHEUMATOLOGY | Facility: CLINIC | Age: 46
End: 2021-03-02

## 2021-03-05 ENCOUNTER — OFFICE VISIT (OUTPATIENT)
Dept: RHEUMATOLOGY | Facility: CLINIC | Age: 46
End: 2021-03-05
Payer: MEDICARE

## 2021-03-05 VITALS
HEIGHT: 57 IN | BODY MASS INDEX: 36.96 KG/M2 | HEART RATE: 71 BPM | DIASTOLIC BLOOD PRESSURE: 79 MMHG | SYSTOLIC BLOOD PRESSURE: 117 MMHG | WEIGHT: 171.31 LBS

## 2021-03-05 DIAGNOSIS — M79.7 FIBROMYALGIA: Primary | ICD-10-CM

## 2021-03-05 DIAGNOSIS — M15.9 PRIMARY OSTEOARTHRITIS INVOLVING MULTIPLE JOINTS: ICD-10-CM

## 2021-03-05 PROCEDURE — 99214 PR OFFICE/OUTPT VISIT, EST, LEVL IV, 30-39 MIN: ICD-10-PCS | Mod: S$PBB,,, | Performed by: INTERNAL MEDICINE

## 2021-03-05 PROCEDURE — 99214 OFFICE O/P EST MOD 30 MIN: CPT | Mod: S$PBB,,, | Performed by: INTERNAL MEDICINE

## 2021-03-05 PROCEDURE — 99213 OFFICE O/P EST LOW 20 MIN: CPT | Mod: PBBFAC,PO | Performed by: INTERNAL MEDICINE

## 2021-03-05 PROCEDURE — 99999 PR PBB SHADOW E&M-EST. PATIENT-LVL III: ICD-10-PCS | Mod: PBBFAC,,, | Performed by: INTERNAL MEDICINE

## 2021-03-05 PROCEDURE — 99999 PR PBB SHADOW E&M-EST. PATIENT-LVL III: CPT | Mod: PBBFAC,,, | Performed by: INTERNAL MEDICINE

## 2021-03-05 RX ORDER — CELECOXIB 200 MG/1
200 CAPSULE ORAL DAILY
Qty: 30 CAPSULE | Refills: 3 | Status: SHIPPED | OUTPATIENT
Start: 2021-03-05 | End: 2021-06-10

## 2021-03-05 RX ORDER — DICLOFENAC SODIUM 10 MG/G
2 GEL TOPICAL 3 TIMES DAILY
Qty: 100 G | Refills: 5 | Status: SHIPPED | OUTPATIENT
Start: 2021-03-05 | End: 2022-04-18

## 2021-03-05 ASSESSMENT — ROUTINE ASSESSMENT OF PATIENT INDEX DATA (RAPID3)
PATIENT GLOBAL ASSESSMENT SCORE: 6.5
FATIGUE SCORE: 2.2
PAIN SCORE: 6
MDHAQ FUNCTION SCORE: 1.5
TOTAL RAPID3 SCORE: 5.83
PSYCHOLOGICAL DISTRESS SCORE: 0

## 2021-03-15 ENCOUNTER — OFFICE VISIT (OUTPATIENT)
Dept: FAMILY MEDICINE | Facility: CLINIC | Age: 46
End: 2021-03-15
Payer: MEDICARE

## 2021-03-15 VITALS
BODY MASS INDEX: 37.34 KG/M2 | HEART RATE: 71 BPM | SYSTOLIC BLOOD PRESSURE: 122 MMHG | OXYGEN SATURATION: 98 % | WEIGHT: 173.06 LBS | DIASTOLIC BLOOD PRESSURE: 74 MMHG | HEIGHT: 57 IN

## 2021-03-15 DIAGNOSIS — M79.7 FIBROMYALGIA: Primary | ICD-10-CM

## 2021-03-15 DIAGNOSIS — M94.0 COSTOCHONDRITIS: ICD-10-CM

## 2021-03-15 DIAGNOSIS — K21.9 GASTROESOPHAGEAL REFLUX DISEASE WITHOUT ESOPHAGITIS: ICD-10-CM

## 2021-03-15 PROCEDURE — 99999 PR PBB SHADOW E&M-EST. PATIENT-LVL IV: ICD-10-PCS | Mod: PBBFAC,,, | Performed by: INTERNAL MEDICINE

## 2021-03-15 PROCEDURE — 99214 OFFICE O/P EST MOD 30 MIN: CPT | Mod: PBBFAC,PO | Performed by: INTERNAL MEDICINE

## 2021-03-15 PROCEDURE — 99999 PR PBB SHADOW E&M-EST. PATIENT-LVL IV: CPT | Mod: PBBFAC,,, | Performed by: INTERNAL MEDICINE

## 2021-03-15 PROCEDURE — 99213 PR OFFICE/OUTPT VISIT, EST, LEVL III, 20-29 MIN: ICD-10-PCS | Mod: S$PBB,,, | Performed by: INTERNAL MEDICINE

## 2021-03-15 PROCEDURE — 99213 OFFICE O/P EST LOW 20 MIN: CPT | Mod: S$PBB,,, | Performed by: INTERNAL MEDICINE

## 2021-03-15 RX ORDER — PANTOPRAZOLE SODIUM 20 MG/1
20 TABLET, DELAYED RELEASE ORAL NIGHTLY
Qty: 30 TABLET | Refills: 1 | Status: SHIPPED | OUTPATIENT
Start: 2021-03-15 | End: 2021-03-17

## 2021-04-04 ENCOUNTER — PATIENT MESSAGE (OUTPATIENT)
Dept: PHYSICAL MEDICINE AND REHAB | Facility: CLINIC | Age: 46
End: 2021-04-04

## 2021-04-05 ENCOUNTER — OFFICE VISIT (OUTPATIENT)
Dept: PHYSICAL MEDICINE AND REHAB | Facility: CLINIC | Age: 46
End: 2021-04-05
Payer: MEDICARE

## 2021-04-05 VITALS — WEIGHT: 173 LBS | HEIGHT: 57 IN | BODY MASS INDEX: 37.32 KG/M2

## 2021-04-05 DIAGNOSIS — M53.3 SACROILIAC JOINT DYSFUNCTION OF RIGHT SIDE: Primary | ICD-10-CM

## 2021-04-05 DIAGNOSIS — M79.18 MYOFASCIAL PAIN: ICD-10-CM

## 2021-04-05 PROCEDURE — 99999 PR PBB SHADOW E&M-EST. PATIENT-LVL III: CPT | Mod: PBBFAC,,, | Performed by: PHYSICAL MEDICINE & REHABILITATION

## 2021-04-05 PROCEDURE — 99999 PR PBB SHADOW E&M-EST. PATIENT-LVL III: ICD-10-PCS | Mod: PBBFAC,,, | Performed by: PHYSICAL MEDICINE & REHABILITATION

## 2021-04-05 PROCEDURE — 99214 PR OFFICE/OUTPT VISIT, EST, LEVL IV, 30-39 MIN: ICD-10-PCS | Mod: 25,S$PBB,, | Performed by: PHYSICAL MEDICINE & REHABILITATION

## 2021-04-05 PROCEDURE — 27096 INJECT SACROILIAC JOINT: CPT | Mod: PBBFAC,PN | Performed by: PHYSICAL MEDICINE & REHABILITATION

## 2021-04-05 PROCEDURE — 99213 OFFICE O/P EST LOW 20 MIN: CPT | Mod: PBBFAC,PN | Performed by: PHYSICAL MEDICINE & REHABILITATION

## 2021-04-05 PROCEDURE — 27096 INJECT SACROILIAC JOINT: CPT | Mod: S$PBB,RT,, | Performed by: PHYSICAL MEDICINE & REHABILITATION

## 2021-04-05 PROCEDURE — 99214 OFFICE O/P EST MOD 30 MIN: CPT | Mod: 25,S$PBB,, | Performed by: PHYSICAL MEDICINE & REHABILITATION

## 2021-04-05 PROCEDURE — 27096 PR INJECTION,SACROILIAC JOINT: ICD-10-PCS | Mod: S$PBB,RT,, | Performed by: PHYSICAL MEDICINE & REHABILITATION

## 2021-04-05 PROCEDURE — 76942 ECHO GUIDE FOR BIOPSY: CPT | Mod: PBBFAC,PN | Performed by: PHYSICAL MEDICINE & REHABILITATION

## 2021-04-05 RX ORDER — TRIAMCINOLONE ACETONIDE 40 MG/ML
40 INJECTION, SUSPENSION INTRA-ARTICULAR; INTRAMUSCULAR
Status: DISCONTINUED | OUTPATIENT
Start: 2021-04-05 | End: 2021-04-05 | Stop reason: HOSPADM

## 2021-04-05 RX ADMIN — TRIAMCINOLONE ACETONIDE 40 MG: 40 INJECTION, SUSPENSION INTRA-ARTICULAR; INTRAMUSCULAR at 10:04

## 2021-04-06 ENCOUNTER — PATIENT MESSAGE (OUTPATIENT)
Dept: PHYSICAL MEDICINE AND REHAB | Facility: CLINIC | Age: 46
End: 2021-04-06

## 2021-04-06 ENCOUNTER — PATIENT MESSAGE (OUTPATIENT)
Dept: OBSTETRICS AND GYNECOLOGY | Facility: CLINIC | Age: 46
End: 2021-04-06

## 2021-04-08 DIAGNOSIS — M79.18 MYOFASCIAL PAIN: ICD-10-CM

## 2021-04-08 DIAGNOSIS — M53.3 SACROILIAC JOINT DYSFUNCTION OF RIGHT SIDE: Primary | ICD-10-CM

## 2021-04-08 RX ORDER — METHYLPREDNISOLONE 4 MG/1
TABLET ORAL
Qty: 1 PACKAGE | Refills: 0 | Status: SHIPPED | OUTPATIENT
Start: 2021-04-08 | End: 2021-04-29

## 2021-04-12 ENCOUNTER — CLINICAL SUPPORT (OUTPATIENT)
Dept: REHABILITATION | Facility: HOSPITAL | Age: 46
End: 2021-04-12
Attending: PHYSICAL MEDICINE & REHABILITATION
Payer: MEDICARE

## 2021-04-12 DIAGNOSIS — M25.551 BILATERAL HIP PAIN: ICD-10-CM

## 2021-04-12 DIAGNOSIS — M25.552 BILATERAL HIP PAIN: ICD-10-CM

## 2021-04-12 DIAGNOSIS — M53.3 SACROILIAC JOINT DYSFUNCTION OF RIGHT SIDE: ICD-10-CM

## 2021-04-12 DIAGNOSIS — M53.86 DECREASED RANGE OF MOTION OF LUMBAR SPINE: ICD-10-CM

## 2021-04-12 DIAGNOSIS — G89.29 CHRONIC BILATERAL LOW BACK PAIN WITH RIGHT-SIDED SCIATICA: ICD-10-CM

## 2021-04-12 DIAGNOSIS — M54.41 CHRONIC BILATERAL LOW BACK PAIN WITH RIGHT-SIDED SCIATICA: ICD-10-CM

## 2021-04-12 DIAGNOSIS — R29.898 WEAKNESS OF BOTH HIPS: ICD-10-CM

## 2021-04-12 PROCEDURE — 97162 PT EVAL MOD COMPLEX 30 MIN: CPT | Mod: PO

## 2021-04-12 PROCEDURE — 97110 THERAPEUTIC EXERCISES: CPT | Mod: PO

## 2021-04-14 ENCOUNTER — CLINICAL SUPPORT (OUTPATIENT)
Dept: REHABILITATION | Facility: HOSPITAL | Age: 46
End: 2021-04-14
Attending: PHYSICAL MEDICINE & REHABILITATION
Payer: MEDICARE

## 2021-04-14 DIAGNOSIS — M25.552 BILATERAL HIP PAIN: ICD-10-CM

## 2021-04-14 DIAGNOSIS — M53.86 DECREASED RANGE OF MOTION OF LUMBAR SPINE: ICD-10-CM

## 2021-04-14 DIAGNOSIS — M25.551 BILATERAL HIP PAIN: ICD-10-CM

## 2021-04-14 PROCEDURE — 97110 THERAPEUTIC EXERCISES: CPT | Mod: PO

## 2021-04-21 ENCOUNTER — CLINICAL SUPPORT (OUTPATIENT)
Dept: REHABILITATION | Facility: HOSPITAL | Age: 46
End: 2021-04-21
Attending: PHYSICAL MEDICINE & REHABILITATION
Payer: MEDICARE

## 2021-04-21 DIAGNOSIS — M53.86 DECREASED RANGE OF MOTION OF LUMBAR SPINE: ICD-10-CM

## 2021-04-21 DIAGNOSIS — M25.552 BILATERAL HIP PAIN: ICD-10-CM

## 2021-04-21 DIAGNOSIS — M25.551 BILATERAL HIP PAIN: ICD-10-CM

## 2021-04-21 PROCEDURE — 97110 THERAPEUTIC EXERCISES: CPT | Mod: PO,CQ

## 2021-04-23 ENCOUNTER — CLINICAL SUPPORT (OUTPATIENT)
Dept: REHABILITATION | Facility: HOSPITAL | Age: 46
End: 2021-04-23
Attending: PHYSICAL MEDICINE & REHABILITATION
Payer: MEDICARE

## 2021-04-23 DIAGNOSIS — M25.552 BILATERAL HIP PAIN: ICD-10-CM

## 2021-04-23 DIAGNOSIS — M53.86 DECREASED RANGE OF MOTION OF LUMBAR SPINE: ICD-10-CM

## 2021-04-23 DIAGNOSIS — M25.551 BILATERAL HIP PAIN: ICD-10-CM

## 2021-04-23 PROCEDURE — 97110 THERAPEUTIC EXERCISES: CPT | Mod: PO

## 2021-04-27 ENCOUNTER — CLINICAL SUPPORT (OUTPATIENT)
Dept: REHABILITATION | Facility: HOSPITAL | Age: 46
End: 2021-04-27
Attending: PHYSICAL MEDICINE & REHABILITATION
Payer: MEDICARE

## 2021-04-27 DIAGNOSIS — M25.551 BILATERAL HIP PAIN: ICD-10-CM

## 2021-04-27 DIAGNOSIS — M53.86 DECREASED RANGE OF MOTION OF LUMBAR SPINE: ICD-10-CM

## 2021-04-27 DIAGNOSIS — M25.552 BILATERAL HIP PAIN: ICD-10-CM

## 2021-04-27 PROCEDURE — 97110 THERAPEUTIC EXERCISES: CPT | Mod: PO

## 2021-04-30 ENCOUNTER — CLINICAL SUPPORT (OUTPATIENT)
Dept: REHABILITATION | Facility: HOSPITAL | Age: 46
End: 2021-04-30
Attending: PHYSICAL MEDICINE & REHABILITATION
Payer: MEDICARE

## 2021-04-30 DIAGNOSIS — M25.552 BILATERAL HIP PAIN: ICD-10-CM

## 2021-04-30 DIAGNOSIS — M53.86 DECREASED RANGE OF MOTION OF LUMBAR SPINE: ICD-10-CM

## 2021-04-30 DIAGNOSIS — M25.551 BILATERAL HIP PAIN: ICD-10-CM

## 2021-04-30 PROCEDURE — 97110 THERAPEUTIC EXERCISES: CPT | Mod: PO,CQ

## 2021-05-04 ENCOUNTER — CLINICAL SUPPORT (OUTPATIENT)
Dept: REHABILITATION | Facility: HOSPITAL | Age: 46
End: 2021-05-04
Attending: PHYSICAL MEDICINE & REHABILITATION
Payer: MEDICARE

## 2021-05-04 DIAGNOSIS — M53.86 DECREASED RANGE OF MOTION OF LUMBAR SPINE: ICD-10-CM

## 2021-05-04 DIAGNOSIS — M25.551 BILATERAL HIP PAIN: ICD-10-CM

## 2021-05-04 DIAGNOSIS — M25.552 BILATERAL HIP PAIN: ICD-10-CM

## 2021-05-04 PROCEDURE — 97110 THERAPEUTIC EXERCISES: CPT | Mod: PO

## 2021-05-07 ENCOUNTER — CLINICAL SUPPORT (OUTPATIENT)
Dept: REHABILITATION | Facility: HOSPITAL | Age: 46
End: 2021-05-07
Payer: MEDICARE

## 2021-05-07 ENCOUNTER — DOCUMENTATION ONLY (OUTPATIENT)
Dept: REHABILITATION | Facility: HOSPITAL | Age: 46
End: 2021-05-07

## 2021-05-07 DIAGNOSIS — M25.551 BILATERAL HIP PAIN: ICD-10-CM

## 2021-05-07 DIAGNOSIS — M53.86 DECREASED RANGE OF MOTION OF LUMBAR SPINE: ICD-10-CM

## 2021-05-07 DIAGNOSIS — M25.552 BILATERAL HIP PAIN: ICD-10-CM

## 2021-05-07 PROCEDURE — 97110 THERAPEUTIC EXERCISES: CPT | Mod: PO

## 2021-05-10 ENCOUNTER — OFFICE VISIT (OUTPATIENT)
Dept: FAMILY MEDICINE | Facility: CLINIC | Age: 46
End: 2021-05-10
Payer: MEDICARE

## 2021-05-10 VITALS
HEART RATE: 84 BPM | SYSTOLIC BLOOD PRESSURE: 116 MMHG | DIASTOLIC BLOOD PRESSURE: 82 MMHG | HEIGHT: 57 IN | BODY MASS INDEX: 37.5 KG/M2 | WEIGHT: 173.81 LBS | OXYGEN SATURATION: 99 %

## 2021-05-10 DIAGNOSIS — M79.7 FIBROMYALGIA: Primary | ICD-10-CM

## 2021-05-10 DIAGNOSIS — K21.9 GASTROESOPHAGEAL REFLUX DISEASE WITHOUT ESOPHAGITIS: ICD-10-CM

## 2021-05-10 PROCEDURE — 99213 PR OFFICE/OUTPT VISIT, EST, LEVL III, 20-29 MIN: ICD-10-PCS | Mod: S$PBB,,, | Performed by: INTERNAL MEDICINE

## 2021-05-10 PROCEDURE — 99213 OFFICE O/P EST LOW 20 MIN: CPT | Mod: S$PBB,,, | Performed by: INTERNAL MEDICINE

## 2021-05-10 PROCEDURE — 99999 PR PBB SHADOW E&M-EST. PATIENT-LVL III: CPT | Mod: PBBFAC,,, | Performed by: INTERNAL MEDICINE

## 2021-05-10 PROCEDURE — 99999 PR PBB SHADOW E&M-EST. PATIENT-LVL III: ICD-10-PCS | Mod: PBBFAC,,, | Performed by: INTERNAL MEDICINE

## 2021-05-10 PROCEDURE — 99213 OFFICE O/P EST LOW 20 MIN: CPT | Mod: PBBFAC,PO | Performed by: INTERNAL MEDICINE

## 2021-05-17 ENCOUNTER — PATIENT MESSAGE (OUTPATIENT)
Dept: FAMILY MEDICINE | Facility: CLINIC | Age: 46
End: 2021-05-17

## 2021-06-10 ENCOUNTER — OFFICE VISIT (OUTPATIENT)
Dept: FAMILY MEDICINE | Facility: CLINIC | Age: 46
End: 2021-06-10
Payer: MEDICARE

## 2021-06-10 VITALS
OXYGEN SATURATION: 99 % | SYSTOLIC BLOOD PRESSURE: 130 MMHG | BODY MASS INDEX: 36.81 KG/M2 | WEIGHT: 170.63 LBS | HEART RATE: 117 BPM | HEIGHT: 57 IN | DIASTOLIC BLOOD PRESSURE: 88 MMHG

## 2021-06-10 DIAGNOSIS — K21.9 GASTROESOPHAGEAL REFLUX DISEASE WITHOUT ESOPHAGITIS: Primary | ICD-10-CM

## 2021-06-10 DIAGNOSIS — R23.2 HOT FLASHES: ICD-10-CM

## 2021-06-10 DIAGNOSIS — M79.7 FIBROMYALGIA: ICD-10-CM

## 2021-06-10 PROCEDURE — 99214 OFFICE O/P EST MOD 30 MIN: CPT | Mod: PBBFAC,PO | Performed by: INTERNAL MEDICINE

## 2021-06-10 PROCEDURE — 99214 PR OFFICE/OUTPT VISIT, EST, LEVL IV, 30-39 MIN: ICD-10-PCS | Mod: S$PBB,,, | Performed by: INTERNAL MEDICINE

## 2021-06-10 PROCEDURE — 99999 PR PBB SHADOW E&M-EST. PATIENT-LVL IV: ICD-10-PCS | Mod: PBBFAC,,, | Performed by: INTERNAL MEDICINE

## 2021-06-10 PROCEDURE — 99214 OFFICE O/P EST MOD 30 MIN: CPT | Mod: S$PBB,,, | Performed by: INTERNAL MEDICINE

## 2021-06-10 PROCEDURE — 99999 PR PBB SHADOW E&M-EST. PATIENT-LVL IV: CPT | Mod: PBBFAC,,, | Performed by: INTERNAL MEDICINE

## 2021-06-10 RX ORDER — PANTOPRAZOLE SODIUM 40 MG/1
40 TABLET, DELAYED RELEASE ORAL DAILY
Qty: 90 TABLET | Refills: 1 | Status: SHIPPED | OUTPATIENT
Start: 2021-06-10 | End: 2022-01-18

## 2021-06-23 ENCOUNTER — OFFICE VISIT (OUTPATIENT)
Dept: FAMILY MEDICINE | Facility: CLINIC | Age: 46
End: 2021-06-23
Payer: MEDICARE

## 2021-06-23 VITALS
OXYGEN SATURATION: 97 % | WEIGHT: 172.06 LBS | SYSTOLIC BLOOD PRESSURE: 126 MMHG | HEART RATE: 87 BPM | TEMPERATURE: 98 F | DIASTOLIC BLOOD PRESSURE: 74 MMHG | BODY MASS INDEX: 37.24 KG/M2

## 2021-06-23 DIAGNOSIS — J22 LOWER RESPIRATORY TRACT INFECTION: Primary | ICD-10-CM

## 2021-06-23 DIAGNOSIS — J45.30 MILD PERSISTENT REACTIVE AIRWAY DISEASE WITHOUT COMPLICATION: ICD-10-CM

## 2021-06-23 PROCEDURE — 99999 PR PBB SHADOW E&M-EST. PATIENT-LVL IV: CPT | Mod: PBBFAC,,, | Performed by: PHYSICIAN ASSISTANT

## 2021-06-23 PROCEDURE — 99214 PR OFFICE/OUTPT VISIT, EST, LEVL IV, 30-39 MIN: ICD-10-PCS | Mod: S$PBB,,, | Performed by: PHYSICIAN ASSISTANT

## 2021-06-23 PROCEDURE — 99999 PR PBB SHADOW E&M-EST. PATIENT-LVL IV: ICD-10-PCS | Mod: PBBFAC,,, | Performed by: PHYSICIAN ASSISTANT

## 2021-06-23 PROCEDURE — 99214 OFFICE O/P EST MOD 30 MIN: CPT | Mod: S$PBB,,, | Performed by: PHYSICIAN ASSISTANT

## 2021-06-23 PROCEDURE — 99214 OFFICE O/P EST MOD 30 MIN: CPT | Mod: PBBFAC,PO | Performed by: PHYSICIAN ASSISTANT

## 2021-06-23 RX ORDER — AZITHROMYCIN 250 MG/1
TABLET, FILM COATED ORAL
Qty: 6 TABLET | Refills: 0 | Status: SHIPPED | OUTPATIENT
Start: 2021-06-23 | End: 2021-06-28

## 2021-06-23 RX ORDER — PROMETHAZINE HYDROCHLORIDE AND DEXTROMETHORPHAN HYDROBROMIDE 6.25; 15 MG/5ML; MG/5ML
5 SYRUP ORAL NIGHTLY PRN
Qty: 118 ML | Refills: 0 | Status: SHIPPED | OUTPATIENT
Start: 2021-06-23 | End: 2021-07-03

## 2021-06-23 RX ORDER — METHYLPREDNISOLONE 4 MG/1
TABLET ORAL
Qty: 1 PACKAGE | Refills: 0 | Status: SHIPPED | OUTPATIENT
Start: 2021-06-23 | End: 2021-07-14

## 2021-07-09 ENCOUNTER — TELEPHONE (OUTPATIENT)
Dept: FAMILY MEDICINE | Facility: CLINIC | Age: 46
End: 2021-07-09

## 2021-07-13 ENCOUNTER — PATIENT MESSAGE (OUTPATIENT)
Dept: FAMILY MEDICINE | Facility: CLINIC | Age: 46
End: 2021-07-13

## 2021-07-19 ENCOUNTER — HOSPITAL ENCOUNTER (OUTPATIENT)
Dept: RADIOLOGY | Facility: HOSPITAL | Age: 46
Discharge: HOME | End: 2021-07-19
Attending: OBSTETRICS & GYNECOLOGY
Payer: MEDICARE

## 2021-07-19 DIAGNOSIS — Z91.89 AT HIGH RISK FOR BREAST CANCER: ICD-10-CM

## 2021-07-19 PROCEDURE — 77049 MRI BREAST W/WO CONTRAST, W/CAD, BILATERAL: ICD-10-PCS | Mod: 26,,, | Performed by: RADIOLOGY

## 2021-07-19 PROCEDURE — 25500020 PHARM REV CODE 255: Mod: PO | Performed by: OBSTETRICS & GYNECOLOGY

## 2021-07-19 PROCEDURE — A9577 INJ MULTIHANCE: HCPCS | Mod: PO | Performed by: OBSTETRICS & GYNECOLOGY

## 2021-07-19 PROCEDURE — 77049 MRI BREAST C-+ W/CAD BI: CPT | Mod: 26,,, | Performed by: RADIOLOGY

## 2021-07-19 PROCEDURE — C8937 CAD BREAST MRI: HCPCS | Mod: TC,PO

## 2021-07-19 RX ADMIN — GADOBENATE DIMEGLUMINE 14 ML: 529 INJECTION, SOLUTION INTRAVENOUS at 08:07

## 2021-08-16 ENCOUNTER — OFFICE VISIT (OUTPATIENT)
Dept: FAMILY MEDICINE | Facility: CLINIC | Age: 46
End: 2021-08-16
Payer: MEDICARE

## 2021-08-16 VITALS
HEART RATE: 84 BPM | OXYGEN SATURATION: 96 % | DIASTOLIC BLOOD PRESSURE: 80 MMHG | WEIGHT: 172.38 LBS | HEIGHT: 57 IN | SYSTOLIC BLOOD PRESSURE: 120 MMHG | BODY MASS INDEX: 37.19 KG/M2

## 2021-08-16 DIAGNOSIS — M79.7 FIBROMYALGIA: ICD-10-CM

## 2021-08-16 DIAGNOSIS — L65.9 HAIR LOSS: Primary | ICD-10-CM

## 2021-08-16 DIAGNOSIS — K21.9 GASTROESOPHAGEAL REFLUX DISEASE WITHOUT ESOPHAGITIS: ICD-10-CM

## 2021-08-16 PROCEDURE — 99213 PR OFFICE/OUTPT VISIT, EST, LEVL III, 20-29 MIN: ICD-10-PCS | Mod: S$PBB,,, | Performed by: INTERNAL MEDICINE

## 2021-08-16 PROCEDURE — 99999 PR PBB SHADOW E&M-EST. PATIENT-LVL IV: ICD-10-PCS | Mod: PBBFAC,,, | Performed by: INTERNAL MEDICINE

## 2021-08-16 PROCEDURE — 99999 PR PBB SHADOW E&M-EST. PATIENT-LVL IV: CPT | Mod: PBBFAC,,, | Performed by: INTERNAL MEDICINE

## 2021-08-16 PROCEDURE — 99213 OFFICE O/P EST LOW 20 MIN: CPT | Mod: S$PBB,,, | Performed by: INTERNAL MEDICINE

## 2021-08-16 PROCEDURE — 99214 OFFICE O/P EST MOD 30 MIN: CPT | Mod: PBBFAC,PO | Performed by: INTERNAL MEDICINE

## 2021-09-07 ENCOUNTER — OFFICE VISIT (OUTPATIENT)
Dept: RHEUMATOLOGY | Facility: CLINIC | Age: 46
End: 2021-09-07
Payer: MEDICARE

## 2021-09-07 VITALS
BODY MASS INDEX: 36.4 KG/M2 | HEART RATE: 97 BPM | SYSTOLIC BLOOD PRESSURE: 128 MMHG | WEIGHT: 168.19 LBS | DIASTOLIC BLOOD PRESSURE: 90 MMHG

## 2021-09-07 DIAGNOSIS — M79.7 FIBROMYALGIA: Primary | ICD-10-CM

## 2021-09-07 DIAGNOSIS — G56.23 ULNAR NEUROPATHY OF BOTH UPPER EXTREMITIES: ICD-10-CM

## 2021-09-07 PROCEDURE — 99214 OFFICE O/P EST MOD 30 MIN: CPT | Mod: S$PBB,,, | Performed by: INTERNAL MEDICINE

## 2021-09-07 PROCEDURE — 99214 PR OFFICE/OUTPT VISIT, EST, LEVL IV, 30-39 MIN: ICD-10-PCS | Mod: S$PBB,,, | Performed by: INTERNAL MEDICINE

## 2021-09-07 PROCEDURE — 99999 PR PBB SHADOW E&M-EST. PATIENT-LVL III: CPT | Mod: PBBFAC,,, | Performed by: INTERNAL MEDICINE

## 2021-09-07 PROCEDURE — 99213 OFFICE O/P EST LOW 20 MIN: CPT | Mod: PBBFAC,PN | Performed by: INTERNAL MEDICINE

## 2021-09-07 PROCEDURE — 99999 PR PBB SHADOW E&M-EST. PATIENT-LVL III: ICD-10-PCS | Mod: PBBFAC,,, | Performed by: INTERNAL MEDICINE

## 2021-09-07 RX ORDER — LINACLOTIDE 145 UG/1
CAPSULE, GELATIN COATED ORAL
COMMUNITY
Start: 2021-08-17 | End: 2023-03-22

## 2021-10-19 ENCOUNTER — PATIENT MESSAGE (OUTPATIENT)
Dept: OBSTETRICS AND GYNECOLOGY | Facility: CLINIC | Age: 46
End: 2021-10-19
Payer: MEDICARE

## 2021-11-03 ENCOUNTER — CLINICAL SUPPORT (OUTPATIENT)
Dept: FAMILY MEDICINE | Facility: CLINIC | Age: 46
End: 2021-11-03
Payer: MEDICARE

## 2021-11-03 PROCEDURE — 90686 IIV4 VACC NO PRSV 0.5 ML IM: CPT | Mod: PBBFAC,PO

## 2021-11-03 PROCEDURE — 99999 PR PBB SHADOW E&M-EST. PATIENT-LVL II: CPT | Mod: PBBFAC,,,

## 2021-11-03 PROCEDURE — 99212 OFFICE O/P EST SF 10 MIN: CPT | Mod: PBBFAC,PO,25

## 2021-11-03 PROCEDURE — 99999 PR PBB SHADOW E&M-EST. PATIENT-LVL II: ICD-10-PCS | Mod: PBBFAC,,,

## 2021-12-16 ENCOUNTER — OFFICE VISIT (OUTPATIENT)
Dept: OBSTETRICS AND GYNECOLOGY | Facility: CLINIC | Age: 46
End: 2021-12-16
Payer: MEDICARE

## 2021-12-16 ENCOUNTER — PATIENT MESSAGE (OUTPATIENT)
Dept: ADMINISTRATIVE | Facility: OTHER | Age: 46
End: 2021-12-16
Payer: MEDICARE

## 2021-12-16 ENCOUNTER — HOSPITAL ENCOUNTER (OUTPATIENT)
Dept: RADIOLOGY | Facility: HOSPITAL | Age: 46
Discharge: HOME | End: 2021-12-16
Attending: OBSTETRICS & GYNECOLOGY
Payer: MEDICARE

## 2021-12-16 ENCOUNTER — PATIENT OUTREACH (OUTPATIENT)
Dept: ADMINISTRATIVE | Facility: OTHER | Age: 46
End: 2021-12-16
Payer: MEDICARE

## 2021-12-16 VITALS
DIASTOLIC BLOOD PRESSURE: 76 MMHG | HEIGHT: 57 IN | WEIGHT: 179.25 LBS | BODY MASS INDEX: 38.67 KG/M2 | SYSTOLIC BLOOD PRESSURE: 110 MMHG

## 2021-12-16 DIAGNOSIS — Z91.89 AT HIGH RISK FOR BREAST CANCER: ICD-10-CM

## 2021-12-16 DIAGNOSIS — L68.9 EXCESSIVE HAIR: ICD-10-CM

## 2021-12-16 DIAGNOSIS — I10 ESSENTIAL HYPERTENSION: ICD-10-CM

## 2021-12-16 DIAGNOSIS — R59.9 LYMPH NODE ENLARGEMENT: ICD-10-CM

## 2021-12-16 DIAGNOSIS — Z12.31 ENCOUNTER FOR SCREENING MAMMOGRAM FOR MALIGNANT NEOPLASM OF BREAST: ICD-10-CM

## 2021-12-16 DIAGNOSIS — Z01.419 GYNECOLOGIC EXAM NORMAL: Primary | ICD-10-CM

## 2021-12-16 PROCEDURE — G0101 PR CA SCREEN;PELVIC/BREAST EXAM: ICD-10-PCS | Mod: S$PBB,,, | Performed by: OBSTETRICS & GYNECOLOGY

## 2021-12-16 PROCEDURE — 88175 CYTOPATH C/V AUTO FLUID REDO: CPT | Performed by: OBSTETRICS & GYNECOLOGY

## 2021-12-16 PROCEDURE — 77063 BREAST TOMOSYNTHESIS BI: CPT | Mod: 26,,, | Performed by: RADIOLOGY

## 2021-12-16 PROCEDURE — 87624 HPV HI-RISK TYP POOLED RSLT: CPT | Mod: 91 | Performed by: OBSTETRICS & GYNECOLOGY

## 2021-12-16 PROCEDURE — 77067 SCR MAMMO BI INCL CAD: CPT | Mod: 26,,, | Performed by: RADIOLOGY

## 2021-12-16 PROCEDURE — G0101 CA SCREEN;PELVIC/BREAST EXAM: HCPCS | Mod: S$PBB,,, | Performed by: OBSTETRICS & GYNECOLOGY

## 2021-12-16 PROCEDURE — 87624 HPV HI-RISK TYP POOLED RSLT: CPT | Performed by: OBSTETRICS & GYNECOLOGY

## 2021-12-16 PROCEDURE — 99999 PR PBB SHADOW E&M-EST. PATIENT-LVL III: ICD-10-PCS | Mod: PBBFAC,,, | Performed by: OBSTETRICS & GYNECOLOGY

## 2021-12-16 PROCEDURE — 99999 PR PBB SHADOW E&M-EST. PATIENT-LVL III: CPT | Mod: PBBFAC,,, | Performed by: OBSTETRICS & GYNECOLOGY

## 2021-12-16 PROCEDURE — 77067 SCR MAMMO BI INCL CAD: CPT | Mod: TC,PN

## 2021-12-16 PROCEDURE — 99213 OFFICE O/P EST LOW 20 MIN: CPT | Mod: PBBFAC,PN | Performed by: OBSTETRICS & GYNECOLOGY

## 2021-12-16 PROCEDURE — 77063 MAMMO DIGITAL SCREENING BILAT WITH TOMO: ICD-10-PCS | Mod: 26,,, | Performed by: RADIOLOGY

## 2021-12-16 PROCEDURE — 77067 MAMMO DIGITAL SCREENING BILAT WITH TOMO: ICD-10-PCS | Mod: 26,,, | Performed by: RADIOLOGY

## 2021-12-20 ENCOUNTER — PATIENT MESSAGE (OUTPATIENT)
Dept: OBSTETRICS AND GYNECOLOGY | Facility: CLINIC | Age: 46
End: 2021-12-20
Payer: MEDICARE

## 2021-12-28 LAB
CLINICAL INFO: ABNORMAL
CYTO CVX: ABNORMAL
CYTOLOGIST CVX/VAG CYTO: ABNORMAL
CYTOLOGIST CVX/VAG CYTO: ABNORMAL
CYTOLOGY CMNT CVX/VAG CYTO-IMP: ABNORMAL
CYTOLOGY PAP THIN PREP EXPLANATION: ABNORMAL
DATE OF PREVIOUS PAP: ABNORMAL
DATE PREVIOUS BX: NO
GEN CATEG CVX/VAG CYTO-IMP: ABNORMAL
HPV E6+E7 MRNA CVX QL NAA+PROBE: NOT DETECTED
HPV I/H RISK 4 DNA CVX QL NAA+PROBE: NORMAL
LMP START DATE: ABNORMAL
MICROORGANISM CVX/VAG CYTO: ABNORMAL
PATHOLOGIST CVX/VAG CYTO: ABNORMAL
SERVICE CMNT-IMP: ABNORMAL
SPECIMEN SOURCE CVX/VAG CYTO: ABNORMAL
STAT OF ADQ CVX/VAG CYTO-IMP: ABNORMAL

## 2022-01-01 NOTE — PLAN OF CARE
[facility]  Physical Therapy Reassessment and Discharge Summary    Name: Lavonne Pierre  Clinic Number: [id number]    Therapy Diagnosis:   Encounter Diagnoses   Name Primary?    Gait abnormality     Ankle weakness      Physician: Omer Mcgee MD  Visit Date: 12/12/2019  Physician Orders: PT Eval and Treat   Medical Diagnosis from Referral: G57.53 (ICD-10-CM) - Tarsal tunnel syndrome of both lower extremities  M54.31 (ICD-10-CM) - Sciatica of right side  M76.61,M76.62 (ICD-10-CM) - Achilles tendinitis of both lower extremities  M21.6X1,M21.6X2 (ICD-10-CM) - Acquired equinus deformity of both feet  Evaluation Date: 10/16/2019  Authorization Period Expiration: 10/8/2020  Plan of Care Expiration: 12/13/19  Visit # / Visits authorized: 10/ 12     Precautions: Standard    Subjective   Date of onset: dr cain 10/9/19  History of current condition - Lavonne reports: she has been having a lot of problems with both of her feet, that has been going on for a long time. The top, bottom, and back of the feet have been hurting her, especially the back of the feet recently. She occasionally gets to the point where she can't stand and walk. She has to shift side to side and move her feet to relieve the pain. She has been using bands that Benja gave her to stretch out her calves. She does ankle circles, and puts eucalyptus lotion on the feet, which helps for a little. She has no numbness or tingling in the feet. She has an EMG study scheduled with Dr. Estrada 11/21/19. She has been taking steroids prescribed by Dr. De Jesus, and is almost done with those. She feels there hasn't been any change with the steroids, but feels she has swelling in the feet by the end of the day. PT is familiar with patient due to previous therapy trials. She has a history of poor overall motor control and awareness, with pain catastrophizing tendencies and difficulty differentiating between pain and expected muscle  "fatigue and muscle stretching with exercises.       Follow up : She reports better movement and with walking since beginning physical therapy. She reports her feet have been getting better with performing ADLs. She reports she has more pain if she is on her feet for too long. "I have not had any issues where I have not been able to walk."     Reassessment on : She reports she is able to walk increased distances and duration before she begins to notice pain in her feet. She received an injection in her (R) heel, which has been helping thus far. She is following up with Dr. Cheung in ~ 1 month.      Medical History:   Past Medical History:   Diagnosis Date    Anxiety 2013    Asthma     Depression     Fibromyalgia     GERD (gastroesophageal reflux disease)     History of pneumonia     HTN (hypertension)     Preeclampsia     Sleep apnea     Uses CPAP       Surgical History:   Lavonne Pierre  has a past surgical history that includes  section, low transverse (); Breast biopsy; Tubal ligation (); Carpal tunnel release (Right, 2017); Neck surgery; and upper GI series.    Medications:   Lavonne has a current medication list which includes the following prescription(s): albuterol, azelastine, breo ellipta, diclofenac sodium, fluticasone propionate, lidocaine-prilocaine, lisinopril, modafinil, montelukast, multivitamin, omeprazole, and tramadol.    Allergies:   Review of patient's allergies indicates:   Allergen Reactions    Ibuprofen Other (See Comments)     Affects stomach ulcer, avoids all NSAIDS    Latex, natural rubber Rash     Small red bumps on skin contact        Imaging, x-ray: Minimal Achilles enthesophyte formation.    Prior Therapy: yes for back, shoulders  Social History: lives with son and fiance   Occupation: on disability  Prior Level of Function: difficulty with all activities due to fibromyalgia  Current Level of Function: increased difficulty with ADLs " requiring standing and walking, like shopping and cleaning    Pain:  Current 6/10, worst 9/10, best 6/10   Location: R>L heel and posterior ankle  Description: Shooting  Aggravating Factors: Standing, Walking and Getting out of bed/chair  Easing Factors: tylenol extra strength    Pts goals: subsiding of pain; walking without pain    Objective     Observation: good arch maintenance in standing, no visible calcaneal eversion, but bilateral foot abduction    Gait: R>L foot abduction, wide stance, decreased toe-off bilaterally    PROM Right Left Comment   DF: 8* degrees 12* degrees Calf stretch   PF: 50* degrees 50* degrees Anterior foot, tingling   Eversion: 25* degrees 30* degrees Anterior foot   Inversion: 22* degrees 20* degrees Anterior foot   1st MTP Ext: 70 degrees 60 degrees    1st MTP Flex: 0 degrees 10 degrees    *pain     Right Left Comment   DF: 5/5 5/5    PF: 5/5 5/5    Eversion: 4+/5 4+/5    Inversion: 4+/5 4+/5    1st MTP Ext: 5/5 5/5    1st MTP Flex: 5/5 5/5      - Anterior Drawer: right/left negative  - Posterior Drawer: right/left negative  - Talar Tilt: right/left negative  Bilateral talar hypomobility, but testing is limited due to tenderness with hand placement    Functional movements:   Squat: bilateral knee valgus and functional arch collapse, unable to correct with verbal cueing  Calf raise: able to perform bilateral heel raise; unable to perform SL heel raise without UE support due to coordination deficits    Palpation: global tenderness to palpation with tenderness reported at all sites tested- achilles tendon, calcaneal insertion, plantar fascia, posterior tibialis tendon, anterior tibialis tendon, dorsal foot, phalanges      CMS Impairment/Limitation/Restriction for FOTO Foot Survey    Therapist reviewed FOTO scores for Lavonne Pierre on 12/12/2019.   FOTO documents entered into EP[employer] - see Media section.    Limitation Score: 49%  Category: Body Position         TREATMENT   See Daily Note      Assessment   Lavonne is a 44 y.o. female referred to outpatient Physical Therapy with a medical diagnosis of tarsal tunnel syndrome, sciatica of right side, achilles tendinitis of both lower extremities, acquired equinus deformity of both feet. Since beginning PT, Lavonne has been seen 10 times since initial evaluation on 10/16/2019. Overall, Lavonne has improved with her ankle strengthening and ROM, but she is continuing to have pain and impaired balance. She continues to have difficulty maintaining her arches with squats and has pain with increased distance and duration ambulating. Instructed patient to continue with LE and ankle strengthening and follow up with Dr. Cheung in 1 month for further treatment if pain worsens. She is discharged from physical therapy.     Pt prognosis is Guarded.   Pt will benefit from skilled outpatient Physical Therapy to address the deficits stated above and in the chart below, provide pt/family education, and to maximize pt's level of independence.     Plan of care discussed with patient: Yes  Pt's spiritual, cultural and educational needs considered and patient is agreeable to the plan of care and goals as stated below:     Anticipated Barriers for therapy: fibromyalgia, chronicity of symptoms    Medical Necessity is demonstrated by the following  History  Co-morbidities and personal factors that may impact the plan of care Co-morbidities:   anxiety, HTN, fibromyalgia, asthma    Personal Factors:   coping style     high   Examination  Body Structures and Functions, activity limitations and participation restrictions that may impact the plan of care Body Regions:   back  lower extremities    Body Systems:    gross symmetry  ROM  strength  gross coordinated movement  balance  gait  transfers  transitions  motor control  motor learning    Participation Restrictions:   Difficulty with community ambulation    Activity limitations:   Learning and applying knowledge  no  deficits    General Tasks and Commands  undertaking multiple tasks    Communication  no deficits    Mobility  lifting and carrying objects  walking    Self care  no deficits    Domestic Life  shopping  cooking  doing house work (cleaning house, washing dishes, laundry)  assisting others    Interactions/Relationships  family relationships    Life Areas  no deficits    Community and Social Life  community life  recreation and leisure         high   Clinical Presentation evolving clinical presentation with changing clinical characteristics moderate   Decision Making/ Complexity Score: moderate     Goals:  Short Term Goals: 4 weeks   - amb 2 blocks on level tile without pain provocation or need for rest - (Met)  - do SLS >10 sec without LOB or pain (Progressing, not met)  - do anterior step up of 6  without pain provocation (Met)    Long Term Goals: 8 weeks   - pt will be able to perform squat with arch control and no visible knee valgus for improved functional stability (Progressing, not met)  - pt will demonstrate a 5 degree improvement in ankle dorsiflexion ROM for improved mobility. (Met)  - pt will demonstrate 5/5 LE strength via MMT for improved stability. (Progressing, not met)  - pt will report <5/10 pain with a full day of household activities and shopping for improved functional tolerance. (Met)    Plan   Discharge from PT    Benja Brooks, PT     [] : Resident [Time Spent: ___ minutes] : I have spent [unfilled] minutes of time on the encounter.

## 2022-01-18 ENCOUNTER — OFFICE VISIT (OUTPATIENT)
Dept: FAMILY MEDICINE | Facility: CLINIC | Age: 47
End: 2022-01-18
Payer: MEDICARE

## 2022-01-18 ENCOUNTER — PATIENT MESSAGE (OUTPATIENT)
Dept: RHEUMATOLOGY | Facility: CLINIC | Age: 47
End: 2022-01-18
Payer: MEDICARE

## 2022-01-18 VITALS
HEART RATE: 75 BPM | OXYGEN SATURATION: 97 % | DIASTOLIC BLOOD PRESSURE: 78 MMHG | HEIGHT: 57 IN | SYSTOLIC BLOOD PRESSURE: 126 MMHG | WEIGHT: 179.88 LBS | BODY MASS INDEX: 38.81 KG/M2

## 2022-01-18 DIAGNOSIS — L68.0 HIRSUTISM: ICD-10-CM

## 2022-01-18 DIAGNOSIS — G47.33 OSA ON CPAP: ICD-10-CM

## 2022-01-18 DIAGNOSIS — E66.01 SEVERE OBESITY (BMI 35.0-39.9) WITH COMORBIDITY: ICD-10-CM

## 2022-01-18 DIAGNOSIS — I10 ESSENTIAL HYPERTENSION: ICD-10-CM

## 2022-01-18 DIAGNOSIS — Z00.00 PHYSICAL EXAM, ROUTINE: Primary | ICD-10-CM

## 2022-01-18 DIAGNOSIS — M79.7 FIBROMYALGIA: ICD-10-CM

## 2022-01-18 PROCEDURE — 99213 OFFICE O/P EST LOW 20 MIN: CPT | Mod: S$PBB,,, | Performed by: INTERNAL MEDICINE

## 2022-01-18 PROCEDURE — 99999 PR PBB SHADOW E&M-EST. PATIENT-LVL IV: CPT | Mod: PBBFAC,,, | Performed by: INTERNAL MEDICINE

## 2022-01-18 PROCEDURE — 99999 PR PBB SHADOW E&M-EST. PATIENT-LVL IV: ICD-10-PCS | Mod: PBBFAC,,, | Performed by: INTERNAL MEDICINE

## 2022-01-18 PROCEDURE — 99213 PR OFFICE/OUTPT VISIT, EST, LEVL III, 20-29 MIN: ICD-10-PCS | Mod: S$PBB,,, | Performed by: INTERNAL MEDICINE

## 2022-01-18 PROCEDURE — 99214 OFFICE O/P EST MOD 30 MIN: CPT | Mod: PBBFAC,PO | Performed by: INTERNAL MEDICINE

## 2022-01-18 RX ORDER — LISINOPRIL 5 MG/1
5 TABLET ORAL DAILY
Qty: 90 TABLET | Refills: 3 | Status: SHIPPED | OUTPATIENT
Start: 2022-01-18 | End: 2022-01-18

## 2022-01-18 RX ORDER — SPIRONOLACTONE 50 MG/1
50 TABLET, FILM COATED ORAL DAILY
Qty: 30 TABLET | Refills: 11 | Status: SHIPPED | OUTPATIENT
Start: 2022-01-18 | End: 2022-11-03

## 2022-01-18 NOTE — PROGRESS NOTES
"Subjective     Lavonne Pierre is a 46 y.o. old, female here for a health maintenance visit.    45 y/o with PMH of fibromyalgia, HTN, asthma, allergies, SALVADOR, GERD  Fibromyalgia: Pain still significant, lots of disruptions to sleep. I tried her on fetzima at last visit. She stated it started causing an inner "burning feeling" and stopped taking it.  Dysphagia resolved with esophageal dilatation, sees Dr. Benedict regularly.  She is concerned about hair growth.    Health Habits  Exercise and Activity: some walking and chores.  Diet: fair, wt stable    Mental Health  No current issues    History     Past Medical History:   Diagnosis Date    Anxiety 2013    Asthma     Depression     Fibromyalgia     GERD (gastroesophageal reflux disease)     History of pneumonia     HTN (hypertension)     Preeclampsia     Sleep apnea     Uses CPAP     Past Surgical History:   Procedure Laterality Date    BREAST BIOPSY      Benign    BREAST SURGERY  Right Breast     Fibro cystic    CARPAL TUNNEL RELEASE Right 2017     SECTION, LOW TRANSVERSE      for PREE    NECK SURGERY      TUBAL LIGATION      upper GI series       Review of patient's allergies indicates:   Allergen Reactions    Ibuprofen Other (See Comments)     Affects stomach ulcer, avoids all NSAIDS    Latex, natural rubber Rash     Small red bumps on skin contact     Outpatient Medications Marked as Taking for the 22 encounter (Office Visit) with Ranjit Bernabe MD   Medication Sig Dispense Refill    azelastine (ASTELIN) 137 mcg (0.1 %) nasal spray U 1 TO 2 SPRAYS IN EACH NOSTRIL BID  0    BREO ELLIPTA 100-25 mcg/dose diskus inhaler INHALE 1 PUFF BY MOUTH AT THE SAME TIME EVERY DAY 60 each 9    diclofenac sodium (VOLTAREN) 1 % Gel Apply 2 g topically 3 (three) times daily. 100 g 5    fluticasone propionate (FLONASE) 50 mcg/actuation nasal spray INSTILL 1 TO 2 SPRAYS IN EACH NOSTRIL QD  5    LINZESS 145 mcg Cap " capsule       montelukast (SINGULAIR) 10 mg tablet TAKE 1 TABLET(10 MG) BY MOUTH EVERY DAY IN THE EVENING 30 tablet 11    multivitamin (THERAGRAN) per tablet Take 1 tablet by mouth once daily.      VENTOLIN HFA 90 mcg/actuation inhaler INHALE 1 TO 2 PUFFS BY MOUTH EVERY 4 TO 6 HOURS AS NEEDED 18 g 1    [DISCONTINUED] lisinopriL (PRINIVIL,ZESTRIL) 5 MG tablet TAKE 1 TABLET(5 MG) BY MOUTH EVERY DAY 90 tablet 1     Social History     Tobacco Use    Smoking status: Never Smoker    Smokeless tobacco: Never Used   Substance Use Topics    Alcohol use: No    Drug use: No     Family History   Problem Relation Age of Onset    Pancreatic cancer Mother     Cancer Mother         Mother passed in 2011 from Pancreas Cancer    Lymphoma Father     Diabetes Father     Hypertension Father     Cancer Father         Small Cell Lung Cancer/ still alive    Pancreatic cancer Maternal Grandmother     Pancreatic cancer Maternal Uncle     Breast cancer Maternal Aunt     Cancer Maternal Aunt         Breast Cancer both/ passed.  Im not sure of year i was youg.    Breast cancer Paternal Aunt 50    Asthma Brother     Cancer Paternal Grandfather         Melanoma    Diabetes Paternal Grandmother     Heart disease Maternal Grandfather     Hypertension Brother     Breast cancer Maternal Aunt 70       Review of Systems   Review of Systems   Constitutional: Positive for malaise/fatigue. Negative for chills and fever.   HENT: Negative for ear pain and sinus pain.    Eyes: Negative for blurred vision and pain.   Respiratory: Negative for cough and shortness of breath.    Cardiovascular: Negative for chest pain and palpitations.   Gastrointestinal: Negative for abdominal pain and nausea.   Genitourinary: Negative for dysuria and frequency.   Musculoskeletal: Positive for myalgias. Negative for joint pain.   Skin: Negative for itching and rash.   Neurological: Negative for weakness and headaches.   Endo/Heme/Allergies: Negative for  "environmental allergies. Does not bruise/bleed easily.   Psychiatric/Behavioral: Negative for depression. The patient has insomnia.      Objective   /78   Pulse 75   Ht 4' 9" (1.448 m)   Wt 81.6 kg (179 lb 14.3 oz)   SpO2 97%   BMI 38.93 kg/m²   Physical Exam  Constitutional:       General: She is not in acute distress.     Appearance: Normal appearance. She is well-developed.   HENT:      Head: Normocephalic and atraumatic.      Mouth/Throat:      Mouth: Oropharynx is clear and moist and mucous membranes are normal.   Eyes:      Conjunctiva/sclera: Conjunctivae normal.      Pupils: Pupils are equal, round, and reactive to light.   Neck:      Thyroid: No thyroid mass or thyromegaly.   Cardiovascular:      Rate and Rhythm: Normal rate and regular rhythm.      Heart sounds: Normal heart sounds. No murmur heard.      Pulmonary:      Effort: No respiratory distress.      Breath sounds: Normal breath sounds.   Chest:   Breasts:      Right: No supraclavicular adenopathy.      Left: No supraclavicular adenopathy.       Abdominal:      General: Bowel sounds are normal.      Palpations: Abdomen is soft.      Tenderness: There is no abdominal tenderness.   Musculoskeletal:         General: No deformity or edema.      Cervical back: Neck supple.   Lymphadenopathy:      Cervical: No cervical adenopathy.      Upper Body:      Right upper body: No supraclavicular adenopathy.      Left upper body: No supraclavicular adenopathy.   Skin:     General: Skin is warm and dry.      Findings: No rash.   Neurological:      Mental Status: She is alert and oriented to person, place, and time.   Psychiatric:         Mood and Affect: Mood and affect normal.         Behavior: Behavior normal.       Assessment and Plan     Physical exam, routine    Essential hypertension  -     Discontinue: lisinopriL (PRINIVIL,ZESTRIL) 5 MG tablet; Take 1 tablet (5 mg total) by mouth once daily.  Dispense: 90 tablet; Refill: 3  -     spironolactone " (ALDACTONE) 50 MG tablet; Take 1 tablet (50 mg total) by mouth once daily.  Dispense: 30 tablet; Refill: 11    Fibromyalgia    SALVADOR on CPAP    Severe obesity (BMI 35.0-39.9) with comorbidity    Hirsutism  -     spironolactone (ALDACTONE) 50 MG tablet; Take 1 tablet (50 mg total) by mouth once daily.  Dispense: 30 tablet; Refill: 11      Hold lisinopril and switch to andrés for HTN and hirsutism    ___________________  Ranjit Bernabe MD  Internal Medicine and Pediatrics

## 2022-01-20 ENCOUNTER — OFFICE VISIT (OUTPATIENT)
Dept: RHEUMATOLOGY | Facility: CLINIC | Age: 47
End: 2022-01-20
Payer: MEDICARE

## 2022-01-20 VITALS
HEIGHT: 57 IN | DIASTOLIC BLOOD PRESSURE: 82 MMHG | WEIGHT: 179.69 LBS | BODY MASS INDEX: 38.77 KG/M2 | HEART RATE: 91 BPM | SYSTOLIC BLOOD PRESSURE: 119 MMHG

## 2022-01-20 DIAGNOSIS — M79.7 FIBROMYALGIA: Primary | ICD-10-CM

## 2022-01-20 PROCEDURE — 99213 OFFICE O/P EST LOW 20 MIN: CPT | Mod: PBBFAC,PN | Performed by: INTERNAL MEDICINE

## 2022-01-20 PROCEDURE — 99999 PR PBB SHADOW E&M-EST. PATIENT-LVL III: CPT | Mod: PBBFAC,,, | Performed by: INTERNAL MEDICINE

## 2022-01-20 PROCEDURE — 99215 PR OFFICE/OUTPT VISIT, EST, LEVL V, 40-54 MIN: ICD-10-PCS | Mod: S$PBB,,, | Performed by: INTERNAL MEDICINE

## 2022-01-20 PROCEDURE — 99215 OFFICE O/P EST HI 40 MIN: CPT | Mod: S$PBB,,, | Performed by: INTERNAL MEDICINE

## 2022-01-20 PROCEDURE — 99999 PR PBB SHADOW E&M-EST. PATIENT-LVL III: ICD-10-PCS | Mod: PBBFAC,,, | Performed by: INTERNAL MEDICINE

## 2022-01-20 RX ORDER — PREGABALIN 50 MG/1
50 CAPSULE ORAL 2 TIMES DAILY
Qty: 60 CAPSULE | Refills: 6 | Status: SHIPPED | OUTPATIENT
Start: 2022-01-20 | End: 2022-11-03

## 2022-01-20 RX ORDER — METHYLPREDNISOLONE 4 MG/1
TABLET ORAL
Qty: 60 EACH | Refills: 6 | Status: SHIPPED | OUTPATIENT
Start: 2022-01-20 | End: 2022-01-22

## 2022-01-20 NOTE — PROGRESS NOTES
"Answers for HPI/ROS submitted by the patient on 1/20/2022  fever: No  eye redness: No  mouth sores: No  headaches: No  shortness of breath: No  chest pain: No  trouble swallowing: No  diarrhea: Yes  constipation: Yes  unexpected weight change: No  genital sore: No  dysuria: No  During the last 3 days, have you had a skin rash?: No  Bruises or bleeds easily: No  cough: No      Subjective:          Chief Complaint: Lavonne Pierre is a 46 y.o. female who had concerns including Disease Management.    HPI:    Patient here today for at her request for complaint of hand pain and widespread msk pain:    with a history of fibromyalgia, positive MIRIAN 1:160 homogeneous/speckled. negative MIRIAN profile. + thyroid Antibodies. No evidence of CTD.   As of recent patient having increasing pain in fingers and wrist. Noting "bumps" in DIP joints. Shoulder, knees all over with pain seems worse.   She did stop Fetzima reported "stopped helping" but she reports being worse since off.   She does use Voltaren gel.   She is using tylenol arthritis with some relief.       Interval events.      Started Fetzima with PCP Dr. Bernabe. 8/16/21 noting some benefit just started. -since last visit patient discontinued Fetzima for no benefit.   Was doing well with Lyrica -now not appreciating any benefit  States Tramadol not helping. Not taking.       Not taking Naproxen but uses tylenol arthritis with some benefit.   Describes pain along extensore surface of hand she describes tingling and pain. She notes tingling that is daily. She is s/p  Notes thumb locking at times.   S/p right CTR with Dr. Jose 2017. EMG/NCS 2017 with moderate Right and mild Left CTS, no mention of ulnar neuropathy.   She does have waking with numbness.   Upper shoulders, low back and lateral hip with pains.   S/p injection right tarsal tunnel.     S/p cervical surgery with Dr. Vasquez 6/17/19 with Disc replacement no fusion. Hands are definitely better. Legs with " "some calf and knee pain describing lumps under the skin but not as much "weakness" more tendon and joint complaints some focal soft tissue issues. Has seen podiatry more recently with EMG/NCS negative for denervation.          Previous medications:   tramadol  mg once daily PRN- This does help only takes at night. .   Celebrex 200 mg daily which she states is not working discontinued.   Gabapentin 300 mg t.i.d. which she folt not working discontinued.   Nuvigil at 250mg daily-discontinued due to insurance  Elavil 25mg for HS- discontinued no help  NSAIDs with gastric ulcers in past.   Failed Cymbalta, Effexor, Zoloft, wellbutrin. Tizanidine, savella,  Prozac  States she did well with Topamax but concern on long term safety. For ocular migraines. Still having HA.       Did have steroid injections in past no relief with elbows, hands, feet or knees. Does help some with hip busitis.   She's had various myalgias as well as joint pain in the ankles, feet, wrists, shoulders and neck/back and legs.  She also has an underlying history of depression she's had no constitutional symptoms, rashes, oral ulcers, serositis, Raynaud's.   She is not sleeping at night.   She is having significant fatigue and feel "shaky" in her body. She is noting significant brain fog.  Trouble with restless leg type symptoms, but occur during the day.  She is noting pain in her feet (diffusely), knees at the superior pole of the patella. And bilateral elbows she is going to meet with Dr. Jack as right elbow is doing better , now needing the left.    She is c/o lumps in her skin tender, no redness but she does try to massage them. She is feeling increased pain in the knees with "lumps" in gastrocnemius region. Do feel swollen.              Component      Latest Ref Rng & Units 1/17/2017 1/15/2016   Anti Sm Antibody      0.00 - 19.99 EU  0.97   Anti-Sm Interpretation      Negative  Negative   Anti-SSA Antibody      0.00 - 19.99 EU  1.09 "   Anti-SSA Interpretation      Negative  Negative   Anti-SSB Antibody      0.00 - 19.99 EU  0.30   Anti-SSB Interpretation      Negative  Negative   ds DNA Ab      Negative 1:10  Negative 1:10   Anti Sm/RNP Antibody      0.00 - 19.99 EU  0.11   Anti-Sm/RNP Interpretation      Negative  Negative   Sed Rate      0 - 20 mm/Hr  9   CRP      0.0 - 8.2 mg/L  5.7   MIRIAN Screen      Negative <1:160 Positive (A) Positive (A)   Rheumatoid Factor      0.0 - 15.0 IU/mL  <10.0   MIRIAN HEP-2 Titer       Positive 1:320 Homogeneous Positive 1:160 Homogeneous       REVIEW OF SYSTEMS:    Review of Systems   Constitutional: Positive for malaise/fatigue and weight loss. Negative for chills and fever.   HENT: Negative for sore throat.    Eyes: Negative for double vision, photophobia, discharge and redness.   Respiratory: Negative for cough, shortness of breath and wheezing.    Cardiovascular: Negative for chest pain, palpitations and orthopnea.   Gastrointestinal: Positive for heartburn. Negative for abdominal pain, constipation and diarrhea.   Genitourinary: Negative for dysuria, hematuria and urgency.   Musculoskeletal: Positive for back pain, joint pain, myalgias and neck pain.   Skin: Negative for rash.   Neurological: Positive for headaches. Negative for dizziness, tingling, focal weakness and weakness.   Endo/Heme/Allergies: Does not bruise/bleed easily.   Psychiatric/Behavioral: Negative for depression, hallucinations and suicidal ideas. The patient is nervous/anxious.                Objective:            Past Medical History:   Diagnosis Date    Anxiety 7/16/2013    Asthma     Depression     Fibromyalgia     GERD (gastroesophageal reflux disease)     History of pneumonia 2015    HTN (hypertension)     Preeclampsia     Sleep apnea     Uses CPAP     Family History   Problem Relation Age of Onset    Pancreatic cancer Mother     Cancer Mother         Mother passed in 2011 from Pancreas Cancer    Lymphoma Father      Diabetes Father     Hypertension Father     Cancer Father         Small Cell Lung Cancer/ still alive    Pancreatic cancer Maternal Grandmother     Pancreatic cancer Maternal Uncle     Breast cancer Maternal Aunt     Cancer Maternal Aunt         Breast Cancer both/ passed.  Im not sure of year i was youg.    Breast cancer Paternal Aunt 50    Asthma Brother     Cancer Paternal Grandfather         Melanoma    Diabetes Paternal Grandmother     Heart disease Maternal Grandfather     Hypertension Brother     Breast cancer Maternal Aunt 70     Social History     Tobacco Use    Smoking status: Never Smoker    Smokeless tobacco: Never Used   Substance Use Topics    Alcohol use: No    Drug use: No         Current Outpatient Medications on File Prior to Visit   Medication Sig Dispense Refill    azelastine (ASTELIN) 137 mcg (0.1 %) nasal spray U 1 TO 2 SPRAYS IN EACH NOSTRIL BID  0    BREO ELLIPTA 100-25 mcg/dose diskus inhaler INHALE 1 PUFF BY MOUTH AT THE SAME TIME EVERY DAY 60 each 9    diclofenac sodium (VOLTAREN) 1 % Gel Apply 2 g topically 3 (three) times daily. 100 g 5    fluticasone propionate (FLONASE) 50 mcg/actuation nasal spray INSTILL 1 TO 2 SPRAYS IN EACH NOSTRIL QD  5    LINZESS 145 mcg Cap capsule       montelukast (SINGULAIR) 10 mg tablet TAKE 1 TABLET(10 MG) BY MOUTH EVERY DAY IN THE EVENING 30 tablet 11    multivitamin (THERAGRAN) per tablet Take 1 tablet by mouth once daily.      spironolactone (ALDACTONE) 50 MG tablet Take 1 tablet (50 mg total) by mouth once daily. 30 tablet 11    VENTOLIN HFA 90 mcg/actuation inhaler INHALE 1 TO 2 PUFFS BY MOUTH EVERY 4 TO 6 HOURS AS NEEDED 18 g 1     No current facility-administered medications on file prior to visit.       Vitals:    01/20/22 1006   BP: 119/82   Pulse: 91       Physical Exam:    Physical Exam  Constitutional:       Appearance: She is well-developed.   Eyes:      General: Lids are normal.   Neurological:      Mental Status: She  is oriented to person, place, and time.   Psychiatric:         Behavior: Behavior normal.         Thought Content: Thought content normal.               Assessment:       Encounter Diagnosis   Name Primary?    Fibromyalgia Yes          Plan:        Fibromyalgia    Other orders  -     pregabalin (LYRICA) 50 MG capsule; Take 1 capsule (50 mg total) by mouth 2 (two) times daily.  Dispense: 60 capsule; Refill: 6  -     methylPREDNISolone (MEDROL DOSEPACK) 4 mg tablet; use as directed  Dispense: 60 each; Refill: 6         Off gabapentin no help  Off Prozac no help.   Off Elvail no help  Off Gabapentin no help  Off Flexeril no help  Off Savella lost efficacy  Off Lyrica intermittent efficacy with weight gain  Off Fetzima no help.   Other agents attempted over the years: Wellbutrin, zoloft, effexor, topamax, robaxin, celebrex, nuvigil, tizanidine all with inefficacy.   Tylenol arthritis most effective.       Noted benefit with Toradol, not as much all over pain with Naproxen but did help with costochondritis. Will switch to celebrex see if this treats more targets.   Will re-send Voltaren gel 1%  Restarted Lyrica as of February she was tolerating she discontinued in the past for weight gain, now noting no benefit at 50mg BID and + weight gain. Stopped again.     Fetzima with PCP now off failed.     Patient has exhausted pharmacologic therapy for FMS. I have recommended at this point we can restart the meds but we have given her every option currently available.   I did recommend the Functional [facility] Program in [facility].   She notes she wanted to join [facility] for some of the massage etc, I do think this is a good idea.  Discussed locally trying to find Mental Health counselor who may offer CBT for pain mitigation. Can speak with PCP about this as well.     Will give Medrol, restart Lyrica and recommend f/u with PCP going forward as Rheumatology has no further recommendations other than  CBT/therapy/Functional Restoration.       TFollow up if symptoms worsen or fail to improve.        40min consultation with greater than 50% spent in counseling, chart review and coordination of care. All questions answered.

## 2022-01-21 ENCOUNTER — TELEPHONE (OUTPATIENT)
Dept: RHEUMATOLOGY | Facility: CLINIC | Age: 47
End: 2022-01-21
Payer: MEDICARE

## 2022-01-21 NOTE — TELEPHONE ENCOUNTER
----- Message from Lucinda Aaron sent at 1/20/2022 12:31 PM CST -----  Contact: [facility]  Type:  Pharmacy Calling to Clarify an RX    Name of Caller:  Shabnam  [facility]arm[facility] Name:  [facility]   Prescription Name:  Medrol  What do they need to clarify?:  dosagem, qty, refill   Best Call Back Number:  [phone]  Additional Information:

## 2022-01-21 NOTE — TELEPHONE ENCOUNTER
Dr. Ross,  Pharmacy calling to clarify sig on Medrol Dose pack.  Please advise.  Thanks.    Kylee Lockwood MA  1/21/2022

## 2022-01-22 RX ORDER — METHYLPREDNISOLONE 4 MG/1
TABLET ORAL
Qty: 1 EACH | Refills: 0 | Status: SHIPPED | OUTPATIENT
Start: 2022-01-22 | End: 2022-06-14

## 2022-02-13 ENCOUNTER — PATIENT MESSAGE (OUTPATIENT)
Dept: FAMILY MEDICINE | Facility: CLINIC | Age: 47
End: 2022-02-13
Payer: MEDICARE

## 2022-02-14 ENCOUNTER — OFFICE VISIT (OUTPATIENT)
Dept: FAMILY MEDICINE | Facility: CLINIC | Age: 47
End: 2022-02-14
Payer: MEDICARE

## 2022-02-14 ENCOUNTER — PATIENT MESSAGE (OUTPATIENT)
Dept: FAMILY MEDICINE | Facility: CLINIC | Age: 47
End: 2022-02-14

## 2022-02-14 VITALS
HEART RATE: 78 BPM | DIASTOLIC BLOOD PRESSURE: 82 MMHG | HEIGHT: 57 IN | SYSTOLIC BLOOD PRESSURE: 126 MMHG | BODY MASS INDEX: 38.81 KG/M2 | OXYGEN SATURATION: 98 % | WEIGHT: 179.88 LBS

## 2022-02-14 DIAGNOSIS — N20.0 NEPHROLITHIASIS: Primary | ICD-10-CM

## 2022-02-14 LAB
BACTERIA #/AREA URNS HPF: ABNORMAL /HPF
BILIRUB UR QL STRIP: NEGATIVE
CLARITY UR: CLEAR
COLOR UR: YELLOW
GLUCOSE UR QL STRIP: NEGATIVE
HGB UR QL STRIP: ABNORMAL
KETONES UR QL STRIP: NEGATIVE
LEUKOCYTE ESTERASE UR QL STRIP: ABNORMAL
MICROSCOPIC COMMENT: ABNORMAL
NITRITE UR QL STRIP: NEGATIVE
PH UR STRIP: 6 [PH] (ref 5–8)
PROT UR QL STRIP: NEGATIVE
RBC #/AREA URNS HPF: 5 /HPF (ref 0–4)
SP GR UR STRIP: >=1.03 (ref 1–1.03)
SQUAMOUS #/AREA URNS HPF: 1 /HPF
URN SPEC COLLECT METH UR: ABNORMAL
WBC #/AREA URNS HPF: 5 /HPF (ref 0–5)

## 2022-02-14 PROCEDURE — 99213 PR OFFICE/OUTPT VISIT, EST, LEVL III, 20-29 MIN: ICD-10-PCS | Mod: S$PBB,,, | Performed by: INTERNAL MEDICINE

## 2022-02-14 PROCEDURE — 87186 SC STD MICRODIL/AGAR DIL: CPT | Performed by: INTERNAL MEDICINE

## 2022-02-14 PROCEDURE — 99213 OFFICE O/P EST LOW 20 MIN: CPT | Mod: S$PBB,,, | Performed by: INTERNAL MEDICINE

## 2022-02-14 PROCEDURE — 81000 URINALYSIS NONAUTO W/SCOPE: CPT | Mod: PO | Performed by: INTERNAL MEDICINE

## 2022-02-14 PROCEDURE — 99999 PR PBB SHADOW E&M-EST. PATIENT-LVL IV: CPT | Mod: PBBFAC,,, | Performed by: INTERNAL MEDICINE

## 2022-02-14 PROCEDURE — 87088 URINE BACTERIA CULTURE: CPT | Performed by: INTERNAL MEDICINE

## 2022-02-14 PROCEDURE — 87086 URINE CULTURE/COLONY COUNT: CPT | Performed by: INTERNAL MEDICINE

## 2022-02-14 PROCEDURE — 87077 CULTURE AEROBIC IDENTIFY: CPT | Performed by: INTERNAL MEDICINE

## 2022-02-14 PROCEDURE — 99999 PR PBB SHADOW E&M-EST. PATIENT-LVL IV: ICD-10-PCS | Mod: PBBFAC,,, | Performed by: INTERNAL MEDICINE

## 2022-02-14 PROCEDURE — 99214 OFFICE O/P EST MOD 30 MIN: CPT | Mod: PBBFAC,PO | Performed by: INTERNAL MEDICINE

## 2022-02-14 RX ORDER — CYCLOSPORINE 0.5 MG/ML
1 EMULSION OPHTHALMIC 2 TIMES DAILY
COMMUNITY
Start: 2022-01-31

## 2022-02-14 NOTE — PROGRESS NOTES
"  Subjective     Lavonne Pierre is a 46 y.o. old, female here for Nephrolithiasis    47 y/o with PMH of fibromyalgia, HTN, asthma, allergies, SALVADOR, GERD    Here for lower abdominal cramping and nephrolithiasis  Pain is cramping and bilateral lower quadrants as well as pelvic. Associated symptoms include chills and dysuria. Symptoms have improved a little perhaps after recently passing several stones. She has naproxen at home.      ROS  Medications     Outpatient Medications Marked as Taking for the 2/14/22 encounter (Office Visit) with Ranjit Bernabe MD   Medication Sig Dispense Refill    azelastine (ASTELIN) 137 mcg (0.1 %) nasal spray U 1 TO 2 SPRAYS IN EACH NOSTRIL BID  0    BREO ELLIPTA 100-25 mcg/dose diskus inhaler INHALE 1 PUFF BY MOUTH AT THE SAME TIME EVERY DAY 60 each 9    diclofenac sodium (VOLTAREN) 1 % Gel Apply 2 g topically 3 (three) times daily. 100 g 5    fluticasone propionate (FLONASE) 50 mcg/actuation nasal spray INSTILL 1 TO 2 SPRAYS IN EACH NOSTRIL QD  5    LINZESS 145 mcg Cap capsule       methylPREDNISolone (MEDROL DOSEPACK) 4 mg tablet use as directed on packaging 1 each 0    montelukast (SINGULAIR) 10 mg tablet TAKE 1 TABLET(10 MG) BY MOUTH EVERY DAY IN THE EVENING 30 tablet 11    multivitamin (THERAGRAN) per tablet Take 1 tablet by mouth once daily.      pregabalin (LYRICA) 50 MG capsule Take 1 capsule (50 mg total) by mouth 2 (two) times daily. 60 capsule 6    RESTASIS 0.05 % ophthalmic emulsion Place 1 drop into both eyes 2 (two) times daily.      spironolactone (ALDACTONE) 50 MG tablet Take 1 tablet (50 mg total) by mouth once daily. 30 tablet 11    VENTOLIN HFA 90 mcg/actuation inhaler INHALE 1 TO 2 PUFFS BY MOUTH EVERY 4 TO 6 HOURS AS NEEDED 18 g 1     Objective     /82   Pulse 78   Ht 4' 9" (1.448 m)   Wt 81.6 kg (179 lb 14.3 oz)   SpO2 98%   BMI 38.93 kg/m²   Physical Exam  Assessment and Plan     Nephrolithiasis  -     Urinalysis; Future; Expected " date: 02/14/2022  -     Urine culture    Other orders  -     Urinalysis Microscopic    UA has blood. Discussed symptomatic treatment of her stones, NSAID's prn, call if worsening if CT scan is needed. Await culture.  ___________________  Ranjit Bernabe MD  Internal Medicine and Pediatrics

## 2022-02-17 ENCOUNTER — PATIENT MESSAGE (OUTPATIENT)
Dept: FAMILY MEDICINE | Facility: CLINIC | Age: 47
End: 2022-02-17
Payer: MEDICARE

## 2022-02-17 LAB — BACTERIA UR CULT: ABNORMAL

## 2022-02-17 RX ORDER — NITROFURANTOIN 25; 75 MG/1; MG/1
100 CAPSULE ORAL 2 TIMES DAILY
Qty: 10 CAPSULE | Refills: 0 | Status: SHIPPED | OUTPATIENT
Start: 2022-02-17 | End: 2022-02-22

## 2022-03-10 ENCOUNTER — PATIENT MESSAGE (OUTPATIENT)
Dept: RHEUMATOLOGY | Facility: CLINIC | Age: 47
End: 2022-03-10
Payer: MEDICARE

## 2022-03-11 ENCOUNTER — OFFICE VISIT (OUTPATIENT)
Dept: FAMILY MEDICINE | Facility: CLINIC | Age: 47
End: 2022-03-11
Payer: MEDICARE

## 2022-03-11 ENCOUNTER — NURSE TRIAGE (OUTPATIENT)
Dept: ADMINISTRATIVE | Facility: CLINIC | Age: 47
End: 2022-03-11
Payer: MEDICARE

## 2022-03-11 VITALS
WEIGHT: 172.38 LBS | DIASTOLIC BLOOD PRESSURE: 84 MMHG | BODY MASS INDEX: 37.19 KG/M2 | HEIGHT: 57 IN | OXYGEN SATURATION: 98 % | HEART RATE: 79 BPM | SYSTOLIC BLOOD PRESSURE: 132 MMHG

## 2022-03-11 DIAGNOSIS — M62.838 MUSCLE SPASM: Primary | ICD-10-CM

## 2022-03-11 PROCEDURE — 99213 PR OFFICE/OUTPT VISIT, EST, LEVL III, 20-29 MIN: ICD-10-PCS | Mod: S$PBB,,, | Performed by: INTERNAL MEDICINE

## 2022-03-11 PROCEDURE — 99999 PR PBB SHADOW E&M-EST. PATIENT-LVL III: CPT | Mod: PBBFAC,,, | Performed by: INTERNAL MEDICINE

## 2022-03-11 PROCEDURE — 99213 OFFICE O/P EST LOW 20 MIN: CPT | Mod: S$PBB,,, | Performed by: INTERNAL MEDICINE

## 2022-03-11 PROCEDURE — 99213 OFFICE O/P EST LOW 20 MIN: CPT | Mod: PBBFAC,PO | Performed by: INTERNAL MEDICINE

## 2022-03-11 PROCEDURE — 99999 PR PBB SHADOW E&M-EST. PATIENT-LVL III: ICD-10-PCS | Mod: PBBFAC,,, | Performed by: INTERNAL MEDICINE

## 2022-03-11 NOTE — TELEPHONE ENCOUNTER
Pt called with c/o pain in her back and she said that she experienced heart burn yesterday ist on the Left side in her ribs and worse when she takes a deep breath today make day 3 for her pain. Pt triaged per protocol and its care advice to be seen in office today. Will route high alert message to MD staff for appt and see if they can get her in.     Reason for Disposition   Pain in the upper back over the ribs (rib cage) and worsened by coughing (or clearly increases with breathing)   Chest pain(s) lasting a few seconds persists > 3 days    Additional Information   Negative: Passed out (i.e., fainted, collapsed and was not responding)   Negative: Shock suspected (e.g., cold/pale/clammy skin, too weak to stand, low BP, rapid pulse)   Negative: Sounds like a life-threatening emergency to the triager   Negative: SEVERE difficulty breathing (e.g., struggling for each breath, speaks in single words)   Negative: Passed out (i.e., fainted, collapsed and was not responding)   Negative: Difficult to awaken or acting confused (e.g., disoriented, slurred speech)   Negative: Shock suspected (e.g., cold/pale/clammy skin, too weak to stand, low BP, rapid pulse)   Negative: Chest pain lasting longer than 5 minutes and ANY of the following:* Over 44 years old* Over 30 years old and at least one cardiac risk factor (e.g., diabetes mellitus, high blood pressure, high cholesterol, smoker, or strong family history of heart disease)* History of heart disease (i.e., angina, heart attack, heart failure, bypass surgery, takes nitroglycerin)* Pain is crushing, pressure-like, or heavy   Negative: Heart beating < 50 beats per minute OR > 140 beats per minute   Negative: Visible sweat on face or sweat dripping down face   Negative: Sounds like a life-threatening emergency to the triager   Negative: SEVERE chest pain   Negative: Pain also in shoulder(s) or arm(s) or jaw   Negative: Difficulty breathing   Negative: Cocaine use  within last 3 days   Negative: Major surgery in the past month   Negative: Hip or leg fracture (broken bone) in past month (or had cast on leg or ankle in past month)   Negative: Illness requiring prolonged bedrest in past month (e.g., immobilization, long hospital stay)   Negative: Long-distance travel in past month (e.g., car, bus, train, plane; with trip lasting 6 or more hours)   Negative: History of prior 'blood clot' in leg or lungs (i.e., deep vein thrombosis, pulmonary embolism)   Negative: History of inherited increased risk of blood clots (e.g., Factor 5 Leiden, Anti-thrombin 3, Protein C or Protein S deficiency, Prothrombin mutation)   Negative: Cancer treatment in the past two months (or has cancer now)   Negative: Heart beating irregularly or very rapidly   Negative: Chest pain lasting longer than 5 minutes and occurred in last 3 days (72 hours) (Exception: feels exactly the same as previously diagnosed heartburn and has accompanying sour taste in mouth)   Negative: Chest pain or 'angina' comes and goes and is happening more often (increasing in frequency) or getting worse (increasing in severity) (Exception: chest pains that last only a few seconds)   Negative: Dizziness or lightheadedness   Negative: Coughing up blood   Negative: Patient sounds very sick or weak to the triager   Negative: Patient says chest pain feels exactly the same as previously diagnosed 'heartburn' and describes burning in chest and accompanying sour taste in mouth   Negative: Fever > 100.4 F (38.0 C)    Protocols used: BACK PAIN-A-OH, CHEST PAIN-A-OH

## 2022-03-11 NOTE — PROGRESS NOTES
Subjective     Lavonne Pierre is a 46 y.o. old, female here for Back Pain (She states for the past 3 days her mid-lower back pain. She states taking deep breather increase her pain. She declines any injury to her back. She did mention cleaning her house on Monday.)    Patient complains of several days of left sided back pain.  It hurts worse with movement such as taking a deep breath.  She thought the recent change in weather and pollen in the air may be affecting her.    She is doing very well with her current diet plan.  Wt Readings from Last 3 Encounters:   03/11/22 1306 78.2 kg (172 lb 6.4 oz)   02/14/22 1046 81.6 kg (179 lb 14.3 oz)   01/20/22 1006 81.5 kg (179 lb 10.8 oz)       ROS  Medications     Outpatient Medications Marked as Taking for the 3/11/22 encounter (Office Visit) with Ranjit Bernabe MD   Medication Sig Dispense Refill    albuterol (PROVENTIL/VENTOLIN HFA) 90 mcg/actuation inhaler INHALE 1 TO 2 PUFFS BY MOUTH EVERY 4 TO 6 HOURS AS NEEDED 18 g 1    azelastine (ASTELIN) 137 mcg (0.1 %) nasal spray U 1 TO 2 SPRAYS IN EACH NOSTRIL BID  0    BREO ELLIPTA 100-25 mcg/dose diskus inhaler INHALE 1 PUFF BY MOUTH AT THE SAME TIME EVERY DAY 60 each 9    fluticasone propionate (FLONASE) 50 mcg/actuation nasal spray INSTILL 1 TO 2 SPRAYS IN EACH NOSTRIL QD  5    LINZESS 145 mcg Cap capsule       methylPREDNISolone (MEDROL DOSEPACK) 4 mg tablet use as directed on packaging 1 each 0    montelukast (SINGULAIR) 10 mg tablet TAKE 1 TABLET(10 MG) BY MOUTH EVERY DAY IN THE EVENING 30 tablet 11    multivitamin (THERAGRAN) per tablet Take 1 tablet by mouth once daily.      pregabalin (LYRICA) 50 MG capsule Take 1 capsule (50 mg total) by mouth 2 (two) times daily. 60 capsule 6    RESTASIS 0.05 % ophthalmic emulsion Place 1 drop into both eyes 2 (two) times daily.      spironolactone (ALDACTONE) 50 MG tablet Take 1 tablet (50 mg total) by mouth once daily. 30 tablet 11     Objective     /84   " Pulse 79   Ht 4' 9" (1.448 m)   Wt 78.2 kg (172 lb 6.4 oz)   SpO2 98%   BMI 37.31 kg/m²   Physical Exam  Constitutional:       Appearance: Normal appearance.   Musculoskeletal:      Thoracic back: Normal. No swelling or bony tenderness.        Back:       Comments: Tenderness and muscle knots located above   Neurological:      Mental Status: She is alert.       Assessment and Plan     Muscle spasm      Treat supportively. Heat, stretching, massage, theragun.    No follow-ups on file.  ___________________  Ranjit Bernabe MD  Internal Medicine and Pediatrics  "

## 2022-03-17 ENCOUNTER — PES CALL (OUTPATIENT)
Dept: ADMINISTRATIVE | Facility: CLINIC | Age: 47
End: 2022-03-17
Payer: MEDICARE

## 2022-03-22 ENCOUNTER — OFFICE VISIT (OUTPATIENT)
Dept: FAMILY MEDICINE | Facility: CLINIC | Age: 47
End: 2022-03-22
Payer: MEDICARE

## 2022-03-22 VITALS
HEIGHT: 57 IN | SYSTOLIC BLOOD PRESSURE: 132 MMHG | OXYGEN SATURATION: 97 % | HEART RATE: 91 BPM | BODY MASS INDEX: 36.47 KG/M2 | DIASTOLIC BLOOD PRESSURE: 76 MMHG | WEIGHT: 169.06 LBS

## 2022-03-22 DIAGNOSIS — I10 ESSENTIAL HYPERTENSION: ICD-10-CM

## 2022-03-22 DIAGNOSIS — Z00.00 ENCOUNTER FOR PREVENTIVE HEALTH EXAMINATION: Primary | ICD-10-CM

## 2022-03-22 DIAGNOSIS — E66.01 SEVERE OBESITY (BMI 35.0-39.9) WITH COMORBIDITY: ICD-10-CM

## 2022-03-22 DIAGNOSIS — G47.33 OSA ON CPAP: ICD-10-CM

## 2022-03-22 DIAGNOSIS — R26.9 ABNORMALITY OF GAIT AND MOBILITY: ICD-10-CM

## 2022-03-22 DIAGNOSIS — R76.8 ANA POSITIVE: ICD-10-CM

## 2022-03-22 DIAGNOSIS — M79.7 FIBROMYALGIA: ICD-10-CM

## 2022-03-22 PROCEDURE — 99999 PR PBB SHADOW E&M-EST. PATIENT-LVL IV: CPT | Mod: PBBFAC,,, | Performed by: NURSE PRACTITIONER

## 2022-03-22 PROCEDURE — G0439 PR MEDICARE ANNUAL WELLNESS SUBSEQUENT VISIT: ICD-10-PCS | Mod: ,,, | Performed by: NURSE PRACTITIONER

## 2022-03-22 PROCEDURE — G0439 PPPS, SUBSEQ VISIT: HCPCS | Mod: ,,, | Performed by: NURSE PRACTITIONER

## 2022-03-22 PROCEDURE — 99999 PR PBB SHADOW E&M-EST. PATIENT-LVL IV: ICD-10-PCS | Mod: PBBFAC,,, | Performed by: NURSE PRACTITIONER

## 2022-03-22 PROCEDURE — 99214 OFFICE O/P EST MOD 30 MIN: CPT | Mod: PBBFAC,PO | Performed by: NURSE PRACTITIONER

## 2022-03-22 RX ORDER — TRAZODONE HYDROCHLORIDE 50 MG/1
50 TABLET ORAL NIGHTLY
COMMUNITY
End: 2022-11-03

## 2022-03-22 NOTE — PROGRESS NOTES
"Lavonne Pierre presented for a  Medicare AWV and comprehensive Health Risk Assessment today. The following components were reviewed and updated:    · Medical history  · Family History  · Social history  · Allergies and Current Medications  · Health Risk Assessment  · Health Maintenance  · Care Team     ** See Completed Assessments for Annual Wellness Visit within the encounter summary.**     The following assessments were completed:  · Living Situation  · CAGE  · Depression Screening  · Timed Get Up and Go  · Whisper Test  · Cognitive Function Screening    · Nutrition Screening  · ADL Screening  · PAQ Screening    Vitals:    03/22/22 0745   BP: 132/76   BP Location: Left arm   Patient Position: Sitting   BP Method: Medium (Manual)   Pulse: 91   SpO2: 97%   Weight: 76.7 kg (169 lb 1.5 oz)   Height: 4' 9" (1.448 m)     Body mass index is 36.59 kg/m².  Physical Exam  Vitals reviewed.   Constitutional:       Appearance: Normal appearance.   Cardiovascular:      Rate and Rhythm: Normal rate and regular rhythm.      Pulses: Normal pulses.      Heart sounds: Normal heart sounds.   Pulmonary:      Effort: Pulmonary effort is normal.      Breath sounds: Normal breath sounds.   Skin:     General: Skin is warm and dry.   Neurological:      Mental Status: She is alert and oriented to person, place, and time.       Diagnoses and health risks identified today and associated recommendations/orders:    1. Encounter for preventive health examination  Reviewed and discussed health maintenance.    Declines covid vaccine    2. Abnormality of gait and mobility  Safety issues discussed and precautions reviewed    3. Essential hypertension  Stable- continue current treatment and follow up routinely with PCP     4. Severe obesity (BMI 35.0-39.9) with comorbidity  Stable- continue current treatment and follow up routinely with PCP     5. SALVADOR on CPAP  Wears CPAP nightly an benefits from use     6. Fibromyalgia  Stable- continue current " treatment and follow up routinely with PCP and rheumatology ()    7. MIRIAN positive  Stable- continue current treatment and follow up routinely with PCP and rheumatology ()    Provided Lavonne with a 5-10 year written screening schedule and personal prevention plan. Recommendations were developed using the USPSTF age appropriate recommendations. Education, counseling, and referrals were provided as needed. After Visit Summary printed and given to patient which includes a list of additional screenings\tests needed.    I offered to discuss advanced care planning, including how to pick a person who would make decisions for you if you were unable to make them for yourself, called a health care power of , and what kind of decisions you might make such as use of life sustaining treatments such as ventilators and tube feeding when faced with a life limiting illness recorded on a living will that they will need to know. (How you want to be cared for as you near the end of your natural life)     X Patient is interested in learning more about how to make advanced directives.  I provided them paperwork and offered to discuss this with them.    Lissa Brar, NP

## 2022-03-22 NOTE — PATIENT INSTRUCTIONS
Counseling and Referral of Other Preventative  (Italic type indicates deductible and co-insurance are waived)    Patient Name: Lavonne Pierre  Today's Date: 3/22/2022    Health Maintenance       Date Due Completion Date    COVID-19 Vaccine (1) Never done ---    Mammogram 12/16/2022 12/16/2021    Cervical Cancer Screening 12/16/2024 12/16/2021    TETANUS VACCINE 01/27/2030 1/27/2020        No orders of the defined types were placed in this encounter.    The following information is provided to all patients.  This information is to help you find resources for any of the problems found today that may be affecting your health:                Living healthy guide: www.[facility].[place].[facility]      Understanding Diabetes: www.diabetes.org      Eating healthy: www.cdc.gov/healthyweight      [facility] home safety checklist: www.cdc.gov/steadi/patient.html      Agency on Aging: www.goea.[place].[facility]      Alcoholics anonymous (AA): www.aa.org      Physical Activity: www.aleks.nih.gov/bf4ehzr      Tobacco use: www.quitwithusla.org

## 2022-04-04 ENCOUNTER — PATIENT MESSAGE (OUTPATIENT)
Dept: FAMILY MEDICINE | Facility: CLINIC | Age: 47
End: 2022-04-04
Payer: MEDICARE

## 2022-04-06 RX ORDER — LISINOPRIL 5 MG/1
5 TABLET ORAL DAILY
Qty: 90 TABLET | Refills: 3 | Status: SHIPPED | OUTPATIENT
Start: 2022-04-06 | End: 2023-01-18 | Stop reason: SDUPTHER

## 2022-05-09 ENCOUNTER — PATIENT MESSAGE (OUTPATIENT)
Dept: SMOKING CESSATION | Facility: CLINIC | Age: 47
End: 2022-05-09
Payer: MEDICARE

## 2022-06-30 DIAGNOSIS — Z91.89 AT HIGH RISK FOR BREAST CANCER: Primary | ICD-10-CM

## 2022-07-01 ENCOUNTER — HOSPITAL ENCOUNTER (OUTPATIENT)
Dept: RADIOLOGY | Facility: HOSPITAL | Age: 47
Discharge: HOME | End: 2022-07-01
Attending: OBSTETRICS & GYNECOLOGY
Payer: MEDICARE

## 2022-07-01 DIAGNOSIS — Z91.89 AT HIGH RISK FOR BREAST CANCER: ICD-10-CM

## 2022-07-01 DIAGNOSIS — R59.9 LYMPH NODE ENLARGEMENT: ICD-10-CM

## 2022-07-01 PROCEDURE — 25500020 PHARM REV CODE 255: Mod: PO | Performed by: OBSTETRICS & GYNECOLOGY

## 2022-07-01 PROCEDURE — 77049 MRI BREAST W/WO CONTRAST, W/CAD, BILATERAL: ICD-10-PCS | Mod: 26,,, | Performed by: RADIOLOGY

## 2022-07-01 PROCEDURE — C8937 CAD BREAST MRI: HCPCS | Mod: TC,PO

## 2022-07-01 PROCEDURE — 77049 MRI BREAST C-+ W/CAD BI: CPT | Mod: 26,,, | Performed by: RADIOLOGY

## 2022-07-01 PROCEDURE — A9577 INJ MULTIHANCE: HCPCS | Mod: PO | Performed by: OBSTETRICS & GYNECOLOGY

## 2022-07-01 RX ADMIN — GADOBENATE DIMEGLUMINE 14 ML: 529 INJECTION, SOLUTION INTRAVENOUS at 09:07

## 2022-09-20 ENCOUNTER — PATIENT MESSAGE (OUTPATIENT)
Dept: FAMILY MEDICINE | Facility: CLINIC | Age: 47
End: 2022-09-20
Payer: MEDICARE

## 2022-09-23 ENCOUNTER — IMMUNIZATION (OUTPATIENT)
Dept: FAMILY MEDICINE | Facility: CLINIC | Age: 47
End: 2022-09-23
Payer: MEDICARE

## 2022-09-23 PROCEDURE — 90686 IIV4 VACC NO PRSV 0.5 ML IM: CPT | Mod: PBBFAC,PO

## 2022-09-23 PROCEDURE — G0008 ADMIN INFLUENZA VIRUS VAC: HCPCS | Mod: PBBFAC,PO

## 2022-10-03 DIAGNOSIS — Z71.89 COMPLEX CARE COORDINATION: ICD-10-CM

## 2022-11-02 ENCOUNTER — PATIENT MESSAGE (OUTPATIENT)
Dept: OBSTETRICS AND GYNECOLOGY | Facility: CLINIC | Age: 47
End: 2022-11-02
Payer: MEDICARE

## 2022-11-02 NOTE — TELEPHONE ENCOUNTER
Spoke with pt and informed her that she is not due for an annual until 12/2023, and mammogram in 12/2022. Pt stated that she needed a problem visit for vaginal discharge with pain. Scheduled appointment for 11/3/22 at 8:20 am. Pt verbalized understanding.

## 2022-11-03 ENCOUNTER — OFFICE VISIT (OUTPATIENT)
Dept: OBSTETRICS AND GYNECOLOGY | Facility: CLINIC | Age: 47
End: 2022-11-03
Payer: MEDICARE

## 2022-11-03 VITALS
BODY MASS INDEX: 34.19 KG/M2 | SYSTOLIC BLOOD PRESSURE: 122 MMHG | HEIGHT: 57 IN | DIASTOLIC BLOOD PRESSURE: 70 MMHG | WEIGHT: 158.5 LBS

## 2022-11-03 DIAGNOSIS — R10.30 LOWER ABDOMINAL PAIN: ICD-10-CM

## 2022-11-03 DIAGNOSIS — N89.8 VAGINAL DISCHARGE: Primary | ICD-10-CM

## 2022-11-03 DIAGNOSIS — N39.46 MIXED STRESS AND URGE INCONTINENCE: ICD-10-CM

## 2022-11-03 DIAGNOSIS — Z12.31 ENCOUNTER FOR SCREENING MAMMOGRAM FOR BREAST CANCER: Primary | ICD-10-CM

## 2022-11-03 DIAGNOSIS — K58.1 IRRITABLE BOWEL SYNDROME WITH CONSTIPATION: ICD-10-CM

## 2022-11-03 PROCEDURE — 99214 PR OFFICE/OUTPT VISIT, EST, LEVL IV, 30-39 MIN: ICD-10-PCS | Mod: S$PBB,,, | Performed by: OBSTETRICS & GYNECOLOGY

## 2022-11-03 PROCEDURE — 81514 NFCT DS BV&VAGINITIS DNA ALG: CPT | Performed by: OBSTETRICS & GYNECOLOGY

## 2022-11-03 PROCEDURE — 99214 OFFICE O/P EST MOD 30 MIN: CPT | Mod: S$PBB,,, | Performed by: OBSTETRICS & GYNECOLOGY

## 2022-11-03 PROCEDURE — 99999 PR PBB SHADOW E&M-EST. PATIENT-LVL III: CPT | Mod: PBBFAC,,, | Performed by: OBSTETRICS & GYNECOLOGY

## 2022-11-03 PROCEDURE — 99999 PR PBB SHADOW E&M-EST. PATIENT-LVL III: ICD-10-PCS | Mod: PBBFAC,,, | Performed by: OBSTETRICS & GYNECOLOGY

## 2022-11-03 PROCEDURE — 99213 OFFICE O/P EST LOW 20 MIN: CPT | Mod: PBBFAC,PN | Performed by: OBSTETRICS & GYNECOLOGY

## 2022-11-03 RX ORDER — MIRABEGRON 25 MG/1
25 TABLET, FILM COATED, EXTENDED RELEASE ORAL DAILY
Qty: 30 TABLET | Refills: 11 | Status: SHIPPED | OUTPATIENT
Start: 2022-11-03 | End: 2023-06-27

## 2022-11-03 NOTE — PROGRESS NOTES
Chief Complaint   Patient presents with    Vaginal Discharge     With pain         History of Present Illness: Lavonne Pierre is a 47 y.o. female that presents today 11/3/2022 for   Chief Complaint   Patient presents with    Vaginal Discharge     With pain       VD that comes and goes.  She reports crust like.  No odor. She reports lower abdominal pain worse since she started going to the gym and working out on machines.  She reports that she has leaky bladder.  She reports that she drinks 64 oz of water.  She reports drinks coffee in the morning. She reports some tea. She iced water with flavor with citrus. She reports some lower pubic bone pain worse after working out.     Past Medical History:   Diagnosis Date    Anxiety 2013    Asthma     Depression     Fibromyalgia     GERD (gastroesophageal reflux disease)     History of pneumonia     HTN (hypertension)     Preeclampsia     Sleep apnea     Uses CPAP       Past Surgical History:   Procedure Laterality Date    BREAST BIOPSY      Benign    BREAST SURGERY  Right Breast     Fibro cystic    CARPAL TUNNEL RELEASE Right 2017     SECTION, LOW TRANSVERSE      for PREE    NECK SURGERY      TUBAL LIGATION  2009    upper GI series         Current Outpatient Medications   Medication Sig Dispense Refill    albuterol (VENTOLIN HFA) 90 mcg/actuation inhaler Inhale 1-2 puffs into the lungs every 4 (four) hours as needed for Wheezing or Shortness of Breath. Rescue 18 g 11    azelastine (ASTELIN) 137 mcg (0.1 %) nasal spray U 1 TO 2 SPRAYS IN EACH NOSTRIL BID  0    diclofenac sodium (VOLTAREN) 1 % Gel APPLY 2 GRAMS TOPICALLY THREE TIMES DAILY 100 g 5    fluticasone furoate-vilanteroL (BREO ELLIPTA) 200-25 mcg/dose DsDv diskus inhaler Inhale 1 puff into the lungs once daily. Controller 1 each 11    fluticasone propionate (FLONASE) 50 mcg/actuation nasal spray INSTILL 1 TO 2 SPRAYS IN EACH NOSTRIL QD  5    lisinopriL (PRINIVIL,ZESTRIL) 5 MG tablet  Take 1 tablet (5 mg total) by mouth once daily. 90 tablet 3    montelukast (SINGULAIR) 10 mg tablet TAKE 1 TABLET(10 MG) BY MOUTH EVERY DAY IN THE EVENING 30 tablet 11    multivitamin (THERAGRAN) per tablet Take 1 tablet by mouth once daily.      RESTASIS 0.05 % ophthalmic emulsion Place 1 drop into both eyes 2 (two) times daily.      LINZESS 145 mcg Cap capsule       mirabegron (MYRBETRIQ) 25 mg Tb24 ER tablet Take 1 tablet (25 mg total) by mouth once daily. 30 tablet 11     No current facility-administered medications for this visit.       Review of patient's allergies indicates:   Allergen Reactions    Ibuprofen Other (See Comments)     Affects stomach ulcer, avoids all NSAIDS    Latex, natural rubber Rash     Small red bumps on skin contact       Family History   Problem Relation Age of Onset    Pancreatic cancer Mother     Cancer Mother         Mother passed in  from Pancreas Cancer    Lymphoma Father     Diabetes Father     Hypertension Father     Cancer Father         Small Cell Lung Cancer/ still alive    Pancreatic cancer Maternal Grandmother     Pancreatic cancer Maternal Uncle     Breast cancer Maternal Aunt     Cancer Maternal Aunt         Breast Cancer both/ passed.  Im not sure of year i was youg.    Breast cancer Paternal Aunt 50    Asthma Brother     Cancer Paternal Grandfather         Melanoma    Diabetes Paternal Grandmother     Heart disease Maternal Grandfather     Hypertension Brother     Breast cancer Maternal Aunt 70       Social History     Tobacco Use    Smoking status: Never    Smokeless tobacco: Never   Substance Use Topics    Alcohol use: No    Drug use: No       OB History    Para Term  AB Living   3 3 2 1   2   SAB IAB Ectopic Multiple Live Births           2      # Outcome Date GA Lbr Rno/2nd Weight Sex Delivery Anes PTL Lv   3 Term            2   29w0d   M CS-Unspec   ALEX   1 Term     M Vag-Spont   ALEX      Obstetric Comments    delivery for PREE  "        /70   Ht 4' 9" (1.448 m)   Wt 71.9 kg (158 lb 8.2 oz)   LMP 10/11/2022 (Exact Date)   Physical Exam:  APPEARANCE: Well nourished, well developed, in no acute distress.  SKIN: Normal skin turgor, no lesions.  NECK: Neck symmetric without masses   RESPIRATORY: Normal respiratory effort with no retractions or use of accessory muscles  CARDIOVASCULAR: Peripheral vascular system with no swelling no varicosities and palpation of pulses normal  LYMPHATIC: No enlargements of the lymph nodes noted in the neck, axillae, or groin  ABDOMEN: Soft. No tenderness or masses. No hepatosplenomegaly. No hernias.  PELVIC: Normal external female genitalia without lesions. Normal hair distribution. Adequate perineal body, normal urethral meatus. Urethra with no masses.  Bladder nontender. Vagina moist and well rugated without lesions or discharge. Cervix pink and without lesions. No significant cystocele or rectocele. Bimanual exam showed uterus normal size, shape, position, mobile and nontender. Adnexa without masses or tenderness. Urethra and bladder normal.  EXTREMITIES: No clubbing cyanosis or edema.    ASSESSMENT/PLAN:  Vaginal discharge  -     VAGINOSIS SCREEN BY DNA PROBE    Lower abdominal pain    Mixed stress and urge incontinence  -     mirabegron (MYRBETRIQ) 25 mg Tb24 ER tablet; Take 1 tablet (25 mg total) by mouth once daily.  Dispense: 30 tablet; Refill: 11    Irritable bowel syndrome with constipation        30 minutes spent today spent preparing reviewing previous external notes, reviewing previous results, performing medical examination, ordering tests or medications, counseling and documenting.       "

## 2022-11-04 LAB
BACTERIAL VAGINOSIS DNA: NEGATIVE
CANDIDA GLABRATA DNA: NEGATIVE
CANDIDA KRUSEI DNA: NEGATIVE
CANDIDA RRNA VAG QL PROBE: NEGATIVE
T VAGINALIS RRNA GENITAL QL PROBE: NEGATIVE

## 2022-12-19 ENCOUNTER — HOSPITAL ENCOUNTER (OUTPATIENT)
Dept: RADIOLOGY | Facility: HOSPITAL | Age: 47
Discharge: HOME | End: 2022-12-19
Attending: OBSTETRICS & GYNECOLOGY
Payer: MEDICARE

## 2022-12-19 DIAGNOSIS — Z12.31 ENCOUNTER FOR SCREENING MAMMOGRAM FOR BREAST CANCER: ICD-10-CM

## 2022-12-19 PROCEDURE — 77063 MAMMO DIGITAL SCREENING BILAT WITH TOMO: ICD-10-PCS | Mod: 26,,, | Performed by: RADIOLOGY

## 2022-12-19 PROCEDURE — 77063 BREAST TOMOSYNTHESIS BI: CPT | Mod: 26,,, | Performed by: RADIOLOGY

## 2022-12-19 PROCEDURE — 77067 MAMMO DIGITAL SCREENING BILAT WITH TOMO: ICD-10-PCS | Mod: 26,,, | Performed by: RADIOLOGY

## 2022-12-19 PROCEDURE — 77067 SCR MAMMO BI INCL CAD: CPT | Mod: TC,PN

## 2022-12-19 PROCEDURE — 77067 SCR MAMMO BI INCL CAD: CPT | Mod: 26,,, | Performed by: RADIOLOGY

## 2022-12-19 PROCEDURE — 77063 BREAST TOMOSYNTHESIS BI: CPT | Mod: TC,PN

## 2023-01-07 ENCOUNTER — PATIENT MESSAGE (OUTPATIENT)
Dept: ADMINISTRATIVE | Facility: HOSPITAL | Age: 48
End: 2023-01-07
Payer: MEDICARE

## 2023-01-18 ENCOUNTER — LAB VISIT (OUTPATIENT)
Dept: LAB | Facility: HOSPITAL | Age: 48
End: 2023-01-18
Attending: INTERNAL MEDICINE
Payer: MEDICARE

## 2023-01-18 ENCOUNTER — OFFICE VISIT (OUTPATIENT)
Dept: FAMILY MEDICINE | Facility: CLINIC | Age: 48
End: 2023-01-18
Payer: MEDICARE

## 2023-01-18 VITALS
HEART RATE: 63 BPM | SYSTOLIC BLOOD PRESSURE: 122 MMHG | BODY MASS INDEX: 34.48 KG/M2 | OXYGEN SATURATION: 99 % | WEIGHT: 159.81 LBS | HEIGHT: 57 IN | DIASTOLIC BLOOD PRESSURE: 64 MMHG

## 2023-01-18 DIAGNOSIS — Z00.00 PHYSICAL EXAM, ROUTINE: ICD-10-CM

## 2023-01-18 DIAGNOSIS — E66.9 OBESITY (BMI 30-39.9): ICD-10-CM

## 2023-01-18 DIAGNOSIS — Z00.00 PHYSICAL EXAM, ROUTINE: Primary | ICD-10-CM

## 2023-01-18 DIAGNOSIS — Z12.11 SCREENING FOR COLON CANCER: ICD-10-CM

## 2023-01-18 PROBLEM — E66.01 SEVERE OBESITY (BMI 35.0-39.9) WITH COMORBIDITY: Status: RESOLVED | Noted: 2022-01-18 | Resolved: 2023-01-18

## 2023-01-18 LAB
ALBUMIN SERPL BCP-MCNC: 4.1 G/DL (ref 3.5–5.2)
ALP SERPL-CCNC: 61 U/L (ref 55–135)
ALT SERPL W/O P-5'-P-CCNC: 15 U/L (ref 10–44)
ANION GAP SERPL CALC-SCNC: 8 MMOL/L (ref 8–16)
AST SERPL-CCNC: 16 U/L (ref 10–40)
BILIRUB SERPL-MCNC: 0.3 MG/DL (ref 0.1–1)
BUN SERPL-MCNC: 14 MG/DL (ref 6–20)
CALCIUM SERPL-MCNC: 9.3 MG/DL (ref 8.7–10.5)
CHLORIDE SERPL-SCNC: 103 MMOL/L (ref 95–110)
CHOLEST SERPL-MCNC: 202 MG/DL (ref 120–199)
CHOLEST/HDLC SERPL: 4.3 {RATIO} (ref 2–5)
CO2 SERPL-SCNC: 26 MMOL/L (ref 23–29)
CREAT SERPL-MCNC: 0.8 MG/DL (ref 0.5–1.4)
ERYTHROCYTE [DISTWIDTH] IN BLOOD BY AUTOMATED COUNT: 12.3 % (ref 11.5–14.5)
EST. GFR  (NO RACE VARIABLE): >60 ML/MIN/1.73 M^2
ESTIMATED AVG GLUCOSE: 103 MG/DL (ref 68–131)
GLUCOSE SERPL-MCNC: 90 MG/DL (ref 70–110)
HBA1C MFR BLD: 5.2 % (ref 4–5.6)
HCT VFR BLD AUTO: 43.4 % (ref 37–48.5)
HDLC SERPL-MCNC: 47 MG/DL (ref 40–75)
HDLC SERPL: 23.3 % (ref 20–50)
HGB BLD-MCNC: 14.4 G/DL (ref 12–16)
LDLC SERPL CALC-MCNC: 131.8 MG/DL (ref 63–159)
MCH RBC QN AUTO: 30.1 PG (ref 27–31)
MCHC RBC AUTO-ENTMCNC: 33.2 G/DL (ref 32–36)
MCV RBC AUTO: 91 FL (ref 82–98)
NONHDLC SERPL-MCNC: 155 MG/DL
PLATELET # BLD AUTO: 317 K/UL (ref 150–450)
PMV BLD AUTO: 11.4 FL (ref 9.2–12.9)
POTASSIUM SERPL-SCNC: 4 MMOL/L (ref 3.5–5.1)
PROT SERPL-MCNC: 7.4 G/DL (ref 6–8.4)
RBC # BLD AUTO: 4.78 M/UL (ref 4–5.4)
SODIUM SERPL-SCNC: 137 MMOL/L (ref 136–145)
TRIGL SERPL-MCNC: 116 MG/DL (ref 30–150)
TSH SERPL DL<=0.005 MIU/L-ACNC: 1.46 UIU/ML (ref 0.4–4)
WBC # BLD AUTO: 8.22 K/UL (ref 3.9–12.7)

## 2023-01-18 PROCEDURE — 3078F DIAST BP <80 MM HG: CPT | Mod: HCNC,CPTII,S$GLB, | Performed by: INTERNAL MEDICINE

## 2023-01-18 PROCEDURE — 99999 PR PBB SHADOW E&M-EST. PATIENT-LVL IV: CPT | Mod: PBBFAC,HCNC,, | Performed by: INTERNAL MEDICINE

## 2023-01-18 PROCEDURE — 99396 PR PREVENTIVE VISIT,EST,40-64: ICD-10-PCS | Mod: HCNC,S$GLB,, | Performed by: INTERNAL MEDICINE

## 2023-01-18 PROCEDURE — 1160F RVW MEDS BY RX/DR IN RCRD: CPT | Mod: HCNC,CPTII,S$GLB, | Performed by: INTERNAL MEDICINE

## 2023-01-18 PROCEDURE — 4010F PR ACE/ARB THEARPY RXD/TAKEN: ICD-10-PCS | Mod: HCNC,CPTII,S$GLB, | Performed by: INTERNAL MEDICINE

## 2023-01-18 PROCEDURE — 36415 COLL VENOUS BLD VENIPUNCTURE: CPT | Mod: HCNC,PO | Performed by: INTERNAL MEDICINE

## 2023-01-18 PROCEDURE — 1160F PR REVIEW ALL MEDS BY PRESCRIBER/CLIN PHARMACIST DOCUMENTED: ICD-10-PCS | Mod: HCNC,CPTII,S$GLB, | Performed by: INTERNAL MEDICINE

## 2023-01-18 PROCEDURE — 80061 LIPID PANEL: CPT | Mod: HCNC | Performed by: INTERNAL MEDICINE

## 2023-01-18 PROCEDURE — 80053 COMPREHEN METABOLIC PANEL: CPT | Mod: HCNC | Performed by: INTERNAL MEDICINE

## 2023-01-18 PROCEDURE — 3008F BODY MASS INDEX DOCD: CPT | Mod: HCNC,CPTII,S$GLB, | Performed by: INTERNAL MEDICINE

## 2023-01-18 PROCEDURE — 99999 PR PBB SHADOW E&M-EST. PATIENT-LVL IV: ICD-10-PCS | Mod: PBBFAC,HCNC,, | Performed by: INTERNAL MEDICINE

## 2023-01-18 PROCEDURE — 99396 PREV VISIT EST AGE 40-64: CPT | Mod: HCNC,S$GLB,, | Performed by: INTERNAL MEDICINE

## 2023-01-18 PROCEDURE — 85027 COMPLETE CBC AUTOMATED: CPT | Mod: HCNC | Performed by: INTERNAL MEDICINE

## 2023-01-18 PROCEDURE — 3074F PR MOST RECENT SYSTOLIC BLOOD PRESSURE < 130 MM HG: ICD-10-PCS | Mod: HCNC,CPTII,S$GLB, | Performed by: INTERNAL MEDICINE

## 2023-01-18 PROCEDURE — 3074F SYST BP LT 130 MM HG: CPT | Mod: HCNC,CPTII,S$GLB, | Performed by: INTERNAL MEDICINE

## 2023-01-18 PROCEDURE — 4010F ACE/ARB THERAPY RXD/TAKEN: CPT | Mod: HCNC,CPTII,S$GLB, | Performed by: INTERNAL MEDICINE

## 2023-01-18 PROCEDURE — 1159F MED LIST DOCD IN RCRD: CPT | Mod: HCNC,CPTII,S$GLB, | Performed by: INTERNAL MEDICINE

## 2023-01-18 PROCEDURE — 3008F PR BODY MASS INDEX (BMI) DOCUMENTED: ICD-10-PCS | Mod: HCNC,CPTII,S$GLB, | Performed by: INTERNAL MEDICINE

## 2023-01-18 PROCEDURE — 83036 HEMOGLOBIN GLYCOSYLATED A1C: CPT | Mod: HCNC | Performed by: INTERNAL MEDICINE

## 2023-01-18 PROCEDURE — 3078F PR MOST RECENT DIASTOLIC BLOOD PRESSURE < 80 MM HG: ICD-10-PCS | Mod: HCNC,CPTII,S$GLB, | Performed by: INTERNAL MEDICINE

## 2023-01-18 PROCEDURE — 1159F PR MEDICATION LIST DOCUMENTED IN MEDICAL RECORD: ICD-10-PCS | Mod: HCNC,CPTII,S$GLB, | Performed by: INTERNAL MEDICINE

## 2023-01-18 PROCEDURE — 84443 ASSAY THYROID STIM HORMONE: CPT | Mod: HCNC | Performed by: INTERNAL MEDICINE

## 2023-01-18 RX ORDER — LISINOPRIL 5 MG/1
5 TABLET ORAL DAILY
Qty: 90 TABLET | Refills: 3 | Status: SHIPPED | OUTPATIENT
Start: 2023-01-18 | End: 2024-01-29

## 2023-01-18 NOTE — MEDICAL/APP STUDENT
Subjective:       Patient ID: Lavonne Pierre is a 47 y.o. female.    Chief Complaint: Annual Exam    HPI   Ms. Pierre is a 48 y/o female with a PMHx of fibromyalgia, anxiety, IBS, and sleep apnea presenting for annual check-up.  Patient states that she d/ai sertraline and lyrica about 4 months ago because she found that they were not helping her mood or fibromyalgia pain respectively. Mood and anxiety have not worsened since discontinuing medication. States she is still bothered by hand, ankle, shoulder, and knee pain. Worsened by movement. Does say she has been walking and doing stretches which helps. Said low back pain is particularly disruptive during menses but midol does help.  Patient inquired about ADHD medications. Stated she sometimes feel distracted and have difficulty focusing. She was screened for ADHD during previous office visits but did not meet diagnostic criteria.  Patient reports losing 30 lbs with optavia and since has no longer required cpap machine. Reports having more plant-based diet and avoiding added sugars.  Discussed colonoscopy screening with patient--she has appointment with her GI doctor upcoming. Has had colonscopy and EGD before in her 30s.   Also discussed routine bloodwork--lipids, HbA1C, CMP, CBC, TSH  Review of Systems   Constitutional:  Positive for fatigue. Negative for chills, diaphoresis and fever.   HENT:  Negative for nosebleeds, rhinorrhea, sneezing and tinnitus.    Respiratory:  Negative for chest tightness, shortness of breath and wheezing.    Cardiovascular:  Negative for chest pain, palpitations and leg swelling.   Gastrointestinal:  Negative for abdominal distention, diarrhea, nausea and vomiting.   Genitourinary:  Positive for menstrual problem. Negative for bladder incontinence and difficulty urinating.   Musculoskeletal:  Positive for arthralgias, back pain, leg pain and myalgias.   Neurological:  Negative for dizziness, seizures and headaches.    Psychiatric/Behavioral:  Positive for decreased concentration. Negative for dysphoric mood and sleep disturbance.        Objective:      Physical Exam  Vitals reviewed.   Constitutional:       General: She is not in acute distress.     Appearance: Normal appearance. She is not ill-appearing, toxic-appearing or diaphoretic.   HENT:      Head: Normocephalic and atraumatic.      Right Ear: Tympanic membrane normal.      Left Ear: Tympanic membrane normal.      Nose: Nose normal.      Mouth/Throat:      Mouth: Mucous membranes are moist.      Pharynx: Oropharynx is clear. No oropharyngeal exudate or posterior oropharyngeal erythema.   Eyes:      Extraocular Movements: Extraocular movements intact.      Conjunctiva/sclera: Conjunctivae normal.      Pupils: Pupils are equal, round, and reactive to light.   Cardiovascular:      Rate and Rhythm: Normal rate and regular rhythm.      Pulses: Normal pulses.      Heart sounds: Normal heart sounds.   Pulmonary:      Effort: Pulmonary effort is normal.      Breath sounds: Normal breath sounds.   Abdominal:      General: Bowel sounds are normal. There is no distension or abdominal bruit.      Palpations: There is no mass.      Tenderness: There is abdominal tenderness. There is no guarding.      Comments: Some diffuse suprapubic and LLQ/RLQ tenderness (period pain)   Musculoskeletal:         General: No swelling or tenderness.      Cervical back: Normal range of motion and neck supple.   Skin:     General: Skin is warm and dry.   Neurological:      Mental Status: She is alert and oriented to person, place, and time. Mental status is at baseline.   Psychiatric:         Mood and Affect: Mood normal.         Thought Content: Thought content normal.       Assessment:     Exam WNL  Problem List Items Addressed This Visit    None  Visit Diagnoses       Physical exam, routine    -  Primary    Relevant Orders    Comprehensive Metabolic Panel    Lipid Panel    CBC Without Differential     Hemoglobin A1C    TSH    Screening for colon cancer        Relevant Orders    Ambulatory referral/consult to Gastroenterology              Plan:     1. Fibromyalgia:  patient has tried essentially every medication with no symptom relief so not starting new med during this visit. Discussed lifestyle modifications--continued weight mgmt, stretching and resistance based exercise to help manage symptoms  2) anxiety/focus. Discussed that cognition difficulties are common with chronic pain and sleep apnea and that ADHD medications would likely not help symptoms. Discussed benefits of exercise for anxiety mgmt. Encouraged regular f/u  2. Colon cancer screening: Discussed screening with patient. Patient has appt scheduled with her GI doctor to arrange colonoscopy or stool test  3. Routine lab work--discussed need for routine bloodwork. HBA1C, TSH, CMP, CBC labs drawn at visit     -Yuly Briggs  MS4

## 2023-02-07 DIAGNOSIS — Z00.00 ENCOUNTER FOR MEDICARE ANNUAL WELLNESS EXAM: ICD-10-CM

## 2023-02-09 DIAGNOSIS — Z00.00 ENCOUNTER FOR MEDICARE ANNUAL WELLNESS EXAM: ICD-10-CM

## 2023-03-22 ENCOUNTER — OFFICE VISIT (OUTPATIENT)
Dept: FAMILY MEDICINE | Facility: CLINIC | Age: 48
End: 2023-03-22
Payer: MEDICARE

## 2023-03-22 VITALS
BODY MASS INDEX: 34.72 KG/M2 | SYSTOLIC BLOOD PRESSURE: 126 MMHG | HEIGHT: 57 IN | HEART RATE: 61 BPM | DIASTOLIC BLOOD PRESSURE: 74 MMHG | OXYGEN SATURATION: 98 % | WEIGHT: 160.94 LBS

## 2023-03-22 DIAGNOSIS — K58.2 IRRITABLE BOWEL SYNDROME WITH BOTH CONSTIPATION AND DIARRHEA: ICD-10-CM

## 2023-03-22 DIAGNOSIS — R76.8 ANA POSITIVE: ICD-10-CM

## 2023-03-22 DIAGNOSIS — M79.641 BILATERAL HAND PAIN: ICD-10-CM

## 2023-03-22 DIAGNOSIS — M79.642 BILATERAL HAND PAIN: ICD-10-CM

## 2023-03-22 DIAGNOSIS — K25.3 ACUTE GASTRIC ULCER WITHOUT HEMORRHAGE OR PERFORATION: ICD-10-CM

## 2023-03-22 DIAGNOSIS — J45.909 REACTIVE AIRWAY DISEASE WITHOUT COMPLICATION, UNSPECIFIED ASTHMA SEVERITY, UNSPECIFIED WHETHER PERSISTENT: ICD-10-CM

## 2023-03-22 DIAGNOSIS — I10 ESSENTIAL HYPERTENSION: ICD-10-CM

## 2023-03-22 DIAGNOSIS — Z00.00 ENCOUNTER FOR PREVENTIVE HEALTH EXAMINATION: Primary | ICD-10-CM

## 2023-03-22 DIAGNOSIS — M79.7 FIBROMYALGIA: ICD-10-CM

## 2023-03-22 DIAGNOSIS — Z00.00 ENCOUNTER FOR MEDICARE ANNUAL WELLNESS EXAM: ICD-10-CM

## 2023-03-22 DIAGNOSIS — G56.00 CARPAL TUNNEL SYNDROME, UNSPECIFIED LATERALITY: ICD-10-CM

## 2023-03-22 PROCEDURE — 4010F ACE/ARB THERAPY RXD/TAKEN: CPT | Mod: HCNC,CPTII,S$GLB, | Performed by: NURSE PRACTITIONER

## 2023-03-22 PROCEDURE — 1160F RVW MEDS BY RX/DR IN RCRD: CPT | Mod: HCNC,CPTII,S$GLB, | Performed by: NURSE PRACTITIONER

## 2023-03-22 PROCEDURE — G0439 PR MEDICARE ANNUAL WELLNESS SUBSEQUENT VISIT: ICD-10-PCS | Mod: HCNC,S$GLB,, | Performed by: NURSE PRACTITIONER

## 2023-03-22 PROCEDURE — G9919 PR SCREENING AND POSITIVE: ICD-10-PCS | Mod: HCNC,CPTII,S$GLB, | Performed by: NURSE PRACTITIONER

## 2023-03-22 PROCEDURE — 1159F MED LIST DOCD IN RCRD: CPT | Mod: HCNC,CPTII,S$GLB, | Performed by: NURSE PRACTITIONER

## 2023-03-22 PROCEDURE — 3078F PR MOST RECENT DIASTOLIC BLOOD PRESSURE < 80 MM HG: ICD-10-PCS | Mod: HCNC,CPTII,S$GLB, | Performed by: NURSE PRACTITIONER

## 2023-03-22 PROCEDURE — 99999 PR PBB SHADOW E&M-EST. PATIENT-LVL V: ICD-10-PCS | Mod: PBBFAC,HCNC,, | Performed by: NURSE PRACTITIONER

## 2023-03-22 PROCEDURE — G9919 SCRN ND POS ND PROV OF REC: HCPCS | Mod: HCNC,CPTII,S$GLB, | Performed by: NURSE PRACTITIONER

## 2023-03-22 PROCEDURE — 3074F PR MOST RECENT SYSTOLIC BLOOD PRESSURE < 130 MM HG: ICD-10-PCS | Mod: HCNC,CPTII,S$GLB, | Performed by: NURSE PRACTITIONER

## 2023-03-22 PROCEDURE — 3044F PR MOST RECENT HEMOGLOBIN A1C LEVEL <7.0%: ICD-10-PCS | Mod: HCNC,CPTII,S$GLB, | Performed by: NURSE PRACTITIONER

## 2023-03-22 PROCEDURE — 3008F BODY MASS INDEX DOCD: CPT | Mod: HCNC,CPTII,S$GLB, | Performed by: NURSE PRACTITIONER

## 2023-03-22 PROCEDURE — 1160F PR REVIEW ALL MEDS BY PRESCRIBER/CLIN PHARMACIST DOCUMENTED: ICD-10-PCS | Mod: HCNC,CPTII,S$GLB, | Performed by: NURSE PRACTITIONER

## 2023-03-22 PROCEDURE — 4010F PR ACE/ARB THEARPY RXD/TAKEN: ICD-10-PCS | Mod: HCNC,CPTII,S$GLB, | Performed by: NURSE PRACTITIONER

## 2023-03-22 PROCEDURE — 3044F HG A1C LEVEL LT 7.0%: CPT | Mod: HCNC,CPTII,S$GLB, | Performed by: NURSE PRACTITIONER

## 2023-03-22 PROCEDURE — 99999 PR PBB SHADOW E&M-EST. PATIENT-LVL V: CPT | Mod: PBBFAC,HCNC,, | Performed by: NURSE PRACTITIONER

## 2023-03-22 PROCEDURE — 3008F PR BODY MASS INDEX (BMI) DOCUMENTED: ICD-10-PCS | Mod: HCNC,CPTII,S$GLB, | Performed by: NURSE PRACTITIONER

## 2023-03-22 PROCEDURE — 3074F SYST BP LT 130 MM HG: CPT | Mod: HCNC,CPTII,S$GLB, | Performed by: NURSE PRACTITIONER

## 2023-03-22 PROCEDURE — 1159F PR MEDICATION LIST DOCUMENTED IN MEDICAL RECORD: ICD-10-PCS | Mod: HCNC,CPTII,S$GLB, | Performed by: NURSE PRACTITIONER

## 2023-03-22 PROCEDURE — G0439 PPPS, SUBSEQ VISIT: HCPCS | Mod: HCNC,S$GLB,, | Performed by: NURSE PRACTITIONER

## 2023-03-22 PROCEDURE — 3078F DIAST BP <80 MM HG: CPT | Mod: HCNC,CPTII,S$GLB, | Performed by: NURSE PRACTITIONER

## 2023-03-22 RX ORDER — PLECANATIDE 3 MG/1
1 TABLET ORAL
COMMUNITY
Start: 2023-02-07 | End: 2023-09-13

## 2023-03-22 RX ORDER — SUCRALFATE 1 G
1 TABLET ORAL 2 TIMES DAILY
COMMUNITY
Start: 2023-03-14 | End: 2023-09-13

## 2023-03-22 RX ORDER — ONDANSETRON 8 MG/1
TABLET, ORALLY DISINTEGRATING ORAL
COMMUNITY
Start: 2023-02-15 | End: 2023-09-13

## 2023-03-22 RX ORDER — HYDROCODONE BITARTRATE AND ACETAMINOPHEN 7.5; 325 MG/1; MG/1
TABLET ORAL
COMMUNITY
Start: 2023-02-15 | End: 2023-03-22 | Stop reason: ALTCHOICE

## 2023-03-22 RX ORDER — IPRATROPIUM BROMIDE 42 UG/1
SPRAY, METERED NASAL
COMMUNITY
Start: 2023-01-29 | End: 2024-01-18

## 2023-03-22 RX ORDER — OMEPRAZOLE 40 MG/1
CAPSULE, DELAYED RELEASE ORAL
COMMUNITY
Start: 2023-02-13 | End: 2023-06-27

## 2023-03-22 NOTE — PROGRESS NOTES
"Lavonne Pierre presented for a  Medicare AWV and comprehensive Health Risk Assessment today. The following components were reviewed and updated:    Medical history  Family History  Social history  Allergies and Current Medications  Health Risk Assessment  Health Maintenance  Care Team     Patient screened moderate and/or high risk for one or more social determinants of health (SDOH). Patient connected to community resources through the ED Navigator.    ** See Completed Assessments for Annual Wellness Visit within the encounter summary.**     The following assessments were completed:  Living Situation  CAGE  Depression Screening  Timed Get Up and Go  Whisper Test  Cognitive Function Screening      Nutrition Screening  ADL Screening  PAQ Screening    Review for Opioid Screening: Pt does not have Rx for Opioids  Review for Substance Use Disorders: Patient does not use substance      Vitals:    03/22/23 0827   BP: 126/74   BP Location: Left arm   Patient Position: Sitting   Pulse: 61   SpO2: 98%   Weight: 73 kg (160 lb 15 oz)   Height: 4' 9" (1.448 m)     Body mass index is 34.83 kg/m².  Physical Exam  Vitals reviewed.   Constitutional:       Appearance: Normal appearance.   Pulmonary:      Effort: Pulmonary effort is normal. No respiratory distress.   Neurological:      Mental Status: She is alert and oriented to person, place, and time.   Psychiatric:         Mood and Affect: Mood normal.         Behavior: Behavior normal.         Judgment: Judgment normal.     Diagnoses and health risks identified today and associated recommendations/orders:    1. Encounter for preventive health examination  Reviewed and discussed health maintenance.    - Ambulatory referral/consult to Orthopedics; Future  Colonoscopy done through 's office    2. Encounter for Medicare annual wellness exam  - Ambulatory Referral/Consult to Enhanced Annual Wellness Visit (eAWV)    3. Reactive airway disease without complication, unspecified " asthma severity, unspecified whether persistent  Stable- continue current treatment and follow up routinely with PCP and pulmonary ([facility] pulmonary)    4. Essential hypertension  Stable- continue current treatment and follow up routinely with PCP     5. MIRIAN positive  Stable- continue current treatment and follow up routinely with PCP and rheumatology ()    6. Fibromyalgia  Stable- continue current treatment and follow up routinely with PCP and rheumatology ()    7. Acute gastric ulcer without hemorrhage or perforation  Stable- continue current treatment and follow up routinely with PCP and GI ()    8. Irritable bowel syndrome with both constipation and diarrhea  Stable- continue current treatment and follow up routinely with PCP and GI ()    9. Bilateral hand pain  - Ambulatory referral/consult to Orthopedics; Future    10. Carpal tunnel syndrome, unspecified laterality  - Ambulatory referral/consult to Orthopedics; Future    Provided Lavonne with a 5-10 year written screening schedule and personal prevention plan. Recommendations were developed using the USPSTF age appropriate recommendations. Education, counseling, and referrals were provided as needed. After Visit Summary printed and given to patient which includes a list of additional screenings\tests needed.    I offered to discuss advanced care planning, including how to pick a person who would make decisions for you if you were unable to make them for yourself, called a health care power of , and what kind of decisions you might make such as use of life sustaining treatments such as ventilators and tube feeding when faced with a life limiting illness recorded on a living will that they will need to know. (How you want to be cared for as you near the end of your natural life)   X Patient is interested in learning more about how to make advanced directives.  I provided them paperwork and offered to discuss this  with them.  Lissa Brar, NP

## 2023-03-22 NOTE — PATIENT INSTRUCTIONS
Counseling and Referral of Other Preventative  (Italic type indicates deductible and co-insurance are waived)    Patient Name: Lavonne Pierre  Today's Date: 3/22/2023    Health Maintenance       Date Due Completion Date    Pneumococcal Vaccines (Age 0-64) (2 - PCV) 09/01/2021 9/1/2020    Colorectal Cancer Screening Never done ---    COVID-19 Vaccine (1) 03/22/2023 (Originally 3/1/1976) ---    Mammogram 12/19/2023 12/19/2022    Cervical Cancer Screening 12/16/2024 12/16/2021    Hemoglobin A1c (Diabetic Prevention Screening) 01/18/2026 1/18/2023    Lipid Panel 01/18/2028 1/18/2023    TETANUS VACCINE 01/27/2030 1/27/2020        No orders of the defined types were placed in this encounter.    The following information is provided to all patients.  This information is to help you find resources for any of the problems found today that may be affecting your health:                Living healthy guide: www.[facility].[place].[facility]      Understanding Diabetes: www.diabetes.org      Eating healthy: www.cdc.gov/healthyweight      [facility] home safety checklist: www.cdc.gov/steadi/patient.html      Agency on Aging: www.goea.[place].gov      Alcoholics anonymous (AA): www.aa.org      Physical Activity: www.aleks.nih.gov/vw6ogoq      Tobacco use: www.quitwithusla.org

## 2023-05-03 DIAGNOSIS — Z71.89 COMPLEX CARE COORDINATION: ICD-10-CM

## 2023-05-31 ENCOUNTER — PATIENT MESSAGE (OUTPATIENT)
Dept: OBSTETRICS AND GYNECOLOGY | Facility: CLINIC | Age: 48
End: 2023-05-31
Payer: MEDICARE

## 2023-06-23 PROBLEM — J45.20 MILD INTERMITTENT ASTHMA WITHOUT COMPLICATION: Status: ACTIVE | Noted: 2023-06-23

## 2023-06-26 PROBLEM — K25.3 ACUTE GASTRIC ULCER WITHOUT HEMORRHAGE OR PERFORATION: Status: RESOLVED | Noted: 2023-03-22 | Resolved: 2023-06-26

## 2023-06-27 ENCOUNTER — OFFICE VISIT (OUTPATIENT)
Dept: OBSTETRICS AND GYNECOLOGY | Facility: CLINIC | Age: 48
End: 2023-06-27
Payer: MEDICARE

## 2023-06-27 VITALS
WEIGHT: 166.25 LBS | SYSTOLIC BLOOD PRESSURE: 110 MMHG | HEIGHT: 57 IN | BODY MASS INDEX: 35.87 KG/M2 | DIASTOLIC BLOOD PRESSURE: 72 MMHG

## 2023-06-27 DIAGNOSIS — E66.9 CLASS 2 OBESITY WITH BODY MASS INDEX (BMI) OF 35.0 TO 35.9 IN ADULT, UNSPECIFIED OBESITY TYPE, UNSPECIFIED WHETHER SERIOUS COMORBIDITY PRESENT: ICD-10-CM

## 2023-06-27 DIAGNOSIS — Z91.89 AT HIGH RISK FOR BREAST CANCER: ICD-10-CM

## 2023-06-27 DIAGNOSIS — Z12.31 ENCOUNTER FOR SCREENING MAMMOGRAM FOR BREAST CANCER: ICD-10-CM

## 2023-06-27 DIAGNOSIS — Z01.419 GYNECOLOGIC EXAM NORMAL: Primary | ICD-10-CM

## 2023-06-27 PROCEDURE — 4010F ACE/ARB THERAPY RXD/TAKEN: CPT | Mod: CPTII,S$GLB,, | Performed by: OBSTETRICS & GYNECOLOGY

## 2023-06-27 PROCEDURE — 3008F BODY MASS INDEX DOCD: CPT | Mod: CPTII,S$GLB,, | Performed by: OBSTETRICS & GYNECOLOGY

## 2023-06-27 PROCEDURE — 3078F DIAST BP <80 MM HG: CPT | Mod: CPTII,S$GLB,, | Performed by: OBSTETRICS & GYNECOLOGY

## 2023-06-27 PROCEDURE — 3078F PR MOST RECENT DIASTOLIC BLOOD PRESSURE < 80 MM HG: ICD-10-PCS | Mod: CPTII,S$GLB,, | Performed by: OBSTETRICS & GYNECOLOGY

## 2023-06-27 PROCEDURE — 1159F MED LIST DOCD IN RCRD: CPT | Mod: CPTII,S$GLB,, | Performed by: OBSTETRICS & GYNECOLOGY

## 2023-06-27 PROCEDURE — 88141 PR  CYTOPATH CERV/VAG INTERPRET: ICD-10-PCS | Mod: ,,, | Performed by: PATHOLOGY

## 2023-06-27 PROCEDURE — 3074F SYST BP LT 130 MM HG: CPT | Mod: CPTII,S$GLB,, | Performed by: OBSTETRICS & GYNECOLOGY

## 2023-06-27 PROCEDURE — 87624 HPV HI-RISK TYP POOLED RSLT: CPT | Performed by: OBSTETRICS & GYNECOLOGY

## 2023-06-27 PROCEDURE — 3044F PR MOST RECENT HEMOGLOBIN A1C LEVEL <7.0%: ICD-10-PCS | Mod: CPTII,S$GLB,, | Performed by: OBSTETRICS & GYNECOLOGY

## 2023-06-27 PROCEDURE — G0101 PR CA SCREEN;PELVIC/BREAST EXAM: ICD-10-PCS | Mod: S$GLB,,, | Performed by: OBSTETRICS & GYNECOLOGY

## 2023-06-27 PROCEDURE — 99999 PR PBB SHADOW E&M-EST. PATIENT-LVL IV: ICD-10-PCS | Mod: PBBFAC,,, | Performed by: OBSTETRICS & GYNECOLOGY

## 2023-06-27 PROCEDURE — 88141 CYTOPATH C/V INTERPRET: CPT | Mod: ,,, | Performed by: PATHOLOGY

## 2023-06-27 PROCEDURE — G0101 CA SCREEN;PELVIC/BREAST EXAM: HCPCS | Mod: S$GLB,,, | Performed by: OBSTETRICS & GYNECOLOGY

## 2023-06-27 PROCEDURE — 3074F PR MOST RECENT SYSTOLIC BLOOD PRESSURE < 130 MM HG: ICD-10-PCS | Mod: CPTII,S$GLB,, | Performed by: OBSTETRICS & GYNECOLOGY

## 2023-06-27 PROCEDURE — 4010F PR ACE/ARB THEARPY RXD/TAKEN: ICD-10-PCS | Mod: CPTII,S$GLB,, | Performed by: OBSTETRICS & GYNECOLOGY

## 2023-06-27 PROCEDURE — 99999 PR PBB SHADOW E&M-EST. PATIENT-LVL IV: CPT | Mod: PBBFAC,,, | Performed by: OBSTETRICS & GYNECOLOGY

## 2023-06-27 PROCEDURE — 88175 CYTOPATH C/V AUTO FLUID REDO: CPT | Performed by: PATHOLOGY

## 2023-06-27 PROCEDURE — 3008F PR BODY MASS INDEX (BMI) DOCUMENTED: ICD-10-PCS | Mod: CPTII,S$GLB,, | Performed by: OBSTETRICS & GYNECOLOGY

## 2023-06-27 PROCEDURE — 1159F PR MEDICATION LIST DOCUMENTED IN MEDICAL RECORD: ICD-10-PCS | Mod: CPTII,S$GLB,, | Performed by: OBSTETRICS & GYNECOLOGY

## 2023-06-27 PROCEDURE — 3044F HG A1C LEVEL LT 7.0%: CPT | Mod: CPTII,S$GLB,, | Performed by: OBSTETRICS & GYNECOLOGY

## 2023-06-27 NOTE — PROGRESS NOTES
Chief Complaint   Patient presents with    Well Woman    Hormones       History of Present Illness: Lavonne Pierre is a 47 y.o. female that presents today 2023 with Patient's last menstrual period was 2023 (approximate).  for well gyn visit.    Past Medical History:   Diagnosis Date    Anxiety 2013    Asthma     Depression     Fibromyalgia     GERD (gastroesophageal reflux disease)     History of pneumonia     HTN (hypertension)     Preeclampsia     Sleep apnea     Uses CPAP       Past Surgical History:   Procedure Laterality Date    BREAST BIOPSY      Benign    BREAST SURGERY  Right Breast     Fibro cystic    CARPAL TUNNEL RELEASE Right 2017     SECTION, LOW TRANSVERSE      for PREE    NASAL SEPTUM SURGERY      NECK SURGERY      TUBAL LIGATION      upper GI series         Current Outpatient Medications   Medication Sig Dispense Refill    albuterol (VENTOLIN HFA) 90 mcg/actuation inhaler Inhale 1-2 puffs into the lungs every 4 (four) hours as needed for Wheezing or Shortness of Breath. Rescue 18 g 11    azelastine (ASTELIN) 137 mcg (0.1 %) nasal spray U 1 TO 2 SPRAYS IN EACH NOSTRIL BID  0    CARAFATE 1 gram tablet Take 1 g by mouth 2 (two) times daily.      diclofenac sodium (VOLTAREN) 1 % Gel APPLY 2 GRAMS TOPICALLY THREE TIMES DAILY 100 g 5    fluticasone propionate (FLONASE) 50 mcg/actuation nasal spray INSTILL 1 TO 2 SPRAYS IN EACH NOSTRIL QD  5    fluticasone-umeclidin-vilanter (TRELEGY ELLIPTA) 200-62.5-25 mcg inhaler Inhale 1 puff into the lungs once daily. 30 each 11    ipratropium (ATROVENT) 42 mcg (0.06 %) nasal spray by Each Nostril route.      lisinopriL (PRINIVIL,ZESTRIL) 5 MG tablet Take 1 tablet (5 mg total) by mouth once daily. 90 tablet 3    montelukast (SINGULAIR) 10 mg tablet Take 1 tablet (10 mg total) by mouth every evening. 30 tablet 1    multivitamin (THERAGRAN) per tablet Take 1 tablet by mouth once daily.      ondansetron (ZOFRAN-ODT) 8  MG TbDL       RESTASIS 0.05 % ophthalmic emulsion Place 1 drop into both eyes 2 (two) times daily.      TRULANCE 3 mg Tab Take 1 tablet by mouth.       No current facility-administered medications for this visit.       Review of patient's allergies indicates:   Allergen Reactions    Ibuprofen Other (See Comments)     Affects stomach ulcer, avoids all NSAIDS    Latex, natural rubber Rash     Small red bumps on skin contact       Family History   Problem Relation Age of Onset    Pancreatic cancer Mother     Cancer Mother         Mother passed in 2011 from Pancreas Cancer    Lymphoma Father     Diabetes Father     Hypertension Father     Cancer Father         Small Cell Lung Cancer/ still alive    Breast cancer Maternal Aunt     Cancer Maternal Aunt         Breast Cancer both/ passed.  Im not sure of year i was youg.    Breast cancer Maternal Aunt 70    Pancreatic cancer Maternal Uncle     Breast cancer Paternal Aunt 50    Pancreatic cancer Maternal Grandmother     Heart disease Maternal Grandfather     Diabetes Paternal Grandmother     Cancer Paternal Grandfather         Melanoma    Asthma Brother     Hypertension Brother        Social History     Socioeconomic History    Marital status:    Tobacco Use    Smoking status: Never    Smokeless tobacco: Never   Substance and Sexual Activity    Alcohol use: No    Drug use: No    Sexual activity: Yes     Partners: Female     Birth control/protection: None     Social Determinants of Health     Financial Resource Strain: Low Risk     Difficulty of Paying Living Expenses: Not hard at all   Food Insecurity: No Food Insecurity    Worried About Running Out of Food in the Last Year: Never true    Ran Out of Food in the Last Year: Never true   Transportation Needs: No Transportation Needs    Lack of Transportation (Medical): No    Lack of Transportation (Non-Medical): No   Physical Activity: Inactive    Days of Exercise per Week: 3 days    Minutes of Exercise per Session: 0  "min   Stress: No Stress Concern Present    Feeling of Stress : Only a little   Social Connections: Unknown    Frequency of Communication with Friends and Family: Once a week    Frequency of Social Gatherings with Friends and Family: Once a week    Active Member of Clubs or Organizations: Yes    Attends Club or Organization Meetings: More than 4 times per year    Marital Status:    Housing Stability: Unknown    Unable to Pay for Housing in the Last Year: Patient refused    Number of Places Lived in the Last Year: 1    Unstable Housing in the Last Year: No       OB History    Para Term  AB Living   3 3 2 1   2   SAB IAB Ectopic Multiple Live Births           2      # Outcome Date GA Lbr Ron/2nd Weight Sex Delivery Anes PTL Lv   3 Term            2   29w0d   M CS-Unspec   ALEX   1 Term     M Vag-Spont   ALEX      Obstetric Comments    delivery for PREE       Review of Symptoms:  GENERAL: Denies weight gain or weight loss. Feeling well overall.   SKIN: Denies rash or lesions.   HEAD: Denies head injury or headache.   NODES: Denies enlarged lymph nodes.   CHEST: Denies chest pain or shortness of breath.   CARDIOVASCULAR: Denies palpitations or left sided chest pain.   ABDOMEN: No abdominal pain, constipation, diarrhea, nausea, vomiting or rectal bleeding.   URINARY: No frequency, dysuria, hematuria, or burning on urination.  HEMATOLOGIC: No easy bruisability or excessive bleeding.   MUSCULOSKELETAL: Denies joint pain or swelling.     /72   Ht 4' 9" (1.448 m)   Wt 75.4 kg (166 lb 3.6 oz)   LMP 2023 (Approximate)   Physical Exam:  APPEARANCE: Well nourished, well developed, in no acute distress.  SKIN: Normal skin turgor, no lesions.  NECK: Neck symmetric without masses   RESPIRATORY: Normal respiratory effort with no retractions or use of accessory muscles  CARDIOVASCULAR: Peripheral vascular system with no swelling no varicosities and palpation of pulses normal  LYMPHATIC: " No enlargements of the lymph nodes noted in the neck, axillae, or groin  ABDOMEN: Soft. No tenderness or masses. No hepatosplenomegaly. No hernias.  BREASTS: Symmetrical, no skin changes or visible lesions. No palpable masses, nipple discharge or adenopathy bilaterally.  PELVIC: Normal external female genitalia without lesions. Normal hair distribution. Adequate perineal body, normal urethral meatus. Urethra with no masses.  Bladder nontender. Vagina moist and well rugated without lesions or discharge. Cervix pink and without lesions. No significant cystocele or rectocele. Bimanual exam showed uterus normal size, shape, position, mobile and nontender. Adnexa without masses or tenderness. Urethra and bladder normal.   EXTREMITIES: No clubbing cyanosis or edema.    ASSESSMENT/PLAN:  Gynecologic exam normal    Encounter for screening mammogram for breast cancer  -     Mammo Digital Screening Bilat; Future; Expected date: 06/27/2023          Patient was counseled today on Pelvic exams and Pap Smear guidelines.   We discussed STD screening if at high risk for a STD.  We discussed recommendation for breast cancer screening with mammogram every other year after the age of 40 and annually after the age of 50.    We discussed colon cancer screening when indicated.   Osteoporosis screening discussed when indicated.   She was advised to see her primary care physician for all other health maintenance.     FOLLOW-UP with me for next routine visit.

## 2023-07-07 ENCOUNTER — HOSPITAL ENCOUNTER (OUTPATIENT)
Dept: RADIOLOGY | Facility: HOSPITAL | Age: 48
Discharge: HOME | End: 2023-07-07
Attending: OBSTETRICS & GYNECOLOGY
Payer: MEDICARE

## 2023-07-07 ENCOUNTER — PATIENT MESSAGE (OUTPATIENT)
Dept: OBSTETRICS AND GYNECOLOGY | Facility: CLINIC | Age: 48
End: 2023-07-07
Payer: MEDICARE

## 2023-07-07 DIAGNOSIS — Z91.89 AT HIGH RISK FOR BREAST CANCER: ICD-10-CM

## 2023-07-07 LAB
FINAL PATHOLOGIC DIAGNOSIS: ABNORMAL
Lab: ABNORMAL

## 2023-07-07 PROCEDURE — 25500020 PHARM REV CODE 255: Mod: PO | Performed by: OBSTETRICS & GYNECOLOGY

## 2023-07-07 PROCEDURE — 77049 MRI BREAST C-+ W/CAD BI: CPT | Mod: 26,,, | Performed by: RADIOLOGY

## 2023-07-07 PROCEDURE — 77049 MRI BREAST W/WO CONTRAST, W/CAD, BILATERAL: ICD-10-PCS | Mod: 26,,, | Performed by: RADIOLOGY

## 2023-07-07 PROCEDURE — 77049 MRI BREAST C-+ W/CAD BI: CPT | Mod: TC,PO

## 2023-07-07 PROCEDURE — A9577 INJ MULTIHANCE: HCPCS | Mod: PO | Performed by: OBSTETRICS & GYNECOLOGY

## 2023-07-07 RX ADMIN — GADOBENATE DIMEGLUMINE 14 ML: 529 INJECTION, SOLUTION INTRAVENOUS at 12:07

## 2023-07-18 ENCOUNTER — OFFICE VISIT (OUTPATIENT)
Dept: FAMILY MEDICINE | Facility: CLINIC | Age: 48
End: 2023-07-18
Payer: MEDICARE

## 2023-07-18 ENCOUNTER — TELEPHONE (OUTPATIENT)
Dept: FAMILY MEDICINE | Facility: CLINIC | Age: 48
End: 2023-07-18

## 2023-07-18 VITALS
HEIGHT: 57 IN | OXYGEN SATURATION: 100 % | HEART RATE: 73 BPM | WEIGHT: 167.25 LBS | SYSTOLIC BLOOD PRESSURE: 116 MMHG | DIASTOLIC BLOOD PRESSURE: 80 MMHG | BODY MASS INDEX: 36.08 KG/M2

## 2023-07-18 DIAGNOSIS — M79.7 FIBROMYALGIA: Primary | ICD-10-CM

## 2023-07-18 DIAGNOSIS — E66.01 SEVERE OBESITY (BMI 35.0-39.9) WITH COMORBIDITY: ICD-10-CM

## 2023-07-18 DIAGNOSIS — J45.20 MILD INTERMITTENT ASTHMA WITHOUT COMPLICATION: ICD-10-CM

## 2023-07-18 PROCEDURE — 1159F PR MEDICATION LIST DOCUMENTED IN MEDICAL RECORD: ICD-10-PCS | Mod: CPTII,S$GLB,, | Performed by: INTERNAL MEDICINE

## 2023-07-18 PROCEDURE — 99214 OFFICE O/P EST MOD 30 MIN: CPT | Mod: S$GLB,,, | Performed by: INTERNAL MEDICINE

## 2023-07-18 PROCEDURE — 3044F PR MOST RECENT HEMOGLOBIN A1C LEVEL <7.0%: ICD-10-PCS | Mod: CPTII,S$GLB,, | Performed by: INTERNAL MEDICINE

## 2023-07-18 PROCEDURE — 4010F PR ACE/ARB THEARPY RXD/TAKEN: ICD-10-PCS | Mod: CPTII,S$GLB,, | Performed by: INTERNAL MEDICINE

## 2023-07-18 PROCEDURE — 3044F HG A1C LEVEL LT 7.0%: CPT | Mod: CPTII,S$GLB,, | Performed by: INTERNAL MEDICINE

## 2023-07-18 PROCEDURE — 3079F PR MOST RECENT DIASTOLIC BLOOD PRESSURE 80-89 MM HG: ICD-10-PCS | Mod: CPTII,S$GLB,, | Performed by: INTERNAL MEDICINE

## 2023-07-18 PROCEDURE — 1159F MED LIST DOCD IN RCRD: CPT | Mod: CPTII,S$GLB,, | Performed by: INTERNAL MEDICINE

## 2023-07-18 PROCEDURE — 3008F PR BODY MASS INDEX (BMI) DOCUMENTED: ICD-10-PCS | Mod: CPTII,S$GLB,, | Performed by: INTERNAL MEDICINE

## 2023-07-18 PROCEDURE — 3074F SYST BP LT 130 MM HG: CPT | Mod: CPTII,S$GLB,, | Performed by: INTERNAL MEDICINE

## 2023-07-18 PROCEDURE — 99999 PR PBB SHADOW E&M-EST. PATIENT-LVL IV: ICD-10-PCS | Mod: PBBFAC,,, | Performed by: INTERNAL MEDICINE

## 2023-07-18 PROCEDURE — 1160F PR REVIEW ALL MEDS BY PRESCRIBER/CLIN PHARMACIST DOCUMENTED: ICD-10-PCS | Mod: CPTII,S$GLB,, | Performed by: INTERNAL MEDICINE

## 2023-07-18 PROCEDURE — 4010F ACE/ARB THERAPY RXD/TAKEN: CPT | Mod: CPTII,S$GLB,, | Performed by: INTERNAL MEDICINE

## 2023-07-18 PROCEDURE — 3079F DIAST BP 80-89 MM HG: CPT | Mod: CPTII,S$GLB,, | Performed by: INTERNAL MEDICINE

## 2023-07-18 PROCEDURE — 1160F RVW MEDS BY RX/DR IN RCRD: CPT | Mod: CPTII,S$GLB,, | Performed by: INTERNAL MEDICINE

## 2023-07-18 PROCEDURE — 99214 PR OFFICE/OUTPT VISIT, EST, LEVL IV, 30-39 MIN: ICD-10-PCS | Mod: S$GLB,,, | Performed by: INTERNAL MEDICINE

## 2023-07-18 PROCEDURE — 3008F BODY MASS INDEX DOCD: CPT | Mod: CPTII,S$GLB,, | Performed by: INTERNAL MEDICINE

## 2023-07-18 PROCEDURE — 99999 PR PBB SHADOW E&M-EST. PATIENT-LVL IV: CPT | Mod: PBBFAC,,, | Performed by: INTERNAL MEDICINE

## 2023-07-18 PROCEDURE — 3074F PR MOST RECENT SYSTOLIC BLOOD PRESSURE < 130 MM HG: ICD-10-PCS | Mod: CPTII,S$GLB,, | Performed by: INTERNAL MEDICINE

## 2023-07-18 RX ORDER — MIRABEGRON 25 MG/1
25 TABLET, FILM COATED, EXTENDED RELEASE ORAL
COMMUNITY
Start: 2023-07-07 | End: 2023-09-13

## 2023-07-18 NOTE — TELEPHONE ENCOUNTER
----- Message from Brii Puentes sent at 7/18/2023 10:05 AM CDT -----  Contact: opal  Type: RX Refill Request        Who Called: opal   Refill or New Rx: new rx  RX Name and Strength: natrexone tablet 4 mg  Preferred Pharmacy with phone number:   [id number] - [place] [address] AT [facility] [address]  [address] [place] [address]  Phone: [phone] Fax: [phone]  Local or Mail Order: local   Ordering Provider: Dr. Bernabe   [facility] Call Back Number: [phone]  Additional Information: This location cant compound this medication will have to send it to a compounding pharmacy or see if a new script will be sent in that they can fill. Thanks

## 2023-07-18 NOTE — PROGRESS NOTES
Subjective     Lavonne Pierre is a 47 y.o. old, female here for Follow-up    48 y/o with PMH of fibromyalgia, HTN, asthma, allergies, SALVADOR, GERD    Patient is here for follow-up on chronic medical problems    Fibromyalgia pain still significant. Multiple past meds trialed, which I will not list in full detail here. Also followed by rheum.    She had done very well losing weight on optavia, but can't afford this and gained most of the weight back. She does cardio at the gym about twice a week.    Allergies: bothersome currently, followed by pulm for asthma, now on trelegy.    She has an appt next year for f/u with Dr. Benedict for repeat EGD and screening colonoscopy.    Review of Systems   Constitutional:  Positive for malaise/fatigue.   Musculoskeletal:  Positive for back pain, joint pain and myalgias.   Medications     Outpatient Medications Marked as Taking for the 7/18/23 encounter (Office Visit) with Ranjit Bernabe MD   Medication Sig Dispense Refill    albuterol (VENTOLIN HFA) 90 mcg/actuation inhaler Inhale 1-2 puffs into the lungs every 4 (four) hours as needed for Wheezing or Shortness of Breath. Rescue 18 g 11    azelastine (ASTELIN) 137 mcg (0.1 %) nasal spray U 1 TO 2 SPRAYS IN EACH NOSTRIL BID  0    CARAFATE 1 gram tablet Take 1 g by mouth 2 (two) times daily.      diclofenac sodium (VOLTAREN) 1 % Gel APPLY 2 GRAMS TOPICALLY THREE TIMES DAILY 100 g 5    fluticasone propionate (FLONASE) 50 mcg/actuation nasal spray INSTILL 1 TO 2 SPRAYS IN EACH NOSTRIL QD  5    fluticasone-umeclidin-vilanter (TRELEGY ELLIPTA) 200-62.5-25 mcg inhaler Inhale 1 puff into the lungs once daily. 30 each 11    ipratropium (ATROVENT) 42 mcg (0.06 %) nasal spray by Each Nostril route.      lisinopriL (PRINIVIL,ZESTRIL) 5 MG tablet Take 1 tablet (5 mg total) by mouth once daily. 90 tablet 3    montelukast (SINGULAIR) 10 mg tablet Take 1 tablet (10 mg total) by mouth every evening. 30 tablet 1    multivitamin (THERAGRAN)  "per tablet Take 1 tablet by mouth once daily.      MYRBETRIQ 25 mg Tb24 ER tablet Take 25 mg by mouth.      ondansetron (ZOFRAN-ODT) 8 MG TbDL       RESTASIS 0.05 % ophthalmic emulsion Place 1 drop into both eyes 2 (two) times daily.      TRULANCE 3 mg Tab Take 1 tablet by mouth.       Objective     /80   Pulse 73   Ht 4' 9" (1.448 m)   Wt 75.8 kg (167 lb 3.5 oz)   LMP 06/13/2023 (Approximate)   SpO2 100%   BMI 36.19 kg/m²   Physical Exam  Constitutional:       General: She is not in acute distress.     Appearance: Normal appearance. She is well-developed.   Neurological:      Mental Status: She is alert.     Assessment and Plan     Fibromyalgia  -     natrexone tablet 4 mg; Take 1 tablet (4 mg total) by mouth every evening.  Dispense: 90 tablet; Refill: 3  -     Acupuncture; Future    Mild intermittent asthma without complication    Severe obesity (BMI 35.0-39.9) with comorbidity      Need to explore other treatment modalities for CPS/fibromyalgia. She has not yet tried low dose naltrexone nor acupuncture, so reasonable to start there.    Follow up in about 6 months (around 1/18/2024).  ___________________  Ranjit Bernabe MD  Internal Medicine and Pediatrics  "

## 2023-08-28 ENCOUNTER — CLINICAL SUPPORT (OUTPATIENT)
Dept: REHABILITATION | Facility: HOSPITAL | Age: 48
End: 2023-08-28
Attending: INTERNAL MEDICINE
Payer: MEDICARE

## 2023-08-28 DIAGNOSIS — M54.50 CHRONIC BILATERAL LOW BACK PAIN WITHOUT SCIATICA: ICD-10-CM

## 2023-08-28 DIAGNOSIS — G89.29 CHRONIC BILATERAL LOW BACK PAIN WITHOUT SCIATICA: ICD-10-CM

## 2023-08-28 DIAGNOSIS — M79.7 FIBROMYALGIA: ICD-10-CM

## 2023-08-28 PROCEDURE — 97811 ACUP 1/> W/O ESTIM EA ADD 15: CPT | Mod: HCNC,PN

## 2023-08-28 PROCEDURE — 97810 ACUP 1/> WO ESTIM 1ST 15 MIN: CPT | Mod: HCNC,PN

## 2023-08-30 PROBLEM — M54.50 CHRONIC BILATERAL LOW BACK PAIN WITHOUT SCIATICA: Status: ACTIVE | Noted: 2023-08-30

## 2023-08-30 PROBLEM — G89.29 CHRONIC BILATERAL LOW BACK PAIN WITHOUT SCIATICA: Status: ACTIVE | Noted: 2023-08-30

## 2023-08-30 NOTE — PROGRESS NOTES
Acupuncture Evaluation Note     Name: Lavonne Pierre  Clinic Number: [id number]    Traditional Chinese Medicine (TCM) Diagnosis: Qi Stagnation  Medical Diagnosis:   Encounter Diagnoses   Name Primary?    Fibromyalgia     Chronic bilateral low back pain without sciatica         Evaluation Date: 8/28/23    Visit #/Visits authorized: 1/12     Precautions: Standard    Subjective     Chief Concern: Pain from fibromyalgia including hips, chronic low back pain, joints. Pain decreases ROM and affects activities/enjoyment.    Medical necessity is demonstrated by the following IMPAIRMENTS: Medical Necessity: Decreased mobility limits day to day activities, social, and emergent situations              Aggravating Factors:  movement and pressure   Relieving Factors:  rest    Symptom Description:     Quality:  Aching  Severity:  5-7 out of 10; depending  Frequency:  when active      Objective       Pulse:        wiry     Treatment       Acupuncture points used:  K3, UB 62, SI 4, TB 5, GB 20  Needles In: 10  Needles Out: 10  Needles W/O STIM placed: 12:15pm  Needles W/O STIM removed: 1pm      Assessment     After treatment, patient felt more relaxed and less stiffness/pain by a couple clicks on the pain scale    Patient prognosis is Good.     Patient will continue to benefit from acupuncture treatment to address the deficits listed in the problem list box on initial evaluation, provide patient family education and to maximize pt's level of independence in the home and community environment.     Patient's spiritual, cultural and educational needs considered and pt agreeable to plan of care and goals.     Anticipated barriers to treatment: none    Plan     Recommend every 2 weeks for 12 sessions with midway re-assess.      Education:  Patient is aware of cumulative benefit of acupuncture

## 2023-10-02 ENCOUNTER — CLINICAL SUPPORT (OUTPATIENT)
Dept: REHABILITATION | Facility: HOSPITAL | Age: 48
End: 2023-10-02
Payer: MEDICARE

## 2023-10-02 DIAGNOSIS — G89.29 CHRONIC BILATERAL LOW BACK PAIN WITHOUT SCIATICA: ICD-10-CM

## 2023-10-02 DIAGNOSIS — M79.7 FIBROMYALGIA: Primary | ICD-10-CM

## 2023-10-02 DIAGNOSIS — M54.50 CHRONIC BILATERAL LOW BACK PAIN WITHOUT SCIATICA: ICD-10-CM

## 2023-10-02 PROCEDURE — 97810 ACUP 1/> WO ESTIM 1ST 15 MIN: CPT | Mod: HCNC,PN

## 2023-10-02 PROCEDURE — 97811 ACUP 1/> W/O ESTIM EA ADD 15: CPT | Mod: HCNC,PN

## 2023-10-05 PROBLEM — J45.40 MODERATE PERSISTENT ASTHMA: Status: ACTIVE | Noted: 2023-10-05

## 2023-10-05 NOTE — PROGRESS NOTES
Acupuncture Evaluation Note     Name: Lavonne Pierre  Clinic Number: [id number]    Traditional Chinese Medicine (TCM) Diagnosis: Qi Stagnation  Medical Diagnosis:   Encounter Diagnoses   Name Primary?    Fibromyalgia Yes    Chronic bilateral low back pain without sciatica         Evaluation Date: 10/2/23    Visit #/Visits authorized: 2/12     Precautions: Standard    Subjective     Chief Concern: Pain from fibromyalgia including hips, chronic low back pain, joints. Pain decreases ROM and affects activities/enjoyment.    Medical necessity is demonstrated by the following IMPAIRMENTS: Medical Necessity: Decreased mobility limits day to day activities, social, and emergent situations              Aggravating Factors:  movement and pressure   Relieving Factors:  rest    Symptom Description:     Quality:  Aching  Severity:  5-7 out of 10; depending  Frequency:  when active      Objective       Pulse:        wiry     Treatment       Acupuncture points used:  K3, UB 62, SI 4, TB 5, GB 20  Needles In: 10  Needles Out: 10  Needles W/O STIM placed: 4pm  [place] W/O STIM removed: 4:45pm      Assessment     After treatment, patient felt more relaxed and less stiffness/pain by a couple clicks on the pain scale    Patient prognosis is Good.     Patient will continue to benefit from acupuncture treatment to address the deficits listed in the problem list box on initial evaluation, provide patient family education and to maximize pt's level of independence in the home and community environment.     Patient's spiritual, cultural and educational needs considered and pt agreeable to plan of care and goals.     Anticipated barriers to treatment: none    Plan     Recommend every 2 weeks for 12 sessions with midway re-assess.      Education:  Patient is aware of cumulative benefit of acupuncture

## 2023-10-30 ENCOUNTER — CLINICAL SUPPORT (OUTPATIENT)
Dept: REHABILITATION | Facility: HOSPITAL | Age: 48
End: 2023-10-30
Payer: MEDICARE

## 2023-10-30 DIAGNOSIS — G89.29 CHRONIC BILATERAL LOW BACK PAIN WITHOUT SCIATICA: ICD-10-CM

## 2023-10-30 DIAGNOSIS — M79.7 FIBROMYALGIA: Primary | ICD-10-CM

## 2023-10-30 DIAGNOSIS — M54.50 CHRONIC BILATERAL LOW BACK PAIN WITHOUT SCIATICA: ICD-10-CM

## 2023-10-30 PROCEDURE — 97810 ACUP 1/> WO ESTIM 1ST 15 MIN: CPT | Mod: HCNC,PN

## 2023-10-30 PROCEDURE — 97811 ACUP 1/> W/O ESTIM EA ADD 15: CPT | Mod: HCNC,PN

## 2023-11-01 NOTE — PROGRESS NOTES
Acupuncture Evaluation Note     Name: Lavonne Pierre  Clinic Number: [id number]    Traditional Chinese Medicine (TCM) Diagnosis: Qi Stagnation  Medical Diagnosis:   Encounter Diagnoses   Name Primary?    Fibromyalgia Yes    Chronic bilateral low back pain without sciatica         Evaluation Date: 10/30/23    Visit #/Visits authorized: 3/12     Precautions: Standard    Subjective     Chief Concern: Pain from fibromyalgia including hips, chronic low back pain, joints. Pain decreases ROM and affects activities/enjoyment.    Medical necessity is demonstrated by the following IMPAIRMENTS: Medical Necessity: Decreased mobility limits day to day activities, social, and emergent situations              Aggravating Factors:  movement and pressure   Relieving Factors:  rest    Symptom Description:     Quality:  Aching  Severity:  5-7 out of 10; depending  Frequency:  when active      Objective       Pulse:        wiry     Treatment       Acupuncture points used:  K3, UB 62, SI 4, TB 5, GB 20  Needles In: 10  Needles Out: 10  Needles W/O STIM placed: 2:30pm  [place] W/O STIM removed: 3:15pm      Assessment     After treatment, patient felt more relaxed and less stiffness/pain by a couple clicks on the pain scale    Patient prognosis is Good.     Patient will continue to benefit from acupuncture treatment to address the deficits listed in the problem list box on initial evaluation, provide patient family education and to maximize pt's level of independence in the home and community environment.     Patient's spiritual, cultural and educational needs considered and pt agreeable to plan of care and goals.     Anticipated barriers to treatment: none    Plan     Recommend every 2 weeks for 12 sessions with midway re-assess.      Education:  Patient is aware of cumulative benefit of acupuncture

## 2023-11-13 ENCOUNTER — OFFICE VISIT (OUTPATIENT)
Dept: OBSTETRICS AND GYNECOLOGY | Facility: CLINIC | Age: 48
End: 2023-11-13
Payer: MEDICARE

## 2023-11-13 ENCOUNTER — TELEPHONE (OUTPATIENT)
Dept: OBSTETRICS AND GYNECOLOGY | Facility: CLINIC | Age: 48
End: 2023-11-13
Payer: MEDICARE

## 2023-11-13 VITALS
BODY MASS INDEX: 36.62 KG/M2 | WEIGHT: 169.75 LBS | DIASTOLIC BLOOD PRESSURE: 76 MMHG | HEIGHT: 57 IN | SYSTOLIC BLOOD PRESSURE: 118 MMHG | RESPIRATION RATE: 18 BRPM

## 2023-11-13 DIAGNOSIS — R30.0 DYSURIA: Primary | ICD-10-CM

## 2023-11-13 LAB
BILIRUBIN, UA POC OHS: NEGATIVE
BLOOD, UA POC OHS: ABNORMAL
CLARITY, UA POC OHS: ABNORMAL
COLOR, UA POC OHS: YELLOW
GLUCOSE, UA POC OHS: NEGATIVE
KETONES, UA POC OHS: NEGATIVE
LEUKOCYTES, UA POC OHS: ABNORMAL
NITRITE, UA POC OHS: NEGATIVE
PH, UA POC OHS: 5.5
PROTEIN, UA POC OHS: NEGATIVE
SPECIFIC GRAVITY, UA POC OHS: 1.02
UROBILINOGEN, UA POC OHS: 0.2

## 2023-11-13 PROCEDURE — 99213 PR OFFICE/OUTPT VISIT, EST, LEVL III, 20-29 MIN: ICD-10-PCS | Mod: HCNC,S$GLB,, | Performed by: OBSTETRICS & GYNECOLOGY

## 2023-11-13 PROCEDURE — 3078F DIAST BP <80 MM HG: CPT | Mod: HCNC,CPTII,S$GLB, | Performed by: OBSTETRICS & GYNECOLOGY

## 2023-11-13 PROCEDURE — 81003 URINALYSIS AUTO W/O SCOPE: CPT | Mod: QW,HCNC,S$GLB, | Performed by: OBSTETRICS & GYNECOLOGY

## 2023-11-13 PROCEDURE — 1159F PR MEDICATION LIST DOCUMENTED IN MEDICAL RECORD: ICD-10-PCS | Mod: HCNC,CPTII,S$GLB, | Performed by: OBSTETRICS & GYNECOLOGY

## 2023-11-13 PROCEDURE — 87077 CULTURE AEROBIC IDENTIFY: CPT | Mod: HCNC | Performed by: OBSTETRICS & GYNECOLOGY

## 2023-11-13 PROCEDURE — 3044F HG A1C LEVEL LT 7.0%: CPT | Mod: HCNC,CPTII,S$GLB, | Performed by: OBSTETRICS & GYNECOLOGY

## 2023-11-13 PROCEDURE — 3008F BODY MASS INDEX DOCD: CPT | Mod: HCNC,CPTII,S$GLB, | Performed by: OBSTETRICS & GYNECOLOGY

## 2023-11-13 PROCEDURE — 3074F PR MOST RECENT SYSTOLIC BLOOD PRESSURE < 130 MM HG: ICD-10-PCS | Mod: HCNC,CPTII,S$GLB, | Performed by: OBSTETRICS & GYNECOLOGY

## 2023-11-13 PROCEDURE — 87186 SC STD MICRODIL/AGAR DIL: CPT | Mod: HCNC | Performed by: OBSTETRICS & GYNECOLOGY

## 2023-11-13 PROCEDURE — 99213 OFFICE O/P EST LOW 20 MIN: CPT | Mod: HCNC,S$GLB,, | Performed by: OBSTETRICS & GYNECOLOGY

## 2023-11-13 PROCEDURE — 4010F ACE/ARB THERAPY RXD/TAKEN: CPT | Mod: HCNC,CPTII,S$GLB, | Performed by: OBSTETRICS & GYNECOLOGY

## 2023-11-13 PROCEDURE — 3008F PR BODY MASS INDEX (BMI) DOCUMENTED: ICD-10-PCS | Mod: HCNC,CPTII,S$GLB, | Performed by: OBSTETRICS & GYNECOLOGY

## 2023-11-13 PROCEDURE — 99999 PR PBB SHADOW E&M-EST. PATIENT-LVL III: ICD-10-PCS | Mod: PBBFAC,HCNC,, | Performed by: OBSTETRICS & GYNECOLOGY

## 2023-11-13 PROCEDURE — 3044F PR MOST RECENT HEMOGLOBIN A1C LEVEL <7.0%: ICD-10-PCS | Mod: HCNC,CPTII,S$GLB, | Performed by: OBSTETRICS & GYNECOLOGY

## 2023-11-13 PROCEDURE — 87086 URINE CULTURE/COLONY COUNT: CPT | Mod: HCNC | Performed by: OBSTETRICS & GYNECOLOGY

## 2023-11-13 PROCEDURE — 3074F SYST BP LT 130 MM HG: CPT | Mod: HCNC,CPTII,S$GLB, | Performed by: OBSTETRICS & GYNECOLOGY

## 2023-11-13 PROCEDURE — 3078F PR MOST RECENT DIASTOLIC BLOOD PRESSURE < 80 MM HG: ICD-10-PCS | Mod: HCNC,CPTII,S$GLB, | Performed by: OBSTETRICS & GYNECOLOGY

## 2023-11-13 PROCEDURE — 1159F MED LIST DOCD IN RCRD: CPT | Mod: HCNC,CPTII,S$GLB, | Performed by: OBSTETRICS & GYNECOLOGY

## 2023-11-13 PROCEDURE — 4010F PR ACE/ARB THEARPY RXD/TAKEN: ICD-10-PCS | Mod: HCNC,CPTII,S$GLB, | Performed by: OBSTETRICS & GYNECOLOGY

## 2023-11-13 PROCEDURE — 87088 URINE BACTERIA CULTURE: CPT | Mod: HCNC | Performed by: OBSTETRICS & GYNECOLOGY

## 2023-11-13 PROCEDURE — 99999 PR PBB SHADOW E&M-EST. PATIENT-LVL III: CPT | Mod: PBBFAC,HCNC,, | Performed by: OBSTETRICS & GYNECOLOGY

## 2023-11-13 PROCEDURE — 81003 POCT URINALYSIS(INSTRUMENT): ICD-10-PCS | Mod: QW,HCNC,S$GLB, | Performed by: OBSTETRICS & GYNECOLOGY

## 2023-11-13 RX ORDER — NITROFURANTOIN 25; 75 MG/1; MG/1
100 CAPSULE ORAL 2 TIMES DAILY
Qty: 14 CAPSULE | Refills: 0 | Status: SHIPPED | OUTPATIENT
Start: 2023-11-13 | End: 2023-11-20

## 2023-11-13 RX ORDER — PHENAZOPYRIDINE HYDROCHLORIDE 100 MG/1
100 TABLET, FILM COATED ORAL 3 TIMES DAILY PRN
Qty: 9 TABLET | Refills: 0 | Status: SHIPPED | OUTPATIENT
Start: 2023-11-13 | End: 2023-11-16

## 2023-11-13 NOTE — PROGRESS NOTES
Chief Complaint   Patient presents with    Urinary Frequency    Urinary Urgency       History of Present Illness: Lavonne Pierre is a 48 y.o. female that presents today 2023 with LMP Patient's last menstrual period was 2023 (approximate). for   Chief Complaint   Patient presents with    Urinary Frequency    Urinary Urgency         Past Medical History:   Diagnosis Date    Anxiety 2013    Asthma     Depression     Fibromyalgia     GERD (gastroesophageal reflux disease)     History of pneumonia     HTN (hypertension)     Preeclampsia     Sleep apnea     Uses CPAP       Past Surgical History:   Procedure Laterality Date    BREAST BIOPSY      Benign    BREAST SURGERY  Right Breast     Fibro cystic    CARPAL TUNNEL RELEASE Right 2017     SECTION, LOW TRANSVERSE      for PREE    NASAL SEPTUM SURGERY      NECK SURGERY      SPINE SURGERY      TUBAL LIGATION      upper GI series         Outpatient Medications Prior to Visit   Medication Sig Dispense Refill    albuterol (PROVENTIL) 2.5 mg /3 mL (0.083 %) nebulizer solution Take 3 mLs (2.5 mg total) by nebulization every 6 (six) hours as needed. Rescue 270 mL 11    albuterol (VENTOLIN HFA) 90 mcg/actuation inhaler Inhale 1-2 puffs into the lungs every 4 (four) hours as needed for Wheezing or Shortness of Breath. Rescue 18 g 11    azelastine (ASTELIN) 137 mcg (0.1 %) nasal spray U 1 TO 2 SPRAYS IN EACH NOSTRIL BID  0    diclofenac sodium (VOLTAREN) 1 % Gel APPLY 2 GRAMS TOPICALLY THREE TIMES DAILY 100 g 5    fluticasone propionate (FLONASE) 50 mcg/actuation nasal spray INSTILL 1 TO 2 SPRAYS IN EACH NOSTRIL QD  5    fluticasone-umeclidin-vilanter (TRELEGY ELLIPTA) 200-62.5-25 mcg inhaler Inhale 1 puff into the lungs once daily. 30 each 11    ipratropium (ATROVENT) 42 mcg (0.06 %) nasal spray by Each Nostril route.      lisinopriL (PRINIVIL,ZESTRIL) 5 MG tablet Take 1 tablet (5 mg total) by mouth once daily. 90 tablet 3     "montelukast (SINGULAIR) 10 mg tablet Take 1 tablet (10 mg total) by mouth every evening. 30 tablet 11    multivitamin (THERAGRAN) per tablet Take 1 tablet by mouth once daily.      RESTASIS 0.05 % ophthalmic emulsion Place 1 drop into both eyes 2 (two) times daily.       No facility-administered medications prior to visit.       Review of patient's allergies indicates:   Allergen Reactions    Ibuprofen Other (See Comments)     Affects stomach ulcer, avoids all NSAIDS    Latex, natural rubber Rash     Small red bumps on skin contact       Family History   Problem Relation Age of Onset    Pancreatic cancer Mother     Cancer Mother         Mother passed in  from Pancreas Cancer    Lymphoma Father     Diabetes Father     Hypertension Father     Cancer Father         Small Cell Lung Cancer/ still alive    Breast cancer Maternal Aunt     Cancer Maternal Aunt         Breast Cancer both/ passed.  Im not sure of year i was youg.    Breast cancer Maternal Aunt 70    Pancreatic cancer Maternal Uncle     Breast cancer Paternal Aunt 50    Pancreatic cancer Maternal Grandmother     Heart disease Maternal Grandfather     Diabetes Paternal Grandmother     Cancer Paternal Grandfather         Melanoma    Asthma Brother     Hypertension Brother        Social History     Tobacco Use    Smoking status: Never    Smokeless tobacco: Never   Substance Use Topics    Alcohol use: No    Drug use: No       OB History    Para Term  AB Living   3 3 2 1   2   SAB IAB Ectopic Multiple Live Births           2      # Outcome Date GA Lbr Ron/2nd Weight Sex Delivery Anes PTL Lv   3 Term            2   29w0d   M CS-Unspec   ALEX   1 Term     M Vag-Spont   ALEX      Obstetric Comments    delivery for PREE         /76   Resp 18   Ht 4' 9" (1.448 m)   Wt 77 kg (169 lb 12.1 oz)   LMP 2023 (Approximate)   Physical Exam:  APPEARANCE: Well nourished, well developed, in no acute distress.  SKIN: Normal skin turgor, " no lesions.  NECK: Neck symmetric without masses   RESPIRATORY: Normal respiratory effort with no retractions or use of accessory muscles  CARDIOVASCULAR: Peripheral vascular system with no swelling no varicosities and palpation of pulses normal  LYMPHATIC: No enlargements of the lymph nodes noted in the neck, axillae, or groin  ABDOMEN: Soft. No tenderness or masses. No hepatosplenomegaly. No hernias.  PELVIC: Normal external female genitalia without lesions. Normal hair distribution. Adequate perineal body, normal urethral meatus. Urethra with no masses.  Bladder ++ tender. Vagina moist and well rugated without lesions or discharge. Cervix pink and without lesions. No significant cystocele or rectocele. Bimanual exam showed uterus normal size, shape, position, mobile and nontender. Adnexa without masses or tenderness. Urethra and bladder normal.  EXTREMITIES: No clubbing cyanosis or edema.    ASSESSMENT/PLAN:  Dysuria  -     POCT Urinalysis(Instrument)  -     Urine culture  -     nitrofurantoin, macrocrystal-monohydrate, (MACROBID) 100 MG capsule; Take 1 capsule (100 mg total) by mouth 2 (two) times daily. for 7 days  Dispense: 14 capsule; Refill: 0  -     phenazopyridine (PYRIDIUM) 100 MG tablet; Take 1 tablet (100 mg total) by mouth 3 (three) times daily as needed for Pain.  Dispense: 9 tablet; Refill: 0        20 minutes spent today preparing reviewing previous external notes, reviewing previous results, performing medical examination, orders tests or medications, counseling and documenting.

## 2023-11-13 NOTE — TELEPHONE ENCOUNTER
----- Message from Ariel Hayes sent at 11/13/2023  6:30 AM CST -----  Type:  Same Day Appointment Request    Caller is requesting a same day appointment.  Caller declined first available appointment listed below.    Name of Caller:  Lavonne  When is the first available appointment? 12-  Symptoms: burning/urgency with urination   Best Call Back Number: [phone]  Additional Information: patient willing to see another provider in that if Dr Cagle is not available today

## 2023-11-15 LAB — BACTERIA UR CULT: ABNORMAL

## 2023-11-16 ENCOUNTER — PATIENT MESSAGE (OUTPATIENT)
Dept: OBSTETRICS AND GYNECOLOGY | Facility: CLINIC | Age: 48
End: 2023-11-16
Payer: MEDICARE

## 2023-11-16 RX ORDER — SULFAMETHOXAZOLE AND TRIMETHOPRIM 800; 160 MG/1; MG/1
1 TABLET ORAL 2 TIMES DAILY
Qty: 14 TABLET | Refills: 0 | Status: SHIPPED | OUTPATIENT
Start: 2023-11-16 | End: 2023-11-23

## 2023-11-16 NOTE — TELEPHONE ENCOUNTER
Attempted to contact pt and advise to stop macrobid and start bactrim sent to pharmacy. No answer, lvm.

## 2023-11-20 ENCOUNTER — TELEPHONE (OUTPATIENT)
Dept: OBSTETRICS AND GYNECOLOGY | Facility: CLINIC | Age: 48
End: 2023-11-20
Payer: MEDICARE

## 2023-11-20 NOTE — TELEPHONE ENCOUNTER
----- Message from Ramila Canada sent at 11/20/2023  3:18 PM CST -----  Contact: Patient  Type:  Needs Medical Advice    Who Called:   Patient    Symptoms (please be specific):   Still burning     Pharmacy name and phone #:        [id number] - [place], [place] - [address] AT [facility] [address]  [address]  [facility] [address]  Phone: [phone] Fax: [phone]    Would the patient rather a call back or a response via [facility]?   Call back  Best Call Back Number:   [phone]    Additional Information:   States she would like to speak with someone - states she finished all of her medications from 2 weeks ago and still has burning - states she would like Dr Cagle to call something else in to the pharmacy if possible - please call - thank you

## 2023-11-27 ENCOUNTER — CLINICAL SUPPORT (OUTPATIENT)
Dept: REHABILITATION | Facility: HOSPITAL | Age: 48
End: 2023-11-27
Payer: MEDICARE

## 2023-11-27 DIAGNOSIS — M54.50 CHRONIC BILATERAL LOW BACK PAIN WITHOUT SCIATICA: ICD-10-CM

## 2023-11-27 DIAGNOSIS — M79.7 FIBROMYALGIA: Primary | ICD-10-CM

## 2023-11-27 DIAGNOSIS — G89.29 CHRONIC BILATERAL LOW BACK PAIN WITHOUT SCIATICA: ICD-10-CM

## 2023-11-27 PROCEDURE — 97810 ACUP 1/> WO ESTIM 1ST 15 MIN: CPT | Mod: HCNC,PN

## 2023-11-27 PROCEDURE — 97811 ACUP 1/> W/O ESTIM EA ADD 15: CPT | Mod: HCNC,PN

## 2023-11-28 NOTE — PROGRESS NOTES
Acupuncture Evaluation Note     Name: Lavonne Pierre  Clinic Number: [id number]    Traditional Chinese Medicine (TCM) Diagnosis: Qi Stagnation  Medical Diagnosis:   Encounter Diagnoses   Name Primary?    Fibromyalgia Yes    Chronic bilateral low back pain without sciatica         Evaluation Date: 11/27/23    Visit #/Visits authorized: 4/12     Precautions: Standard    Subjective     Chief Concern: Pain from fibromyalgia including hips, chronic low back pain, joints. Pain decreases ROM and affects activities/enjoyment.    Medical necessity is demonstrated by the following IMPAIRMENTS: Medical Necessity: Decreased mobility limits day to day activities, social, and emergent situations              Aggravating Factors:  movement and pressure   Relieving Factors:  rest    Symptom Description:     Quality:  Aching  Severity:  5-7 out of 10; depending  Frequency:  when active      Objective       Pulse:        wiry     Treatment       Acupuncture points used:  K3, UB 62, SI 4, TB 5, GB 20  Needles In: 10  Needles Out: 10  Needles W/O STIM placed: 9:30am  Needles W/O STIM removed: 10:15am      Assessment     After treatment, patient felt more relaxed and less stiffness/pain by a couple clicks on the pain scale    Patient prognosis is Good.     Patient will continue to benefit from acupuncture treatment to address the deficits listed in the problem list box on initial evaluation, provide patient family education and to maximize pt's level of independence in the home and community environment.     Patient's spiritual, cultural and educational needs considered and pt agreeable to plan of care and goals.     Anticipated barriers to treatment: none    Plan     Recommend every 2 weeks for 12 sessions with midway re-assess.      Education:  Patient is aware of cumulative benefit of acupuncture

## 2023-12-03 DIAGNOSIS — Z71.89 COMPLEX CARE COORDINATION: ICD-10-CM

## 2023-12-06 ENCOUNTER — PATIENT MESSAGE (OUTPATIENT)
Dept: REHABILITATION | Facility: HOSPITAL | Age: 48
End: 2023-12-06
Payer: MEDICARE

## 2023-12-11 ENCOUNTER — CLINICAL SUPPORT (OUTPATIENT)
Dept: REHABILITATION | Facility: HOSPITAL | Age: 48
End: 2023-12-11
Payer: MEDICARE

## 2023-12-11 DIAGNOSIS — M79.7 FIBROMYALGIA: Primary | ICD-10-CM

## 2023-12-11 DIAGNOSIS — M54.50 CHRONIC BILATERAL LOW BACK PAIN WITHOUT SCIATICA: ICD-10-CM

## 2023-12-11 DIAGNOSIS — G89.29 CHRONIC BILATERAL LOW BACK PAIN WITHOUT SCIATICA: ICD-10-CM

## 2023-12-11 PROCEDURE — 97811 ACUP 1/> W/O ESTIM EA ADD 15: CPT | Mod: HCNC,PN

## 2023-12-11 PROCEDURE — 97810 ACUP 1/> WO ESTIM 1ST 15 MIN: CPT | Mod: HCNC,PN

## 2023-12-14 NOTE — PROGRESS NOTES
Acupuncture Evaluation Note     Name: Lavonne Pierre  Clinic Number: [id number]    Traditional Chinese Medicine (TCM) Diagnosis: Qi Stagnation  Medical Diagnosis:   Encounter Diagnoses   Name Primary?    Fibromyalgia Yes    Chronic bilateral low back pain without sciatica         Evaluation Date: 12/11/23    Visit #/Visits authorized: 5/12     Precautions: Standard    Subjective     Chief Concern: Pain from fibromyalgia including hips, chronic low back pain, joints. Pain decreases ROM and affects activities/enjoyment.    Medical necessity is demonstrated by the following IMPAIRMENTS: Medical Necessity: Decreased mobility limits day to day activities, social, and emergent situations              Aggravating Factors:  movement and pressure   Relieving Factors:  rest    Symptom Description:     Quality:  Aching  Severity:  5-7 out of 10; depending  Frequency:  when active      Objective       Pulse:        wiry     Treatment       Acupuncture points used:  K3, UB 62, SI 4, TB 5, GB 20  Needles In: 10  Needles Out: 10  Needles W/O STIM placed: 2pm  [place] W/O STIM removed: 2:45pm      Assessment     After treatment, patient felt more relaxed and less stiffness/pain by a couple clicks on the pain scale    Patient prognosis is Good.     Patient will continue to benefit from acupuncture treatment to address the deficits listed in the problem list box on initial evaluation, provide patient family education and to maximize pt's level of independence in the home and community environment.     Patient's spiritual, cultural and educational needs considered and pt agreeable to plan of care and goals.     Anticipated barriers to treatment: none    Plan     Recommend every 2 weeks for 12 sessions with midway re-assess.      Education:  Patient is aware of cumulative benefit of acupuncture

## 2024-01-08 ENCOUNTER — CLINICAL SUPPORT (OUTPATIENT)
Dept: REHABILITATION | Facility: HOSPITAL | Age: 49
End: 2024-01-08
Payer: MEDICARE

## 2024-01-08 DIAGNOSIS — M79.7 FIBROMYALGIA: Primary | ICD-10-CM

## 2024-01-08 DIAGNOSIS — M54.50 CHRONIC BILATERAL LOW BACK PAIN WITHOUT SCIATICA: ICD-10-CM

## 2024-01-08 DIAGNOSIS — G89.29 CHRONIC BILATERAL LOW BACK PAIN WITHOUT SCIATICA: ICD-10-CM

## 2024-01-08 PROCEDURE — 97811 ACUP 1/> W/O ESTIM EA ADD 15: CPT | Mod: HCNC,PN

## 2024-01-08 PROCEDURE — 97810 ACUP 1/> WO ESTIM 1ST 15 MIN: CPT | Mod: HCNC,PN

## 2024-01-08 NOTE — PROGRESS NOTES
Acupuncture Evaluation Note     Name: Lavonne Pierre  Clinic Number: [id number]    Traditional Chinese Medicine (TCM) Diagnosis: Qi Stagnation  Medical Diagnosis:   Encounter Diagnoses   Name Primary?    Fibromyalgia Yes    Chronic bilateral low back pain without sciatica         Evaluation Date: 1/8/24    Visit #/Visits authorized: 1/12    Precautions: Standard    Subjective     Chief Concern: Pain from fibromyalgia including hips, chronic low back pain, joints. Pain decreases ROM and affects activities/enjoyment.    Medical necessity is demonstrated by the following IMPAIRMENTS: Medical Necessity: Decreased mobility limits day to day activities, social, and emergent situations              Aggravating Factors:  movement and pressure   Relieving Factors:  rest    Symptom Description:     Quality:  Aching  Severity:  5-7 out of 10; depending  Frequency:  when active      Objective       Pulse:        wiry     Treatment       Acupuncture points used:  K3, UB 62, SI 4, TB 5, GB 20  Needles In: 10  Needles Out: 10  Needles W/O STIM placed: 9:30am  Needles W/O STIM removed: 10:15am      Assessment     After treatment, patient felt more relaxed and less stiffness/pain by a couple clicks on the pain scale    Patient prognosis is Good.     Patient will continue to benefit from acupuncture treatment to address the deficits listed in the problem list box on initial evaluation, provide patient family education and to maximize pt's level of independence in the home and community environment.     Patient's spiritual, cultural and educational needs considered and pt agreeable to plan of care and goals.     Anticipated barriers to treatment: none    Plan     Recommend every 2 weeks for 12 sessions with midway re-assess.      Education:  Patient is aware of cumulative benefit of acupuncture

## 2024-01-18 ENCOUNTER — OFFICE VISIT (OUTPATIENT)
Dept: FAMILY MEDICINE | Facility: CLINIC | Age: 49
End: 2024-01-18
Payer: MEDICARE

## 2024-01-18 VITALS
OXYGEN SATURATION: 99 % | HEART RATE: 70 BPM | BODY MASS INDEX: 34.21 KG/M2 | SYSTOLIC BLOOD PRESSURE: 120 MMHG | DIASTOLIC BLOOD PRESSURE: 68 MMHG | WEIGHT: 158.06 LBS

## 2024-01-18 DIAGNOSIS — M25.512 CHRONIC PAIN OF BOTH SHOULDERS: ICD-10-CM

## 2024-01-18 DIAGNOSIS — J41.1 MUCOPURULENT CHRONIC BRONCHITIS: ICD-10-CM

## 2024-01-18 DIAGNOSIS — R20.2 PARESTHESIA OF BOTH HANDS: ICD-10-CM

## 2024-01-18 DIAGNOSIS — M25.511 CHRONIC PAIN OF BOTH SHOULDERS: ICD-10-CM

## 2024-01-18 DIAGNOSIS — E66.9 OBESITY (BMI 30.0-34.9): ICD-10-CM

## 2024-01-18 DIAGNOSIS — G89.29 CHRONIC PAIN OF BOTH SHOULDERS: ICD-10-CM

## 2024-01-18 DIAGNOSIS — M79.7 FIBROMYALGIA: Primary | ICD-10-CM

## 2024-01-18 PROBLEM — E66.01 SEVERE OBESITY (BMI 35.0-39.9) WITH COMORBIDITY: Status: RESOLVED | Noted: 2023-07-18 | Resolved: 2024-01-18

## 2024-01-18 PROCEDURE — 3078F DIAST BP <80 MM HG: CPT | Mod: HCNC,CPTII,S$GLB, | Performed by: INTERNAL MEDICINE

## 2024-01-18 PROCEDURE — 1159F MED LIST DOCD IN RCRD: CPT | Mod: HCNC,CPTII,S$GLB, | Performed by: INTERNAL MEDICINE

## 2024-01-18 PROCEDURE — 3008F BODY MASS INDEX DOCD: CPT | Mod: HCNC,CPTII,S$GLB, | Performed by: INTERNAL MEDICINE

## 2024-01-18 PROCEDURE — 99999 PR PBB SHADOW E&M-EST. PATIENT-LVL IV: CPT | Mod: PBBFAC,HCNC,, | Performed by: INTERNAL MEDICINE

## 2024-01-18 PROCEDURE — 99214 OFFICE O/P EST MOD 30 MIN: CPT | Mod: HCNC,S$GLB,, | Performed by: INTERNAL MEDICINE

## 2024-01-18 PROCEDURE — 3074F SYST BP LT 130 MM HG: CPT | Mod: HCNC,CPTII,S$GLB, | Performed by: INTERNAL MEDICINE

## 2024-01-18 PROCEDURE — 1160F RVW MEDS BY RX/DR IN RCRD: CPT | Mod: HCNC,CPTII,S$GLB, | Performed by: INTERNAL MEDICINE

## 2024-01-18 NOTE — PROGRESS NOTES
Subjective     Lavonne Pierre is a 48 y.o. old, female here for Follow-up    49 y/o with PMH of fibromyalgia, HTN, asthma, allergies, SALVADOR, GERD     Fibromyalgia: she has been doing acupuncture and seeing some results.  Recently having more shoulder pain and more pain radiating down her legs. Pain interrupts her sleep at night. She is taking tylenol arthritis at times which helps.    She did not start taking low dose naltrexone I recommended to her last time but did pick it up at the pharmacy.    She is having increased tingling in her hands. The right thumb feels like it locks up at times.    Obesity: she has been on semaglutide at the [facility].  Wt Readings from Last 3 Encounters:   01/18/24 0752 71.7 kg (158 lb 1.1 oz)   11/13/23 0944 77 kg (169 lb 12.1 oz)   10/05/23 1141 78.5 kg (173 lb)     ROS  Medications     Outpatient Medications Marked as Taking for the 1/18/24 encounter (Office Visit) with Ranjit Bernaeb MD   Medication Sig Dispense Refill    albuterol (PROVENTIL) 2.5 mg /3 mL (0.083 %) nebulizer solution Take 3 mLs (2.5 mg total) by nebulization every 6 (six) hours as needed. Rescue 270 mL 11    albuterol (VENTOLIN HFA) 90 mcg/actuation inhaler Inhale 1-2 puffs into the lungs every 4 (four) hours as needed for Wheezing or Shortness of Breath. Rescue 18 g 11    azelastine (ASTELIN) 137 mcg (0.1 %) nasal spray U 1 TO 2 SPRAYS IN EACH NOSTRIL BID  0    diclofenac sodium (VOLTAREN) 1 % Gel APPLY 2 GRAMS TOPICALLY THREE TIMES DAILY 100 g 5    fluticasone propionate (FLONASE) 50 mcg/actuation nasal spray INSTILL 1 TO 2 SPRAYS IN EACH NOSTRIL QD  5    fluticasone-umeclidin-vilanter (TRELEGY ELLIPTA) 200-62.5-25 mcg inhaler Inhale 1 puff into the lungs once daily. 30 each 11    lisinopriL (PRINIVIL,ZESTRIL) 5 MG tablet Take 1 tablet (5 mg total) by mouth once daily. 90 tablet 3    montelukast (SINGULAIR) 10 mg tablet Take 1 tablet (10 mg total) by mouth every evening. 30 tablet 11    multivitamin  (THERAGRAN) per tablet Take 1 tablet by mouth once daily.      RESTASIS 0.05 % ophthalmic emulsion Place 1 drop into both eyes 2 (two) times daily.       Objective     /68   Pulse 70   Wt 71.7 kg (158 lb 1.1 oz)   SpO2 99%   BMI 34.21 kg/m²   Physical Exam  Constitutional:       Appearance: Normal appearance.   Musculoskeletal:      Comments: Normal bilateral shoulder ROM without significant tenderness except over R lateral deltoid, normal rotator cuff testing   Neurological:      Mental Status: She is alert.       Assessment and Plan     Fibromyalgia    Paresthesia of both hands  -     Ambulatory referral/consult to Orthopedics; Future; Expected date: 01/25/2024    Mucopurulent chronic bronchitis    Obesity (BMI 30.0-34.9)    Chronic pain of both shoulders        ___________________  Ranjit Bernabe MD  Internal Medicine and Pediatrics

## 2024-01-22 ENCOUNTER — TELEPHONE (OUTPATIENT)
Dept: OBSTETRICS AND GYNECOLOGY | Facility: CLINIC | Age: 49
End: 2024-01-22
Payer: MEDICARE

## 2024-01-22 ENCOUNTER — CLINICAL SUPPORT (OUTPATIENT)
Dept: REHABILITATION | Facility: HOSPITAL | Age: 49
End: 2024-01-22
Payer: MEDICARE

## 2024-01-22 DIAGNOSIS — M54.50 CHRONIC BILATERAL LOW BACK PAIN WITHOUT SCIATICA: ICD-10-CM

## 2024-01-22 DIAGNOSIS — G89.29 CHRONIC BILATERAL LOW BACK PAIN WITHOUT SCIATICA: ICD-10-CM

## 2024-01-22 DIAGNOSIS — M79.7 FIBROMYALGIA: Primary | ICD-10-CM

## 2024-01-22 PROCEDURE — 97810 ACUP 1/> WO ESTIM 1ST 15 MIN: CPT | Mod: HCNC,PN

## 2024-01-22 PROCEDURE — 97811 ACUP 1/> W/O ESTIM EA ADD 15: CPT | Mod: HCNC,PN

## 2024-01-22 NOTE — TELEPHONE ENCOUNTER
----- Message from Hallie Louis sent at 1/22/2024  2:42 PM CST -----  Regarding: Needs Medical Order  Contact: patient at [phone]  Type: Needs Medical Order  Who Called:  patient at [phone]    Additional Information: calling to get  mammogram and breast MRI orders in active request for scheduling. Please call and advise. Thank you

## 2024-01-23 NOTE — PROGRESS NOTES
Acupuncture Evaluation Note     Name: Lavonne Pierre  Clinic Number: [id number]    Traditional Chinese Medicine (TCM) Diagnosis: Qi Stagnation  Medical Diagnosis:   Encounter Diagnoses   Name Primary?    Fibromyalgia Yes    Chronic bilateral low back pain without sciatica         Evaluation Date: 1/22/24    Visit #/Visits authorized: 2/12    Precautions: Standard    Subjective     Chief Concern: Pain from fibromyalgia including hips, chronic low back pain, joints. Pain decreases ROM and affects activities/enjoyment.    Medical necessity is demonstrated by the following IMPAIRMENTS: Medical Necessity: Decreased mobility limits day to day activities, social, and emergent situations              Aggravating Factors:  movement and pressure   Relieving Factors:  rest    Symptom Description:     Quality:  Aching  Severity:  5-7 out of 10; depending  Frequency:  when active      Objective       Pulse:        wiry     Treatment       Acupuncture points used:  K3, UB 62, SI 4, TB 5, GB 20  Needles In: 10  Needles Out: 10  Needles W/O STIM placed: 9:30am  Needles W/O STIM removed: 10:15am      Assessment     After treatment, patient felt more relaxed and less stiffness/pain by a couple clicks on the pain scale    Patient prognosis is Good.     Patient will continue to benefit from acupuncture treatment to address the deficits listed in the problem list box on initial evaluation, provide patient family education and to maximize pt's level of independence in the home and community environment.     Patient's spiritual, cultural and educational needs considered and pt agreeable to plan of care and goals.     Anticipated barriers to treatment: none    Plan     Recommend every 2 weeks for 12 sessions with midway re-assess.      Education:  Patient is aware of cumulative benefit of acupuncture

## 2024-01-26 ENCOUNTER — HOSPITAL ENCOUNTER (OUTPATIENT)
Dept: RADIOLOGY | Facility: HOSPITAL | Age: 49
Discharge: HOME | End: 2024-01-26
Attending: OBSTETRICS & GYNECOLOGY
Payer: MEDICARE

## 2024-01-26 DIAGNOSIS — Z12.31 ENCOUNTER FOR SCREENING MAMMOGRAM FOR BREAST CANCER: ICD-10-CM

## 2024-01-26 PROCEDURE — 77063 BREAST TOMOSYNTHESIS BI: CPT | Mod: 26,HCNC,, | Performed by: RADIOLOGY

## 2024-01-26 PROCEDURE — 77067 SCR MAMMO BI INCL CAD: CPT | Mod: TC,HCNC,PN

## 2024-01-26 PROCEDURE — 77067 SCR MAMMO BI INCL CAD: CPT | Mod: 26,HCNC,, | Performed by: RADIOLOGY

## 2024-01-29 ENCOUNTER — OFFICE VISIT (OUTPATIENT)
Dept: ORTHOPEDICS | Facility: CLINIC | Age: 49
End: 2024-01-29
Payer: MEDICARE

## 2024-01-29 DIAGNOSIS — M79.642 CHRONIC PAIN OF LEFT HAND: ICD-10-CM

## 2024-01-29 DIAGNOSIS — G56.03 BILATERAL CARPAL TUNNEL SYNDROME: Primary | ICD-10-CM

## 2024-01-29 DIAGNOSIS — G89.29 CHRONIC PAIN OF LEFT HAND: ICD-10-CM

## 2024-01-29 DIAGNOSIS — R20.2 PARESTHESIA OF BOTH HANDS: ICD-10-CM

## 2024-01-29 DIAGNOSIS — M65.311 TRIGGER FINGER OF RIGHT THUMB: Primary | ICD-10-CM

## 2024-01-29 PROCEDURE — 99213 OFFICE O/P EST LOW 20 MIN: CPT | Mod: HCNC,S$GLB,, | Performed by: PHYSICIAN ASSISTANT

## 2024-01-29 PROCEDURE — 4010F ACE/ARB THERAPY RXD/TAKEN: CPT | Mod: HCNC,CPTII,S$GLB, | Performed by: PHYSICIAN ASSISTANT

## 2024-01-29 PROCEDURE — 99999 PR PBB SHADOW E&M-EST. PATIENT-LVL III: CPT | Mod: PBBFAC,HCNC,, | Performed by: PHYSICIAN ASSISTANT

## 2024-01-29 PROCEDURE — 1160F RVW MEDS BY RX/DR IN RCRD: CPT | Mod: HCNC,CPTII,S$GLB, | Performed by: PHYSICIAN ASSISTANT

## 2024-01-29 PROCEDURE — 1159F MED LIST DOCD IN RCRD: CPT | Mod: HCNC,CPTII,S$GLB, | Performed by: PHYSICIAN ASSISTANT

## 2024-01-29 RX ORDER — LISINOPRIL 5 MG/1
5 TABLET ORAL
Qty: 90 TABLET | Refills: 3 | Status: SHIPPED | OUTPATIENT
Start: 2024-01-29

## 2024-01-29 NOTE — TELEPHONE ENCOUNTER
Care Due:                  Date            Visit Type   Department     Provider  --------------------------------------------------------------------------------                                EP -                              [facility]  Last Visit: 01-      CARE ([facility])   BEBE Bernabe                               -                              [facility]  Next Visit: 07-      CARE ([facility])   BEBE Bernabe                                                            Last  Test          Frequency    Reason                     Performed    Due Date  --------------------------------------------------------------------------------    CMP.........  12 months..  lisinopriL...............  01- 01-    [facility] Embedded Care Due Messages. Reference number: [id number].   1/29/2024 5:23:56 AM CST

## 2024-01-29 NOTE — TELEPHONE ENCOUNTER
Refill Routing Note   Medication(s) are not appropriate for processing by [facility] for the following reason(s):        Required labs outdated    [facility] action(s):  Defer     Requires labs : Yes             Appointments  past 12m or future 3m with PCP    Date Provider   Last Visit   1/18/2024 Ranjit Bernabe MD   Next Visit   7/18/2024 Ranjit Bernabe MD   ED visits in past 90 days: 0        Note composed:12:53 PM 01/29/2024

## 2024-01-29 NOTE — PROGRESS NOTES
2024    HPI:  Lavonne Pierre is a 48 y.o. female, who presents to clinic today for evaluation of her right thumb locking and pain and bilateral hand numbness, tingling, and pain.  States she had a right carpal tunnel release performed in .  Denies any acute injuries.  States the symptoms on her left hand very mild.  States his symptoms in her right hand are worse.  Denies any other complaints at this time.    PMHX:  Past Medical History:   Diagnosis Date    Anxiety 2013    Asthma     Depression     Fibromyalgia     GERD (gastroesophageal reflux disease)     History of pneumonia     HTN (hypertension)     Preeclampsia     Sleep apnea     Uses CPAP       PSHX:  Past Surgical History:   Procedure Laterality Date    BREAST BIOPSY      Benign    BREAST SURGERY  Right Breast 1998    Fibro cystic    CARPAL TUNNEL RELEASE Right 2017     SECTION, LOW TRANSVERSE      for PREE    NASAL SEPTUM SURGERY      NECK SURGERY      SPINE SURGERY      TUBAL LIGATION      upper GI series         FMHX:  Family History   Problem Relation Age of Onset    Pancreatic cancer Mother     Cancer Mother         Mother passed in  from Pancreas Cancer    Lymphoma Father     Diabetes Father     Hypertension Father     Cancer Father         Small Cell Lung Cancer/ still alive    Breast cancer Maternal Aunt     Cancer Maternal Aunt         Breast Cancer both/ passed.  Im not sure of year i was youg.    Breast cancer Maternal Aunt 70    Pancreatic cancer Maternal Uncle     Breast cancer Paternal Aunt 50    Pancreatic cancer Maternal Grandmother     Heart disease Maternal Grandfather     Diabetes Paternal Grandmother     Cancer Paternal Grandfather         Melanoma    Asthma Brother     Hypertension Brother        SOCHX:  Social History     Tobacco Use    Smoking status: Never    Smokeless tobacco: Never   Substance Use Topics    Alcohol use: No       ALLERGIES:  Ibuprofen and Latex, natural  rubber    CURRENT MEDICATIONS:  Current Outpatient Medications on File Prior to Visit   Medication Sig Dispense Refill    albuterol (PROVENTIL) 2.5 mg /3 mL (0.083 %) nebulizer solution Take 3 mLs (2.5 mg total) by nebulization every 6 (six) hours as needed. Rescue 270 mL 11    albuterol (VENTOLIN HFA) 90 mcg/actuation inhaler Inhale 1-2 puffs into the lungs every 4 (four) hours as needed for Wheezing or Shortness of Breath. Rescue 18 g 11    azelastine (ASTELIN) 137 mcg (0.1 %) nasal spray U 1 TO 2 SPRAYS IN EACH NOSTRIL BID  0    diclofenac sodium (VOLTAREN) 1 % Gel APPLY 2 GRAMS TOPICALLY THREE TIMES DAILY 100 g 5    fluticasone propionate (FLONASE) 50 mcg/actuation nasal spray INSTILL 1 TO 2 SPRAYS IN EACH NOSTRIL QD  5    fluticasone-umeclidin-vilanter (TRELEGY ELLIPTA) 200-62.5-25 mcg inhaler Inhale 1 puff into the lungs once daily. 30 each 11    montelukast (SINGULAIR) 10 mg tablet Take 1 tablet (10 mg total) by mouth every evening. 30 tablet 11    multivitamin (THERAGRAN) per tablet Take 1 tablet by mouth once daily.      RESTASIS 0.05 % ophthalmic emulsion Place 1 drop into both eyes 2 (two) times daily.      [DISCONTINUED] fluticasone furoate-vilanteroL (BREO ELLIPTA) 200-25 mcg/dose DsDv diskus inhaler Inhale 1 puff into the lungs once daily. Controller 1 each 11    [DISCONTINUED] lisinopriL (PRINIVIL,ZESTRIL) 5 MG tablet Take 1 tablet (5 mg total) by mouth once daily. 90 tablet 3     No current facility-administered medications on file prior to visit.       REVIEW OF SYSTEMS:  Review of Systems Complete; Negative, unless noted above.    GENERAL PHYSICAL EXAM:   [facility] 01/12/2024    GEN: well developed, well nourished, no acute distress   PULM: No wheezing, no respiratory distress   CV: RRR    ORTHO EXAM:   Examination of the right hand reveals no edema, erythema, ecchymosis or skin breakdown.  No active triggering noted of the right thumb.  Tenderness palpation over the area of the A1 pulley.  Tenderness  with terminal extension/flexion of the right thumb.  Positive carpal Tinel's test.  Positive Durkan's test.  Sensation is grossly intact in the radial, ulnar, median nerve distributions.  Capillary refill less than 2 seconds.     Examination of the left hand reveals no edema, erythema, ecchymosis, or skin breakdown.  Positive carpal Tinel's test.  Positive Durkan's test.  Sensation is grossly intact in the radial, ulnar, median nerve distributions.  Capillary refill less than 2 seconds.    RADIOLOGY:   None.    ASSESSMENT:   Right thumb trigger finger, bilateral carpal tunnel syndrome    PLAN:  1. I discussed with Lavonne Pierre the trigger finger and carpal tunnel syndrome pathology and treatment options in detail during today's visit.  After treatment options were discussed, we decided the best course of action this time is perform a steroid injection into the flexor tendon sheath of the level of the middle portion of the proximal phalanx of the right thumb.  We also discussed would proceed with nighttime splinting via bilateral carpal tunnel splints and perform an EMG nerve conduction study of the bilateral upper extremities.  She verbally agreed with the treatment plan    2.  She was scheduled for an EMG nerve conduction study the bilateral upper extremities in 1 month from now.      3. She was provided bilateral carpal tunnel splints in clinic today.  She was instructed to wear them every night until seen again in clinic.  She verbalized understanding     4. Informed consent was obtained.  After an alcohol prep followed by chlorhexidine prep, a steroid injection was placed into the flexor tendon sheath at the level of the middle portion of the proximal phalanx of the right thumb.  She tolerated the procedure well with no immediate complications     5.  I would like to have her follow up in clinic after the EMG to discuss the results.  She was instructed to contact clinic for any problems or concerns in the  interim.

## 2024-02-05 ENCOUNTER — CLINICAL SUPPORT (OUTPATIENT)
Dept: REHABILITATION | Facility: HOSPITAL | Age: 49
End: 2024-02-05
Payer: MEDICARE

## 2024-02-05 DIAGNOSIS — G89.29 CHRONIC BILATERAL LOW BACK PAIN WITHOUT SCIATICA: ICD-10-CM

## 2024-02-05 DIAGNOSIS — M54.50 CHRONIC BILATERAL LOW BACK PAIN WITHOUT SCIATICA: ICD-10-CM

## 2024-02-05 DIAGNOSIS — M79.7 FIBROMYALGIA: Primary | ICD-10-CM

## 2024-02-05 PROCEDURE — 97811 ACUP 1/> W/O ESTIM EA ADD 15: CPT | Mod: HCNC,PN

## 2024-02-05 PROCEDURE — 97810 ACUP 1/> WO ESTIM 1ST 15 MIN: CPT | Mod: HCNC,PN

## 2024-02-07 DIAGNOSIS — M25.512 BILATERAL SHOULDER PAIN, UNSPECIFIED CHRONICITY: Primary | ICD-10-CM

## 2024-02-07 DIAGNOSIS — M25.511 BILATERAL SHOULDER PAIN, UNSPECIFIED CHRONICITY: Primary | ICD-10-CM

## 2024-02-08 NOTE — PROGRESS NOTES
Acupuncture Evaluation Note     Name: Lavonne Pierre  Clinic Number: [id number]    Traditional Chinese Medicine (TCM) Diagnosis: Qi Stagnation  Medical Diagnosis:   Encounter Diagnoses   Name Primary?    Fibromyalgia Yes    Chronic bilateral low back pain without sciatica         Evaluation Date:2/5/24    Visit #/Visits authorized: 3/12    Precautions: Standard    Subjective     Chief Concern: Pain from fibromyalgia including hips, chronic low back pain, joints. Pain decreases ROM and affects activities/enjoyment.    Medical necessity is demonstrated by the following IMPAIRMENTS: Medical Necessity: Decreased mobility limits day to day activities, social, and emergent situations              Aggravating Factors:  movement and pressure   Relieving Factors:  rest    Symptom Description:     Quality:  Aching  Severity:  5-7 out of 10; depending  Frequency:  when active      Objective       Pulse:        wiry     Treatment       Acupuncture points used:  K3, UB 62, SI 4, TB 5, GB 20  Needles In: 10  Needles Out: 10  Needles W/O STIM placed: 10am  [place] W/O STIM removed: 10:45am      Assessment     After treatment, patient felt more relaxed and less stiffness/pain by a couple clicks on the pain scale    Patient prognosis is Good.     Patient will continue to benefit from acupuncture treatment to address the deficits listed in the problem list box on initial evaluation, provide patient family education and to maximize pt's level of independence in the home and community environment.     Patient's spiritual, cultural and educational needs considered and pt agreeable to plan of care and goals.     Anticipated barriers to treatment: none    Plan     Recommend every 2 weeks for 12 sessions with midway re-assess.      Education:  Patient is aware of cumulative benefit of acupuncture

## 2024-02-09 ENCOUNTER — OFFICE VISIT (OUTPATIENT)
Dept: ORTHOPEDICS | Facility: CLINIC | Age: 49
End: 2024-02-09
Payer: MEDICARE

## 2024-02-09 ENCOUNTER — HOSPITAL ENCOUNTER (OUTPATIENT)
Dept: RADIOLOGY | Facility: HOSPITAL | Age: 49
Discharge: HOME | End: 2024-02-09
Attending: ORTHOPAEDIC SURGERY
Payer: MEDICARE

## 2024-02-09 VITALS — WEIGHT: 158 LBS | BODY MASS INDEX: 34.09 KG/M2 | HEIGHT: 57 IN

## 2024-02-09 DIAGNOSIS — M25.512 BILATERAL SHOULDER PAIN, UNSPECIFIED CHRONICITY: ICD-10-CM

## 2024-02-09 DIAGNOSIS — M25.511 BILATERAL SHOULDER PAIN, UNSPECIFIED CHRONICITY: Primary | ICD-10-CM

## 2024-02-09 DIAGNOSIS — M25.512 BILATERAL SHOULDER PAIN, UNSPECIFIED CHRONICITY: Primary | ICD-10-CM

## 2024-02-09 DIAGNOSIS — M25.511 BILATERAL SHOULDER PAIN, UNSPECIFIED CHRONICITY: ICD-10-CM

## 2024-02-09 PROCEDURE — 99204 OFFICE O/P NEW MOD 45 MIN: CPT | Mod: 25,HCNC,S$GLB, | Performed by: ORTHOPAEDIC SURGERY

## 2024-02-09 PROCEDURE — 20610 DRAIN/INJ JOINT/BURSA W/O US: CPT | Mod: HCNC,LT,S$GLB, | Performed by: ORTHOPAEDIC SURGERY

## 2024-02-09 PROCEDURE — 73030 X-RAY EXAM OF SHOULDER: CPT | Mod: TC,50,HCNC,PO

## 2024-02-09 PROCEDURE — 3008F BODY MASS INDEX DOCD: CPT | Mod: HCNC,CPTII,S$GLB, | Performed by: ORTHOPAEDIC SURGERY

## 2024-02-09 PROCEDURE — 1159F MED LIST DOCD IN RCRD: CPT | Mod: HCNC,CPTII,S$GLB, | Performed by: ORTHOPAEDIC SURGERY

## 2024-02-09 PROCEDURE — 4010F ACE/ARB THERAPY RXD/TAKEN: CPT | Mod: HCNC,CPTII,S$GLB, | Performed by: ORTHOPAEDIC SURGERY

## 2024-02-09 PROCEDURE — 99999 PR PBB SHADOW E&M-EST. PATIENT-LVL III: CPT | Mod: PBBFAC,HCNC,, | Performed by: ORTHOPAEDIC SURGERY

## 2024-02-09 PROCEDURE — 73030 X-RAY EXAM OF SHOULDER: CPT | Mod: 26,50,HCNC, | Performed by: RADIOLOGY

## 2024-02-09 RX ORDER — TRIAMCINOLONE ACETONIDE 40 MG/ML
80 INJECTION, SUSPENSION INTRA-ARTICULAR; INTRAMUSCULAR
Status: DISCONTINUED | OUTPATIENT
Start: 2024-02-09 | End: 2024-02-09 | Stop reason: HOSPADM

## 2024-02-09 RX ADMIN — TRIAMCINOLONE ACETONIDE 80 MG: 40 INJECTION, SUSPENSION INTRA-ARTICULAR; INTRAMUSCULAR at 09:02

## 2024-02-09 NOTE — PROCEDURES
Large Joint Aspiration/Injection: L subacromial bursa    Date/Time: 2/9/2024 9:45 AM    Performed by: Al Ellis MD  Authorized by: Al Ellis MD    Consent Done?:  Yes (Verbal)  Site marked: the procedure site was marked    Prep: patient was prepped and draped in usual sterile fashion      Details:  Needle Size:  21 G  Approach:  Posterior  Location:  Shoulder  Site:  L subacromial bursa  Medications:  80 mg triamcinolone acetonide 40 mg/mL  Patient tolerance:  Patient tolerated the procedure well with no immediate complications

## 2024-02-19 ENCOUNTER — CLINICAL SUPPORT (OUTPATIENT)
Dept: REHABILITATION | Facility: HOSPITAL | Age: 49
End: 2024-02-19
Payer: MEDICARE

## 2024-02-19 DIAGNOSIS — G89.29 CHRONIC BILATERAL LOW BACK PAIN WITHOUT SCIATICA: ICD-10-CM

## 2024-02-19 DIAGNOSIS — M54.50 CHRONIC BILATERAL LOW BACK PAIN WITHOUT SCIATICA: ICD-10-CM

## 2024-02-19 DIAGNOSIS — M79.7 FIBROMYALGIA: Primary | ICD-10-CM

## 2024-02-19 PROCEDURE — 97811 ACUP 1/> W/O ESTIM EA ADD 15: CPT | Mod: HCNC,PN

## 2024-02-19 PROCEDURE — 97810 ACUP 1/> WO ESTIM 1ST 15 MIN: CPT | Mod: HCNC,PN

## 2024-02-20 ENCOUNTER — TELEPHONE (OUTPATIENT)
Dept: FAMILY MEDICINE | Facility: CLINIC | Age: 49
End: 2024-02-20

## 2024-02-20 DIAGNOSIS — G89.29 CHRONIC MIDLINE LOW BACK PAIN WITHOUT SCIATICA: Primary | ICD-10-CM

## 2024-02-20 DIAGNOSIS — M54.50 CHRONIC MIDLINE LOW BACK PAIN WITHOUT SCIATICA: Primary | ICD-10-CM

## 2024-02-20 DIAGNOSIS — M54.59 OTHER LOW BACK PAIN: ICD-10-CM

## 2024-02-20 NOTE — TELEPHONE ENCOUNTER
Vicki Ly Staff  Cc: Mayra Duke LAC  [employer] MANAGED MEDICARE / [employer] [facility] HMO PPO SPECIAL NEEDS FOLLOWS MEDICARE CMS GUIDELINES      Acupuncture    Medicare Part B (Medical Insurance) covers up to 12 acupuncture visits in 90 days for chronic low back pain. Chronic low back pain is defined as:  Lasting 12 weeks or longer  Having no identifiable systemic cause (not associated with metastatic, inflammatory, or infectious disease)  Pain that's not associated with surgery or pregnancy  An additional 8 sessions will be covered if you show improvement. If your doctor decides your chronic low back pain isn't improving or is getting worse, then Medicare won't cover your treatments. No more than 20 acupuncture treatments can be given yearly.      DX: M79.7 (ICD-10-CM) - Fibromyalgia      IS NOT A COVERED DIAGNOSIS.        PATIENT MESSAGED FINDINGS.

## 2024-02-22 NOTE — PROGRESS NOTES
Acupuncture Evaluation Note     Name: Lavonne Pierre  Clinic Number: [id number]    Traditional Chinese Medicine (TCM) Diagnosis: Qi Stagnation  Medical Diagnosis:   Encounter Diagnoses   Name Primary?    Fibromyalgia Yes    Chronic bilateral low back pain without sciatica         Evaluation Date:2/19/24    Visit #/Visits authorized: 4/12    Precautions: Standard    Subjective     Chief Concern: Pain from fibromyalgia including hips, chronic low back pain, joints. Pain decreases ROM and affects activities/enjoyment.    Medical necessity is demonstrated by the following IMPAIRMENTS: Medical Necessity: Decreased mobility limits day to day activities, social, and emergent situations              Aggravating Factors:  movement and pressure   Relieving Factors:  rest    Symptom Description:     Quality:  Aching  Severity:  5-7 out of 10; depending  Frequency:  when active      Objective       Pulse:        wiry     Treatment       Acupuncture points used:  K3, UB 62, SI 4, TB 5, GB 20  Needles In: 10  Needles Out: 10  Needles W/O STIM placed: 930am  [place] W/O STIM removed: 10:15am      Assessment     After treatment, patient felt more relaxed and less stiffness/pain by a couple clicks on the pain scale    Patient prognosis is Good.     Patient will continue to benefit from acupuncture treatment to address the deficits listed in the problem list box on initial evaluation, provide patient family education and to maximize pt's level of independence in the home and community environment.     Patient's spiritual, cultural and educational needs considered and pt agreeable to plan of care and goals.     Anticipated barriers to treatment: none    Plan     Recommend every 2 weeks for 12 sessions with midway re-assess.      Education:  Patient is aware of cumulative benefit of acupuncture

## 2024-03-04 ENCOUNTER — TELEPHONE (OUTPATIENT)
Dept: ORTHOPEDICS | Facility: CLINIC | Age: 49
End: 2024-03-04
Payer: MEDICARE

## 2024-03-04 ENCOUNTER — TELEPHONE (OUTPATIENT)
Dept: NEUROLOGY | Facility: CLINIC | Age: 49
End: 2024-03-04
Payer: MEDICARE

## 2024-03-04 ENCOUNTER — OFFICE VISIT (OUTPATIENT)
Dept: PHYSICAL MEDICINE AND REHAB | Facility: CLINIC | Age: 49
End: 2024-03-04
Payer: MEDICARE

## 2024-03-04 DIAGNOSIS — G56.03 BILATERAL CARPAL TUNNEL SYNDROME: ICD-10-CM

## 2024-03-04 PROCEDURE — 99499 UNLISTED E&M SERVICE: CPT | Mod: HCNC,S$GLB,, | Performed by: PHYSICAL MEDICINE & REHABILITATION

## 2024-03-04 PROCEDURE — 95886 MUSC TEST DONE W/N TEST COMP: CPT | Mod: HCNC,50,S$GLB, | Performed by: PHYSICAL MEDICINE & REHABILITATION

## 2024-03-04 PROCEDURE — 99999 PR PBB SHADOW E&M-EST. PATIENT-LVL II: CPT | Mod: PBBFAC,HCNC,, | Performed by: PHYSICAL MEDICINE & REHABILITATION

## 2024-03-04 PROCEDURE — 95911 NRV CNDJ TEST 9-10 STUDIES: CPT | Mod: HCNC,S$GLB,, | Performed by: PHYSICAL MEDICINE & REHABILITATION

## 2024-03-04 NOTE — PROGRESS NOTES
[facility]  [address] [place] [address]             Full Name: Lavonne Pierre Gender: Female  MRN: [id number] YOB: 1975  History: Patient complaining of numbness, tingling and pain of bilateral hands, R>L.  She did a CTS release several years back on the right hand.  In addition, neck pain and shoulder pain.        Visit Date: 3/4/2024 10:57 AM  Age: 48 Years  Examining Physician: Melanie Mason DO   Referring Physician: Siddharth Moralez PA-C   Height: 4 feet 9 inch      Sensory NCS      Nerve / Sites Rec. Site Onset Lat Peak Lat NP Amp PP Amp Segments Distance Velocity     ms ms µV µV  cm m/s   L Median - Digit III (Antidromic)      Wrist Dig III 3.13 4.08 18.9 34.4 Wrist - Dig III 14 45   R Median - Digit III (Antidromic)      Wrist Dig III 3.40 4.63 14.3 15.6 Wrist - Dig III 14 41   L Ulnar - Digit V (Antidromic)      Wrist Dig V 2.54 3.21 14.7 22.7 Wrist - Dig V 14 55   R Ulnar - Digit V (Antidromic)      Wrist Dig V 2.31 3.04 31.1 70.1 Wrist - Dig V 14 61   L Radial - Anatomical snuff box (Forearm)      Forearm Wrist 1.54 2.17 36.1 23.0 Forearm - Wrist 10 65   R Radial - Anatomical snuff box (Forearm)      Forearm Wrist 1.40 2.00 57.8 23.4 Forearm - Wrist 10 72       Motor NCS      Nerve / Sites Muscle Latency Amplitude Amp % Duration Segments Distance Lat Diff Velocity     ms mV % ms  cm ms m/s   L Median - APB      Wrist APB 3.40 8.8 100 6.21 Wrist - APB 8        Elbow APB 7.00 8.4 95.6 6.33 Elbow - Wrist 18.5 3.60 51   R Median - APB      Wrist APB 4.40 11.5 100 5.67 Wrist - APB 8        Elbow APB 7.56 11.2 98 5.83 Elbow - Wrist 16.5 3.17 52   L Ulnar - ADM      Wrist ADM 2.42 13.2 100 5.58 Wrist - ADM 8        B.Elbow ADM 4.96 11.5 87.1 5.83 B.Elbow - Wrist 14 2.54 55      A.Elbow ADM 7.13 11.3 85.4 5.77 A.Elbow - B.Elbow 11 2.17 51   R Ulnar - ADM      Wrist ADM 2.56 13.2 100 5.63 Wrist - ADM 8        B.Elbow ADM 5.00 12.6 95.3 5.56 B.Elbow - Wrist 15 2.44 62       A.Elbow ADM 6.90 12.1 91.6 5.94 A.Elbow - B.Elbow 11 1.90 58       EMG Summary Table     Spontaneous MUAP Recruitment   Muscle IA Fib PSW Fasc CRD Amp Dur. Poly Pattern   L. Deltoid N None None None None N N None N   L. Biceps brachii N None None None None N N None N   L. Triceps brachii N None None None None N N None N   L. Pronator teres N None None None None N N None N   L. Abductor pollicis brevis N None None None None N N None N   R. Deltoid N None None None None N N None N   R. Biceps brachii N None None None None N N None N   R. Triceps brachii N None None None None N N None N   R. Pronator teres N None None None None N N None N   R. Abductor pollicis brevis N None None None None N N None N       Summary    The motor conduction test was normal in all 4 of the tested nerves: L Median - APB, R Median - APB, L Ulnar - ADM, R Ulnar - ADM.    The sensory conduction test was performed on 6 nerve(s). The results were normal in 4 nerve(s): L Ulnar - Digit V (Antidromic), R Ulnar - Digit V (Antidromic), L Radial - Anatomical snuff box (Forearm), R Radial - Anatomical snuff box (Forearm). Results outside the specified normal range were found in 2 nerve(s), as follows:  In the L Median - Digit III (Antidromic) study  the peak latency result was increased for Wrist stimulation  In the R Median - Digit III (Antidromic) study  the peak latency result was increased for Wrist stimulation  the peak amplitude result was reduced for Wrist stimulation    The needle EMG study was normal in all 10 tested muscles: L. Deltoid, L. Biceps brachii, L. Triceps brachii, L. Pronator teres, L. Abductor pollicis brevis, R. Deltoid, R. Biceps brachii, R. Triceps brachii, R. Pronator teres, R. Abductor pollicis brevis.       Impression:  Abnormal study.    Moderate right carpal tunnel syndrome, without active denervation.    Mild left carpal tunnel syndrome, without active denervation.    No electrophysiologic evidence of bilateral cervical  radiculopathy/plexopathy, bilateral ulnar mononeuropathy, or peripheral neuropathy in bilateral extremities.      ------------------------------  Melanie Mason, DO

## 2024-03-06 ENCOUNTER — OFFICE VISIT (OUTPATIENT)
Dept: ORTHOPEDICS | Facility: CLINIC | Age: 49
End: 2024-03-06
Payer: MEDICARE

## 2024-03-06 DIAGNOSIS — G56.03 BILATERAL CARPAL TUNNEL SYNDROME: Primary | ICD-10-CM

## 2024-03-06 PROCEDURE — 99214 OFFICE O/P EST MOD 30 MIN: CPT | Mod: HCNC,25,S$GLB, | Performed by: PHYSICIAN ASSISTANT

## 2024-03-06 PROCEDURE — 4010F ACE/ARB THERAPY RXD/TAKEN: CPT | Mod: HCNC,CPTII,S$GLB, | Performed by: PHYSICIAN ASSISTANT

## 2024-03-06 PROCEDURE — 1159F MED LIST DOCD IN RCRD: CPT | Mod: HCNC,CPTII,S$GLB, | Performed by: PHYSICIAN ASSISTANT

## 2024-03-06 PROCEDURE — 1160F RVW MEDS BY RX/DR IN RCRD: CPT | Mod: HCNC,CPTII,S$GLB, | Performed by: PHYSICIAN ASSISTANT

## 2024-03-06 PROCEDURE — 99999 PR PBB SHADOW E&M-EST. PATIENT-LVL III: CPT | Mod: PBBFAC,HCNC,, | Performed by: PHYSICIAN ASSISTANT

## 2024-03-06 PROCEDURE — 20526 THER INJECTION CARP TUNNEL: CPT | Mod: HCNC,50,S$GLB, | Performed by: PHYSICIAN ASSISTANT

## 2024-03-06 RX ORDER — TRIAMCINOLONE ACETONIDE 40 MG/ML
40 INJECTION, SUSPENSION INTRA-ARTICULAR; INTRAMUSCULAR
Status: DISCONTINUED | OUTPATIENT
Start: 2024-03-06 | End: 2024-03-06 | Stop reason: HOSPADM

## 2024-03-06 RX ADMIN — TRIAMCINOLONE ACETONIDE 40 MG: 40 INJECTION, SUSPENSION INTRA-ARTICULAR; INTRAMUSCULAR at 01:03

## 2024-03-06 NOTE — PROCEDURES
Carpal Tunnel    Date/Time: 3/6/2024 1:00 PM    Performed by: Siddharth Moralez PA-C  Authorized by: Siddharth Moralez PA-C    Consent Done?:  Yes (Verbal)  Indications:  Pain  Site marked: the procedure site was marked    Timeout: prior to procedure the correct patient, procedure, and site was verified    Prep: patient was prepped and draped in usual sterile fashion      Local anesthesia used?: Yes    Local anesthetic:  Lidocaine 1% without epinephrine  Anesthetic total (ml):  0.5    Location:  Wrist  Site:  L carpal tunnel  Ultrasonic Guidance for Needle Placement?: No    Needle size:  25 G  Approach:  Volar  Medications:  40 mg triamcinolone acetonide 40 mg/mL (20 mg injected)  Patient tolerance:  Patient tolerated the procedure well with no immediate complications

## 2024-03-06 NOTE — PROCEDURES
Carpal Tunnel    Date/Time: 3/6/2024 1:00 PM    Performed by: Siddharth Moralez PA-C  Authorized by: Siddharth Moralez PA-C    Consent Done?:  Yes (Verbal)  Indications:  Pain  Site marked: the procedure site was marked    Timeout: prior to procedure the correct patient, procedure, and site was verified    Prep: patient was prepped and draped in usual sterile fashion      Local anesthesia used?: Yes    Local anesthetic:  Lidocaine 1% without epinephrine  Anesthetic total (ml):  0.5    Location:  Wrist  Site:  R carpal tunnel  Ultrasonic Guidance for Needle Placement?: No    Needle size:  25 G  Approach:  Volar  Medications:  40 mg triamcinolone acetonide 40 mg/mL (20 mg injected)  Patient tolerance:  Patient tolerated the procedure well with no immediate complications

## 2024-03-06 NOTE — PROGRESS NOTES
3/6/2024    HPI:  Lavonne Pierre is a 48 y.o. female, who presents to clinic today for continued evaluation of her bilateral carpal tunnel syndrome and to discuss her EMG results.  States the injection she received for her right thumb trigger finger improve her symptoms for a few days, but her symptoms returned afterwards.  States he continues to have carpal tunnel symptoms, and has worn the carpal tunnel splints as instructed which have helped slightly.  Denies any other complaints at this time.    PMHX:  Past Medical History:   Diagnosis Date    Anxiety 2013    Asthma     Depression     Fibromyalgia     GERD (gastroesophageal reflux disease)     History of pneumonia     HTN (hypertension)     Preeclampsia     Sleep apnea     Uses CPAP       PSHX:  Past Surgical History:   Procedure Laterality Date    BREAST BIOPSY      Benign    BREAST SURGERY  Right Breast 1998    Fibro cystic    CARPAL TUNNEL RELEASE Right 2017     SECTION, LOW TRANSVERSE      for PREE    NASAL SEPTUM SURGERY      NECK SURGERY      SPINE SURGERY      TUBAL LIGATION      upper GI series         FMHX:  Family History   Problem Relation Age of Onset    Pancreatic cancer Mother     Cancer Mother         Mother passed in  from Pancreas Cancer    Lymphoma Father     Diabetes Father     Hypertension Father     Cancer Father         Small Cell Lung Cancer/ still alive    Breast cancer Maternal Aunt     Cancer Maternal Aunt         Breast Cancer both/ passed.  Im not sure of year i was youg.    Breast cancer Maternal Aunt 70    Pancreatic cancer Maternal Uncle     Breast cancer Paternal Aunt 50    Pancreatic cancer Maternal Grandmother     Heart disease Maternal Grandfather     Diabetes Paternal Grandmother     Cancer Paternal Grandfather         Melanoma    Asthma Brother     Hypertension Brother        SOCHX:  Social History     Tobacco Use    Smoking status: Never    Smokeless tobacco: Never   Substance Use  Topics    Alcohol use: No       ALLERGIES:  Ibuprofen and Latex, natural rubber    CURRENT MEDICATIONS:  Current Outpatient Medications on File Prior to Visit   Medication Sig Dispense Refill    albuterol (PROVENTIL) 2.5 mg /3 mL (0.083 %) nebulizer solution Take 3 mLs (2.5 mg total) by nebulization every 6 (six) hours as needed. Rescue 270 mL 11    albuterol (VENTOLIN HFA) 90 mcg/actuation inhaler Inhale 1-2 puffs into the lungs every 4 (four) hours as needed for Wheezing or Shortness of Breath. Rescue 18 g 11    azelastine (ASTELIN) 137 mcg (0.1 %) nasal spray U 1 TO 2 SPRAYS IN EACH NOSTRIL BID  0    diclofenac sodium (VOLTAREN) 1 % Gel APPLY 2 GRAMS TOPICALLY THREE TIMES DAILY 100 g 5    fluticasone propionate (FLONASE) 50 mcg/actuation nasal spray INSTILL 1 TO 2 SPRAYS IN EACH NOSTRIL QD  5    fluticasone-umeclidin-vilanter (TRELEGY ELLIPTA) 200-62.5-25 mcg inhaler Inhale 1 puff into the lungs once daily. 30 each 11    lisinopriL (PRINIVIL,ZESTRIL) 5 MG tablet TAKE 1 TABLET(5 MG) BY MOUTH EVERY DAY 90 tablet 3    montelukast (SINGULAIR) 10 mg tablet Take 1 tablet (10 mg total) by mouth every evening. 30 tablet 11    multivitamin (THERAGRAN) per tablet Take 1 tablet by mouth once daily.      RESTASIS 0.05 % ophthalmic emulsion Place 1 drop into both eyes 2 (two) times daily.      [DISCONTINUED] fluticasone furoate-vilanteroL (BREO ELLIPTA) 200-25 mcg/dose DsDv diskus inhaler Inhale 1 puff into the lungs once daily. Controller 1 each 11     No current facility-administered medications on file prior to visit.       REVIEW OF SYSTEMS:  Review of Systems Complete; Negative, unless noted above.    GENERAL PHYSICAL EXAM:   There were no vitals taken for this visit.   GEN: well developed, well nourished, no acute distress   PULM: No wheezing, no respiratory distress   CV: RRR    ORTHO EXAM:   Examination of the bilateral hand/wrist reveals no edema, erythema, ecchymosis, or skin breakdown.  Mild tenderness palpation  overlying the area of the A1 pulley of the right thumb.  No active triggering noted of the right thumb.  No tenderness noted throughout the remainder of the bilateral hands.  Positive carpal Tinel's test bilaterally.  Sensation is grossly intact in the radial, ulnar, median nerve distributions.  Capillary refill less than 2 seconds.    RADIOLOGY:   None.    EMG:   EMG nerve conduction study of the bilateral upper extremities showed electrodiagnostic evidence of moderate right carpal tunnel syndrome without active axonal denervation and mild left carpal tunnel syndrome without active axonal denervation.    ASSESSMENT:   Right recurrent carpal tunnel syndrome, left carpal tunnel syndrome    PLAN:  1. I discussed with Lavonne Pierre that the best course of action this time is to perform bilateral carpal tunnel injections in clinic today.  She verbally agreed with the treatment plan    2.  Informed consent was obtained.  Attention was 1st turned to the right carpal tunnel.  After an alcohol prep followed by chlorhexidine prep, a steroid injection was placed into the right carpal tunnel.  She tolerated the procedure well with no immediate complications.  Attention was then turned to the left carpal tunnel.  After an alcohol prep followed by a chlorhexidine prep, a steroid injection was placed into the left carpal tunnel.  She tolerated the procedure well with no immediate complications.      3. I would like her follow up in clinic in 3 weeks for repeat evaluation.  She was instructed to contact clinic for any problems or concerns in the interim.

## 2024-03-11 ENCOUNTER — CLINICAL SUPPORT (OUTPATIENT)
Dept: REHABILITATION | Facility: HOSPITAL | Age: 49
End: 2024-03-11
Attending: INTERNAL MEDICINE
Payer: MEDICARE

## 2024-03-11 DIAGNOSIS — M54.50 CHRONIC MIDLINE LOW BACK PAIN WITHOUT SCIATICA: ICD-10-CM

## 2024-03-11 DIAGNOSIS — G89.29 CHRONIC BILATERAL LOW BACK PAIN WITHOUT SCIATICA: ICD-10-CM

## 2024-03-11 DIAGNOSIS — M54.50 CHRONIC BILATERAL LOW BACK PAIN WITHOUT SCIATICA: ICD-10-CM

## 2024-03-11 DIAGNOSIS — M79.7 FIBROMYALGIA: Primary | ICD-10-CM

## 2024-03-11 DIAGNOSIS — M54.59 OTHER LOW BACK PAIN: ICD-10-CM

## 2024-03-11 DIAGNOSIS — G89.29 CHRONIC MIDLINE LOW BACK PAIN WITHOUT SCIATICA: ICD-10-CM

## 2024-03-11 PROCEDURE — 97810 ACUP 1/> WO ESTIM 1ST 15 MIN: CPT | Mod: HCNC,PN

## 2024-03-11 PROCEDURE — 97811 ACUP 1/> W/O ESTIM EA ADD 15: CPT | Mod: HCNC,PN

## 2024-03-13 DIAGNOSIS — I10 ESSENTIAL HYPERTENSION: ICD-10-CM

## 2024-03-13 NOTE — PROGRESS NOTES
Acupuncture Evaluation Note     Name: Lavonne Pierre  Clinic Number: [id number]    Traditional Chinese Medicine (TCM) Diagnosis: Qi Stagnation  Medical Diagnosis:   Encounter Diagnoses   Name Primary?    Chronic midline low back pain without sciatica     Other low back pain     Fibromyalgia Yes    Chronic bilateral low back pain without sciatica         Evaluation Date:3/11/24    Visit #/Visits authorized: 5/12    Precautions: Standard    Subjective     Chief Concern: Pain from fibromyalgia including hips, chronic low back pain, joints. Pain decreases ROM and affects activities/enjoyment.    Medical necessity is demonstrated by the following IMPAIRMENTS: Medical Necessity: Decreased mobility limits day to day activities, social, and emergent situations              Aggravating Factors:  movement and pressure   Relieving Factors:  rest    Symptom Description:     Quality:  Aching  Severity:  5-7 out of 10; depending  Frequency:  when active      Objective       Pulse:        wiry     Treatment       Acupuncture points used:  K3, UB 62, SI 4, TB 5, GB 20  Needles In: 10  Needles Out: 10  Needles W/O STIM placed: 445pm  [place] W/O STIM removed: 630pm      Assessment     After treatment, patient felt more relaxed and less stiffness/pain by a couple clicks on the pain scale    Patient prognosis is Good.     Patient will continue to benefit from acupuncture treatment to address the deficits listed in the problem list box on initial evaluation, provide patient family education and to maximize pt's level of independence in the home and community environment.     Patient's spiritual, cultural and educational needs considered and pt agreeable to plan of care and goals.     Anticipated barriers to treatment: none    Plan     Recommend every 2 weeks for 12 sessions with midway re-assess.      Education:  Patient is aware of cumulative benefit of acupuncture

## 2024-03-27 ENCOUNTER — OFFICE VISIT (OUTPATIENT)
Dept: ORTHOPEDICS | Facility: CLINIC | Age: 49
End: 2024-03-27
Payer: MEDICARE

## 2024-03-27 DIAGNOSIS — M65.311 TRIGGER FINGER OF RIGHT THUMB: Primary | ICD-10-CM

## 2024-03-27 DIAGNOSIS — G56.03 BILATERAL CARPAL TUNNEL SYNDROME: ICD-10-CM

## 2024-03-27 PROCEDURE — 20550 NJX 1 TENDON SHEATH/LIGAMENT: CPT | Mod: HCNC,RT,S$GLB, | Performed by: PHYSICIAN ASSISTANT

## 2024-03-27 PROCEDURE — 1159F MED LIST DOCD IN RCRD: CPT | Mod: HCNC,CPTII,S$GLB, | Performed by: PHYSICIAN ASSISTANT

## 2024-03-27 PROCEDURE — 1160F RVW MEDS BY RX/DR IN RCRD: CPT | Mod: HCNC,CPTII,S$GLB, | Performed by: PHYSICIAN ASSISTANT

## 2024-03-27 PROCEDURE — 99213 OFFICE O/P EST LOW 20 MIN: CPT | Mod: HCNC,25,S$GLB, | Performed by: PHYSICIAN ASSISTANT

## 2024-03-27 PROCEDURE — 4010F ACE/ARB THERAPY RXD/TAKEN: CPT | Mod: HCNC,CPTII,S$GLB, | Performed by: PHYSICIAN ASSISTANT

## 2024-03-27 PROCEDURE — 99999 PR PBB SHADOW E&M-EST. PATIENT-LVL III: CPT | Mod: PBBFAC,HCNC,, | Performed by: PHYSICIAN ASSISTANT

## 2024-03-27 RX ORDER — TRIAMCINOLONE ACETONIDE 40 MG/ML
40 INJECTION, SUSPENSION INTRA-ARTICULAR; INTRAMUSCULAR
Status: DISCONTINUED | OUTPATIENT
Start: 2024-03-27 | End: 2024-03-27 | Stop reason: HOSPADM

## 2024-03-27 RX ADMIN — TRIAMCINOLONE ACETONIDE 40 MG: 40 INJECTION, SUSPENSION INTRA-ARTICULAR; INTRAMUSCULAR at 10:03

## 2024-03-27 NOTE — PROGRESS NOTES
3/27/2024    HPI:  Lavonne Pierre is a 48 y.o. female, who presents to clinic today for continued evaluation of her bilateral carpal tunnel symptoms.  States the injection received at her last visit has completely resolved her symptoms of her left hand/wrist.  States they have mildly improved his symptoms of her right hand/wrist, but she has noticed increased locking of the thumb.  States he will wake up with the thumb locked in the morning and she will have to manually reduce it.  States he is pain is of the base of the thumb as well.  Denies any acute injuries.  Denies any other complaints this time.    PMHX:  Past Medical History:   Diagnosis Date    Anxiety 2013    Asthma     Depression     Fibromyalgia     GERD (gastroesophageal reflux disease)     History of pneumonia     HTN (hypertension)     Preeclampsia     Sleep apnea     Uses CPAP       PSHX:  Past Surgical History:   Procedure Laterality Date    BREAST BIOPSY      Benign    BREAST SURGERY  Right Breast 1998    Fibro cystic    CARPAL TUNNEL RELEASE Right 2017     SECTION, LOW TRANSVERSE      for PREE    NASAL SEPTUM SURGERY      NECK SURGERY      SPINE SURGERY      TUBAL LIGATION      upper GI series         FMHX:  Family History   Problem Relation Age of Onset    Pancreatic cancer Mother     Cancer Mother         Mother passed in  from Pancreas Cancer    Lymphoma Father     Diabetes Father     Hypertension Father     Cancer Father         Small Cell Lung Cancer/ still alive    Breast cancer Maternal Aunt     Cancer Maternal Aunt         Breast Cancer both/ passed.  Im not sure of year i was youg.    Breast cancer Maternal Aunt 70    Pancreatic cancer Maternal Uncle     Breast cancer Paternal Aunt 50    Pancreatic cancer Maternal Grandmother     Heart disease Maternal Grandfather     Diabetes Paternal Grandmother     Cancer Paternal Grandfather         Melanoma    Asthma Brother     Hypertension Brother         SOCHX:  Social History     Tobacco Use    Smoking status: Never    Smokeless tobacco: Never   Substance Use Topics    Alcohol use: No       ALLERGIES:  Ibuprofen and Latex, natural rubber    CURRENT MEDICATIONS:  Current Outpatient Medications on File Prior to Visit   Medication Sig Dispense Refill    albuterol (PROVENTIL) 2.5 mg /3 mL (0.083 %) nebulizer solution Take 3 mLs (2.5 mg total) by nebulization every 6 (six) hours as needed. Rescue 270 mL 11    albuterol (VENTOLIN HFA) 90 mcg/actuation inhaler Inhale 1-2 puffs into the lungs every 4 (four) hours as needed for Wheezing or Shortness of Breath. Rescue 18 g 11    azelastine (ASTELIN) 137 mcg (0.1 %) nasal spray U 1 TO 2 SPRAYS IN EACH NOSTRIL BID  0    diclofenac sodium (VOLTAREN) 1 % Gel APPLY 2 GRAMS TOPICALLY THREE TIMES DAILY 100 g 5    fluticasone propionate (FLONASE) 50 mcg/actuation nasal spray INSTILL 1 TO 2 SPRAYS IN EACH NOSTRIL QD  5    fluticasone-umeclidin-vilanter (TRELEGY ELLIPTA) 200-62.5-25 mcg inhaler Inhale 1 puff into the lungs once daily. 30 each 11    lisinopriL (PRINIVIL,ZESTRIL) 5 MG tablet TAKE 1 TABLET(5 MG) BY MOUTH EVERY DAY 90 tablet 3    montelukast (SINGULAIR) 10 mg tablet Take 1 tablet (10 mg total) by mouth every evening. 30 tablet 11    multivitamin (THERAGRAN) per tablet Take 1 tablet by mouth once daily.      RESTASIS 0.05 % ophthalmic emulsion Place 1 drop into both eyes 2 (two) times daily.      [DISCONTINUED] fluticasone furoate-vilanteroL (BREO ELLIPTA) 200-25 mcg/dose DsDv diskus inhaler Inhale 1 puff into the lungs once daily. Controller 1 each 11     No current facility-administered medications on file prior to visit.       REVIEW OF SYSTEMS:  Review of Systems Complete; Negative, unless noted above.    GENERAL PHYSICAL EXAM:   There were no vitals taken for this visit.   GEN: well developed, well nourished, no acute distress   PULM: No wheezing, no respiratory distress   CV: RRR    ORTHO EXAM:   Examination of  the right hand/wrist reveals no edema, erythema ecchymosis, or skin breakdown.  Able make composite fist and fully extend all fingers.  No evidence of any active triggering or catching of the flexor tendon of the right thumb.  Tenderness to palpation just proximal to the area of the A1 pulley of the right thumb.  No significant tenderness portions of the remainder of the right hand/wrist.  Normal sensation in the radial, ulnar, median nerve distributions.  Capillary refill less than 2 seconds.    RADIOLOGY:   None.    ASSESSMENT:   Questionable right thumb trigger finger    PLAN:  1. I discussed with Lavonne Pierre the trigger finger pathology and treatment options in detail during today's visit.  We discussed that the best course of action this time is to perform both a diagnostic and possibly therapeutic steroid injection into the flexor tendon sheath of the right thumb in clinic today.  She verbally agreed with the treatment plan.    2. Informed consent was obtained.  After an alcohol prep followed by a chlorhexidine prep, a steroid injection was placed into the flexor tendon sheath at the level of the middle portion of the proximal phalanx of the right thumb in clinic today.  She tolerated the procedure well with no immediate complications.    3. I would like her follow up in clinic in 3 weeks for repeat evaluation.  She was instructed to contact clinic for any problems or concerns in the interim.

## 2024-03-27 NOTE — PROCEDURES
Tendon Sheath    Date/Time: 3/27/2024 10:20 AM    Performed by: Siddharth Moralez PA-C  Authorized by: Siddharth Moralez PA-C    Consent Done?:  Yes (Verbal)  Indications:  Pain  Site marked: the procedure site was marked    Timeout: prior to procedure the correct patient, procedure, and site was verified    Prep: patient was prepped and draped in usual sterile fashion      Local anesthesia used?: Yes    Local anesthetic:  Lidocaine 1% without epinephrine  Anesthetic total (ml):  0.5    Location:  Thumb  Site:  R thumb flexor tendon sheath  Ultrasonic guidance for needle placement?: No    Needle size:  25 G  Approach:  Volar  Medications:  40 mg triamcinolone acetonide 40 mg/mL (20 mg injected)  Patient tolerance:  Patient tolerated the procedure well with no immediate complications

## 2024-04-01 ENCOUNTER — CLINICAL SUPPORT (OUTPATIENT)
Dept: REHABILITATION | Facility: HOSPITAL | Age: 49
End: 2024-04-01
Payer: MEDICARE

## 2024-04-01 DIAGNOSIS — M54.50 CHRONIC BILATERAL LOW BACK PAIN WITHOUT SCIATICA: Primary | ICD-10-CM

## 2024-04-01 DIAGNOSIS — G89.29 CHRONIC BILATERAL LOW BACK PAIN WITHOUT SCIATICA: Primary | ICD-10-CM

## 2024-04-01 DIAGNOSIS — M79.7 FIBROMYALGIA: ICD-10-CM

## 2024-04-01 DIAGNOSIS — M54.59 OTHER LOW BACK PAIN: ICD-10-CM

## 2024-04-01 PROCEDURE — 97810 ACUP 1/> WO ESTIM 1ST 15 MIN: CPT | Mod: HCNC,PN

## 2024-04-01 PROCEDURE — 97811 ACUP 1/> W/O ESTIM EA ADD 15: CPT | Mod: HCNC,PN

## 2024-04-03 NOTE — PROGRESS NOTES
Acupuncture Evaluation Note     Name: Lavonne Pierre  Clinic Number: [id number]    Traditional Chinese Medicine (TCM) Diagnosis: Qi Stagnation  Medical Diagnosis:   Encounter Diagnoses   Name Primary?    Chronic bilateral low back pain without sciatica Yes    Fibromyalgia     Other low back pain         Evaluation Date:4/1/24    Visit #/Visits authorized: 6/12    Precautions: Standard    Subjective     Chief Concern: Pain from fibromyalgia including hips, chronic low back pain, joints. Pain decreases ROM and affects activities/enjoyment.    Medical necessity is demonstrated by the following IMPAIRMENTS: Medical Necessity: Decreased mobility limits day to day activities, social, and emergent situations              Aggravating Factors:  movement and pressure   Relieving Factors:  rest    Symptom Description:     Quality:  Aching  Severity:  5-7 out of 10; depending  Frequency:  when active      Objective       Pulse:        wiry     Treatment       Acupuncture points used:  K3, UB 62, SI 4, TB 5, GB 20  Needles In: 10  Needles Out: 10  Needles W/O STIM placed: 1:45pm  [place] W/O STIM removed: 2:30pm      Assessment     After treatment, patient felt more relaxed and less stiffness/pain by a couple clicks on the pain scale    Patient prognosis is Good.     Patient will continue to benefit from acupuncture treatment to address the deficits listed in the problem list box on initial evaluation, provide patient family education and to maximize pt's level of independence in the home and community environment.     Patient's spiritual, cultural and educational needs considered and pt agreeable to plan of care and goals.     Anticipated barriers to treatment: none    Plan     Recommend every 2 weeks for 12 sessions with midway re-assess.      Education:  Patient is aware of cumulative benefit of acupuncture

## 2024-04-17 ENCOUNTER — OFFICE VISIT (OUTPATIENT)
Dept: ORTHOPEDICS | Facility: CLINIC | Age: 49
End: 2024-04-17
Payer: MEDICARE

## 2024-04-17 VITALS — BODY MASS INDEX: 30.2 KG/M2 | HEIGHT: 57 IN | WEIGHT: 140 LBS

## 2024-04-17 DIAGNOSIS — M54.12 CERVICAL RADICULOPATHY: ICD-10-CM

## 2024-04-17 DIAGNOSIS — G56.03 BILATERAL CARPAL TUNNEL SYNDROME: Primary | ICD-10-CM

## 2024-04-17 PROCEDURE — 3008F BODY MASS INDEX DOCD: CPT | Mod: CPTII,S$GLB,, | Performed by: PHYSICIAN ASSISTANT

## 2024-04-17 PROCEDURE — 1159F MED LIST DOCD IN RCRD: CPT | Mod: CPTII,S$GLB,, | Performed by: PHYSICIAN ASSISTANT

## 2024-04-17 PROCEDURE — 99999 PR PBB SHADOW E&M-EST. PATIENT-LVL III: CPT | Mod: PBBFAC,,, | Performed by: PHYSICIAN ASSISTANT

## 2024-04-17 PROCEDURE — 4010F ACE/ARB THERAPY RXD/TAKEN: CPT | Mod: CPTII,S$GLB,, | Performed by: PHYSICIAN ASSISTANT

## 2024-04-17 PROCEDURE — 99213 OFFICE O/P EST LOW 20 MIN: CPT | Mod: S$GLB,,, | Performed by: PHYSICIAN ASSISTANT

## 2024-04-17 NOTE — PROGRESS NOTES
2024    HPI:  Lavonne Pierre is a 48 y.o. female, who presents to clinic today for  continued evaluation of her right thumb locking and pain.  States he is continued to have symptoms despite the injection she received 3 weeks ago.  States he was able to take pictures of when the thumb locks.  States he does have a history of cervical fusion.  States he does have pain that radiates into the arm.  Denies any other complaints at this time.    PMHX:  Past Medical History:   Diagnosis Date    Anxiety 2013    Asthma     Depression     Fibromyalgia     GERD (gastroesophageal reflux disease)     History of pneumonia     HTN (hypertension)     Preeclampsia     Sleep apnea     Uses CPAP       PSHX:  Past Surgical History:   Procedure Laterality Date    BREAST BIOPSY      Benign    BREAST SURGERY  Right Breast 1998    Fibro cystic    CARPAL TUNNEL RELEASE Right 2017     SECTION, LOW TRANSVERSE      for PREE    NASAL SEPTUM SURGERY      NECK SURGERY      SPINE SURGERY      TUBAL LIGATION      upper GI series         FMHX:  Family History   Problem Relation Name Age of Onset    Pancreatic cancer Mother Maya Pierre     Cancer Mother Maya Pierre         Mother passed in  from Pancreas Cancer    Lymphoma Father Bradly Tastet     Diabetes Father Bradly Tastet     Hypertension Father Bradly Tastet     Cancer Father Bradly Tastet         Small Cell Lung Cancer/ still alive    Breast cancer Maternal Aunt Pat     Cancer Maternal Aunt Pat         Breast Cancer both/ passed.  Im not sure of year i was youg.    Breast cancer Maternal Aunt Nicky 70    Pancreatic cancer Maternal Uncle      Breast cancer Paternal Aunt  50    Pancreatic cancer Maternal Grandmother      Heart disease Maternal Grandfather Jose Guadalupe Tastet     Diabetes Paternal Grandmother Holli Tastet     Cancer Paternal Grandfather Skin cancer         Melanoma    Asthma Brother Pacheco Tastet     Hypertension Brother Alan  "Tastet        SOCHX:  Social History     Tobacco Use    Smoking status: Never    Smokeless tobacco: Never   Substance Use Topics    Alcohol use: No       ALLERGIES:  Ibuprofen and Latex, natural rubber    CURRENT MEDICATIONS:  Current Outpatient Medications on File Prior to Visit   Medication Sig Dispense Refill    albuterol (PROVENTIL) 2.5 mg /3 mL (0.083 %) nebulizer solution Take 3 mLs (2.5 mg total) by nebulization every 6 (six) hours as needed. Rescue 270 mL 11    albuterol (VENTOLIN HFA) 90 mcg/actuation inhaler Inhale 1-2 puffs into the lungs every 4 (four) hours as needed for Wheezing or Shortness of Breath. Rescue 18 g 11    azelastine (ASTELIN) 137 mcg (0.1 %) nasal spray U 1 TO 2 SPRAYS IN EACH NOSTRIL BID  0    diclofenac sodium (VOLTAREN) 1 % Gel APPLY 2 GRAMS TOPICALLY THREE TIMES DAILY 100 g 5    fluticasone propionate (FLONASE) 50 mcg/actuation nasal spray INSTILL 1 TO 2 SPRAYS IN EACH NOSTRIL QD  5    fluticasone-umeclidin-vilanter (TRELEGY ELLIPTA) 200-62.5-25 mcg inhaler Inhale 1 puff into the lungs once daily. 30 each 11    lisinopriL (PRINIVIL,ZESTRIL) 5 MG tablet TAKE 1 TABLET(5 MG) BY MOUTH EVERY DAY 90 tablet 3    montelukast (SINGULAIR) 10 mg tablet Take 1 tablet (10 mg total) by mouth every evening. 30 tablet 11    multivitamin (THERAGRAN) per tablet Take 1 tablet by mouth once daily.      RESTASIS 0.05 % ophthalmic emulsion Place 1 drop into both eyes 2 (two) times daily.      [DISCONTINUED] fluticasone furoate-vilanteroL (BREO ELLIPTA) 200-25 mcg/dose DsDv diskus inhaler Inhale 1 puff into the lungs once daily. Controller 1 each 11     No current facility-administered medications on file prior to visit.       REVIEW OF SYSTEMS:  Review of Systems Complete; Negative, unless noted above.    GENERAL PHYSICAL EXAM:   Ht 4' 9" (1.448 m)   Wt 63.5 kg (140 lb)   BMI 30.30 kg/m²    GEN: well developed, well nourished, no acute distress   PULM: No wheezing, no respiratory distress   CV: " RRR    ORTHO EXAM:     Examination of the right hand/wrist reveals no edema, erythema, ecchymosis, or skin breakdown.  Able make composite fist and fully extend all fingers.  Full intact range of motion of the right wrist.  5/5 / intrinsic strength.  No hyper spasticity noted of the thumb.  Negative Greer's test.  Normal sensation in the radial, ulnar, median nerve distributions.  Capillary refill less than 2 seconds.    RADIOLOGY:     None.    ASSESSMENT:     Cervical radiculopathy with associated  focal dystonia of the right thumb    PLAN:  1. I discussed with Lavonne Pierre  that the hypersensitivity to her thumb is likely due to her neck.  We did discuss we would have her follow up with back and spine for further evaluation.  She verbally agreed with the treatment plan.    2.  She was referred to back and spine in clinic today.      3.  I would like to have her follow up in clinic for any returning of her carpal tunnel symptoms.  She was instructed to contact the clinic for any problems or concerns in the interim.

## 2024-04-22 LAB — CRC RECOMMENDATION EXT: NORMAL

## 2024-04-24 ENCOUNTER — OFFICE VISIT (OUTPATIENT)
Dept: PAIN MEDICINE | Facility: CLINIC | Age: 49
End: 2024-04-24
Payer: MEDICARE

## 2024-04-24 ENCOUNTER — PATIENT OUTREACH (OUTPATIENT)
Dept: ADMINISTRATIVE | Facility: HOSPITAL | Age: 49
End: 2024-04-24
Payer: MEDICARE

## 2024-04-24 VITALS
WEIGHT: 143.75 LBS | DIASTOLIC BLOOD PRESSURE: 74 MMHG | HEART RATE: 74 BPM | BODY MASS INDEX: 31.11 KG/M2 | SYSTOLIC BLOOD PRESSURE: 119 MMHG

## 2024-04-24 DIAGNOSIS — M65.311 TRIGGER FINGER OF RIGHT THUMB: ICD-10-CM

## 2024-04-24 DIAGNOSIS — M54.12 CERVICAL RADICULOPATHY: Primary | ICD-10-CM

## 2024-04-24 PROCEDURE — 4010F ACE/ARB THERAPY RXD/TAKEN: CPT | Mod: CPTII,S$GLB,, | Performed by: PHYSICIAN ASSISTANT

## 2024-04-24 PROCEDURE — 3008F BODY MASS INDEX DOCD: CPT | Mod: CPTII,S$GLB,, | Performed by: PHYSICIAN ASSISTANT

## 2024-04-24 PROCEDURE — 99999 PR PBB SHADOW E&M-EST. PATIENT-LVL IV: CPT | Mod: PBBFAC,,, | Performed by: PHYSICIAN ASSISTANT

## 2024-04-24 PROCEDURE — 1160F RVW MEDS BY RX/DR IN RCRD: CPT | Mod: CPTII,S$GLB,, | Performed by: PHYSICIAN ASSISTANT

## 2024-04-24 PROCEDURE — 1159F MED LIST DOCD IN RCRD: CPT | Mod: CPTII,S$GLB,, | Performed by: PHYSICIAN ASSISTANT

## 2024-04-24 PROCEDURE — 99214 OFFICE O/P EST MOD 30 MIN: CPT | Mod: S$GLB,,, | Performed by: PHYSICIAN ASSISTANT

## 2024-04-24 PROCEDURE — 3078F DIAST BP <80 MM HG: CPT | Mod: CPTII,S$GLB,, | Performed by: PHYSICIAN ASSISTANT

## 2024-04-24 PROCEDURE — 3074F SYST BP LT 130 MM HG: CPT | Mod: CPTII,S$GLB,, | Performed by: PHYSICIAN ASSISTANT

## 2024-04-24 NOTE — LETTER
FAX      AUTHORIZATION FOR RELEASE OF   CONFIDENTIAL INFORMATION            We are seeing Lavonne Pierre, date of birth 1975, in the clinic at [facility]. Ranjit Bernabe MD is the patient's PCP. Lavonne Pierre has an outstanding lab/procedure at the time we reviewed their chart. In order to help keep their health information updated, Lavonne Pierre has authorized us to request the following medical record(s):       (X)  COLON/PATH W/RECALL                                     Please fax records to [facility] [phone].     If you have any questions, please contact Lucrecia at [phone]

## 2024-04-24 NOTE — PROGRESS NOTES
"[facility] New Patient Evaluation      Referred by: Siddharth Moralez    PCP:  Ranjit Bernabe MD    CC:   Chief Complaint   Patient presents with    Headache    Neck Pain    Shoulder Pain    Low-back Pain          HPI:   Lavonne Pierre is a 48 y.o. year old female patient who has a past medical history of Anxiety, Asthma, Depression, Fibromyalgia, GERD (gastroesophageal reflux disease), History of pneumonia, HTN (hypertension), Preeclampsia, and Sleep apnea. She presents in referral from Siddharth Moralez for right arm and thumb pain.  She was seen in 2020 for neck pain, hip pain and leg pain.  Presents today for right thumb.  With cooking and doing activities with the right hand, the right handspasms in a the base of the thumb and the thum over extends.  It is painful and bothersom for her. She also describes onset of neck pain and bilateral arm pain to the hands and fingers.  She has intermittent shoulder pain.  She is right handed and had C5/6 artificial disk replacement.     Denies bowel/ bladder incontinence.    HPI from 2-14-20 when last seen:  44 year old female with anxiety, depression, fibromyalgia, GERD, hypertension and asthma presents for follow up of chronic neck/ back pain after undergoing imaging.  There have been no significant changes in symptoms since last assessment.  Per my last note:  "In 1996 someone fell from the top deck of a float hitting her back as he fell.  Since then she has felt a burning sensation in the bilateral hips and lateral legs to the feet.  She underwent PT with dry needling in December 2019 for her back without resolution.  Pain increases with standing and walking.  She has also had chronic neck pain and underwent artificial disk replacement with Dr. Martin in June 2019; this was not good experience for her and pain continues.  She did PT after surgery for her neck, but she continues to have pain in the posterior neck with radiation into the right greater " "than left shoulder to the arms and hands.  It is associated with numbness/ tingling in the bilateral hands.  She has tried voltaredn gel and tramadol for neck and back pain.  She also underwent right carpal tunnel release in 2017."  Oswestry score: 46%.  PHQ:  14.    She is neurologically intact with 5/5 strength, 2+ DTR and no sensory deficits in the upper/ lower extremities.  Gait and station fluid.  She denies bowel/ bladder dysfunction.      Past and current medications:  Antineuropathics:  NSAIDs: voltaren gel  Antidepressants:  Muscle relaxers:  Opioids: tramadol in past.  Antiplatelets/Anticoagulants:    Physical Therapy/ Chiropractic care:  Acupuncture - undergoing for fibroyalgia currently  PT - 2021 for lower back, hips and 2019 for neck pain    Pain Intervention History:  SI joint injections 4-5-21    Past Spine Surgical History:  C5/6 artificaial disk replacedment in June 2019 with Dr. Bee  Right carpal tunnel release in 2017.        History:    Current Outpatient Medications:     albuterol (PROVENTIL) 2.5 mg /3 mL (0.083 %) nebulizer solution, Take 3 mLs (2.5 mg total) by nebulization every 6 (six) hours as needed. Rescue, Disp: 270 mL, Rfl: 11    albuterol (VENTOLIN HFA) 90 mcg/actuation inhaler, Inhale 1-2 puffs into the lungs every 4 (four) hours as needed for Wheezing or Shortness of Breath. Rescue, Disp: 18 g, Rfl: 11    azelastine (ASTELIN) 137 mcg (0.1 %) nasal spray, U 1 TO 2 SPRAYS IN EACH NOSTRIL BID, Disp: , Rfl: 0    diclofenac sodium (VOLTAREN) 1 % Gel, APPLY 2 GRAMS TOPICALLY THREE TIMES DAILY, Disp: 100 g, Rfl: 5    fluticasone propionate (FLONASE) 50 mcg/actuation nasal spray, INSTILL 1 TO 2 SPRAYS IN EACH NOSTRIL QD, Disp: , Rfl: 5    fluticasone-umeclidin-vilanter (TRELEGY ELLIPTA) 200-62.5-25 mcg inhaler, Inhale 1 puff into the lungs once daily., Disp: 30 each, Rfl: 11    lisinopriL (PRINIVIL,ZESTRIL) 5 MG tablet, TAKE 1 TABLET(5 MG) BY MOUTH EVERY DAY, Disp: 90 tablet, Rfl: " 3    montelukast (SINGULAIR) 10 mg tablet, Take 1 tablet (10 mg total) by mouth every evening., Disp: 30 tablet, Rfl: 11    multivitamin (THERAGRAN) per tablet, Take 1 tablet by mouth once daily., Disp: , Rfl:     RESTASIS 0.05 % ophthalmic emulsion, Place 1 drop into both eyes 2 (two) times daily., Disp: , Rfl:     Past Medical History:   Diagnosis Date    Anxiety 2013    Asthma     Depression     Fibromyalgia     GERD (gastroesophageal reflux disease)     History of pneumonia     HTN (hypertension)     Preeclampsia     Sleep apnea     Uses CPAP       Past Surgical History:   Procedure Laterality Date    BREAST BIOPSY      Benign    BREAST SURGERY  Right Breast 1998    Fibro cystic    CARPAL TUNNEL RELEASE Right 2017     SECTION, LOW TRANSVERSE      for PREE    NASAL SEPTUM SURGERY      NECK SURGERY      SPINE SURGERY      TUBAL LIGATION      upper GI series         Family History   Problem Relation Name Age of Onset    Pancreatic cancer Mother Maya Tastet     Cancer Mother Maya Tastet         Mother passed in  from Pancreas Cancer    Lymphoma Father Bradly Tastet     Diabetes Father Bradly Tastet     Hypertension Father Bradly Tastet     Cancer Father Bradly Tastet         Small Cell Lung Cancer/ still alive    Breast cancer Maternal Aunt Pat     Cancer Maternal Aunt Pat         Breast Cancer both/ passed.  Im not sure of year i was youg.    Breast cancer Maternal Aunt Nicky 70    Pancreatic cancer Maternal Uncle      Breast cancer Paternal Aunt  50    Pancreatic cancer Maternal Grandmother      Heart disease Maternal Grandfather Jose Guadalupe Tastet     Diabetes Paternal Grandmother Annamay Tastet     Cancer Paternal Grandfather Skin cancer         Melanoma    Asthma Brother Pacheco Tastet     Hypertension Brother Alan Tastet        Social History     Socioeconomic History    Marital status:    Tobacco Use    Smoking status: Never    Smokeless tobacco: Never    Substance and Sexual Activity    Alcohol use: No    Drug use: No    Sexual activity: Yes     Partners: Female     Birth control/protection: None     Social Determinants of Health     Financial Resource Strain: Low Risk  (1/18/2024)    Overall Financial Resource Strain (CARDIA)     Difficulty of Paying Living Expenses: Not hard at all   Food Insecurity: No Food Insecurity (1/18/2024)    Hunger Vital Sign     Worried About Running Out of Food in the Last Year: Never true     Ran Out of Food in the Last Year: Never true   Transportation Needs: No Transportation Needs (1/18/2024)    PRAPARE - Transportation     Lack of Transportation (Medical): No     Lack of Transportation (Non-Medical): No   Physical Activity: Insufficiently Active (1/18/2024)    Exercise Vital Sign     Days of Exercise per Week: 7 days     Minutes of Exercise per Session: 10 min   Stress: No Stress Concern Present (1/18/2024)    [employer] [place] [employer] - Occupational Stress Questionnaire     Feeling of Stress : Only a little   Social Connections: Unknown (1/18/2024)    Social Connection and Isolation Panel [NHANES]     Frequency of Communication with Friends and Family: Once a week     Frequency of Social Gatherings with Friends and Family: Once a week     Active Member of Clubs or Organizations: Yes     Attends Club or Organization Meetings: More than 4 times per year     Marital Status:    Housing Stability: Unknown (1/18/2024)    Housing Stability Vital Sign     Unable to Pay for Housing in the Last Year: No     Unstable Housing in the Last Year: No       Review of patient's allergies indicates:   Allergen Reactions    Ibuprofen Other (See Comments)     Affects stomach ulcer, avoids all NSAIDS    Latex, natural rubber Rash     Small red bumps on skin contact       Labs:  Lab Results   Component Value Date    HGBA1C 5.2 01/18/2023       Lab Results   Component Value Date    WBC 9.42 06/23/2023    HGB 13.8 06/23/2023     HCT 42.2 06/23/2023    MCV 88 06/23/2023     06/23/2023           Review of Systems:  Right arm pain.  Thumb pain, locking up.  Balance of review of systems is negative.    Physical Exam:  Vitals:    04/24/24 0913   BP: 119/74   Pulse: 74   Weight: 65.2 kg (143 lb 11.8 oz)   PainSc:   4   PainLoc: Hand     Body mass index is 31.11 kg/m².    Pain disability index:      4/24/2024     9:13 AM   Last 3 PDI Scores   Pain Disability Index (PDI) 19       Gen: NAD  Psych: mood appropriate for given condition  HEENT: eyes anicteric   CV: RRR, 2+ radial pulse  Respiratory: non-labored, no signs of respiratory distress  Abd: non-distended  Skin: warm, dry and intact.  Gait: Able to heel walk, toe walk. No antalgic gait.     Coordination:   Tandem walking coordination: normal    Cervical spine: ROM is full in flexion, extension and lateral rotation without increased pain.  Spurling's maneuver causes no neck pain to either side.  Myofascial exam: No Tenderness to palpation across cervical paraspinous region bilaterally.    Lumbar spine:  Lumbar spine: ROM is full with flexion extension and oblique extension with no increased pain.    Edwin's test causes no increased pain on either side.    Supine straight leg raise is negative bilaterally.    Internal and external rotation of the hip causes no increased pain on either side.  Myofascial exam: No tenderness to palpation across lumbar paraspinous muscles. No tenderness to palpation over the bilateral greater trochanters and bilateral SI joint    Sensory:  Intact and symmetrical to light touch in C4-T1 dermatomes bilaterally. Intact and symmetrical to light touch in L1-S1 dermatomes bilaterally.    Motor:    Right Left   C4 Shoulder Abduction  5  5   C5 Elbow Flexion    5  5   C6 Wrist Extension  5  5   C7 Elbow Extension   5  5   C8/T1 Hand Intrinsics   5  5        Right Left   L2/3 Iliacus Hip flexion  5  5   L3/4 Qudratus Femoris Knee Extension  5  5   L4/5 Tib Anterior  Ankle Dorsiflexion   5  5   L5/S1 Extensor Hallicus Longus Great toe extension  5  5   S1/S2 Gastroc/Soleus Plantar Flexion  5  5      Right Left   Triceps DTR 2+ 2+   Biceps DTR 2+ 2+   Brachioradialis DTR 2+ 2+   Patellar DTR 2+ 2+   Achilles DTR 2+ 2+   Greer Absent  Absent   Clonus Absent Absent   Babinski Absent Absent       Imaging:    X-ray and MRI cervical spine from 02/13/2020 reviewed.  Normal postoperative changes of C5-6 artificial disc replacement is seen with no evidence of instability on flexion or extension.  Mild multilevel degenerative changes.  There is mild at most foraminal narrowing at any given cervical spine level.  C5-6 foramina are not completely evaluated secondary to artifact from artificial disc.    X-ray and MRI lumbar spine from 02/13/2020 reviewed.  There is good alignment with no evidence of instability on flexion or extension.  At L4-5 there is a mild disc osteophyte complex without most mild right foraminal narrowing.      Emg/ ncv upper extremities 3-4-24:  Impression:  Abnormal study.    Moderate right carpal tunnel syndrome, without active denervation.    Mild left carpal tunnel syndrome, without active denervation.    No electrophysiologic evidence of bilateral cervical radiculopathy/plexopathy, bilateral ulnar mononeuropathy, or peripheral neuropathy in bilateral extremities.    Assessment:   Lavonne Pierre is a 48 y.o. year old female patient who has a past medical history of Anxiety, Asthma, Depression, Fibromyalgia, GERD (gastroesophageal reflux disease), History of pneumonia, HTN (hypertension), Preeclampsia, and Sleep apnea. She presents in referral from Siddharth Moralez for right hand spasm with neck pain and arm pain.  Neck and arm likley myofascial given mild degenerative changes noted I the neck, but cannot completely rule out degenerative changes influencing neck and arm pain.     Plan:  - discussed role of medicaitons, PT, exercise, JODIE to help with symptoms.    - recommend conserative measures beofre any surgery.  - refer to PT     - follow up in 6 weeks to monitor progress    Problem List Items Addressed This Visit    None  Visit Diagnoses       Cervical radiculopathy    -  Primary    Relevant Orders    Ambulatory referral/consult to Physical/Occupational Therapy    Trigger finger of right thumb        Relevant Orders    Ambulatory referral/consult to Physical/Occupational Therapy            Follow Up: RTC in 6 weeks    : Reviewed and consistent with medication use as prescribed.    Thank you for referring this interesting patient, and I look forward to continuing to collaborate in her care.        Janny Adame PA-C  [facility]

## 2024-04-24 NOTE — PROGRESS NOTES
Population Health Chart Review & Patient Outreach Details      Additional Pop Health Notes:               Updates Requested / Reviewed:      Updated Care Coordination Note         Health Maintenance Topics Overdue:      VBHM Score: 0     Patient is not due for any topics at this time.                       Health Maintenance Topic(s) Outreach Outcomes & Actions Taken:    Colorectal Cancer Screening - Outreach Outcomes & Actions Taken  : External Records Requested & Care Team Updated if Applicable

## 2024-04-26 ENCOUNTER — PATIENT OUTREACH (OUTPATIENT)
Dept: ADMINISTRATIVE | Facility: HOSPITAL | Age: 49
End: 2024-04-26
Payer: MEDICARE

## 2024-04-26 NOTE — PROGRESS NOTES
Population Health Chart Review & Patient Outreach Details      Additional [facility] Health Notes:               Updates Requested / Reviewed:      Updated Care Coordination Note and Immunizations Reconciliation Completed or Queried: [place]         Health Maintenance Topics Overdue:      VBHM Score: 0     Patient is not due for any topics at this time.                       Health Maintenance Topic(s) Outreach Outcomes & Actions Taken:    Colorectal Cancer Screening - Outreach Outcomes & Actions Taken  : External Records Requested & Care Team Updated if Applicable and External Records Uploaded, Care Team Updated, & History Updated if Applicable

## 2024-04-29 ENCOUNTER — CLINICAL SUPPORT (OUTPATIENT)
Dept: REHABILITATION | Facility: HOSPITAL | Age: 49
End: 2024-04-29
Payer: MEDICARE

## 2024-04-29 DIAGNOSIS — M54.59 OTHER LOW BACK PAIN: Primary | Chronic | ICD-10-CM

## 2024-04-29 DIAGNOSIS — G89.29 CHRONIC MIDLINE LOW BACK PAIN WITHOUT SCIATICA: ICD-10-CM

## 2024-04-29 DIAGNOSIS — M79.7 FIBROMYALGIA: ICD-10-CM

## 2024-04-29 DIAGNOSIS — M54.50 CHRONIC BILATERAL LOW BACK PAIN WITHOUT SCIATICA: ICD-10-CM

## 2024-04-29 DIAGNOSIS — G89.29 CHRONIC BILATERAL LOW BACK PAIN WITHOUT SCIATICA: ICD-10-CM

## 2024-04-29 DIAGNOSIS — M54.50 CHRONIC MIDLINE LOW BACK PAIN WITHOUT SCIATICA: ICD-10-CM

## 2024-04-29 PROCEDURE — 97811 ACUP 1/> W/O ESTIM EA ADD 15: CPT | Mod: PN

## 2024-04-29 PROCEDURE — 97810 ACUP 1/> WO ESTIM 1ST 15 MIN: CPT | Mod: PN

## 2024-05-01 ENCOUNTER — OFFICE VISIT (OUTPATIENT)
Dept: OBSTETRICS AND GYNECOLOGY | Facility: CLINIC | Age: 49
End: 2024-05-01
Payer: MEDICARE

## 2024-05-01 VITALS
WEIGHT: 143.75 LBS | HEIGHT: 57 IN | DIASTOLIC BLOOD PRESSURE: 60 MMHG | BODY MASS INDEX: 31.01 KG/M2 | SYSTOLIC BLOOD PRESSURE: 120 MMHG

## 2024-05-01 DIAGNOSIS — N92.1 METRORRHAGIA: Primary | ICD-10-CM

## 2024-05-01 DIAGNOSIS — N89.8 VAGINAL ODOR: ICD-10-CM

## 2024-05-01 DIAGNOSIS — L68.9 EXCESSIVE HAIR GROWTH: ICD-10-CM

## 2024-05-01 DIAGNOSIS — N95.1 PERIMENOPAUSE: ICD-10-CM

## 2024-05-01 DIAGNOSIS — I10 HYPERTENSION, UNSPECIFIED TYPE: ICD-10-CM

## 2024-05-01 DIAGNOSIS — R63.4 WEIGHT LOSS: ICD-10-CM

## 2024-05-01 PROCEDURE — 99214 OFFICE O/P EST MOD 30 MIN: CPT | Mod: S$GLB,,, | Performed by: OBSTETRICS & GYNECOLOGY

## 2024-05-01 PROCEDURE — 3078F DIAST BP <80 MM HG: CPT | Mod: CPTII,S$GLB,, | Performed by: OBSTETRICS & GYNECOLOGY

## 2024-05-01 PROCEDURE — 1159F MED LIST DOCD IN RCRD: CPT | Mod: CPTII,S$GLB,, | Performed by: OBSTETRICS & GYNECOLOGY

## 2024-05-01 PROCEDURE — 99999 PR PBB SHADOW E&M-EST. PATIENT-LVL III: CPT | Mod: PBBFAC,,, | Performed by: OBSTETRICS & GYNECOLOGY

## 2024-05-01 PROCEDURE — 3074F SYST BP LT 130 MM HG: CPT | Mod: CPTII,S$GLB,, | Performed by: OBSTETRICS & GYNECOLOGY

## 2024-05-01 PROCEDURE — 3008F BODY MASS INDEX DOCD: CPT | Mod: CPTII,S$GLB,, | Performed by: OBSTETRICS & GYNECOLOGY

## 2024-05-01 PROCEDURE — 4010F ACE/ARB THERAPY RXD/TAKEN: CPT | Mod: CPTII,S$GLB,, | Performed by: OBSTETRICS & GYNECOLOGY

## 2024-05-01 RX ORDER — METRONIDAZOLE 500 MG/1
500 TABLET ORAL 2 TIMES DAILY
Qty: 14 TABLET | Refills: 0 | Status: SHIPPED | OUTPATIENT
Start: 2024-05-01 | End: 2024-05-08

## 2024-05-01 RX ORDER — NORETHINDRONE ACETATE AND ETHINYL ESTRADIOL 1MG-20(21)
1 KIT ORAL DAILY
Qty: 30 TABLET | Refills: 11 | Status: SHIPPED | OUTPATIENT
Start: 2024-05-01 | End: 2025-05-01

## 2024-05-01 RX ORDER — NORETHINDRONE 5 MG/1
5 TABLET ORAL DAILY
Qty: 10 TABLET | Refills: 0 | Status: SHIPPED | OUTPATIENT
Start: 2024-05-01 | End: 2024-05-01

## 2024-05-01 NOTE — PROGRESS NOTES
Chief Complaint   Patient presents with    Irregular Cycles     Two Cycles for the month of April. First one was red and then started again after intercourse and now is brown with an odor.       History of Present Illness: Lavonne Pierre is a 48 y.o. female that presents today 2024 with LMP Patient's last menstrual period was 2024 (approximate). for   Chief Complaint   Patient presents with    Irregular Cycles     Two Cycles for the month of April. First one was red and then started again after intercourse and now is brown with an odor.     Two periods in April    She reports dark old blood with odor.     She reports going through two packs of pads    5 for 7 days     -  still bleeding    1-2 heavy days first days.     She reports on OCPs for years.     Past Medical History:   Diagnosis Date    Anxiety 2013    Asthma     Depression     Fibromyalgia     GERD (gastroesophageal reflux disease)     History of pneumonia     HTN (hypertension)     Personal history of colonic polyps 2024    Preeclampsia     Sleep apnea     Uses CPAP       Past Surgical History:   Procedure Laterality Date    BREAST BIOPSY      Benign    BREAST SURGERY  Right Breast     Fibro cystic    CARPAL TUNNEL RELEASE Right 2017     SECTION, LOW TRANSVERSE  2008    for PREE    COLONOSCOPY  2024    NASAL SEPTUM SURGERY      NECK SURGERY      SPINE SURGERY      TUBAL LIGATION      upper GI series         Outpatient Medications Prior to Visit   Medication Sig Dispense Refill    fluticasone furoate-vilanteroL (BREO ELLIPTA) 200-25 mcg/dose DsDv diskus inhaler Inhale 1 puff into the lungs once daily. Controller 1 each 11    lisinopriL (PRINIVIL,ZESTRIL) 5 MG tablet TAKE 1 TABLET(5 MG) BY MOUTH EVERY DAY 90 tablet 3    montelukast (SINGULAIR) 10 mg tablet Take 1 tablet (10 mg total) by mouth every evening. 30 tablet 11    multivitamin (THERAGRAN) per tablet Take 1 tablet by mouth once  daily.      RESTASIS 0.05 % ophthalmic emulsion Place 1 drop into both eyes 2 (two) times daily.      semaglutide, weight loss, 2.4 mg/0.75 mL PnIj Inject 2.4 mg into the skin every 7 days.      albuterol (PROVENTIL) 2.5 mg /3 mL (0.083 %) nebulizer solution Take 3 mLs (2.5 mg total) by nebulization every 6 (six) hours as needed. Rescue (Patient not taking: Reported on 5/1/2024) 270 mL 11    albuterol (VENTOLIN HFA) 90 mcg/actuation inhaler Inhale 1-2 puffs into the lungs every 4 (four) hours as needed for Wheezing or Shortness of Breath. Rescue (Patient not taking: Reported on 5/1/2024) 18 g 11    azelastine (ASTELIN) 137 mcg (0.1 %) nasal spray U 1 TO 2 SPRAYS IN EACH NOSTRIL BID (Patient not taking: Reported on 5/1/2024)  0    cetirizine (ZYRTEC) 10 MG tablet Take 1 tablet (10 mg total) by mouth once daily. (Patient not taking: Reported on 5/1/2024) 30 tablet 11    diclofenac sodium (VOLTAREN) 1 % Gel APPLY 2 GRAMS TOPICALLY THREE TIMES DAILY (Patient not taking: Reported on 5/1/2024) 100 g 5    fluticasone propionate (FLONASE) 50 mcg/actuation nasal spray INSTILL 1 TO 2 SPRAYS IN EACH NOSTRIL QD (Patient not taking: Reported on 5/1/2024)  5     No facility-administered medications prior to visit.       Review of patient's allergies indicates:   Allergen Reactions    Ibuprofen Other (See Comments)     Affects stomach ulcer, avoids all NSAIDS    Latex, natural rubber Rash     Small red bumps on skin contact       Family History   Problem Relation Name Age of Onset    Pancreatic cancer Mother Maya Tastet     Cancer Mother Maya Tastet         Mother passed in 2011 from Pancreas Cancer    Lymphoma Father Bradly Tastet     Diabetes Father Bradly Tastet     Hypertension Father Bradly Tastet     Cancer Father Bradly Tastet         Small Cell Lung Cancer/ still alive    Breast cancer Maternal Aunt Pat     Cancer Maternal Aunt Pat         Breast Cancer both/ passed.  Im not sure of year i was youg.    Breast  "cancer Maternal Aunt Nicky 70    Pancreatic cancer Maternal Uncle      Breast cancer Paternal Aunt  50    Pancreatic cancer Maternal Grandmother      Heart disease Maternal Grandfather Jose Guadalupe Tastet     Diabetes Paternal Grandmother Holli Tastet     Cancer Paternal Grandfather Skin cancer         Melanoma    Asthma Brother Pacheco Tastet     Hypertension Brother Alan Tastet        Social History     Tobacco Use    Smoking status: Never    Smokeless tobacco: Never   Substance Use Topics    Alcohol use: No    Drug use: No       OB History    Para Term  AB Living   3 3 2 1   2   SAB IAB Ectopic Multiple Live Births           2      # Outcome Date GA Lbr Ron/2nd Weight Sex Type Anes PTL Lv   3 Term            2   29w0d   M CS-Unspec   ALEX   1 Term     M Vag-Spont   ALEX      Obstetric Comments    delivery for PREE         /60   Ht 4' 9" (1.448 m)   Wt 65.2 kg (143 lb 11.8 oz)   LMP 2024 (Approximate)         ASSESSMENT/PLAN:  Metrorrhagia  -     US Pelvis Comp with Transvag NON-OB (xpd; Future; Expected date: 2024  -     Discontinue: norethindrone (AYGESTIN) 5 mg Tab; Take 1 tablet (5 mg total) by mouth once daily. for 20 days  Dispense: 10 tablet; Refill: 0  -     norethindrone-ethinyl estradiol (JUNEL FE ) 1 mg-20 mcg (21)/75 mg (7) per tablet; Take 1 tablet by mouth once daily.  Dispense: 30 tablet; Refill: 11    Perimenopause  -     norethindrone-ethinyl estradiol (JUNEL FE 1/20) 1 mg-20 mcg (21)/75 mg (7) per tablet; Take 1 tablet by mouth once daily.  Dispense: 30 tablet; Refill: 11    Vaginal odor  -     metroNIDAZOLE (FLAGYL) 500 MG tablet; Take 1 tablet (500 mg total) by mouth 2 (two) times daily. for 7 days  Dispense: 14 tablet; Refill: 0    Excessive hair growth  -     norethindrone-ethinyl estradiol (JUNEL FE ) 1 mg-20 mcg (21)/75 mg (7) per tablet; Take 1 tablet by mouth once daily.  Dispense: 30 tablet; Refill: 11    Hypertension, unspecified " type    Weight loss        30 minutes spent today spent preparing reviewing previous external notes, reviewing previous results, performing medical examination, ordering tests or medications, counseling and documenting.

## 2024-05-02 NOTE — PROGRESS NOTES
Acupuncture Evaluation Note     Name: Lavonne Pierre  Clinic Number: [id number]    Traditional Chinese Medicine (TCM) Diagnosis: Qi Stagnation  Medical Diagnosis:   Encounter Diagnoses   Name Primary?    Other low back pain Yes    Chronic bilateral low back pain without sciatica     Fibromyalgia     Chronic midline low back pain without sciatica         Evaluation Date:4/29/24    Visit #/Visits authorized: 7/12    Precautions: Standard    Subjective     Chief Concern: Pain from fibromyalgia including hips, chronic low back pain, joints. Pain decreases ROM and affects activities/enjoyment.    Medical necessity is demonstrated by the following IMPAIRMENTS: Medical Necessity: Decreased mobility limits day to day activities, social, and emergent situations              Aggravating Factors:  movement and pressure   Relieving Factors:  rest    Symptom Description:     Quality:  Aching  Severity:  5-7 out of 10; depending  Frequency:  when active      Objective       Pulse:        wiry     Treatment       Acupuncture points used:  K3, UB 62, SI 4, TB 5, GB 20  Needles In: 10  Needles Out: 10  Needles W/O STIM placed: 10:30am  Needles W/O STIM removed: 11:15am      Assessment     After treatment, patient felt more relaxed and less stiffness/pain by a couple clicks on the pain scale    Patient prognosis is Good.     Patient will continue to benefit from acupuncture treatment to address the deficits listed in the problem list box on initial evaluation, provide patient family education and to maximize pt's level of independence in the home and community environment.     Patient's spiritual, cultural and educational needs considered and pt agreeable to plan of care and goals.     Anticipated barriers to treatment: none    Plan     Recommend every 2 weeks for 12 sessions with midway re-assess.      Education:  Patient is aware of cumulative benefit of acupuncture

## 2024-05-03 ENCOUNTER — PATIENT MESSAGE (OUTPATIENT)
Dept: FAMILY MEDICINE | Facility: CLINIC | Age: 49
End: 2024-05-03
Payer: MEDICARE

## 2024-05-03 RX ORDER — DICLOFENAC SODIUM 10 MG/G
GEL TOPICAL DAILY
Qty: 100 G | Refills: 5 | Status: SHIPPED | OUTPATIENT
Start: 2024-05-03

## 2024-05-03 NOTE — TELEPHONE ENCOUNTER
Care Due:                  Date            Visit Type   Department     Provider  --------------------------------------------------------------------------------                                EP -                              [facility]  Last Visit: 01-      CARE ([facility])   BEBE Bernabe                               -                              [facility]  Next Visit: 07-      CARE ([facility])   BEBE Bernabe                                                            Last  Test          Frequency    Reason                     Performed    Due Date  --------------------------------------------------------------------------------    CMP.........  12 months..  lisinopriL...............  01- 01-    [facility] Embedded Care Due Messages. Reference number: [id number].   5/03/2024 8:35:25 AM CDT

## 2024-05-08 ENCOUNTER — CLINICAL SUPPORT (OUTPATIENT)
Dept: REHABILITATION | Facility: HOSPITAL | Age: 49
End: 2024-05-08
Payer: MEDICARE

## 2024-05-08 ENCOUNTER — HOSPITAL ENCOUNTER (OUTPATIENT)
Dept: RADIOLOGY | Facility: HOSPITAL | Age: 49
Discharge: HOME | End: 2024-05-08
Attending: OBSTETRICS & GYNECOLOGY
Payer: MEDICARE

## 2024-05-08 DIAGNOSIS — M65.311 TRIGGER FINGER OF RIGHT THUMB: ICD-10-CM

## 2024-05-08 DIAGNOSIS — M54.12 CERVICAL RADICULOPATHY: ICD-10-CM

## 2024-05-08 DIAGNOSIS — R29.898 UPPER EXTREMITY WEAKNESS: Primary | ICD-10-CM

## 2024-05-08 DIAGNOSIS — N92.1 METRORRHAGIA: ICD-10-CM

## 2024-05-08 PROCEDURE — 76830 TRANSVAGINAL US NON-OB: CPT | Mod: TC,PN

## 2024-05-08 PROCEDURE — 76830 TRANSVAGINAL US NON-OB: CPT | Mod: 26,,, | Performed by: RADIOLOGY

## 2024-05-08 PROCEDURE — 97530 THERAPEUTIC ACTIVITIES: CPT | Mod: PO

## 2024-05-08 PROCEDURE — 76856 US EXAM PELVIC COMPLETE: CPT | Mod: 26,,, | Performed by: RADIOLOGY

## 2024-05-08 PROCEDURE — 97161 PT EVAL LOW COMPLEX 20 MIN: CPT | Mod: PO

## 2024-05-08 NOTE — PLAN OF CARE
[facility]   Physical Therapy Initial Evaluation      Name: Lavonne Emery [facility]  Clinic Number: [id number]    Therapy Diagnosis:   Encounter Diagnoses   Name Primary?    Cervical radiculopathy     Trigger finger of right thumb     Upper extremity weakness Yes        Physician: Janny Adame PA-C    Physician Orders: PT Eval and Treat   Medical Diagnosis from Referral:   M54.12 (ICD-10-CM) - Cervical radiculopathy   M65.311 (ICD-10-CM) - Trigger finger of right thumb   Evaluation Date: 5/8/2024  Authorization Period Expiration: 4/24/2025  Plan of Care Expiration: 7/3/2024  Progress Note Due: 6/8/2024  Date of Surgery: 2022, Pt to bring paperwork with date  Visit # / Visits authorized: 1/ 1   FOTO: 1/ 3    Precautions: Standard     Time In: 0700  Time Out: 0750  Total Billable Time: 50 minutes    Subjective     Date of onset: 6 months ago    History of current condition - Lavonne reports: A history of neck, B shoulder, and lower back pain for the past 6 months. She reports R hand cramping and thinks it may be coming from her neck. She states she has an artificial disc at the C3 level and reports mild improvement in sx following disc placement.     Falls: None    Imaging: See EPIC:     Prior Therapy: Several rounds of PT for current condition over the past several years  Social History: Lives with boyfriend   Occupation: Disability  Prior Level of Function: ind  Current Level of Function: ind    Pain:  Current 4/10, worst 10/10, best 3/10   Location: bilateral cervical region and B shoulders   Description: Aching and Dull  Aggravating Factors: housework  Easing Factors: rest    Patients goals: improve overall function and comfort     Medical History:   Past Medical History:   Diagnosis Date    Anxiety 07/16/2013    Asthma     Depression     Fibromyalgia     GERD (gastroesophageal reflux disease)     History of pneumonia 2015    HTN (hypertension)     Personal history of colonic polyps  2024    Preeclampsia     Sleep apnea     Uses CPAP       Surgical History:   Lavonne Pierre  has a past surgical history that includes  section, low transverse (); Breast biopsy; Tubal ligation (); Carpal tunnel release (Right, 2017); Neck surgery; upper GI series; Breast surgery (Right Breast ); Nasal septum surgery (); Spine surgery; and Colonoscopy (2024).    Medications:   Lavonne has a current medication list which includes the following prescription(s): albuterol, albuterol, azelastine, cetirizine, diclofenac sodium, fluticasone furoate-vilanterol, fluticasone propionate, lisinopril, metronidazole, montelukast, multivitamin, norethindrone-ethinyl estradiol, restasis, and semaglutide (weight loss).    Allergies:   Review of patient's allergies indicates:   Allergen Reactions    Ibuprofen Other (See Comments)     Affects stomach ulcer, avoids all NSAIDS    Latex, natural rubber Rash     Small red bumps on skin contact        Objective      Posture: FHP and increased thoracic kyphosis      Sensation: Dermatomes SILT                                                                                                                                                                                                                                  Cervical ROM:                                              %                         Pain/dysfunction/movement  Flexion full none   Extension full none   Side-bending Right full none   Side-bending Left full none   Right rotation full none   Left rotation full none       Shoulder Range of Motion:   Shoulder Left Right   Flexion 135 135   Abduction 155 115   ER T3 T1   IR T10 T12       Upper Extremity Strength  (R) UE  (L) UE    Shoulder Flexion 4+/5  4+/5   Shoulder abduction 4/5  4/5   Shoulder ER 4/5  4/5   Shoulder IR 4+/5  4+/5   Low trap 4-/5  4-/5   Mid trap 4-/5  4-/5       Special Tests:  Distraction +   Compression +   Spurlings  "+     Joint Mobility: WNL    Thoracic mobility: Decreased PA mobility    Palpation: TTP B upper trap      Sensation: SILT    Flexibility: Decreased in B upper trap          Intake Outcome Measure for FOTO Survey    Therapist reviewed FOTO scores for Lavonne Pierre on 5/8/2024.   FOTO report - see Media section or FOTO account episode details.    Intake Score: 42%         Treatment     Total Treatment time (time-based codes) separate from Evaluation: 20 minutes     Lavonne received the treatments listed below:        manual therapy techniques: Myofacial release were applied to the: B upper traps for 5 minutes, including:  Discussed the purpose, mechanism, and indications for dry needling with Lavonne . Patient was cleared of all precautions and contraindications and pt signed written consent and gave verbal consent to dry needling Rx today.   Palpation used to determine dry needling sites. Increased tone noted at B upper traps. Pt rec'd dry needling in supine position to B upper traps with .30 mm needles.  Pt tolerated treatment well and was not in any distress at the completion of treatment.       therapeutic activities to improve functional performance for 15  minutes, including:  Education on TDN and on a HEP to include:  Supine chin tucks 3x10 c 3 sec hold  Upper trap stretch 3x30" BUE      Patient Education and Home Exercises     Education provided:   - on HEP and POC    Written Home Exercises Provided: yes. Exercises were reviewed and Lavonne was able to demonstrate them prior to the end of the session.  Lavonne demonstrated good  understanding of the education provided. See EMR under Patient Instructions for exercises provided during therapy sessions.    Assessment     Lavonne is a 48 y.o. female referred to outpatient Physical Therapy with a medical diagnosis of   M54.12 (ICD-10-CM) - Cervical radiculopathy   M65.311 (ICD-10-CM) - Trigger finger of right thumb   Patient presents with increased pain and " decreased range of motion, strength, and flexibility. These deficits limit the patient from performing everyday activities to include lifting, carrying, bending, reaching, pushing, and pulling without pain or limitations. 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 Pt with highest deficits in pain and BUE strength upon assessment today with tenderness to palpation to B upper traps. This was addressed with TDN to B upper traps with good overall tolerance and several local twitch responses achieved. Pt was educated on a comprehensive HEP to address these deficits and demonstrated good understanding. Due to these deficits, pt will benefit from skilled PT services to improve mobility, control pain, and restore overall function.        Patient prognosis is Good.   Patient will benefit from skilled outpatient Physical Therapy to address the deficits stated above and in the chart below, provide patient /family education, and to maximize patientt's level of independence.     Plan of care discussed with patient: Yes  Patient's spiritual, cultural and educational needs considered and patient is agreeable to the plan of care and goals as stated below:     Anticipated Barriers for therapy: pmhx    Medical Necessity is demonstrated by the following  History  Co-morbidities and personal factors that may impact the plan of care [] LOW: no personal factors / co-morbidities  [x] MODERATE: 1-2 personal factors / co-morbidities  [] HIGH: 3+ personal factors / co-morbidities    Moderate / High Support Documentation:   Co-morbidities affecting plan of care: pmhx    Personal Factors:   no deficits       Examination  Body Structures and Functions, activity limitations and participation restrictions that may impact the plan of care [x] LOW: addressing 1-2 elements  [] MODERATE: 3+ elements  [] HIGH: 4+ elements (please support below)    Moderate / High  Support Documentation:      Clinical Presentation [x] LOW: stable  [] MODERATE: Evolving  [] HIGH: Unstable     Decision Making/ Complexity Score: low       Goals:  Short Term Goals: 4 weeks   Pt to report worst pain 5/10  Pt to improve BUE strength by 1/2 grade  Pt to improve FOTO by 10%  Pt to demo independence with initial HEP    Long Term Goals: 8 weeks   Pt to report worst pain 1/10  Pt to improve BUE strength by 1 grade  Pt to improve FOTO by 20%  Pt to demo independence with final HEP  Plan     Plan of care Certification: 5/8/2024 to 7/3/2024.    Outpatient Physical Therapy 2 times weekly for 8 weeks to include the following interventions: Cervical/Lumbar Traction, Manual Therapy, Moist Heat/ Ice, Neuromuscular Re-ed, Patient Education, Self Care, Therapeutic Activities, and Therapeutic Exercise.       Rick Lockhart, PT        Physician's Signature: _________________________________________ Date: ________________

## 2024-05-13 ENCOUNTER — CLINICAL SUPPORT (OUTPATIENT)
Dept: REHABILITATION | Facility: HOSPITAL | Age: 49
End: 2024-05-13
Payer: MEDICARE

## 2024-05-13 DIAGNOSIS — G89.29 CHRONIC BILATERAL LOW BACK PAIN WITHOUT SCIATICA: ICD-10-CM

## 2024-05-13 DIAGNOSIS — M79.7 FIBROMYALGIA: ICD-10-CM

## 2024-05-13 DIAGNOSIS — M54.50 CHRONIC MIDLINE LOW BACK PAIN WITHOUT SCIATICA: ICD-10-CM

## 2024-05-13 DIAGNOSIS — R29.898 UPPER EXTREMITY WEAKNESS: ICD-10-CM

## 2024-05-13 DIAGNOSIS — M54.12 CERVICAL RADICULOPATHY: ICD-10-CM

## 2024-05-13 DIAGNOSIS — M54.59 OTHER LOW BACK PAIN: Primary | Chronic | ICD-10-CM

## 2024-05-13 DIAGNOSIS — M54.50 CHRONIC BILATERAL LOW BACK PAIN WITHOUT SCIATICA: ICD-10-CM

## 2024-05-13 DIAGNOSIS — G89.29 CHRONIC MIDLINE LOW BACK PAIN WITHOUT SCIATICA: ICD-10-CM

## 2024-05-13 DIAGNOSIS — M65.311 TRIGGER FINGER OF RIGHT THUMB: ICD-10-CM

## 2024-05-13 PROCEDURE — 97811 ACUP 1/> W/O ESTIM EA ADD 15: CPT | Mod: HCNC,PN

## 2024-05-13 PROCEDURE — 97810 ACUP 1/> WO ESTIM 1ST 15 MIN: CPT | Mod: HCNC,PN

## 2024-05-14 NOTE — PROGRESS NOTES
Acupuncture Evaluation Note     Name: Lavonne Pierre  Clinic Number: [id number]    Traditional Chinese Medicine (TCM) Diagnosis: Qi Stagnation  Medical Diagnosis:   Encounter Diagnoses   Name Primary?    Other low back pain Yes    Cervical radiculopathy     Trigger finger of right thumb     Upper extremity weakness     Chronic bilateral low back pain without sciatica     Fibromyalgia     Chronic midline low back pain without sciatica         Evaluation Date:5/13/24    Visit #/Visits authorized: 8/12    Precautions: Standard    Subjective     Chief Concern: Pain from fibromyalgia including hips, chronic low back pain, joints. Pain decreases ROM and affects activities/enjoyment.    Medical necessity is demonstrated by the following IMPAIRMENTS: Medical Necessity: Decreased mobility limits day to day activities, social, and emergent situations              Aggravating Factors:  movement and pressure   Relieving Factors:  rest    Symptom Description:     Quality:  Aching  Severity:  5-7 out of 10; depending  Frequency:  when active      Objective       Pulse:        wiry     Treatment       Acupuncture points used:  K3, UB 62, SI 4, TB 5, GB 20  Needles In: 10  Needles Out: 10  Needles W/O STIM placed: 9:45am  Needles W/O STIM removed: 10:30am      Assessment     After treatment, patient felt more relaxed and less stiffness/pain by a couple clicks on the pain scale    Patient prognosis is Good.     Patient will continue to benefit from acupuncture treatment to address the deficits listed in the problem list box on initial evaluation, provide patient family education and to maximize pt's level of independence in the home and community environment.     Patient's spiritual, cultural and educational needs considered and pt agreeable to plan of care and goals.     Anticipated barriers to treatment: none    Plan     Recommend every 2 weeks for 12 sessions with midway re-assess.      Education:  Patient is aware of  cumulative benefit of acupuncture

## 2024-05-15 ENCOUNTER — CLINICAL SUPPORT (OUTPATIENT)
Dept: REHABILITATION | Facility: HOSPITAL | Age: 49
End: 2024-05-15
Payer: MEDICARE

## 2024-05-15 DIAGNOSIS — R29.898 UPPER EXTREMITY WEAKNESS: Primary | ICD-10-CM

## 2024-05-15 PROCEDURE — 97112 NEUROMUSCULAR REEDUCATION: CPT | Mod: HCNC,PO

## 2024-05-15 PROCEDURE — 97140 MANUAL THERAPY 1/> REGIONS: CPT | Mod: HCNC,PO

## 2024-05-15 NOTE — PROGRESS NOTES
[facility]   Physical Therapy Treatment Note     Name: Lavonne Emery [facility]  Clinic Number: [id number]    Therapy Diagnosis:   Encounter Diagnosis   Name Primary?    Upper extremity weakness Yes     Physician: Janny Adame PA-C    Visit Date: 5/15/2024    Physician Orders: PT Eval and Treat   Medical Diagnosis from Referral:   M54.12 (ICD-10-CM) - Cervical radiculopathy   M65.311 (ICD-10-CM) - Trigger finger of right thumb   Evaluation Date: 5/8/2024  Authorization Period Expiration: 4/24/2025  Plan of Care Expiration: 7/3/2024  Progress Note Due: 6/8/2024  Date of Surgery: 2022, Pt to bring paperwork with date  Visit # / Visits authorized: 1/ 1   FOTO: 1/ 3     Precautions: Standard    PTA Visit #: 0/5     Time In: 0740  Time Out: 0835  Total Billable Time: 55 minutes    SUBJECTIVE     Pt reports: improvement in pain since dry needling last session.  She was compliant with home exercise program.      Pain: 2/10  Location: bilateral neck     OBJECTIVE      Sensation: Dermatomes SILT                                                                                                                                                                                                                                  Cervical ROM:                                              %                         Pain/dysfunction/movement  Flexion full none   Extension full none   Side-bending Right full none   Side-bending Left full none   Right rotation full none   Left rotation full none         Shoulder Range of Motion:   Shoulder Left Right   Flexion 135 135   Abduction 155 115   ER T3 T1   IR T10 T12         Upper Extremity Strength  (R) UE   (L) UE     Shoulder Flexion 4+/5   4+/5   Shoulder abduction 4/5   4/5   Shoulder ER 4/5   4/5   Shoulder IR 4+/5   4+/5   Low trap 4-/5   4-/5   Mid trap 4-/5   4-/5        Treatment     Lavonne received the treatments listed below:      therapeutic exercises to develop   "for 0 minutes including:      manual therapy techniques: Myofacial release were applied to the: B upper traps for 10 minutes, including:  Discussed the purpose, mechanism, and indications for dry needling with Lavonne . Patient was cleared of all precautions and contraindications and pt signed written consent and gave verbal consent to dry needling Rx today.   Palpation used to determine dry needling sites. Increased tone noted at B upper traps. Pt rec'd dry needling in supine position to B upper traps with .30 mm needles.  Pt tolerated treatment well and was not in any distress at the completion of treatment    neuromuscular re-education activities to improve: Coordination, Kinesthetic, Sense, Proprioception, and Posture for 45 minutes. The following activities were included:  Supine chin tucks 3x10 c 3 sec hold  Upper trap stretch 3x30" BUE  No moneys c RTB 3x10 BUE  D2 flexion c RTB 2x10 BUE  PRECOR middle row 30# 3x10  GTB rows 3x10 Standing shoulder ext c RTB 3x10    therapeutic activities to improve functional performance for   minutes, including:            Patient Education and Home Exercises     Home Exercises Provided and Patient Education Provided     Education provided:   - on HEP and POC    Written Home Exercises Provided: yes. Exercises were reviewed and Lavonne was able to demonstrate them prior to the end of the session.  Lavonne demonstrated good  understanding of the education provided. See EMR under Patient Instructions for exercises provided during therapy sessions    ASSESSMENT     First time follow-up performed today. Continued with TDN to B upper traps due to notable improvement following this during initial evaluation last session. Pt with great toelrance to TDN today, with local twitch responses achieved and no adverse reactions. This was followed by postural and RTC re-ed with increased fatigue but good overall tolerance. Patient will continue to benefit from loading and progressions to " tolerance going forward to improve overall function.    Lavonne Is progressing well towards her goals.   Pt prognosis is Good.     Pt will continue to benefit from skilled outpatient physical therapy to address the deficits listed in the problem list box on initial evaluation, provide pt/family education and to maximize pt's level of independence in the home and community environment.     Pt's spiritual, cultural and educational needs considered and pt agreeable to plan of care and goals.     Anticipated barriers to physical therapy: pmhx    Goals:   Short Term Goals: 4 weeks   Pt to report worst pain 5/10  Pt to improve BUE strength by 1/2 grade  Pt to improve FOTO by 10%  Pt to demo independence with initial HEP     Long Term Goals: 8 weeks   Pt to report worst pain 1/10  Pt to improve BUE strength by 1 grade  Pt to improve FOTO by 20%  Pt to demo independence with final HEP    PLAN     Plan of care Certification: 5/8/2024 to 7/3/2024.     Outpatient Physical Therapy 2 times weekly for 8 weeks to include the following interventions: Cervical/Lumbar Traction, Manual Therapy, Moist Heat/ Ice, Neuromuscular Re-ed, Patient Education, Self Care, Therapeutic Activities, and Therapeutic Exercise.        Rick Lockhart, PT

## 2024-05-17 ENCOUNTER — CLINICAL SUPPORT (OUTPATIENT)
Dept: REHABILITATION | Facility: HOSPITAL | Age: 49
End: 2024-05-17
Payer: MEDICARE

## 2024-05-17 DIAGNOSIS — R29.898 UPPER EXTREMITY WEAKNESS: Primary | ICD-10-CM

## 2024-05-17 PROCEDURE — 97112 NEUROMUSCULAR REEDUCATION: CPT | Mod: HCNC,PO

## 2024-05-17 NOTE — PROGRESS NOTES
[facility]   Physical Therapy Treatment Note     Name: Lavonne Emery [facility]  Clinic Number: [id number]    Therapy Diagnosis:   Encounter Diagnosis   Name Primary?    Upper extremity weakness Yes     Physician: Janny Adame PA-C    Visit Date: 5/17/2024    Physician Orders: PT Eval and Treat   Medical Diagnosis from Referral:   M54.12 (ICD-10-CM) - Cervical radiculopathy   M65.311 (ICD-10-CM) - Trigger finger of right thumb   Evaluation Date: 5/8/2024  Authorization Period Expiration: 4/24/2025  Plan of Care Expiration: 7/3/2024  Progress Note Due: 6/8/2024  Date of Surgery: 2022, Pt to bring paperwork with date  Visit # / Visits authorized: 2/20   FOTO: 1/ 3     Precautions: Standard    PTA Visit #: 0/5     Time In: 0905  Time Out: 1000  Total Billable Time: 30 minutes    SUBJECTIVE     Pt reports: improvement in pain since dry needling last session and would like to continue it.  She was compliant with home exercise program.      Pain: 2/10  Location: bilateral neck     OBJECTIVE      Sensation: Dermatomes SILT                                                                                                                                                                                                                                  Cervical ROM:                                              %                         Pain/dysfunction/movement  Flexion full none   Extension full none   Side-bending Right full none   Side-bending Left full none   Right rotation full none   Left rotation full none         Shoulder Range of Motion:   Shoulder Left Right   Flexion 135 135   Abduction 155 115   ER T3 T1   IR T10 T12         Upper Extremity Strength  (R) UE   (L) UE     Shoulder Flexion 4+/5   4+/5   Shoulder abduction 4/5   4/5   Shoulder ER 4/5   4/5   Shoulder IR 4+/5   4+/5   Low trap 4-/5   4-/5   Mid trap 4-/5   4-/5        Treatment     Lavonne received the treatments listed below:   "    therapeutic exercises to develop  for 0 minutes including:      manual therapy techniques: Myofacial release were applied to the: B upper traps for 10 minutes, including:  Discussed the purpose, mechanism, and indications for dry needling with Lavonne . Patient was cleared of all precautions and contraindications and pt signed written consent and gave verbal consent to dry needling Rx today.   Palpation used to determine dry needling sites. Increased tone noted at B upper traps. Pt rec'd dry needling in supine position to B upper traps with .30 mm needles.  Pt tolerated treatment well and was not in any distress at the completion of treatment    neuromuscular re-education activities to improve: Coordination, Kinesthetic, Sense, Proprioception, and Posture for 45 minutes. The following activities were included:  Supine chin tucks 3x10 c 3 sec hold  Upper trap stretch 3x30" BUE  No moneys c RTB 3x10 BUE  D2 flexion c RTB 2x10 BUE  PRECOR middle row 30# 3x10  GTB rows 3x10 Standing shoulder ext c RTB 3x10    therapeutic activities to improve functional performance for   minutes, including:            Patient Education and Home Exercises     Home Exercises Provided and Patient Education Provided     Education provided:   - on HEP and POC    Written Home Exercises Provided: yes. Exercises were reviewed and Lavonne was able to demonstrate them prior to the end of the session.  Lavonne demonstrated good  understanding of the education provided. See EMR under Patient Instructions for exercises provided during therapy sessions    ASSESSMENT     Continued with TDN to B upper traps due to notable improvement following last session. Pt with great toelrance to TDN today, with several local twitch responses achieved and no adverse reactions. This was followed by progressions to repetition numbers to include targeting of endurance with good overall tolerance but intermittent rest breaks required. Patient will continue to benefit " from loading and progressions to tolerance going forward to improve overall function.    Lavonne Is progressing well towards her goals.   Pt prognosis is Good.     Pt will continue to benefit from skilled outpatient physical therapy to address the deficits listed in the problem list box on initial evaluation, provide pt/family education and to maximize pt's level of independence in the home and community environment.     Pt's spiritual, cultural and educational needs considered and pt agreeable to plan of care and goals.     Anticipated barriers to physical therapy: pmhx    Goals:   Short Term Goals: 4 weeks   Pt to report worst pain 5/10  Pt to improve BUE strength by 1/2 grade  Pt to improve FOTO by 10%  Pt to demo independence with initial HEP     Long Term Goals: 8 weeks   Pt to report worst pain 1/10  Pt to improve BUE strength by 1 grade  Pt to improve FOTO by 20%  Pt to demo independence with final HEP    PLAN     Plan of care Certification: 5/8/2024 to 7/3/2024.     Outpatient Physical Therapy 2 times weekly for 8 weeks to include the following interventions: Cervical/Lumbar Traction, Manual Therapy, Moist Heat/ Ice, Neuromuscular Re-ed, Patient Education, Self Care, Therapeutic Activities, and Therapeutic Exercise.        Rick Lockhart, PT

## 2024-05-22 ENCOUNTER — CLINICAL SUPPORT (OUTPATIENT)
Dept: REHABILITATION | Facility: HOSPITAL | Age: 49
End: 2024-05-22
Payer: MEDICARE

## 2024-05-22 DIAGNOSIS — R29.898 UPPER EXTREMITY WEAKNESS: Primary | ICD-10-CM

## 2024-05-22 PROCEDURE — 97140 MANUAL THERAPY 1/> REGIONS: CPT | Mod: HCNC,PO

## 2024-05-22 PROCEDURE — 97112 NEUROMUSCULAR REEDUCATION: CPT | Mod: HCNC,PO

## 2024-05-22 NOTE — PROGRESS NOTES
[facility]   Physical Therapy Treatment Note     Name: Lavonne Emery [facility]  Clinic Number: [id number]    Therapy Diagnosis:   Encounter Diagnosis   Name Primary?    Upper extremity weakness Yes     Physician: Janny Adame PA-C    Visit Date: 5/22/2024    Physician Orders: PT Eval and Treat   Medical Diagnosis from Referral:   M54.12 (ICD-10-CM) - Cervical radiculopathy   M65.311 (ICD-10-CM) - Trigger finger of right thumb   Evaluation Date: 5/8/2024  Authorization Period Expiration: 4/24/2025  Plan of Care Expiration: 7/3/2024  Progress Note Due: 6/8/2024  Date of Surgery: 2022, Pt to bring paperwork with date  Visit # / Visits authorized: 3/20   FOTO: 1/ 3     Precautions: Standard    PTA Visit #: 0/5     Time In: 0800  Time Out: 0855  Total Billable Time: 55 minutes    SUBJECTIVE     Pt reports: continued improvement in pain and no question about HEP  She was compliant with home exercise program.      Pain: 2/10  Location: bilateral neck     OBJECTIVE      Sensation: Dermatomes SILT                                                                                                                                                                                                                                  Cervical ROM:                                              %                         Pain/dysfunction/movement  Flexion full none   Extension full none   Side-bending Right full none   Side-bending Left full none   Right rotation full none   Left rotation full none         Shoulder Range of Motion:   Shoulder Left Right   Flexion 135 135   Abduction 155 115   ER T3 T1   IR T10 T12         Upper Extremity Strength  (R) UE   (L) UE     Shoulder Flexion 4+/5   4+/5   Shoulder abduction 4/5   4/5   Shoulder ER 4/5   4/5   Shoulder IR 4+/5   4+/5   Low trap 4-/5   4-/5   Mid trap 4-/5   4-/5        Treatment     Lavonne received the treatments listed below:      therapeutic exercises to  "develop  for 0 minutes including:      manual therapy techniques: Myofacial release were applied to the: B upper traps for 10 minutes, including:  Discussed the purpose, mechanism, and indications for dry needling with Lavonne . Patient was cleared of all precautions and contraindications and pt signed written consent and gave verbal consent to dry needling Rx today.   Palpation used to determine dry needling sites. Increased tone noted at B upper traps. Pt rec'd dry needling in supine position to B upper traps with .30 mm needles.  Pt tolerated treatment well and was not in any distress at the completion of treatment    neuromuscular re-education activities to improve: Coordination, Kinesthetic, Sense, Proprioception, and Posture for 45 minutes. The following activities were included:  Supine chin tucks 3x10 c 3 sec hold  Upper trap stretch 3x30" BUE  No moneys c GTB 3x10 BUE  D2 flexion c RTB 2x10 BUE  PRECOR middle row 30# 3x15  GTB rows 3x10 Standing shoulder ext c RTB 3x10    therapeutic activities to improve functional performance for   minutes, including:            Patient Education and Home Exercises     Home Exercises Provided and Patient Education Provided     Education provided:   - on HEP and POC    Written Home Exercises Provided: yes. Exercises were reviewed and Lavonne was able to demonstrate them prior to the end of the session.  Lavonne demonstrated good  understanding of the education provided. See EMR under Patient Instructions for exercises provided during therapy sessions    ASSESSMENT     Progressed rhomboid and middle trap strengthening exercises to incorporate repetitions to target endurance with good overall tolerance and only mild soreness reported. Pt appears to be progressing well at this time and will continue to benefit from loading to tolerance going forward to enhance overall function.    Lavonne Is progressing well towards her goals.   Pt prognosis is Good.     Pt will continue to " benefit from skilled outpatient physical therapy to address the deficits listed in the problem list box on initial evaluation, provide pt/family education and to maximize pt's level of independence in the home and community environment.     Pt's spiritual, cultural and educational needs considered and pt agreeable to plan of care and goals.     Anticipated barriers to physical therapy: pmhx    Goals:   Short Term Goals: 4 weeks   Pt to report worst pain 5/10  Pt to improve BUE strength by 1/2 grade  Pt to improve FOTO by 10%  Pt to demo independence with initial HEP     Long Term Goals: 8 weeks   Pt to report worst pain 1/10  Pt to improve BUE strength by 1 grade  Pt to improve FOTO by 20%  Pt to demo independence with final HEP    PLAN     Plan of care Certification: 5/8/2024 to 7/3/2024.     Outpatient Physical Therapy 2 times weekly for 8 weeks to include the following interventions: Cervical/Lumbar Traction, Manual Therapy, Moist Heat/ Ice, Neuromuscular Re-ed, Patient Education, Self Care, Therapeutic Activities, and Therapeutic Exercise.        Rick Lockhart, PT

## 2024-05-23 ENCOUNTER — CLINICAL SUPPORT (OUTPATIENT)
Dept: REHABILITATION | Facility: HOSPITAL | Age: 49
End: 2024-05-23
Payer: MEDICARE

## 2024-05-23 DIAGNOSIS — R29.898 UPPER EXTREMITY WEAKNESS: Primary | ICD-10-CM

## 2024-05-23 PROCEDURE — 97112 NEUROMUSCULAR REEDUCATION: CPT | Mod: HCNC,PO,CQ

## 2024-05-23 PROCEDURE — 97110 THERAPEUTIC EXERCISES: CPT | Mod: HCNC,PO,CQ

## 2024-05-23 NOTE — PROGRESS NOTES
[facility]   Physical Therapy Treatment Note     Name: Lavonne Emery [facility]  Clinic Number: [id number]    Therapy Diagnosis:   Encounter Diagnosis   Name Primary?    Upper extremity weakness Yes       Physician: Janny Adame PA-C    Visit Date: 5/23/2024    Physician Orders: PT Eval and Treat   Medical Diagnosis from Referral:   M54.12 (ICD-10-CM) - Cervical radiculopathy   M65.311 (ICD-10-CM) - Trigger finger of right thumb   Evaluation Date: 5/8/2024  Authorization Period Expiration: 4/24/2025  Plan of Care Expiration: 7/3/2024  Progress Note Due: 6/8/2024  Date of Surgery: 2022, Pt to bring paperwork with date  Visit # / Visits authorized: 4/20   FOTO: 1/ 3     Precautions: Standard    PTA Visit #: 1/5     Time In: 10:00  Time Out: 1045  Total Billable Time: 45 minutes    SUBJECTIVE     Pt reports: her pain conts to improve with HEP.   She was compliant with home exercise program.      Pain: 5/10  Location: bilateral neck     OBJECTIVE      Sensation: Dermatomes SILT                                                                                                                                                                                                                                  Cervical ROM:                                              %                         Pain/dysfunction/movement  Flexion full none   Extension full none   Side-bending Right full none   Side-bending Left full none   Right rotation full none   Left rotation full none         Shoulder Range of Motion:   Shoulder Left Right   Flexion 135 135   Abduction 155 115   ER T3 T1   IR T10 T12         Upper Extremity Strength  (R) UE   (L) UE     Shoulder Flexion 4+/5   4+/5   Shoulder abduction 4/5   4/5   Shoulder ER 4/5   4/5   Shoulder IR 4+/5   4+/5   Low trap 4-/5   4-/5   Mid trap 4-/5   4-/5        Treatment     Lavonne received the treatments listed below:      therapeutic exercises to develop  for 15  "minutes including:  Thoracic supine stretch with towel roll 2mins  Chest stretch supine with towel roll 2 mins  Thoracic stretch over chair 10x     manual therapy techniques: Myofacial release were applied to the: B upper traps for 00 minutes, including:  Discussed the purpose, mechanism, and indications for dry needling with Lavonne . Patient was cleared of all precautions and contraindications and pt signed written consent and gave verbal consent to dry needling Rx today.   Palpation used to determine dry needling sites. Increased tone noted at B upper traps. Pt rec'd dry needling in supine position to B upper traps with .30 mm needles.  Pt tolerated treatment well and was not in any distress at the completion of treatment    neuromuscular re-education activities to improve: Coordination, Kinesthetic, Sense, Proprioception, and Posture for 30 minutes. The following activities were included:  Seated chin tucks 20x   Supine chin tucks 3x10 c 3 sec hold  Upper trap stretch 3x30" BUE  No moneys c GTB 3x10 BUE  D2 flexion c RTB 2x10 BUE  PRECOR middle row 30# 3x15   GTB rows 3x10   Standing shoulder ext c RTB 3x10    therapeutic activities to improve functional performance for   minutes, including:            Patient Education and Home Exercises     Home Exercises Provided and Patient Education Provided     Education provided:   - on HEP and POC    Written Home Exercises Provided: yes. Exercises were reviewed and Lavonne was able to demonstrate them prior to the end of the session.  Lavonne demonstrated good  understanding of the education provided. See EMR under Patient Instructions for exercises provided during therapy sessions    ASSESSMENT   Pt with less pain post session. She demo her HEP well and states she is performing daily. Minimal cueing for correct scapular engagement with MN TE.  Progressed pt to seated chin tuck to be able to perform during the day when out.   Pt appears to be progressing well at this time " and will continue to benefit from loading to tolerance going forward to enhance overall function.    Lavonne Is progressing well towards her goals.   Pt prognosis is Good.     Pt will continue to benefit from skilled outpatient physical therapy to address the deficits listed in the problem list box on initial evaluation, provide pt/family education and to maximize pt's level of independence in the home and community environment.     Pt's spiritual, cultural and educational needs considered and pt agreeable to plan of care and goals.     Anticipated barriers to physical therapy: pmhx    Goals:   Short Term Goals: 4 weeks   Pt to report worst pain 5/10  Pt to improve BUE strength by 1/2 grade  Pt to improve FOTO by 10%  Pt to demo independence with initial HEP     Long Term Goals: 8 weeks   Pt to report worst pain 1/10  Pt to improve BUE strength by 1 grade  Pt to improve FOTO by 20%  Pt to demo independence with final HEP    PLAN     Plan of care Certification: 5/8/2024 to 7/3/2024.     Outpatient Physical Therapy 2 times weekly for 8 weeks to include the following interventions: Cervical/Lumbar Traction, Manual Therapy, Moist Heat/ Ice, Neuromuscular Re-ed, Patient Education, Self Care, Therapeutic Activities, and Therapeutic Exercise.        Stephanie Lambert, PTA

## 2024-05-27 ENCOUNTER — CLINICAL SUPPORT (OUTPATIENT)
Dept: REHABILITATION | Facility: HOSPITAL | Age: 49
End: 2024-05-27
Payer: MEDICARE

## 2024-05-27 DIAGNOSIS — G89.29 CHRONIC BILATERAL LOW BACK PAIN WITHOUT SCIATICA: ICD-10-CM

## 2024-05-27 DIAGNOSIS — M54.50 CHRONIC BILATERAL LOW BACK PAIN WITHOUT SCIATICA: ICD-10-CM

## 2024-05-27 DIAGNOSIS — G89.29 CHRONIC MIDLINE LOW BACK PAIN WITHOUT SCIATICA: ICD-10-CM

## 2024-05-27 DIAGNOSIS — M79.7 FIBROMYALGIA: ICD-10-CM

## 2024-05-27 DIAGNOSIS — R29.898 UPPER EXTREMITY WEAKNESS: ICD-10-CM

## 2024-05-27 DIAGNOSIS — M65.311 TRIGGER FINGER OF RIGHT THUMB: ICD-10-CM

## 2024-05-27 DIAGNOSIS — M54.50 CHRONIC MIDLINE LOW BACK PAIN WITHOUT SCIATICA: ICD-10-CM

## 2024-05-27 DIAGNOSIS — M54.12 CERVICAL RADICULOPATHY: ICD-10-CM

## 2024-05-27 DIAGNOSIS — M54.59 OTHER LOW BACK PAIN: Primary | ICD-10-CM

## 2024-05-27 PROCEDURE — 97810 ACUP 1/> WO ESTIM 1ST 15 MIN: CPT | Mod: HCNC,PN

## 2024-05-27 PROCEDURE — 97811 ACUP 1/> W/O ESTIM EA ADD 15: CPT | Mod: HCNC,PN

## 2024-05-27 NOTE — PROGRESS NOTES
Acupuncture Evaluation Note     Name: Lavonne Pierre  Clinic Number: [id number]    Traditional Chinese Medicine (TCM) Diagnosis: Qi Stagnation  Medical Diagnosis:   Encounter Diagnoses   Name Primary?    Other low back pain Yes    Upper extremity weakness     Cervical radiculopathy     Trigger finger of right thumb     Chronic bilateral low back pain without sciatica     Fibromyalgia     Chronic midline low back pain without sciatica         Evaluation Date:5/27/24    Visit #/Visits authorized: 9/12    Precautions: Standard    Subjective     Chief Concern: Pain from fibromyalgia including hips, chronic low back pain, joints. Pain decreases ROM and affects activities/enjoyment.    Medical necessity is demonstrated by the following IMPAIRMENTS: Medical Necessity: Decreased mobility limits day to day activities, social, and emergent situations              Aggravating Factors:  movement and pressure   Relieving Factors:  rest    Symptom Description:     Quality:  Aching  Severity:  5-7 out of 10; depending  Frequency:  when active      Objective       Pulse:        wiry     Treatment       Acupuncture points used:  K3, UB 62, SI 4, TB 5, GB 20  Needles In: 10  Needles Out: 10  Needles W/O STIM placed: 9:45am  Needles W/O STIM removed: 10:30am      Assessment     After treatment, patient felt more relaxed and less stiffness/pain by a couple clicks on the pain scale    Patient prognosis is Good.     Patient will continue to benefit from acupuncture treatment to address the deficits listed in the problem list box on initial evaluation, provide patient family education and to maximize pt's level of independence in the home and community environment.     Patient's spiritual, cultural and educational needs considered and pt agreeable to plan of care and goals.     Anticipated barriers to treatment: none    Plan     Recommend every 2 weeks for 12 sessions with midway re-assess.      Education:  Patient is aware of  cumulative benefit of acupuncture

## 2024-05-29 ENCOUNTER — CLINICAL SUPPORT (OUTPATIENT)
Dept: REHABILITATION | Facility: HOSPITAL | Age: 49
End: 2024-05-29
Payer: MEDICARE

## 2024-05-29 DIAGNOSIS — R29.898 UPPER EXTREMITY WEAKNESS: Primary | ICD-10-CM

## 2024-05-29 PROCEDURE — 97112 NEUROMUSCULAR REEDUCATION: CPT | Mod: KX,HCNC,PO

## 2024-05-29 PROCEDURE — 97140 MANUAL THERAPY 1/> REGIONS: CPT | Mod: KX,HCNC,PO

## 2024-05-29 NOTE — PROGRESS NOTES
[facility]   Physical Therapy Treatment Note     Name: Lavonne Emery [facility]  Clinic Number: [id number]    Therapy Diagnosis:   Encounter Diagnosis   Name Primary?    Upper extremity weakness Yes       Physician: Janny Adame PA-C    Visit Date: 5/29/2024    Physician Orders: PT Eval and Treat   Medical Diagnosis from Referral:   M54.12 (ICD-10-CM) - Cervical radiculopathy   M65.311 (ICD-10-CM) - Trigger finger of right thumb   Evaluation Date: 5/8/2024  Authorization Period Expiration: 4/24/2025  Plan of Care Expiration: 7/3/2024  Progress Note Due: 6/8/2024  Date of Surgery: 2022, Pt to bring paperwork with date  Visit # / Visits authorized: 5/20   FOTO: 1/ 3     Precautions: Standard    PTA Visit #: 1/5     Time In: 0900  Time Out: 0958  Total Billable Time: 58 minutes    SUBJECTIVE     Pt reports: her pain conts to improve with PT as os recent sessions.   She was compliant with home exercise program.      Pain: 2/10  Location: bilateral neck     OBJECTIVE      Sensation: Dermatomes SILT                                                                                                                                                                                                                                  Cervical ROM:                                              %                         Pain/dysfunction/movement  Flexion full none   Extension full none   Side-bending Right full none   Side-bending Left full none   Right rotation full none   Left rotation full none         Shoulder Range of Motion:   Shoulder Left Right   Flexion 135 135   Abduction 155 115   ER T3 T1   IR T10 T12         Upper Extremity Strength  (R) UE   (L) UE     Shoulder Flexion 4+/5   4+/5   Shoulder abduction 4/5   4/5   Shoulder ER 4/5   4/5   Shoulder IR 4+/5   4+/5   Low trap 4-/5   4-/5   Mid trap 4-/5   4-/5        Treatment     Lavonne received the treatments listed below:      therapeutic exercises to  "develop  for 0 minutes including:  Thoracic supine stretch with towel roll 2mins  Chest stretch supine with towel roll 2 mins  Thoracic stretch over chair 10x     manual therapy techniques: Myofacial release were applied to the: B upper traps for 20 minutes, including:  Discussed the purpose, mechanism, and indications for dry needling with Lavonne . Patient was cleared of all precautions and contraindications and pt signed written consent and gave verbal consent to dry needling Rx today.   Palpation used to determine dry needling sites. Increased tone noted at B upper traps. Pt rec'd dry needling in supine position to B upper traps with .30 mm needles.  Pt tolerated treatment well and was not in any distress at the completion of treatment    Manual levator fascial stretch    neuromuscular re-education activities to improve: Coordination, Kinesthetic, Sense, Proprioception, and Posture for 38 minutes. The following activities were included:  Seated chin tucks 20x   Supine chin tucks 3x10 c 3 sec hold  Upper trap stretch 3x30" BUE  No moneys c GTB 3x10 BUE  D2 flexion c RTB 2x10 BUE  PRECOR middle row 30# 3x15   GTB rows 3x10   Standing shoulder ext c RTB 3x10    therapeutic activities to improve functional performance for   minutes, including:            Patient Education and Home Exercises     Home Exercises Provided and Patient Education Provided     Education provided:   - on HEP and POC    Written Home Exercises Provided: yes. Exercises were reviewed and Lavonne was able to demonstrate them prior to the end of the session.  Lavonne demonstrated good  understanding of the education provided. See EMR under Patient Instructions for exercises provided during therapy sessions    ASSESSMENT   Due to increased tenderness over B levator scap areas, incorporated manual myofascial stretching with great tolerance and decrease in pain reported afterwards. This was followed by postural re-ed exercises with good overall " carryover of relief noted. Patient will continue to benefit from loading and progressions to tolerance going forward to improve overall function.    Lavonne Is progressing well towards her goals.   Pt prognosis is Good.     Pt will continue to benefit from skilled outpatient physical therapy to address the deficits listed in the problem list box on initial evaluation, provide pt/family education and to maximize pt's level of independence in the home and community environment.     Pt's spiritual, cultural and educational needs considered and pt agreeable to plan of care and goals.     Anticipated barriers to physical therapy: pmhx    Goals:   Short Term Goals: 4 weeks   Pt to report worst pain 5/10  Pt to improve BUE strength by 1/2 grade  Pt to improve FOTO by 10%  Pt to demo independence with initial HEP     Long Term Goals: 8 weeks   Pt to report worst pain 1/10  Pt to improve BUE strength by 1 grade  Pt to improve FOTO by 20%  Pt to demo independence with final HEP    PLAN     Plan of care Certification: 5/8/2024 to 7/3/2024.     Outpatient Physical Therapy 2 times weekly for 8 weeks to include the following interventions: Cervical/Lumbar Traction, Manual Therapy, Moist Heat/ Ice, Neuromuscular Re-ed, Patient Education, Self Care, Therapeutic Activities, and Therapeutic Exercise.        Rick Lockhart, PT

## 2024-05-31 ENCOUNTER — CLINICAL SUPPORT (OUTPATIENT)
Dept: REHABILITATION | Facility: HOSPITAL | Age: 49
End: 2024-05-31
Payer: MEDICARE

## 2024-05-31 DIAGNOSIS — R29.898 UPPER EXTREMITY WEAKNESS: Primary | ICD-10-CM

## 2024-05-31 PROCEDURE — 97140 MANUAL THERAPY 1/> REGIONS: CPT | Mod: KX,HCNC,PO

## 2024-05-31 PROCEDURE — 97112 NEUROMUSCULAR REEDUCATION: CPT | Mod: KX,HCNC,PO

## 2024-05-31 NOTE — PROGRESS NOTES
[facility]   Physical Therapy Treatment Note     Name: Lavonne Emery [facility]  Clinic Number: [id number]    Therapy Diagnosis:   Encounter Diagnosis   Name Primary?    Upper extremity weakness Yes       Physician: Janny Adame PA-C    Visit Date: 5/31/2024    Physician Orders: PT Eval and Treat   Medical Diagnosis from Referral:   M54.12 (ICD-10-CM) - Cervical radiculopathy   M65.311 (ICD-10-CM) - Trigger finger of right thumb   Evaluation Date: 5/8/2024  Authorization Period Expiration: 4/24/2025  Plan of Care Expiration: 7/3/2024  Progress Note Due: 6/8/2024  Date of Surgery: 2022, Pt to bring paperwork with date  Visit # / Visits authorized: 6/20   FOTO: 1/ 3     Precautions: Standard    PTA Visit #: 1/5     Time In: 0800  Time Out: 0900  Total Billable Time: 60 minutes    SUBJECTIVE     Pt reports: her pain conts to improve with PT as of recent sessions.   She was compliant with home exercise program.      Pain: 2/10  Location: bilateral neck     OBJECTIVE      Sensation: Dermatomes SILT                                                                                                                                                                                                                                  Cervical ROM:                                              %                         Pain/dysfunction/movement  Flexion full none   Extension full none   Side-bending Right full none   Side-bending Left full none   Right rotation full none   Left rotation full none         Shoulder Range of Motion:   Shoulder Left Right   Flexion 135 135   Abduction 155 115   ER T3 T1   IR T10 T12         Upper Extremity Strength  (R) UE   (L) UE     Shoulder Flexion 4+/5   4+/5   Shoulder abduction 4/5   4/5   Shoulder ER 4/5   4/5   Shoulder IR 4+/5   4+/5   Low trap 4-/5   4-/5   Mid trap 4-/5   4-/5        Treatment     Lavonne received the treatments listed below:      therapeutic exercises to  "develop  for 0 minutes including:  Thoracic supine stretch with towel roll 2mins  Chest stretch supine with towel roll 2 mins  Thoracic stretch over chair 10x     manual therapy techniques: Myofacial release were applied to the: B upper traps for 20 minutes, including:  Discussed the purpose, mechanism, and indications for dry needling with Lavonne . Patient was cleared of all precautions and contraindications and pt signed written consent and gave verbal consent to dry needling Rx today.   Palpation used to determine dry needling sites. Increased tone noted at B upper traps. Pt rec'd dry needling in supine position to B upper traps with .30 mm needles.  Pt tolerated treatment well and was not in any distress at the completion of treatment    Manual levator fascial stretch    neuromuscular re-education activities to improve: Coordination, Kinesthetic, Sense, Proprioception, and Posture for 40 minutes. The following activities were included:  Seated chin tucks 20x   Supine chin tucks 3x10 c 3 sec hold  Upper trap stretch 3x30" BUE  No moneys c GTB 3x10 BUE  D2 flexion c RTB 2x10 BUE  PRECOR middle row 40# 3x10   GTB rows 3x10   Standing shoulder ext c RTB 3x10    therapeutic activities to improve functional performance for   minutes, including:            Patient Education and Home Exercises     Home Exercises Provided and Patient Education Provided     Education provided:   - on HEP and POC    Written Home Exercises Provided: yes. Exercises were reviewed and Lavonne was able to demonstrate them prior to the end of the session.  Lavonne demonstrated good  understanding of the education provided. See EMR under Patient Instructions for exercises provided during therapy sessions    ASSESSMENT   Pt continues to respond well to current therapy at this time. Overall independence with performance of all exercises noted to where pt is able to maintain progress on her own with HEP going forward. Due to this, pt was educated on " HEP trial and verbalized agreement. Pt will follow back up with PT if symptoms resume.    Lavonne Is progressing well towards her goals.   Pt prognosis is Good.     Pt will continue to benefit from skilled outpatient physical therapy to address the deficits listed in the problem list box on initial evaluation, provide pt/family education and to maximize pt's level of independence in the home and community environment.     Pt's spiritual, cultural and educational needs considered and pt agreeable to plan of care and goals.     Anticipated barriers to physical therapy: pmhx    Goals:   Short Term Goals: 4 weeks   Pt to report worst pain 5/10  Pt to improve BUE strength by 1/2 grade  Pt to improve FOTO by 10%  Pt to demo independence with initial HEP     Long Term Goals: 8 weeks   Pt to report worst pain 1/10  Pt to improve BUE strength by 1 grade  Pt to improve FOTO by 20%  Pt to demo independence with final HEP    PLAN     Plan of care Certification: 5/8/2024 to 7/3/2024.     Outpatient Physical Therapy 2 times weekly for 8 weeks to include the following interventions: Cervical/Lumbar Traction, Manual Therapy, Moist Heat/ Ice, Neuromuscular Re-ed, Patient Education, Self Care, Therapeutic Activities, and Therapeutic Exercise.        Rick Lockhart, PT

## 2024-06-10 ENCOUNTER — CLINICAL SUPPORT (OUTPATIENT)
Dept: REHABILITATION | Facility: HOSPITAL | Age: 49
End: 2024-06-10
Payer: MEDICARE

## 2024-06-10 DIAGNOSIS — M54.59 OTHER LOW BACK PAIN: ICD-10-CM

## 2024-06-10 DIAGNOSIS — G89.29 CHRONIC MIDLINE LOW BACK PAIN WITHOUT SCIATICA: ICD-10-CM

## 2024-06-10 DIAGNOSIS — G89.29 CHRONIC BILATERAL LOW BACK PAIN WITHOUT SCIATICA: ICD-10-CM

## 2024-06-10 DIAGNOSIS — M54.50 CHRONIC BILATERAL LOW BACK PAIN WITHOUT SCIATICA: ICD-10-CM

## 2024-06-10 DIAGNOSIS — R29.898 UPPER EXTREMITY WEAKNESS: Primary | ICD-10-CM

## 2024-06-10 DIAGNOSIS — M54.12 CERVICAL RADICULOPATHY: ICD-10-CM

## 2024-06-10 DIAGNOSIS — M65.311 TRIGGER FINGER OF RIGHT THUMB: ICD-10-CM

## 2024-06-10 DIAGNOSIS — M79.7 FIBROMYALGIA: ICD-10-CM

## 2024-06-10 DIAGNOSIS — M54.50 CHRONIC MIDLINE LOW BACK PAIN WITHOUT SCIATICA: ICD-10-CM

## 2024-06-10 PROCEDURE — 97810 ACUP 1/> WO ESTIM 1ST 15 MIN: CPT | Mod: HCNC,PN

## 2024-06-10 PROCEDURE — 97811 ACUP 1/> W/O ESTIM EA ADD 15: CPT | Mod: HCNC,PN

## 2024-06-10 NOTE — PROGRESS NOTES
Acupuncture Evaluation Note     Name: Lavonne Pierre  Clinic Number: [id number]    Traditional Chinese Medicine (TCM) Diagnosis: Qi Stagnation  Medical Diagnosis:   Encounter Diagnoses   Name Primary?    Upper extremity weakness Yes    Other low back pain     Cervical radiculopathy     Trigger finger of right thumb     Chronic bilateral low back pain without sciatica     Fibromyalgia     Chronic midline low back pain without sciatica         Evaluation Date: 6/10/24    Visit #/Visits authorized: 10/12    Precautions: Standard    Subjective     Chief Concern: Pain from fibromyalgia including hips, chronic low back pain, joints. Pain decreases ROM and affects activities/enjoyment.    Medical necessity is demonstrated by the following IMPAIRMENTS: Medical Necessity: Decreased mobility limits day to day activities, social, and emergent situations              Aggravating Factors:  movement and pressure   Relieving Factors:  rest    Symptom Description:     Quality:  Aching  Severity:  5-7 out of 10; depending  Frequency:  when active      Objective       Pulse:        wiry     Treatment       Acupuncture points used:  K3, UB 62, SI 4, TB 5, GB 20  Needles In: 10  Needles Out: 10  Needles W/O STIM placed: 9:30am  Needles W/O STIM removed: 10:15am      Assessment     After treatment, patient felt more relaxed and less stiffness/pain by a couple clicks on the pain scale    Patient prognosis is Good.     Patient will continue to benefit from acupuncture treatment to address the deficits listed in the problem list box on initial evaluation, provide patient family education and to maximize pt's level of independence in the home and community environment.     Patient's spiritual, cultural and educational needs considered and pt agreeable to plan of care and goals.     Anticipated barriers to treatment: none    Plan     Recommend every 2 weeks for 12 sessions with midway re-assess.      Education:  Patient is aware of  cumulative benefit of acupuncture

## 2024-06-14 ENCOUNTER — OFFICE VISIT (OUTPATIENT)
Dept: PAIN MEDICINE | Facility: CLINIC | Age: 49
End: 2024-06-14
Payer: MEDICARE

## 2024-06-14 VITALS — WEIGHT: 141 LBS | BODY MASS INDEX: 30.51 KG/M2

## 2024-06-14 DIAGNOSIS — M54.2 CERVICALGIA: ICD-10-CM

## 2024-06-14 DIAGNOSIS — M54.12 CERVICAL RADICULOPATHY: Primary | ICD-10-CM

## 2024-06-14 PROCEDURE — 1160F RVW MEDS BY RX/DR IN RCRD: CPT | Mod: HCNC,CPTII,S$GLB, | Performed by: PHYSICIAN ASSISTANT

## 2024-06-14 PROCEDURE — 1159F MED LIST DOCD IN RCRD: CPT | Mod: HCNC,CPTII,S$GLB, | Performed by: PHYSICIAN ASSISTANT

## 2024-06-14 PROCEDURE — 3008F BODY MASS INDEX DOCD: CPT | Mod: HCNC,CPTII,S$GLB, | Performed by: PHYSICIAN ASSISTANT

## 2024-06-14 PROCEDURE — 99999 PR PBB SHADOW E&M-EST. PATIENT-LVL III: CPT | Mod: PBBFAC,HCNC,, | Performed by: PHYSICIAN ASSISTANT

## 2024-06-14 PROCEDURE — 4010F ACE/ARB THERAPY RXD/TAKEN: CPT | Mod: HCNC,CPTII,S$GLB, | Performed by: PHYSICIAN ASSISTANT

## 2024-06-14 PROCEDURE — 99213 OFFICE O/P EST LOW 20 MIN: CPT | Mod: HCNC,S$GLB,, | Performed by: PHYSICIAN ASSISTANT

## 2024-06-14 NOTE — PROGRESS NOTES
"[facility]        PCP:  Ranjit Bernabe MD    CC:   Chief Complaint   Patient presents with    Neck Pain          HPI:   Lavonne Pierre is a 48 y.o. year old female patient who has a past medical history of Anxiety, Asthma, Depression, Fibromyalgia, GERD (gastroesophageal reflux disease), History of pneumonia, HTN (hypertension), Personal history of colonic polyps, Preeclampsia, and Sleep apnea. She presents for follow up neck and arm symptoms.  She continues with acupuncture.  She has been going to PT.  PT and dry needling have helped, but states she cannot continue to go becuasue of an insurance/ cost issue.  She continues with constant pain in the neck.  It is worse in the morning and felt as throbbing throughout the day.  Therapy also helped with right thumb region pain, however with stopping therapy thumb spasms and pain are starting to recur more frequently.    HPI 4-24-24:  She presents in referral from No ref. provider found for right arm and thumb pain.  She was seen in 2020 for neck pain, hip pain and leg pain.  Presents today for right thumb.  With cooking and doing activities with the right hand, the right handspasms in a the base of the thumb and the thum over extends.  It is painful and bothersom for her. She also describes onset of neck pain and bilateral arm pain to the hands and fingers.  She has intermittent shoulder pain.  She is right handed and had C5/6 artificial disk replacement.   Denies bowel/ bladder incontinence.    HPI from 2-14-20:  44 year old female with anxiety, depression, fibromyalgia, GERD, hypertension and asthma presents for follow up of chronic neck/ back pain after undergoing imaging.  There have been no significant changes in symptoms since last assessment.  Per my last note:  "In 1996 someone fell from the top deck of a float hitting her back as he fell.  Since then she has felt a burning sensation in the bilateral hips and lateral legs to the feet.  " "She underwent PT with dry needling in December 2019 for her back without resolution.  Pain increases with standing and walking.  She has also had chronic neck pain and underwent artificial disk replacement with Dr. Martin in June 2019; this was not good experience for her and pain continues.  She did PT after surgery for her neck, but she continues to have pain in the posterior neck with radiation into the right greater than left shoulder to the arms and hands.  It is associated with numbness/ tingling in the bilateral hands.  She has tried voltaredn gel and tramadol for neck and back pain.  She also underwent right carpal tunnel release in 2017."  Oswestry score: 46%.  PHQ:  14.    She is neurologically intact with 5/5 strength, 2+ DTR and no sensory deficits in the upper/ lower extremities.  Gait and station fluid.  She denies bowel/ bladder dysfunction.      Past and current medications:  Antineuropathics:  NSAIDs: voltaren gel  Antidepressants:  Muscle relaxers:  Opioids: tramadol in past.  Antiplatelets/Anticoagulants:    Physical Therapy/ Chiropractic care:  Acupuncture - undergoing for fibroyalgia currently  PT - 2021 for lower back, hips and 2019 for neck pain  PT - srping 2024 with benefit    Pain Intervention History:  SI joint injections 4-5-21    Past Spine Surgical History:  C5/6 artificaial disk replacedment in June 2019 with Dr. Bee  Right carpal tunnel release in 2017.        History:    Current Outpatient Medications:     albuterol (PROVENTIL) 2.5 mg /3 mL (0.083 %) nebulizer solution, Take 3 mLs (2.5 mg total) by nebulization every 6 (six) hours as needed. Rescue (Patient not taking: Reported on 5/1/2024), Disp: 270 mL, Rfl: 11    albuterol (VENTOLIN HFA) 90 mcg/actuation inhaler, Inhale 1-2 puffs into the lungs every 4 (four) hours as needed for Wheezing or Shortness of Breath. Rescue (Patient not taking: Reported on 5/1/2024), Disp: 18 g, Rfl: 11    azelastine (ASTELIN) 137 mcg (0.1 " %) nasal spray, U 1 TO 2 SPRAYS IN EACH NOSTRIL BID (Patient not taking: Reported on 2024), Disp: , Rfl: 0    cetirizine (ZYRTEC) 10 MG tablet, Take 1 tablet (10 mg total) by mouth once daily. (Patient not taking: Reported on 2024), Disp: 30 tablet, Rfl: 11    diclofenac sodium (VOLTAREN) 1 % Gel, Apply topically once daily., Disp: 100 g, Rfl: 5    fluticasone furoate-vilanteroL (BREO ELLIPTA) 200-25 mcg/dose DsDv diskus inhaler, Inhale 1 puff into the lungs once daily. Controller, Disp: 1 each, Rfl: 11    fluticasone propionate (FLONASE) 50 mcg/actuation nasal spray, INSTILL 1 TO 2 SPRAYS IN EACH NOSTRIL QD (Patient not taking: Reported on 2024), Disp: , Rfl: 5    lisinopriL (PRINIVIL,ZESTRIL) 5 MG tablet, TAKE 1 TABLET(5 MG) BY MOUTH EVERY DAY, Disp: 90 tablet, Rfl: 3    montelukast (SINGULAIR) 10 mg tablet, Take 1 tablet (10 mg total) by mouth every evening., Disp: 30 tablet, Rfl: 11    multivitamin (THERAGRAN) per tablet, Take 1 tablet by mouth once daily., Disp: , Rfl:     norethindrone-ethinyl estradiol (JUNEL FE 1/20) 1 mg-20 mcg (21)/75 mg (7) per tablet, Take 1 tablet by mouth once daily., Disp: 30 tablet, Rfl: 11    RESTASIS 0.05 % ophthalmic emulsion, Place 1 drop into both eyes 2 (two) times daily., Disp: , Rfl:     semaglutide, weight loss, 2.4 mg/0.75 mL PnIj, Inject 2.4 mg into the skin every 7 days., Disp: , Rfl:     Past Medical History:   Diagnosis Date    Anxiety 2013    Asthma     Depression     Fibromyalgia     GERD (gastroesophageal reflux disease)     History of pneumonia     HTN (hypertension)     Personal history of colonic polyps 2024    Preeclampsia     Sleep apnea     Uses CPAP       Past Surgical History:   Procedure Laterality Date    BREAST BIOPSY      Benign    BREAST SURGERY  Right Breast     Fibro cystic    CARPAL TUNNEL RELEASE Right 2017     SECTION, LOW TRANSVERSE      for PREE    COLONOSCOPY  2024    NASAL SEPTUM SURGERY   2023    NECK SURGERY      SPINE SURGERY      TUBAL LIGATION  2009    upper GI series         Family History   Problem Relation Name Age of Onset    Pancreatic cancer Mother Maya Tastet     Cancer Mother Maya Tastet         Mother passed in 2011 from Pancreas Cancer    Lymphoma Father Bradly Tastet     Diabetes Father Bradly Tastet     Hypertension Father Bradly Tastet     Cancer Father Bradly Tastet         Small Cell Lung Cancer/ still alive    Breast cancer Maternal Aunt Pat     Cancer Maternal Aunt Pat         Breast Cancer both/ passed.  Im not sure of year i was youg.    Breast cancer Maternal Aunt Nicky 70    Pancreatic cancer Maternal Uncle      Breast cancer Paternal Aunt  50    Pancreatic cancer Maternal Grandmother      Heart disease Maternal Grandfather Jose Guadalupe Tastet     Diabetes Paternal Grandmother Holli Tastet     Cancer Paternal Grandfather Skin cancer         Melanoma    Asthma Brother Pacheco Tastet     Hypertension Brother Alan Tastet        Social History     Socioeconomic History    Marital status:    Tobacco Use    Smoking status: Never    Smokeless tobacco: Never   Substance and Sexual Activity    Alcohol use: No    Drug use: No    Sexual activity: Yes     Partners: Female     Birth control/protection: None     Social Determinants of Health     Financial Resource Strain: Low Risk  (1/18/2024)    Overall Financial Resource Strain (CARDIA)     Difficulty of Paying Living Expenses: Not hard at all   Food Insecurity: No Food Insecurity (1/18/2024)    Hunger Vital Sign     Worried About Running Out of Food in the Last Year: Never true     Ran Out of Food in the Last Year: Never true   Transportation Needs: No Transportation Needs (1/18/2024)    PRAPARE - Transportation     Lack of Transportation (Medical): No     Lack of Transportation (Non-Medical): No   Physical Activity: Insufficiently Active (1/18/2024)    Exercise Vital Sign     Days of Exercise per Week: 7 days     Minutes of  Exercise per Session: 10 min   Stress: No Stress Concern Present (1/18/2024)    [employer] - Occupational Stress Questionnaire     Feeling of Stress : Only a little   Housing Stability: Unknown (1/18/2024)    Housing Stability Vital Sign     Unable to Pay for Housing in the Last Year: No     Unstable Housing in the Last Year: No       Review of patient's allergies indicates:   Allergen Reactions    Ibuprofen Other (See Comments)     Affects stomach ulcer, avoids all NSAIDS    Latex, natural rubber Rash     Small red bumps on skin contact       Labs:  Lab Results   Component Value Date    HGBA1C 5.2 01/18/2023       Lab Results   Component Value Date    WBC 9.42 06/23/2023    HGB 13.8 06/23/2023    HCT 42.2 06/23/2023    MCV 88 06/23/2023     06/23/2023           Review of Systems:  Right arm pain.  Thumb pain, locking up.  Balance of review of systems is negative.    Physical Exam:  Vitals:    06/14/24 1312   Weight: 64 kg (140 lb 15.8 oz)   PainSc:   3   PainLoc: Neck     Body mass index is 30.51 kg/m².    Pain disability index:      4/24/2024     9:13 AM   Last 3 PDI Scores   Pain Disability Index (PDI) 19       Gen: NAD  Psych: mood appropriate for given condition  HEENT: eyes anicteric   CV: RRR, 2+ radial pulse  Respiratory: non-labored, no signs of respiratory distress  Abd: non-distended  Skin: warm, dry and intact.  Gait: Able to heel walk, toe walk. No antalgic gait.     Coordination:   Tandem walking coordination: normal    Cervical spine: ROM is full in flexion, extension and lateral rotation without increased pain.  Spurling's maneuver causes no neck pain to either side.  Myofascial exam: No Tenderness to palpation across cervical paraspinous region bilaterally.    Lumbar spine:  Lumbar spine: ROM is full with flexion extension and oblique extension with no increased pain.    Edwin's test causes no increased pain on either side.    Supine straight leg raise is  negative bilaterally.    Internal and external rotation of the hip causes no increased pain on either side.  Myofascial exam: No tenderness to palpation across lumbar paraspinous muscles. No tenderness to palpation over the bilateral greater trochanters and bilateral SI joint    Sensory:  Intact and symmetrical to light touch in C4-T1 dermatomes bilaterally. Intact and symmetrical to light touch in L1-S1 dermatomes bilaterally.    Motor:    Right Left   C4 Shoulder Abduction  5  5   C5 Elbow Flexion    5  5   C6 Wrist Extension  5  5   C7 Elbow Extension   5  5   C8/T1 Hand Intrinsics   5  5        Right Left   L2/3 Iliacus Hip flexion  5  5   L3/4 Qudratus Femoris Knee Extension  5  5   L4/5 Tib Anterior Ankle Dorsiflexion   5  5   L5/S1 Extensor Hallicus Longus Great toe extension  5  5   S1/S2 Gastroc/Soleus Plantar Flexion  5  5      Right Left   Triceps DTR 2+ 2+   Biceps DTR 2+ 2+   Brachioradialis DTR 2+ 2+   Patellar DTR 2+ 2+   Achilles DTR 2+ 2+   Greer Absent  Absent   Clonus Absent Absent   Babinski Absent Absent       Imaging:    X-ray and MRI cervical spine from 02/13/2020 reviewed.  Normal postoperative changes of C5-6 artificial disc replacement is seen with no evidence of instability on flexion or extension.  Mild multilevel degenerative changes.  There is mild at most foraminal narrowing at any given cervical spine level.  C5-6 foramina are not completely evaluated secondary to artifact from artificial disc.    X-ray and MRI lumbar spine from 02/13/2020 reviewed.  There is good alignment with no evidence of instability on flexion or extension.  At L4-5 there is a mild disc osteophyte complex without most mild right foraminal narrowing.      Emg/ ncv upper extremities 3-4-24:  Impression:  Abnormal study.    Moderate right carpal tunnel syndrome, without active denervation.    Mild left carpal tunnel syndrome, without active denervation.    No electrophysiologic evidence of bilateral cervical  radiculopathy/plexopathy, bilateral ulnar mononeuropathy, or peripheral neuropathy in bilateral extremities.    Assessment:   Lavonne Pierre is a 48 y.o. year old female patient who has a past medical history of Anxiety, Asthma, Depression, Fibromyalgia, GERD (gastroesophageal reflux disease), History of pneumonia, HTN (hypertension), Personal history of colonic polyps, Preeclampsia, and Sleep apnea. She presents for follow up of right hand spasm with neck pain and arm pain.  Neck and arm likley myofascial given mild degenerative changes noted I the neck, but cannot completely rule out degenerative changes influencing neck and arm pain. With symptoms not completely resolving with medications, PT and acupuncture, I recommend further imaging assessment.    Plan:  - discussed role of medicaitons, PT, exercise, JODIE to help with symptoms.   - will obtain xary and MRI of the neck to further assess and determine if she is a candidate for any interventional procedures.  - I will follow up with PT regarding cost and insurance isssue to see if I can help get it covered in any wasy   - follow up after imaging.    Problem List Items Addressed This Visit    None  Visit Diagnoses       Cervical radiculopathy    -  Primary    Relevant Orders    X-Ray Cervical Spine 5 View W Flex Extxt    MRI Cervical Spine Without Contrast    Ambulatory referral/consult to Physical/Occupational Therapy    Cervicalgia        Relevant Orders    X-Ray Cervical Spine 5 View W Flex Extxt    MRI Cervical Spine Without Contrast    Ambulatory referral/consult to Physical/Occupational Therapy          Addendum 6-18-24:  Disussed with Rick Lockhart in PT.  New PT order placed to see if insurance will cover more visits.    Follow Up: RTC after imaging.     : Reviewed and consistent with medication use as prescribed.          Janny Adame PA-C  [facility]

## 2024-06-17 ENCOUNTER — TELEPHONE (OUTPATIENT)
Dept: PODIATRY | Facility: CLINIC | Age: 49
End: 2024-06-17
Payer: MEDICARE

## 2024-06-17 NOTE — TELEPHONE ENCOUNTER
----- Message from Mandeepsrikanth Horowitzzohaib Funk sent at 6/17/2024 10:13 AM CDT -----  Type:  Sooner Appointment Request    Caller is requesting a sooner appointment.  Caller declined first available appointment listed below.  Caller will not accept being placed on the waitlist and is requesting a message be sent to doctor.    Name of Caller:  pt   When is the first available appointment?  08/12  Symptoms:  pain in bottom right upper foot  Would the patient rather a call back or a response via [facility]? Call back   Best Call Back Number:  [phone]  Additional Information:  pt stated she would like to verena an appt with provider asap due to concerns with pain in bottom right upper foot and asking to be advised when trying to verena appt pt comes up as being NP and pt stated she is not NP she says she saw provider sometime last year please ensure to call pt back to advise and verena asap thanks!

## 2024-07-02 ENCOUNTER — CLINICAL SUPPORT (OUTPATIENT)
Dept: REHABILITATION | Facility: HOSPITAL | Age: 49
End: 2024-07-02
Payer: MEDICARE

## 2024-07-02 DIAGNOSIS — M53.82 DECREASED RANGE OF MOTION OF INTERVERTEBRAL DISCS OF CERVICAL SPINE: Primary | ICD-10-CM

## 2024-07-02 DIAGNOSIS — M54.12 CERVICAL RADICULOPATHY: ICD-10-CM

## 2024-07-02 DIAGNOSIS — M54.2 CERVICALGIA: ICD-10-CM

## 2024-07-02 DIAGNOSIS — R29.898 UPPER EXTREMITY WEAKNESS: ICD-10-CM

## 2024-07-02 PROCEDURE — 97161 PT EVAL LOW COMPLEX 20 MIN: CPT | Mod: KX,HCNC,PO

## 2024-07-02 PROCEDURE — 97530 THERAPEUTIC ACTIVITIES: CPT | Mod: KX,HCNC,PO

## 2024-07-02 NOTE — PLAN OF CARE
[facility]   Physical Therapy Initial Evaluation      Name: Lavonne Emery [facility]  Clinic Number: [id number]    Therapy Diagnosis:   Encounter Diagnoses   Name Primary?    Cervical radiculopathy     Cervicalgia     Decreased range of motion of intervertebral discs of cervical spine Yes    Upper extremity weakness         Physician: Janny Adame PA-C    Physician Orders: PT Eval and Treat   Medical Diagnosis from Referral:   M54.12 (ICD-10-CM) - Cervical radiculopathy   M54.2 (ICD-10-CM) - Cervicalgia     Evaluation Date: 7/2/2024  Authorization Period Expiration: 6/18/2025  Plan of Care Expiration: 8/27/2024  Progress Note Due: 7/2/2024  Date of Surgery: NA  Visit # / Visits authorized: 1/ 1   FOTO: 1/ 3    Precautions: Standard     Time In: 0850  Time Out: 0940  Total Billable Time: 50 minutes    Subjective     Date of onset: several years    History of current condition - Lavonne reports: a history of neck and upper back pain with headaches. She reports having therapy for this condition earlier this year with notable improvements with dry needling, but states that since she stopped this, the pain has returned.     Falls: none    Imaging: See EPIC:     Prior Therapy: Prior PT for current condition with good results  Social History: Lives with boyfriend  Occupation: disability  Prior Level of Function: ind  Current Level of Function: ind c pain    Pain:  Current 6/10, worst 8/10, best 5/10   Location: bilateral neck   Description: Aching  Aggravating Factors: unsure  Easing Factors: dry needling    Patients goals: improve function and decrease pain     Medical History:   Past Medical History:   Diagnosis Date    Anxiety 07/16/2013    Asthma     Depression     Fibromyalgia     GERD (gastroesophageal reflux disease)     History of pneumonia 2015    HTN (hypertension)     Personal history of colonic polyps 04/22/2024    Preeclampsia     Sleep apnea     Uses CPAP       Surgical History:    Lavonne Pierre  has a past surgical history that includes  section, low transverse (); Breast biopsy; Tubal ligation (); Carpal tunnel release (Right, 2017); Neck surgery; upper GI series; Breast surgery (Right Breast ); Nasal septum surgery (); Spine surgery; and Colonoscopy (2024).    Medications:   Lavonne has a current medication list which includes the following prescription(s): albuterol, albuterol, azelastine, cetirizine, diclofenac sodium, fluticasone furoate-vilanterol, fluticasone propionate, lisinopril, montelukast, multivitamin, norethindrone-ethinyl estradiol, restasis, and semaglutide (weight loss).    Allergies:   Review of patient's allergies indicates:   Allergen Reactions    Ibuprofen Other (See Comments)     Affects stomach ulcer, avoids all NSAIDS    Latex, natural rubber Rash     Small red bumps on skin contact        Objective                                                                                                                                                                                                                                      Cervical ROM:                                              %                         Pain/dysfunction/movement  Flexion full pain   Extension full pain   Side-bending Right 25% limit pain   Side-bending Left 25% limit pain   Right rotation full none   Left rotation full none         Shoulder Range of Motion:   Shoulder Left Right   Flexion 140 140   Abduction 155 150   ER T3 T1   IR T10 T12         Upper Extremity Strength  (R) UE   (L) UE     Shoulder Flexion 4/5   4/5   Shoulder abduction 4/5   4/5   Shoulder ER 4/5   4/5   Shoulder IR 4+/5   4+/5   Low trap 4-/5   4-/5   Mid trap 4-/5   4-/5          Intake Outcome Measure for FOTO Survey    Therapist reviewed FOTO scores for Lavonne Pierre on 2024.   FOTO report - see Media section or FOTO account episode details.    Intake Score: 50%          Treatment     Total Treatment time (time-based codes) separate from Evaluation: 20 minutes     Lavonne received the treatments listed below:      manual therapy techniques: Myofacial release were applied to the: B upper traps for 5 minutes, including:  Discussed the purpose, mechanism, and indications for dry needling with Lavonne . Patient was cleared of all precautions and contraindications and pt signed written consent and gave verbal consent to dry needling Rx today.   Palpation used to determine dry needling sites. Increased tone noted at B upper traps. Pt rec'd dry needling in supine position to B upper traps with .30 mm needles.  Pt tolerated treatment well and was not in any distress at the completion of treatment.     therapeutic activities to improve functional performance for 15  minutes, including:  Education on HEP to include:  Chin tucks x30  No moneys 3x10 RTB  B upper trap stretch 3x30: BUE        Patient Education and Home Exercises     Education provided:   - on HEP and POC    Written Home Exercises Provided: yes. Exercises were reviewed and Lavonne was able to demonstrate them prior to the end of the session.  Lavonne demonstrated good  understanding of the education provided. See EMR under Patient Instructions for exercises provided during therapy sessions.    Assessment     Lavonne is a 48 y.o. female referred to outpatient Physical Therapy with a medical diagnosis of   M54.12 (ICD-10-CM) - Cervical radiculopathy   M54.2 (ICD-10-CM) - Cervicalgia   Patient presents with increased pain and decreased range of motion, strength, and flexibility. These deficits limit the patient from performing everyday activities to include lifting, carrying, bending, reaching, pushing, and pulling without pain or limitations. Pt noted to return with s/s consistent with cervical radicular pain at this time. Pt with soft tissue restriction limiting range of motion and strength at this time due to soft tissue stretch  and empty end feels. Due to this, TDN to B upper traps was incorporated with local twitch responses observed. Pt was educated on a HEP to address flexibility and postural strength following TDN with good performance of all activities. Due to these deficits, pt will benefit from skilled PT services to improve mobility, control pain, and restore overall function.        Patient prognosis is Fair.   Patient will benefit from skilled outpatient Physical Therapy to address the deficits stated above and in the chart below, provide patient /family education, and to maximize patientt's level of independence.     Plan of care discussed with patient: Yes  Patient's spiritual, cultural and educational needs considered and patient is agreeable to the plan of care and goals as stated below:     Anticipated Barriers for therapy: none    Medical Necessity is demonstrated by the following  History  Co-morbidities and personal factors that may impact the plan of care [x] LOW: no personal factors / co-morbidities  [] MODERATE: 1-2 personal factors / co-morbidities  [] HIGH: 3+ personal factors / co-morbidities    Moderate / High Support Documentation:   Co-morbidities affecting plan of care:     Personal Factors:   no deficits     Examination  Body Structures and Functions, activity limitations and participation restrictions that may impact the plan of care [x] LOW: addressing 1-2 elements  [] MODERATE: 3+ elements  [] HIGH: 4+ elements (please support below)    Moderate / High Support Documentation:      Clinical Presentation [x] LOW: stable  [] MODERATE: Evolving  [] HIGH: Unstable     Decision Making/ Complexity Score: low       Goals:  Short Term Goals: 4 weeks   Pt to report worst pain 4/10 to improve QOL  Pt to improve BUE strength by 1/2 grade  Pt to improve cervical range of motion by 25%  Pt to demo independence with initial HEP  Pt to improve FOTO by 15%    Long Term Goals: 8 weeks   Pt to report worst pain 1/10 to improve  QOL  Pt to improve BUE strength by 1 grade  Pt to demo independence with final HEP  Pt to improve FOTO by 30%  Plan     Plan of care Certification: 7/2/2024 to 8/27/2024.    Outpatient Physical Therapy 2 times weekly for 8 weeks to include the following interventions: Cervical/Lumbar Traction, Manual Therapy, Moist Heat/ Ice, Neuromuscular Re-ed, Patient Education, Self Care, Therapeutic Activities, and Therapeutic Exercise.     Rick Lockhart, PT        Physician's Signature: _________________________________________ Date: ________________

## 2024-07-03 DIAGNOSIS — Z71.89 COMPLEX CARE COORDINATION: ICD-10-CM

## 2024-07-09 ENCOUNTER — CLINICAL SUPPORT (OUTPATIENT)
Dept: REHABILITATION | Facility: HOSPITAL | Age: 49
End: 2024-07-09
Payer: MEDICARE

## 2024-07-09 DIAGNOSIS — M53.82 DECREASED RANGE OF MOTION OF INTERVERTEBRAL DISCS OF CERVICAL SPINE: Primary | ICD-10-CM

## 2024-07-09 PROCEDURE — 97112 NEUROMUSCULAR REEDUCATION: CPT | Mod: KX,HCNC,PO

## 2024-07-09 NOTE — PROGRESS NOTES
[facility]   Physical Therapy Treatment Note     Name: Lavonne Emery [facility]  Clinic Number: [id number]    Therapy Diagnosis:   Encounter Diagnosis   Name Primary?    Decreased range of motion of intervertebral discs of cervical spine Yes     Physician: Janny Adame PA-C    Visit Date: 7/9/2024       Physician Orders: PT Eval and Treat   Medical Diagnosis from Referral:   M54.12 (ICD-10-CM) - Cervical radiculopathy   M54.2 (ICD-10-CM) - Cervicalgia      Evaluation Date: 7/2/2024  Authorization Period Expiration: 6/18/2025  Plan of Care Expiration: 8/27/2024  Progress Note Due: 7/2/2024  Date of Surgery: NA  Visit # / Visits authorized: 1/ 1   FOTO: 1/ 3     Precautions: Standard     PTA Visit #: 0/5     Time In: 0700  Time Out: 0755  Total Billable Time: 30 minutes 1:1    SUBJECTIVE     Pt reports: Feeling better after dry needling last session and is without new complaints..  She was compliant with home exercise program.      Pain: 2/10  Location: B upper traps      OBJECTIVE                                                                                                                                                                                                                                     Cervical ROM:                                              %                         Pain/dysfunction/movement  Flexion full pain   Extension full pain   Side-bending Right 25% limit pain   Side-bending Left 25% limit pain   Right rotation full none   Left rotation full none         Shoulder Range of Motion:   Shoulder Left Right   Flexion 140 140   Abduction 155 150   ER T3 T1   IR T10 T12         Upper Extremity Strength  (R) UE   (L) UE     Shoulder Flexion 4/5   4/5   Shoulder abduction 4/5   4/5   Shoulder ER 4/5   4/5   Shoulder IR 4+/5   4+/5   Low trap 4-/5   4-/5   Mid trap 4-/5   4-/5           Treatment     Lavonne received the treatments listed below:      therapeutic exercises to  develop endurance and ROM for 10 minutes including:  UBE 5/5 for endurance/ROM    manual therapy techniques: Myofacial release were applied to the: B upper traps for 10 minutes, including:  Discussed the purpose, mechanism, and indications for dry needling with Lavonne . Patient was cleared of all precautions and contraindications and pt signed written consent and gave verbal consent to dry needling Rx today.   Palpation used to determine dry needling sites. Increased tone noted at B upper traps. Pt rec'd dry needling in supine position to B upper traps with .30 mm needles.  Pt tolerated treatment well and was not in any distress at the completion of treatment.       neuromuscular re-education activities to improve: Coordination, Kinesthetic, Sense, Proprioception, and Posture for 35 minutes. The following activities were included:  Chin tucks x30  No moneys 3x10 RTB  B upper trap stretch 3x30: BUE  Middle precor row 30# 3x15  Banded row c GTB 3x10  Banded shoulder ext c GTB 3x10    therapeutic activities to improve functional performance for   minutes, including:                  Patient Education and Home Exercises     Home Exercises Provided and Patient Education Provided     Education provided:   - on HEP and POC    Written Home Exercises Provided: yes. Exercises were reviewed and Lavonne was able to demonstrate them prior to the end of the session.  Lavonne demonstrated good  understanding of the education provided. See EMR under Patient Instructions for exercises provided during therapy sessions    ASSESSMENT     First time follow-up performed. Pt with good tolerance to TDN to B upper traps followed by periscapular loading. At this time, Patient will continue to benefit from loading and progressions to tolerance going forward to improve overall function.    Lavonne Is progressing well towards her goals.   Pt prognosis is Good.     Pt will continue to benefit from skilled outpatient physical therapy to address the  deficits listed in the problem list box on initial evaluation, provide pt/family education and to maximize pt's level of independence in the home and community environment.     Pt's spiritual, cultural and educational needs considered and pt agreeable to plan of care and goals.     Anticipated barriers to physical therapy:     Goals:   Short Term Goals: 4 weeks   Pt to report worst pain 4/10 to improve QOL  Pt to improve BUE strength by 1/2 grade  Pt to improve cervical range of motion by 25%  Pt to demo independence with initial HEP  Pt to improve FOTO by 15%     Long Term Goals: 8 weeks   Pt to report worst pain 1/10 to improve QOL  Pt to improve BUE strength by 1 grade  Pt to demo independence with final HEP  Pt to improve FOTO by 30%    PLAN     Plan of care Certification: 7/2/2024 to 8/27/2024.     Outpatient Physical Therapy 2 times weekly for 8 weeks to include the following interventions: Cervical/Lumbar Traction, Manual Therapy, Moist Heat/ Ice, Neuromuscular Re-ed, Patient Education, Self Care, Therapeutic Activities, and Therapeutic Exercise.     Rick Lockhart, PT

## 2024-07-10 ENCOUNTER — OFFICE VISIT (OUTPATIENT)
Dept: PODIATRY | Facility: CLINIC | Age: 49
End: 2024-07-10
Payer: MEDICARE

## 2024-07-10 ENCOUNTER — HOSPITAL ENCOUNTER (OUTPATIENT)
Dept: RADIOLOGY | Facility: HOSPITAL | Age: 49
Discharge: HOME | End: 2024-07-10
Attending: PODIATRIST
Payer: MEDICARE

## 2024-07-10 VITALS — WEIGHT: 141.13 LBS | HEIGHT: 57 IN | BODY MASS INDEX: 30.45 KG/M2

## 2024-07-10 DIAGNOSIS — G57.61 MORTON'S NEUROMA OF SECOND INTERSPACE OF RIGHT FOOT: ICD-10-CM

## 2024-07-10 DIAGNOSIS — G57.61 MORTON'S NEUROMA OF SECOND INTERSPACE OF RIGHT FOOT: Primary | ICD-10-CM

## 2024-07-10 PROCEDURE — 4010F ACE/ARB THERAPY RXD/TAKEN: CPT | Mod: HCNC,CPTII,S$GLB, | Performed by: PODIATRIST

## 2024-07-10 PROCEDURE — 99999 PR PBB SHADOW E&M-EST. PATIENT-LVL IV: CPT | Mod: PBBFAC,HCNC,, | Performed by: PODIATRIST

## 2024-07-10 PROCEDURE — 1160F RVW MEDS BY RX/DR IN RCRD: CPT | Mod: HCNC,CPTII,S$GLB, | Performed by: PODIATRIST

## 2024-07-10 PROCEDURE — 73630 X-RAY EXAM OF FOOT: CPT | Mod: 26,HCNC,RT, | Performed by: RADIOLOGY

## 2024-07-10 PROCEDURE — 1159F MED LIST DOCD IN RCRD: CPT | Mod: HCNC,CPTII,S$GLB, | Performed by: PODIATRIST

## 2024-07-10 PROCEDURE — 73630 X-RAY EXAM OF FOOT: CPT | Mod: TC,HCNC,PO,RT

## 2024-07-10 PROCEDURE — 99203 OFFICE O/P NEW LOW 30 MIN: CPT | Mod: 25,HCNC,S$GLB, | Performed by: PODIATRIST

## 2024-07-10 PROCEDURE — 3008F BODY MASS INDEX DOCD: CPT | Mod: HCNC,CPTII,S$GLB, | Performed by: PODIATRIST

## 2024-07-10 PROCEDURE — 64455 NJX AA&/STRD PLTR COM DG NRV: CPT | Mod: HCNC,RT,S$GLB, | Performed by: PODIATRIST

## 2024-07-10 RX ORDER — TRIAMCINOLONE ACETONIDE 40 MG/ML
INJECTION, SUSPENSION INTRA-ARTICULAR; INTRAMUSCULAR
COMMUNITY
Start: 2024-03-27

## 2024-07-10 RX ORDER — NORETHINDRONE 5 MG/1
TABLET ORAL
COMMUNITY
Start: 2024-05-06

## 2024-07-10 RX ORDER — OMEPRAZOLE 40 MG/1
40 CAPSULE, DELAYED RELEASE ORAL
COMMUNITY
Start: 2024-05-07

## 2024-07-10 RX ORDER — METHYLPREDNISOLONE ACETATE 40 MG/ML
20 INJECTION, SUSPENSION INTRA-ARTICULAR; INTRALESIONAL; INTRAMUSCULAR; SOFT TISSUE
Status: COMPLETED | OUTPATIENT
Start: 2024-07-10 | End: 2024-07-10

## 2024-07-10 RX ADMIN — METHYLPREDNISOLONE ACETATE 20 MG: 40 INJECTION, SUSPENSION INTRA-ARTICULAR; INTRALESIONAL; INTRAMUSCULAR; SOFT TISSUE at 10:07

## 2024-07-10 NOTE — PROGRESS NOTES
Subjective:      Patient ID: Lavonne Pierre is a 48 y.o. female.    Chief Complaint: Foot Pain (Right)    Lavonne is a 48 y.o. female who presents to the podiatry clinic  with complaint of right foot pain in the ball of the foot with weight bearing activities it gets worse, pain radiated across the whole ball of the foot and into the toes.       Review of Systems   Constitutional: Negative for chills and fever.   Cardiovascular:  Positive for leg swelling. Negative for claudication.   Respiratory:  Negative for shortness of breath.    Skin:  Negative for itching, nail changes and rash.   Musculoskeletal:  Positive for back pain, joint pain, muscle weakness and myalgias. Negative for muscle cramps.   Gastrointestinal:  Negative for nausea and vomiting.   Neurological:  Positive for paresthesias. Negative for focal weakness, loss of balance and numbness.           Objective:      Physical Exam  Constitutional:       General: She is not in acute distress.     Appearance: She is well-developed. She is not diaphoretic.   Cardiovascular:      Pulses:           Dorsalis pedis pulses are 2+ on the right side and 2+ on the left side.        Posterior tibial pulses are 2+ on the right side and 2+ on the left side.      Comments: < 3 sec capillary refill time to toes 1-5 bilateral. Toes and feet are warm to touch proximally with normal distal cooling b/l. There is some hair growth on the feet and toes b/l. There is no edema b/l. No spider veins or varicosities present b/l.     Musculoskeletal:      Comments: Equinus noted b/l ankles with < 5 deg DF noted. MMT 5/5 in DF/PF/Inv/Ev resistance with no reproduction of pain in any direction. Passive range of motion of ankle and pedal joints is painless b/l.    There is pain on palpation of the right second  intermetatarsal space with a positive Zbigniew's click. Minimal tenderness to palpation of the adjacent metatarsal heads.       Skin:     General: Skin is warm and dry.       Coloration: Skin is not pale.      Findings: No abrasion, bruising, burn, ecchymosis, erythema, laceration, lesion, petechiae or rash.      Nails: There is no clubbing.      Comments: Skin temperature, texture and turgor within normal limits.         Neurological:      Mental Status: She is alert and oriented to person, place, and time.      Sensory: No sensory deficit.      Motor: No tremor, atrophy or abnormal muscle tone.      Comments: Positive tinel sign bilateral.   Psychiatric:         Behavior: Behavior normal.               Assessment:       Encounter Diagnosis   Name Primary?    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    Diagnoses and all orders for this visit:    Wise's neuroma of second interspace of right foot  -     X-Ray Foot Complete Right; Future      I counseled the patient on her conditions, their implications and medical management.    Fabricated, dispensed and fitted with metatarsal pad right shoe.    Avoid barefoot or flats    Obtained verbal consent from the patient to perform an injection to her right foot. Each injection site was prepped with alcohol and skin anesthesia achieved with Ethyl Chloride. A mixture containing a total of 0.5 mL of Depomedrol and 1 mL 1% plain lidocaine was injected within the affected intermetatarsal areas described above. The patient tolerated the procedure well and expressed improvement in her symptoms.    Return in 6 weeks for follow up    Lester De Jesus DPM

## 2024-07-16 ENCOUNTER — HOSPITAL ENCOUNTER (OUTPATIENT)
Dept: RADIOLOGY | Facility: HOSPITAL | Age: 49
Discharge: HOME | End: 2024-07-16
Attending: PHYSICIAN ASSISTANT
Payer: MEDICARE

## 2024-07-16 ENCOUNTER — PATIENT MESSAGE (OUTPATIENT)
Dept: PAIN MEDICINE | Facility: CLINIC | Age: 49
End: 2024-07-16
Payer: MEDICARE

## 2024-07-16 DIAGNOSIS — M54.12 CERVICAL RADICULOPATHY: ICD-10-CM

## 2024-07-16 DIAGNOSIS — M54.2 CERVICALGIA: ICD-10-CM

## 2024-07-16 PROCEDURE — 72052 X-RAY EXAM NECK SPINE 6/>VWS: CPT | Mod: 26,HCNC,, | Performed by: RADIOLOGY

## 2024-07-16 PROCEDURE — 72052 X-RAY EXAM NECK SPINE 6/>VWS: CPT | Mod: TC,HCNC,FY,PO

## 2024-07-17 ENCOUNTER — CLINICAL SUPPORT (OUTPATIENT)
Dept: REHABILITATION | Facility: HOSPITAL | Age: 49
End: 2024-07-17
Payer: MEDICARE

## 2024-07-17 DIAGNOSIS — M53.82 DECREASED RANGE OF MOTION OF INTERVERTEBRAL DISCS OF CERVICAL SPINE: Primary | ICD-10-CM

## 2024-07-17 PROCEDURE — 97112 NEUROMUSCULAR REEDUCATION: CPT | Mod: HCNC,PO

## 2024-07-17 RX ORDER — ALPRAZOLAM 1 MG/1
1 TABLET ORAL
Qty: 1 TABLET | Refills: 0 | Status: SHIPPED | OUTPATIENT
Start: 2024-07-17 | End: 2024-08-16

## 2024-07-17 NOTE — TELEPHONE ENCOUNTER
Please send prescription for MRI. I have reviewed the [place] [employer] website and there are no abberancies.

## 2024-07-17 NOTE — PROGRESS NOTES
[facility]   Physical Therapy Treatment Note     Name: Lavonne Emery [facility]  Clinic Number: [id number]    Therapy Diagnosis:   Encounter Diagnosis   Name Primary?    Decreased range of motion of intervertebral discs of cervical spine Yes     Physician: Janny Adame PA-C    Visit Date: 7/17/2024       Physician Orders: PT Eval and Treat   Medical Diagnosis from Referral:   M54.12 (ICD-10-CM) - Cervical radiculopathy   M54.2 (ICD-10-CM) - Cervicalgia      Evaluation Date: 7/2/2024  Authorization Period Expiration: 6/18/2025  Plan of Care Expiration: 8/27/2024  Progress Note Due: 7/2/2024  Date of Surgery: NA  Visit # / Visits authorized: 2/20   FOTO: 1/ 3     Precautions: Standard     PTA Visit #: 0/5     Time In: 1500  Time Out: 1550  Total Billable Time: 35 minutes 1:1    SUBJECTIVE     Pt reports: increased wrist pain bilaterally and thinks that may be due to the theraband work last session.  She was compliant with home exercise program.      Pain: 2/10  Location: B upper traps      OBJECTIVE                                                                                                                                                                                                                                     Cervical ROM:                                              %                         Pain/dysfunction/movement  Flexion full pain   Extension full pain   Side-bending Right 25% limit pain   Side-bending Left 25% limit pain   Right rotation full none   Left rotation full none         Shoulder Range of Motion:   Shoulder Left Right   Flexion 140 140   Abduction 155 150   ER T3 T1   IR T10 T12         Upper Extremity Strength  (R) UE   (L) UE     Shoulder Flexion 4/5   4/5   Shoulder abduction 4/5   4/5   Shoulder ER 4/5   4/5   Shoulder IR 4+/5   4+/5   Low trap 4-/5   4-/5   Mid trap 4-/5   4-/5           Treatment     Lavonne received the treatments listed below:       therapeutic exercises to develop endurance and ROM for 10 minutes including:  UBE 5/5 for endurance/ROM    manual therapy techniques: Myofacial release were applied to the: B upper traps for 5 minutes, including:  Discussed the purpose, mechanism, and indications for dry needling with Lavonne . Patient was cleared of all precautions and contraindications and pt signed written consent and gave verbal consent to dry needling Rx today.   Palpation used to determine dry needling sites. Increased tone noted at B upper traps. Pt rec'd dry needling in supine position to B upper traps with .30 mm needles.  Pt tolerated treatment well and was not in any distress at the completion of treatment.       neuromuscular re-education activities to improve: Coordination, Kinesthetic, Sense, Proprioception, and Posture for 35 minutes. The following activities were included:  Chin tucks x30  No moneys 3x10 RTB  B upper trap stretch 3x30: BUE  Middle precor row 30# 3x15  Banded row c GTB 3x10  Banded shoulder ext c GTB 3x10    therapeutic activities to improve functional performance for   minutes, including:                  Patient Education and Home Exercises     Home Exercises Provided and Patient Education Provided     Education provided:   - on HEP and POC    Written Home Exercises Provided: yes. Exercises were reviewed and Lavonne was able to demonstrate them prior to the end of the session.  Lavonne demonstrated good  understanding of the education provided. See EMR under Patient Instructions for exercises provided during therapy sessions    ASSESSMENT     Pt continues progress well. Discontinued no money and D2 flexion therex due to increased wrist pain and added wrist flexibility exercises with good tolerance. Patient will continue to benefit from loading and progressions to tolerance going forward to improve overall function.    Lavonne Is progressing well towards her goals.   Pt prognosis is Good.     Pt will continue to  benefit from skilled outpatient physical therapy to address the deficits listed in the problem list box on initial evaluation, provide pt/family education and to maximize pt's level of independence in the home and community environment.     Pt's spiritual, cultural and educational needs considered and pt agreeable to plan of care and goals.     Anticipated barriers to physical therapy:     Goals:   Short Term Goals: 4 weeks   Pt to report worst pain 4/10 to improve QOL  Pt to improve BUE strength by 1/2 grade  Pt to improve cervical range of motion by 25%  Pt to demo independence with initial HEP  Pt to improve FOTO by 15%     Long Term Goals: 8 weeks   Pt to report worst pain 1/10 to improve QOL  Pt to improve BUE strength by 1 grade  Pt to demo independence with final HEP  Pt to improve FOTO by 30%    PLAN     Plan of care Certification: 7/2/2024 to 8/27/2024.     Outpatient Physical Therapy 2 times weekly for 8 weeks to include the following interventions: Cervical/Lumbar Traction, Manual Therapy, Moist Heat/ Ice, Neuromuscular Re-ed, Patient Education, Self Care, Therapeutic Activities, and Therapeutic Exercise.     Rick Lockhart, PT

## 2024-07-18 ENCOUNTER — OFFICE VISIT (OUTPATIENT)
Dept: FAMILY MEDICINE | Facility: CLINIC | Age: 49
End: 2024-07-18
Payer: MEDICARE

## 2024-07-18 VITALS
OXYGEN SATURATION: 99 % | BODY MASS INDEX: 29.82 KG/M2 | DIASTOLIC BLOOD PRESSURE: 80 MMHG | SYSTOLIC BLOOD PRESSURE: 124 MMHG | HEART RATE: 89 BPM | WEIGHT: 137.81 LBS

## 2024-07-18 DIAGNOSIS — I10 ESSENTIAL HYPERTENSION: ICD-10-CM

## 2024-07-18 DIAGNOSIS — J06.9 VIRAL URI: Primary | ICD-10-CM

## 2024-07-18 DIAGNOSIS — J45.40 MODERATE PERSISTENT ASTHMA, UNSPECIFIED WHETHER COMPLICATED: ICD-10-CM

## 2024-07-18 PROCEDURE — 3079F DIAST BP 80-89 MM HG: CPT | Mod: HCNC,CPTII,S$GLB, | Performed by: INTERNAL MEDICINE

## 2024-07-18 PROCEDURE — 3008F BODY MASS INDEX DOCD: CPT | Mod: HCNC,CPTII,S$GLB, | Performed by: INTERNAL MEDICINE

## 2024-07-18 PROCEDURE — 1160F RVW MEDS BY RX/DR IN RCRD: CPT | Mod: HCNC,CPTII,S$GLB, | Performed by: INTERNAL MEDICINE

## 2024-07-18 PROCEDURE — 99999 PR PBB SHADOW E&M-EST. PATIENT-LVL IV: CPT | Mod: PBBFAC,HCNC,, | Performed by: INTERNAL MEDICINE

## 2024-07-18 PROCEDURE — 3074F SYST BP LT 130 MM HG: CPT | Mod: HCNC,CPTII,S$GLB, | Performed by: INTERNAL MEDICINE

## 2024-07-18 PROCEDURE — 1159F MED LIST DOCD IN RCRD: CPT | Mod: HCNC,CPTII,S$GLB, | Performed by: INTERNAL MEDICINE

## 2024-07-18 PROCEDURE — 99214 OFFICE O/P EST MOD 30 MIN: CPT | Mod: HCNC,S$GLB,, | Performed by: INTERNAL MEDICINE

## 2024-07-18 PROCEDURE — 4010F ACE/ARB THERAPY RXD/TAKEN: CPT | Mod: HCNC,CPTII,S$GLB, | Performed by: INTERNAL MEDICINE

## 2024-07-18 NOTE — PROGRESS NOTES
Subjective     Lavonne Pierre is a 48 y.o. old, female here for Follow-up    47 y/o with PMH of fibromyalgia, HTN, asthma, allergies, SALVADOR, GERD     Patient complains of URI symptoms that started yesterday. She has cough, congestion, ear ache, sore throat. No fever or systemic symptoms. Fatigued.    HTN: well controlled on low dose lisinopril, we will stop it because of her recent weight loss and see how she does off meds.    Asthma controlled on meds, using albuterol right now as needed.    ROS  Medications     Outpatient Medications Marked as Taking for the 7/18/24 encounter (Office Visit) with Ranjit Bernabe MD   Medication Sig Dispense Refill    albuterol (PROVENTIL) 2.5 mg /3 mL (0.083 %) nebulizer solution Take 3 mLs (2.5 mg total) by nebulization every 6 (six) hours as needed. Rescue 270 mL 11    albuterol (VENTOLIN HFA) 90 mcg/actuation inhaler Inhale 1-2 puffs into the lungs every 4 (four) hours as needed for Wheezing or Shortness of Breath. Rescue 18 g 11    ALPRAZolam (XANAX) 1 MG tablet Take 1 tablet (1 mg total) by mouth On call Procedure for Anxiety. Take 30min prior to procedure 1 tablet 0    azelastine (ASTELIN) 137 mcg (0.1 %) nasal spray   0    cetirizine (ZYRTEC) 10 MG tablet Take 1 tablet (10 mg total) by mouth once daily. 30 tablet 11    diclofenac sodium (VOLTAREN) 1 % Gel Apply topically once daily. 100 g 5    fluticasone furoate-vilanteroL (BREO ELLIPTA) 200-25 mcg/dose DsDv diskus inhaler Inhale 1 puff into the lungs once daily. Controller 1 each 11    fluticasone propionate (FLONASE) 50 mcg/actuation nasal spray   5    montelukast (SINGULAIR) 10 mg tablet Take 1 tablet (10 mg total) by mouth every evening. 30 tablet 11    multivitamin (THERAGRAN) per tablet Take 1 tablet by mouth once daily.      norethindrone (AYGESTIN) 5 mg Tab       norethindrone-ethinyl estradiol (JUNEL FE 1/20) 1 mg-20 mcg (21)/75 mg (7) per tablet Take 1 tablet by mouth once daily. 30 tablet 11     omeprazole (PRILOSEC) 40 MG capsule Take 40 mg by mouth.      RESTASIS 0.05 % ophthalmic emulsion Place 1 drop into both eyes 2 (two) times daily.      semaglutide, weight loss, 2.4 mg/0.75 mL PnIj Inject 2.4 mg into the skin every 7 days.      triamcinolone acetonide (KENALOG-40) 40 mg/mL injection       [DISCONTINUED] lisinopriL (PRINIVIL,ZESTRIL) 5 MG tablet TAKE 1 TABLET(5 MG) BY MOUTH EVERY DAY 90 tablet 3     Objective     /80   Pulse 89   Wt 62.5 kg (137 lb 12.6 oz)   SpO2 99%   BMI 29.82 kg/m²   Physical Exam  Constitutional:       General: She is not in acute distress.     Appearance: She is well-developed.   HENT:      Head: Normocephalic and atraumatic.      Right Ear: External ear normal.      Left Ear: External ear normal.   Eyes:      General:         Right eye: No discharge.         Left eye: No discharge.      Conjunctiva/sclera: Conjunctivae normal.   Cardiovascular:      Rate and Rhythm: Normal rate and regular rhythm.      Heart sounds: No murmur heard.  Pulmonary:      Effort: Pulmonary effort is normal. No respiratory distress.      Breath sounds: Normal breath sounds.   Musculoskeletal:      Cervical back: Neck supple.   Lymphadenopathy:      Cervical: No cervical adenopathy.       Assessment and Plan     Viral URI    Essential hypertension    Moderate persistent asthma, unspecified whether complicated      Supportive care. She declines covid testing.  F/u 6 months  ___________________  Ranjit Bernabe MD  Internal Medicine and Pediatrics

## 2024-07-19 ENCOUNTER — CLINICAL SUPPORT (OUTPATIENT)
Dept: REHABILITATION | Facility: HOSPITAL | Age: 49
End: 2024-07-19
Payer: MEDICARE

## 2024-07-19 DIAGNOSIS — M53.82 DECREASED RANGE OF MOTION OF INTERVERTEBRAL DISCS OF CERVICAL SPINE: Primary | ICD-10-CM

## 2024-07-19 PROCEDURE — 97140 MANUAL THERAPY 1/> REGIONS: CPT | Mod: KX,HCNC,PO,CQ

## 2024-07-19 PROCEDURE — 97110 THERAPEUTIC EXERCISES: CPT | Mod: KX,HCNC,PO,CQ

## 2024-07-19 PROCEDURE — 97112 NEUROMUSCULAR REEDUCATION: CPT | Mod: KX,HCNC,PO,CQ

## 2024-07-19 NOTE — PROGRESS NOTES
[facility]   Physical Therapy Treatment Note     Name: Lavonne Emery [facility]  Clinic Number: [id number]    Therapy Diagnosis:   Encounter Diagnosis   Name Primary?    Decreased range of motion of intervertebral discs of cervical spine Yes     Physician: Janny Adame PA-C    Visit Date: 7/19/2024       Physician Orders: PT Eval and Treat   Medical Diagnosis from Referral:   M54.12 (ICD-10-CM) - Cervical radiculopathy   M54.2 (ICD-10-CM) - Cervicalgia      Evaluation Date: 7/2/2024  Authorization Period Expiration: 6/18/2025  Plan of Care Expiration: 8/27/2024  Progress Note Due: 7/2/2024  Date of Surgery: NA  Visit # / Visits authorized: 3/20   FOTO: 1/ 3     Precautions: Standard     PTA Visit #: 0/5     Time In: 1:00 PM  Time Out: 1550  Total Billable Time: 60 minutes     SUBJECTIVE     Pt reports:that she had temp decrease in sx post last tx but that her neck is hurting today.  She was compliant with home exercise program.      Pain: 4/10  Location: B upper traps      OBJECTIVE                                                                                                                                                                                                                                     Cervical ROM:                                              %                         Pain/dysfunction/movement  Flexion full pain   Extension full pain   Side-bending Right 25% limit pain   Side-bending Left 25% limit pain   Right rotation full none   Left rotation full none         Shoulder Range of Motion:   Shoulder Left Right   Flexion 140 140   Abduction 155 150   ER T3 T1   IR T10 T12         Upper Extremity Strength  (R) UE   (L) UE     Shoulder Flexion 4/5   4/5   Shoulder abduction 4/5   4/5   Shoulder ER 4/5   4/5   Shoulder IR 4+/5   4+/5   Low trap 4-/5   4-/5   Mid trap 4-/5   4-/5           Treatment     Lavonne received the treatments listed below:      therapeutic exercises to  develop endurance and ROM for 15 minutes including:  UBE 5/5 for endurance/ROM  Thoracic supine stretch with horiz towel roll 3 mins  Chest stretch supine with vertical towel roll 3 mins    manual therapy techniques: Myofacial release were applied to the: B upper traps for 10 minutes, including:  STM, B UT, Lev Scap. Manual intermittent distraction    Performed Anabelle PT  Discussed the purpose, mechanism, and indications for dry needling with Lavonne . Patient was cleared of all precautions and contraindications and pt signed written consent and gave verbal consent to dry needling Rx today.   Palpation used to determine dry needling sites. Increased tone noted at B upper traps. Pt rec'd dry needling in supine position to B upper traps with .30 mm needles.  Pt tolerated treatment well and was not in any distress at the completion of treatment.       neuromuscular re-education activities to improve: Coordination, Kinesthetic, Sense, Proprioception, and Posture for 35 minutes. The following activities were included:  Chin tucks x30  No moneys 3x10 RTB  B upper trap stretch 3x30: BUE  B Lev Scap stretch 3 x 30 sec  Seated scap retraction 3 sec hold 20x  Middle precor row 30# 3x15  Banded row c GTB 3x10  Banded shoulder ext c GTB 3x10  Supine ISO retraction 3 sec hold 20x    therapeutic activities to improve functional performance for   minutes, including:                  Patient Education and Home Exercises     Home Exercises Provided and Patient Education Provided     Education provided:   - on HEP and POC    Written Home Exercises Provided: yes. Exercises were reviewed and Lavonne was able to demonstrate them prior to the end of the session.  Lavonne demonstrated good  understanding of the education provided. See EMR under Patient Instructions for exercises provided during therapy sessions    ASSESSMENT     Lavonne babatunde today's tx with progression of ther ex,neuromuscular re-ed and manual intervention well. She   continues progress well. She was able to perform all activities w/o difficulty or complaint.  Patient will continue to benefit from loading and progressions to tolerance going forward to improve overall function.    Lavonne Is progressing well towards her goals.   Pt prognosis is Good.     Pt will continue to benefit from skilled outpatient physical therapy to address the deficits listed in the problem list box on initial evaluation, provide pt/family education and to maximize pt's level of independence in the home and community environment.     Pt's spiritual, cultural and educational needs considered and pt agreeable to plan of care and goals.     Anticipated barriers to physical therapy:     Goals:   Short Term Goals: 4 weeks   Pt to report worst pain 4/10 to improve QOL  Pt to improve BUE strength by 1/2 grade  Pt to improve cervical range of motion by 25%  Pt to demo independence with initial HEP  Pt to improve FOTO by 15%     Long Term Goals: 8 weeks   Pt to report worst pain 1/10 to improve QOL  Pt to improve BUE strength by 1 grade  Pt to demo independence with final HEP  Pt to improve FOTO by 30%    PLAN     Plan of care Certification: 7/2/2024 to 8/27/2024.     Outpatient Physical Therapy 2 times weekly for 8 weeks to include the following interventions: Cervical/Lumbar Traction, Manual Therapy, Moist Heat/ Ice, Neuromuscular Re-ed, Patient Education, Self Care, Therapeutic Activities, and Therapeutic Exercise.     Jan Stein, PTA

## 2024-07-22 ENCOUNTER — OFFICE VISIT (OUTPATIENT)
Dept: PAIN MEDICINE | Facility: CLINIC | Age: 49
End: 2024-07-22
Payer: MEDICARE

## 2024-07-22 VITALS
HEART RATE: 79 BPM | DIASTOLIC BLOOD PRESSURE: 90 MMHG | SYSTOLIC BLOOD PRESSURE: 130 MMHG | BODY MASS INDEX: 29.73 KG/M2 | WEIGHT: 137.81 LBS | HEIGHT: 57 IN

## 2024-07-22 DIAGNOSIS — M54.2 CERVICALGIA: Primary | ICD-10-CM

## 2024-07-22 DIAGNOSIS — Z98.890 H/O CERVICAL SPINE SURGERY: ICD-10-CM

## 2024-07-22 DIAGNOSIS — M54.12 CERVICAL RADICULOPATHY: ICD-10-CM

## 2024-07-22 PROCEDURE — 1159F MED LIST DOCD IN RCRD: CPT | Mod: HCNC,CPTII,S$GLB, | Performed by: PHYSICIAN ASSISTANT

## 2024-07-22 PROCEDURE — 99213 OFFICE O/P EST LOW 20 MIN: CPT | Mod: HCNC,S$GLB,, | Performed by: PHYSICIAN ASSISTANT

## 2024-07-22 PROCEDURE — 99999 PR PBB SHADOW E&M-EST. PATIENT-LVL IV: CPT | Mod: PBBFAC,HCNC,, | Performed by: PHYSICIAN ASSISTANT

## 2024-07-22 PROCEDURE — 3080F DIAST BP >= 90 MM HG: CPT | Mod: HCNC,CPTII,S$GLB, | Performed by: PHYSICIAN ASSISTANT

## 2024-07-22 PROCEDURE — 1160F RVW MEDS BY RX/DR IN RCRD: CPT | Mod: HCNC,CPTII,S$GLB, | Performed by: PHYSICIAN ASSISTANT

## 2024-07-22 PROCEDURE — 3075F SYST BP GE 130 - 139MM HG: CPT | Mod: HCNC,CPTII,S$GLB, | Performed by: PHYSICIAN ASSISTANT

## 2024-07-22 PROCEDURE — 3008F BODY MASS INDEX DOCD: CPT | Mod: HCNC,CPTII,S$GLB, | Performed by: PHYSICIAN ASSISTANT

## 2024-07-22 PROCEDURE — 4010F ACE/ARB THERAPY RXD/TAKEN: CPT | Mod: HCNC,CPTII,S$GLB, | Performed by: PHYSICIAN ASSISTANT

## 2024-07-22 NOTE — PROGRESS NOTES
[facility]        PCP:  Ranjit Bernabe MD    CC:   Chief Complaint   Patient presents with    Follow-up     follow up MRI and X-ray resuls. MRI not done s/d sedative will have to rescheduel            HPI:   Lavonne Pierre is a 48 y.o. year old female patient who has a past medical history of Anxiety, Asthma, Depression, Fibromyalgia, GERD (gastroesophageal reflux disease), History of pneumonia, HTN (hypertension), Personal history of colonic polyps, Preeclampsia, and Sleep apnea. Ms. Banerjee returns for follow up of neck pain.  She has restarted PT.  She did have the xrays of the neck completed, but did not have the MRI as she felt claustrophobic.  Dr. Cheatham has sent in xanax to help and she anticipates rescheduleing.  She continues with bialteral neck, interscapular pain.  Today more left than right sided pain.  She has bilateral wrist pain.  Right thumb spasms have not been as bad lately.    HPI: 6-14-24  She presents for follow up neck and arm symptoms.  She continues with acupuncture.  She has been going to PT.  PT and dry needling have helped, but states she cannot continue to go becuasue of an insurance/ cost issue.  She continues with constant pain in the neck.  It is worse in the morning and felt as throbbing throughout the day.  Therapy also helped with right thumb region pain, however with stopping therapy thumb spasms and pain are starting to recur more frequently.    HPI 4-24-24:  She presents in referral from No ref. provider found for right arm and thumb pain.  She was seen in 2020 for neck pain, hip pain and leg pain.  Presents today for right thumb.  With cooking and doing activities with the right hand, the right handspasms in a the base of the thumb and the thum over extends.  It is painful and bothersom for her. She also describes onset of neck pain and bilateral arm pain to the hands and fingers.  She has intermittent shoulder pain.  She is right handed and had C5/6  "artificial disk replacement.   Denies bowel/ bladder incontinence.    HPI from 2-14-20:  44 year old female with anxiety, depression, fibromyalgia, GERD, hypertension and asthma presents for follow up of chronic neck/ back pain after undergoing imaging.  There have been no significant changes in symptoms since last assessment.  Per my last note:  "In 1996 someone fell from the top deck of a float hitting her back as he fell.  Since then she has felt a burning sensation in the bilateral hips and lateral legs to the feet.  She underwent PT with dry needling in December 2019 for her back without resolution.  Pain increases with standing and walking.  She has also had chronic neck pain and underwent artificial disk replacement with Dr. Martin in June 2019; this was not good experience for her and pain continues.  She did PT after surgery for her neck, but she continues to have pain in the posterior neck with radiation into the right greater than left shoulder to the arms and hands.  It is associated with numbness/ tingling in the bilateral hands.  She has tried voltaredn gel and tramadol for neck and back pain.  She also underwent right carpal tunnel release in 2017."  Oswestry score: 46%.  PHQ:  14.    She is neurologically intact with 5/5 strength, 2+ DTR and no sensory deficits in the upper/ lower extremities.  Gait and station fluid.  She denies bowel/ bladder dysfunction.      Past and current medications:  Antineuropathics:  NSAIDs: voltaren gel  Antidepressants:  Muscle relaxers:  Opioids: tramadol in past.  Antiplatelets/Anticoagulants:    Physical Therapy/ Chiropractic care:  Acupuncture - undergoing for fibroyalgia currently  PT - 2021 for lower back, hips and 2019 for neck pain  PT - srping 2024 with benefit  PT - recently restarted with 3 vistis since June 2024    Pain Intervention History:  SI joint injections 4-5-21    Past Spine Surgical History:  C5/6 artificaial disk replacedment in June 2019 with " Dr. Bee  Right carpal tunnel release in 2017.        History:    Current Outpatient Medications:     albuterol (PROVENTIL) 2.5 mg /3 mL (0.083 %) nebulizer solution, Take 3 mLs (2.5 mg total) by nebulization every 6 (six) hours as needed. Rescue, Disp: 270 mL, Rfl: 11    albuterol (VENTOLIN HFA) 90 mcg/actuation inhaler, Inhale 1-2 puffs into the lungs every 4 (four) hours as needed for Wheezing or Shortness of Breath. Rescue, Disp: 18 g, Rfl: 11    ALPRAZolam (XANAX) 1 MG tablet, Take 1 tablet (1 mg total) by mouth On call Procedure for Anxiety. Take 30min prior to procedure, Disp: 1 tablet, Rfl: 0    azelastine (ASTELIN) 137 mcg (0.1 %) nasal spray, , Disp: , Rfl: 0    cetirizine (ZYRTEC) 10 MG tablet, Take 1 tablet (10 mg total) by mouth once daily., Disp: 30 tablet, Rfl: 11    diclofenac sodium (VOLTAREN) 1 % Gel, Apply topically once daily., Disp: 100 g, Rfl: 5    fluticasone furoate-vilanteroL (BREO ELLIPTA) 200-25 mcg/dose DsDv diskus inhaler, Inhale 1 puff into the lungs once daily. Controller, Disp: 1 each, Rfl: 11    fluticasone propionate (FLONASE) 50 mcg/actuation nasal spray, , Disp: , Rfl: 5    montelukast (SINGULAIR) 10 mg tablet, Take 1 tablet (10 mg total) by mouth every evening., Disp: 30 tablet, Rfl: 11    multivitamin (THERAGRAN) per tablet, Take 1 tablet by mouth once daily., Disp: , Rfl:     norethindrone (AYGESTIN) 5 mg Tab, , Disp: , Rfl:     norethindrone-ethinyl estradiol (JUNEL FE 1/20) 1 mg-20 mcg (21)/75 mg (7) per tablet, Take 1 tablet by mouth once daily., Disp: 30 tablet, Rfl: 11    omeprazole (PRILOSEC) 40 MG capsule, Take 40 mg by mouth., Disp: , Rfl:     RESTASIS 0.05 % ophthalmic emulsion, Place 1 drop into both eyes 2 (two) times daily., Disp: , Rfl:     semaglutide, weight loss, 2.4 mg/0.75 mL PnIj, Inject 2.4 mg into the skin every 7 days., Disp: , Rfl:     triamcinolone acetonide (KENALOG-40) 40 mg/mL injection, , Disp: , Rfl:     Past Medical History:   Diagnosis Date     Anxiety 2013    Asthma     Depression     Fibromyalgia     GERD (gastroesophageal reflux disease)     History of pneumonia     HTN (hypertension)     Personal history of colonic polyps 2024    Preeclampsia     Sleep apnea     Uses CPAP       Past Surgical History:   Procedure Laterality Date    BREAST BIOPSY      Benign    BREAST SURGERY  Right Breast 1998    Fibro cystic    CARPAL TUNNEL RELEASE Right 2017     SECTION, LOW TRANSVERSE  2008    for PREE    COLONOSCOPY  2024    NASAL SEPTUM SURGERY      NECK SURGERY      SPINE SURGERY      TUBAL LIGATION      upper GI series         Family History   Problem Relation Name Age of Onset    Pancreatic cancer Mother Maya Tastet     Cancer Mother Maya Tastet         Mother passed in  from Pancreas Cancer    Lymphoma Father Bradly Tastet     Diabetes Father Bradly Tastet     Hypertension Father Bradly Tastet     Cancer Father Bradly Tastet         Small Cell Lung Cancer/ still alive    Breast cancer Maternal Aunt Pat     Cancer Maternal Aunt Pat         Breast Cancer both/ passed.  Im not sure of year i was youg.    Breast cancer Maternal Aunt Nicky 70    Arthritis Maternal Aunt Nicky     Pancreatic cancer Maternal Uncle      Breast cancer Paternal Aunt  50    Pancreatic cancer Maternal Grandmother      Heart disease Maternal Grandfather Jose Guadalupe Tastet     Diabetes Paternal Grandmother Holli Tastet     Cancer Paternal Grandfather Skin cancer         Melanoma    Asthma Brother Pacheco Tastet     Hypertension Brother Alan Tastet        Social History     Socioeconomic History    Marital status:    Tobacco Use    Smoking status: Never    Smokeless tobacco: Never   Substance and Sexual Activity    Alcohol use: No    Drug use: No    Sexual activity: Yes     Partners: Male     Birth control/protection: None     Social Determinants of Health     Financial Resource Strain: Low Risk  (2024)    Overall Financial  "Resource Strain (CARDIA)     Difficulty of Paying Living Expenses: Not hard at all   Food Insecurity: No Food Insecurity (1/18/2024)    Hunger Vital Sign     Worried About Running Out of Food in the Last Year: Never true     Ran Out of Food in the Last Year: Never true   Transportation Needs: No Transportation Needs (1/18/2024)    PRAPARE - Transportation     Lack of Transportation (Medical): No     Lack of Transportation (Non-Medical): No   Physical Activity: Insufficiently Active (1/18/2024)    Exercise Vital Sign     Days of Exercise per Week: 7 days     Minutes of Exercise per Session: 10 min   Stress: No Stress Concern Present (1/18/2024)    [employer] - Occupational Stress Questionnaire     Feeling of Stress : Only a little   Housing Stability: Unknown (1/18/2024)    Housing Stability Vital Sign     Unable to Pay for Housing in the Last Year: No     Unstable Housing in the Last Year: No       Review of patient's allergies indicates:   Allergen Reactions    Ibuprofen Other (See Comments)     Affects stomach ulcer, avoids all NSAIDS    Latex, natural rubber Rash     Small red bumps on skin contact       Labs:  Lab Results   Component Value Date    HGBA1C 5.2 01/18/2023       Lab Results   Component Value Date    WBC 9.42 06/23/2023    HGB 13.8 06/23/2023    HCT 42.2 06/23/2023    MCV 88 06/23/2023     06/23/2023           Review of Systems:  Right arm pain.  Thumb pain, locking up.  Balance of review of systems is negative.    Physical Exam:  Vitals:    07/22/24 0849   BP: (!) 130/90   Pulse: 79   Weight: 62.5 kg (137 lb 12.6 oz)   Height: 4' 9" (1.448 m)   PainSc:   3   PainLoc: Neck     Body mass index is 29.82 kg/m².    Pain disability index:      4/24/2024     9:13 AM   Last 3 PDI Scores   Pain Disability Index (PDI) 19       Gen: NAD  Psych: mood appropriate for given condition  HEENT: eyes anicteric   CV: RRR, 2+ radial pulse  Respiratory: non-labored, no signs of " respiratory distress  Abd: non-distended  Skin: warm, dry and intact.  Gait: Able to heel walk, toe walk. No antalgic gait.     Coordination:   Tandem walking coordination: normal    Cervical spine: ROM is full in flexion, extension and lateral rotation without increased pain.  Spurling's maneuver causes no neck pain to either side.  Myofascial exam: No Tenderness to palpation across cervical paraspinous region bilaterally.    Lumbar spine:  Lumbar spine: ROM is full with flexion extension and oblique extension with no increased pain.    Edwin's test causes no increased pain on either side.    Supine straight leg raise is negative bilaterally.    Internal and external rotation of the hip causes no increased pain on either side.  Myofascial exam: No tenderness to palpation across lumbar paraspinous muscles. No tenderness to palpation over the bilateral greater trochanters and bilateral SI joint    Sensory:  Intact and symmetrical to light touch in C4-T1 dermatomes bilaterally. Intact and symmetrical to light touch in L1-S1 dermatomes bilaterally.    Motor:    Right Left   C4 Shoulder Abduction  5  5   C5 Elbow Flexion    5  5   C6 Wrist Extension  5  5   C7 Elbow Extension   5  5   C8/T1 Hand Intrinsics   5  5        Right Left   L2/3 Iliacus Hip flexion  5  5   L3/4 Qudratus Femoris Knee Extension  5  5   L4/5 Tib Anterior Ankle Dorsiflexion   5  5   L5/S1 Extensor Hallicus Longus Great toe extension  5  5   S1/S2 Gastroc/Soleus Plantar Flexion  5  5      Right Left   Triceps DTR 2+ 2+   Biceps DTR 2+ 2+   Brachioradialis DTR 2+ 2+   Patellar DTR 2+ 2+   Achilles DTR 2+ 2+   Greer Absent  Absent   Clonus Absent Absent   Babinski Absent Absent       Imaging:    X-ray and MRI cervical spine from 02/13/2020 reviewed.  Normal postoperative changes of C5-6 artificial disc replacement is seen with no evidence of instability on flexion or extension.  Mild multilevel degenerative changes.  There is mild at most  foraminal narrowing at any given cervical spine level.  C5-6 foramina are not completely evaluated secondary to artifact from artificial disc.    X-ray and MRI lumbar spine from 02/13/2020 reviewed.  There is good alignment with no evidence of instability on flexion or extension.  At L4-5 there is a mild disc osteophyte complex without most mild right foraminal narrowing.      Emg/ ncv upper extremities 3-4-24:  Impression:  Abnormal study.    Moderate right carpal tunnel syndrome, without active denervation.    Mild left carpal tunnel syndrome, without active denervation.    No electrophysiologic evidence of bilateral cervical radiculopathy/plexopathy, bilateral ulnar mononeuropathy, or peripheral neuropathy in bilateral extremities.    Xray cervical spine 7-16-24:  There is a disc prosthesis at the C5-6 level.  Vertebral body height and alignment are normal.  C7 is partially obscured by the overlying structures.  The odontoid process is intact.  Disc spaces are preserved.  There is no instability with flexion or extension.  The prevertebral soft tissues are normal.     Assessment:   Lavonne Pierre is a 48 y.o. year old female patient who has a past medical history of Anxiety, Asthma, Depression, Fibromyalgia, GERD (gastroesophageal reflux disease), History of pneumonia, HTN (hypertension), Personal history of colonic polyps, Preeclampsia, and Sleep apnea. She presents for follow up of right hand spasm with neck pain and arm pain.  Neck and arm likley myofascial given mild degenerative changes noted I the neck, but cannot completely rule out degenerative changes influencing neck and arm pain. With symptoms not completely resolving with medications, PT and acupuncture, I recommend further imaging assessment. We will work to reschedule MRI and she will continue with PT in the mean time.  Discussed xrays today.    Plan:  - discussed role of medicaitons, PT, exercise, JODIE to help with symptoms.   - will obtain MRI  of the neck to further assess and determine if she is a candidate for any interventional procedures.  - continue with PT   - follow up after imaging.    Problem List Items Addressed This Visit    None  Visit Diagnoses       Cervicalgia    -  Primary    Cervical radiculopathy        H/O cervical spine surgery                Follow Up: RTC after imaging.     : Reviewed and consistent with medication use as prescribed.          Janny Adame PA-C  [facility]     No

## 2024-07-23 ENCOUNTER — CLINICAL SUPPORT (OUTPATIENT)
Dept: REHABILITATION | Facility: HOSPITAL | Age: 49
End: 2024-07-23
Payer: MEDICARE

## 2024-07-23 DIAGNOSIS — M53.82 DECREASED RANGE OF MOTION OF INTERVERTEBRAL DISCS OF CERVICAL SPINE: Primary | ICD-10-CM

## 2024-07-23 PROCEDURE — 97110 THERAPEUTIC EXERCISES: CPT | Mod: HCNC,PO,CQ

## 2024-07-23 PROCEDURE — 97112 NEUROMUSCULAR REEDUCATION: CPT | Mod: HCNC,PO,CQ

## 2024-07-23 NOTE — PROGRESS NOTES
[facility]   Physical Therapy Treatment Note     Name: Lavonne Emery [facility]  Clinic Number: [id number]    Therapy Diagnosis:   Encounter Diagnosis   Name Primary?    Decreased range of motion of intervertebral discs of cervical spine Yes     Physician: Janny Adame PA-C    Visit Date: 7/23/2024       Physician Orders: PT Eval and Treat   Medical Diagnosis from Referral:   M54.12 (ICD-10-CM) - Cervical radiculopathy   M54.2 (ICD-10-CM) - Cervicalgia      Evaluation Date: 7/2/2024  Authorization Period Expiration: 6/18/2025  Plan of Care Expiration: 8/27/2024  Progress Note Due: 7/2/2024  Date of Surgery: NA  Visit # / Visits authorized: 4/20   FOTO: 1/ 3     Precautions: Standard     PTA Visit #: 1/5     Time In: 1:00 PM  Time Out: 2:00 PM  Total Billable Time: 45 minutes     SUBJECTIVE     Pt reports:that she had temp decrease in sx post last tx but that her neck is hurting today.  She was compliant with home exercise program.      Pain: 4/10  Location: B upper traps      OBJECTIVE                                                                                                                                                                                                                                     Cervical ROM:                                              %                         Pain/dysfunction/movement  Flexion full pain   Extension full pain   Side-bending Right 25% limit pain   Side-bending Left 25% limit pain   Right rotation full none   Left rotation full none         Shoulder Range of Motion:   Shoulder Left Right   Flexion 140 140   Abduction 155 150   ER T3 T1   IR T10 T12         Upper Extremity Strength  (R) UE   (L) UE     Shoulder Flexion 4/5   4/5   Shoulder abduction 4/5   4/5   Shoulder ER 4/5   4/5   Shoulder IR 4+/5   4+/5   Low trap 4-/5   4-/5   Mid trap 4-/5   4-/5           Treatment     Lavonne received the treatments listed below:      therapeutic  exercises to develop endurance and ROM for 15 minutes including:  UBE 5/5 for endurance/ROM  Thoracic supine stretch with horiz towel roll 3 mins  Chest stretch supine with vertical towel roll 3 mins    manual therapy techniques: Myofacial release were applied to the: B upper traps for 10 minutes, including:  STM, B UT, Lev Scap. Manual intermittent distraction    Performed Anabelle PT  Discussed the purpose, mechanism, and indications for dry needling with Lavonne . Patient was cleared of all precautions and contraindications and pt signed written consent and gave verbal consent to dry needling Rx today.   Palpation used to determine dry needling sites. Increased tone noted at B upper traps. Pt rec'd dry needling in supine position to B upper traps with .30 mm needles.  Pt tolerated treatment well and was not in any distress at the completion of treatment.       neuromuscular re-education activities to improve: Coordination, Kinesthetic, Sense, Proprioception, and Posture for 35 minutes. The following activities were included:  Chin tucks x30  No moneys 3x10 RTB  B upper trap stretch 3x30: BUE  B Lev Scap stretch 3 x 30 sec  Seated scap retraction 3 sec hold 20x  Middle precor row 30# 3x15  Banded row c GTB 3x10  Banded shoulder ext c GTB 3x10  Supine ISO retraction 3 sec hold 20x    therapeutic activities to improve functional performance for   minutes, including:                  Patient Education and Home Exercises     Home Exercises Provided and Patient Education Provided     Education provided:   - on HEP and POC    Written Home Exercises Provided: yes. Exercises were reviewed and Lavonne was able to demonstrate them prior to the end of the session.  Lavonne demonstrated good  understanding of the education provided. See EMR under Patient Instructions for exercises provided during therapy sessions    ASSESSMENT     Lavonne with a reduction of cervical pain post  ther ex,neuromuscular re-ed and manual  intervention well. She continues progress well. Pt unable to perform no money due to wrist discomfort, but pt demo all scapular strengthening well  Patient will continue to benefit from loading and progressions to tolerance going forward to improve overall function.    Lavonne Is progressing well towards her goals.   Pt prognosis is Good.     Pt will continue to benefit from skilled outpatient physical therapy to address the deficits listed in the problem list box on initial evaluation, provide pt/family education and to maximize pt's level of independence in the home and community environment.     Pt's spiritual, cultural and educational needs considered and pt agreeable to plan of care and goals.     Anticipated barriers to physical therapy:     Goals:   Short Term Goals: 4 weeks   Pt to report worst pain 4/10 to improve QOL  Pt to improve BUE strength by 1/2 grade  Pt to improve cervical range of motion by 25%  Pt to demo independence with initial HEP  Pt to improve FOTO by 15%     Long Term Goals: 8 weeks   Pt to report worst pain 1/10 to improve QOL  Pt to improve BUE strength by 1 grade  Pt to demo independence with final HEP  Pt to improve FOTO by 30%    PLAN     Plan of care Certification: 7/2/2024 to 8/27/2024.     Outpatient Physical Therapy 2 times weekly for 8 weeks to include the following interventions: Cervical/Lumbar Traction, Manual Therapy, Moist Heat/ Ice, Neuromuscular Re-ed, Patient Education, Self Care, Therapeutic Activities, and Therapeutic Exercise.     Stephanie Lambert, PTA

## 2024-07-23 NOTE — PROGRESS NOTES
[facility]   Physical Therapy Treatment Note     Name: Lavonne Emery [facility]  Clinic Number: [id number]    Therapy Diagnosis:   Encounter Diagnosis   Name Primary?    Decreased range of motion of intervertebral discs of cervical spine Yes     Physician: Janny Adame PA-C    Visit Date: 7/23/2024       Physician Orders: PT Eval and Treat   Medical Diagnosis from Referral:   M54.12 (ICD-10-CM) - Cervical radiculopathy   M54.2 (ICD-10-CM) - Cervicalgia      Evaluation Date: 7/2/2024  Authorization Period Expiration: 6/18/2025  Plan of Care Expiration: 8/27/2024  Progress Note Due: 7/2/2024  Date of Surgery: NA  Visit # / Visits authorized: 3/20   FOTO: 1/ 3     Precautions: Standard     PTA Visit #: 0/5     Time In: 1:00 PM  Time Out: 1550  Total Billable Time: 60 minutes     SUBJECTIVE     Pt reports:that she had temp decrease in sx post last tx but that her neck is hurting today.  She was compliant with home exercise program.      Pain: 4/10  Location: B upper traps      OBJECTIVE                                                                                                                                                                                                                                     Cervical ROM:                                              %                         Pain/dysfunction/movement  Flexion full pain   Extension full pain   Side-bending Right 25% limit pain   Side-bending Left 25% limit pain   Right rotation full none   Left rotation full none         Shoulder Range of Motion:   Shoulder Left Right   Flexion 140 140   Abduction 155 150   ER T3 T1   IR T10 T12         Upper Extremity Strength  (R) UE   (L) UE     Shoulder Flexion 4/5   4/5   Shoulder abduction 4/5   4/5   Shoulder ER 4/5   4/5   Shoulder IR 4+/5   4+/5   Low trap 4-/5   4-/5   Mid trap 4-/5   4-/5           Treatment     Lavonne received the treatments listed below:      therapeutic exercises to  develop endurance and ROM for 15 minutes including:  UBE 5/5 for endurance/ROM  Thoracic supine stretch with horiz towel roll 3 mins  Chest stretch supine with vertical towel roll 3 mins    manual therapy techniques: Myofacial release were applied to the: B upper traps for 10 minutes, including:  STM, B UT, Lev Scap. Manual intermittent distraction    Performed Anabelle PT  Discussed the purpose, mechanism, and indications for dry needling with Lavonne . Patient was cleared of all precautions and contraindications and pt signed written consent and gave verbal consent to dry needling Rx today.   Palpation used to determine dry needling sites. Increased tone noted at B upper traps. Pt rec'd dry needling in supine position to B upper traps with .30 mm needles.  Pt tolerated treatment well and was not in any distress at the completion of treatment.       neuromuscular re-education activities to improve: Coordination, Kinesthetic, Sense, Proprioception, and Posture for 35 minutes. The following activities were included:  Chin tucks x30  No moneys 3x10 RTB  B upper trap stretch 3x30: BUE  B Lev Scap stretch 3 x 30 sec  Seated scap retraction 3 sec hold 20x  Middle precor row 30# 3x15  Banded row c GTB 3x10  Banded shoulder ext c GTB 3x10  Supine ISO retraction 3 sec hold 20x    therapeutic activities to improve functional performance for   minutes, including:                  Patient Education and Home Exercises     Home Exercises Provided and Patient Education Provided     Education provided:   - on HEP and POC    Written Home Exercises Provided: yes. Exercises were reviewed and Lavonne was able to demonstrate them prior to the end of the session.  Lavonne demonstrated good  understanding of the education provided. See EMR under Patient Instructions for exercises provided during therapy sessions    ASSESSMENT     Lavonne babatunde today's tx with progression of ther ex,neuromuscular re-ed and manual intervention well. She   continues progress well. She was able to perform all activities w/o difficulty or complaint.  Patient will continue to benefit from loading and progressions to tolerance going forward to improve overall function.    Lavonne Is progressing well towards her goals.   Pt prognosis is Good.     Pt will continue to benefit from skilled outpatient physical therapy to address the deficits listed in the problem list box on initial evaluation, provide pt/family education and to maximize pt's level of independence in the home and community environment.     Pt's spiritual, cultural and educational needs considered and pt agreeable to plan of care and goals.     Anticipated barriers to physical therapy:     Goals:   Short Term Goals: 4 weeks   Pt to report worst pain 4/10 to improve QOL  Pt to improve BUE strength by 1/2 grade  Pt to improve cervical range of motion by 25%  Pt to demo independence with initial HEP  Pt to improve FOTO by 15%     Long Term Goals: 8 weeks   Pt to report worst pain 1/10 to improve QOL  Pt to improve BUE strength by 1 grade  Pt to demo independence with final HEP  Pt to improve FOTO by 30%    PLAN     Plan of care Certification: 7/2/2024 to 8/27/2024.     Outpatient Physical Therapy 2 times weekly for 8 weeks to include the following interventions: Cervical/Lumbar Traction, Manual Therapy, Moist Heat/ Ice, Neuromuscular Re-ed, Patient Education, Self Care, Therapeutic Activities, and Therapeutic Exercise.     Rick Lockhart, PT

## 2024-07-25 ENCOUNTER — CLINICAL SUPPORT (OUTPATIENT)
Dept: REHABILITATION | Facility: HOSPITAL | Age: 49
End: 2024-07-25
Payer: MEDICARE

## 2024-07-25 DIAGNOSIS — M53.82 DECREASED RANGE OF MOTION OF INTERVERTEBRAL DISCS OF CERVICAL SPINE: Primary | ICD-10-CM

## 2024-07-25 PROCEDURE — 97112 NEUROMUSCULAR REEDUCATION: CPT | Mod: HCNC,PO

## 2024-07-25 PROCEDURE — 97140 MANUAL THERAPY 1/> REGIONS: CPT | Mod: HCNC,PO

## 2024-07-25 NOTE — PROGRESS NOTES
[facility]   Physical Therapy Treatment Note     Name: Lavonne Emery [facility]  Clinic Number: [id number]    Therapy Diagnosis:   Encounter Diagnosis   Name Primary?    Decreased range of motion of intervertebral discs of cervical spine Yes     Physician: Janny Adame PA-C    Visit Date: 7/25/2024       Physician Orders: PT Eval and Treat   Medical Diagnosis from Referral:   M54.12 (ICD-10-CM) - Cervical radiculopathy   M54.2 (ICD-10-CM) - Cervicalgia      Evaluation Date: 7/2/2024  Authorization Period Expiration: 6/18/2025  Plan of Care Expiration: 8/27/2024  Progress Note Due: 7/2/2024  Date of Surgery: NA  Visit # / Visits authorized: 5/20   FOTO: 1/ 3     Precautions: Standard     PTA Visit #: 1/5     Time In: 0750  Time Out: 0840  Total Billable Time: 45 minutes 1:1 c PT    SUBJECTIVE     Pt reports:that she continues to feel better with dry needling  She was compliant with home exercise program.      Pain: 2/10  Location: B upper traps      OBJECTIVE                                                                                                                                                                                                                                     Cervical ROM:                                              %                         Pain/dysfunction/movement  Flexion full pain   Extension full pain   Side-bending Right 25% limit pain   Side-bending Left 25% limit pain   Right rotation full none   Left rotation full none         Shoulder Range of Motion:   Shoulder Left Right   Flexion 140 140   Abduction 155 150   ER T3 T1   IR T10 T12         Upper Extremity Strength  (R) UE   (L) UE     Shoulder Flexion 4/5   4/5   Shoulder abduction 4/5   4/5   Shoulder ER 4/5   4/5   Shoulder IR 4+/5   4+/5   Low trap 4-/5   4-/5   Mid trap 4-/5   4-/5           Treatment     Lavonne received the treatments listed below:      therapeutic exercises to develop endurance and  ROM for 10 minutes including:  UBE 5/5 for endurance/ROM  Thoracic supine stretch with horiz towel roll 3 mins  Chest stretch supine with vertical towel roll 3 mins    manual therapy techniques: Myofacial release were applied to the: B upper traps for 10 minutes, including:  STM, B UT, Lev Scap. Manual intermittent distraction    Performed Anabelle PT  Discussed the purpose, mechanism, and indications for dry needling with Lavonne . Patient was cleared of all precautions and contraindications and pt signed written consent and gave verbal consent to dry needling Rx today.   Palpation used to determine dry needling sites. Increased tone noted at B upper traps. Pt rec'd dry needling in supine position to B upper traps with .30 mm needles.  Pt tolerated treatment well and was not in any distress at the completion of treatment.       neuromuscular re-education activities to improve: Coordination, Kinesthetic, Sense, Proprioception, and Posture for 30 minutes. The following activities were included:  Chin tucks x30  No moneys 3x10 RTB  B upper trap stretch 3x30: BUE  B Lev Scap stretch 3 x 30 sec  Seated scap retraction 3 sec hold 20x  Middle precor row 30# 3x15  Banded row c GTB 3x10  Banded shoulder ext c GTB 3x10  Supine ISO retraction 3 sec hold 20x    therapeutic activities to improve functional performance for   minutes, including:                  Patient Education and Home Exercises     Home Exercises Provided and Patient Education Provided     Education provided:   - on HEP and POC    Written Home Exercises Provided: yes. Exercises were reviewed and Lavonne was able to demonstrate them prior to the end of the session.  Lavonne demonstrated good  understanding of the education provided. See EMR under Patient Instructions for exercises provided during therapy sessions    ASSESSMENT     Pt continues to respond well to combination of STM, TDN, and postural re-education at this time. Overall sx continue to decrease in  frequency and patient will continue to benefit from loading and progressions to tolerance going forward to improve overall function.    Lavonne Is progressing well towards her goals.   Pt prognosis is Good.     Pt will continue to benefit from skilled outpatient physical therapy to address the deficits listed in the problem list box on initial evaluation, provide pt/family education and to maximize pt's level of independence in the home and community environment.     Pt's spiritual, cultural and educational needs considered and pt agreeable to plan of care and goals.     Anticipated barriers to physical therapy:     Goals:   Short Term Goals: 4 weeks   Pt to report worst pain 4/10 to improve QOL  Pt to improve BUE strength by 1/2 grade  Pt to improve cervical range of motion by 25%  Pt to demo independence with initial HEP  Pt to improve FOTO by 15%     Long Term Goals: 8 weeks   Pt to report worst pain 1/10 to improve QOL  Pt to improve BUE strength by 1 grade  Pt to demo independence with final HEP  Pt to improve FOTO by 30%    PLAN     Plan of care Certification: 7/2/2024 to 8/27/2024.     Outpatient Physical Therapy 2 times weekly for 8 weeks to include the following interventions: Cervical/Lumbar Traction, Manual Therapy, Moist Heat/ Ice, Neuromuscular Re-ed, Patient Education, Self Care, Therapeutic Activities, and Therapeutic Exercise.     Rick Lockhart, PT

## 2024-07-30 ENCOUNTER — HOSPITAL ENCOUNTER (OUTPATIENT)
Dept: RADIOLOGY | Facility: HOSPITAL | Age: 49
Discharge: HOME | End: 2024-07-30
Attending: OBSTETRICS & GYNECOLOGY
Payer: MEDICARE

## 2024-07-30 DIAGNOSIS — Z12.39 BREAST CANCER SCREENING OTHER THAN MAMMOGRAM: ICD-10-CM

## 2024-07-30 PROCEDURE — C8937 CAD BREAST MRI: HCPCS | Mod: TC,HCNC,PO

## 2024-07-30 PROCEDURE — A9577 INJ MULTIHANCE: HCPCS | Mod: HCNC,PO | Performed by: OBSTETRICS & GYNECOLOGY

## 2024-07-30 PROCEDURE — 77049 MRI BREAST C-+ W/CAD BI: CPT | Mod: TC,HCNC,PO

## 2024-07-30 PROCEDURE — 25500020 PHARM REV CODE 255: Mod: HCNC,PO | Performed by: OBSTETRICS & GYNECOLOGY

## 2024-07-30 PROCEDURE — 77049 MRI BREAST C-+ W/CAD BI: CPT | Mod: 26,HCNC,, | Performed by: RADIOLOGY

## 2024-07-30 RX ADMIN — GADOBENATE DIMEGLUMINE 12 ML: 529 INJECTION, SOLUTION INTRAVENOUS at 11:07

## 2024-07-31 ENCOUNTER — CLINICAL SUPPORT (OUTPATIENT)
Dept: REHABILITATION | Facility: HOSPITAL | Age: 49
End: 2024-07-31
Payer: MEDICARE

## 2024-07-31 DIAGNOSIS — M53.82 DECREASED RANGE OF MOTION OF INTERVERTEBRAL DISCS OF CERVICAL SPINE: Primary | ICD-10-CM

## 2024-07-31 PROCEDURE — 97112 NEUROMUSCULAR REEDUCATION: CPT | Mod: KX,HCNC,PO

## 2024-07-31 NOTE — PROGRESS NOTES
[facility]   Physical Therapy Treatment Note     Name: Lavonne Emery [facility]  Clinic Number: [id number]    Therapy Diagnosis:   Encounter Diagnosis   Name Primary?    Decreased range of motion of intervertebral discs of cervical spine Yes     Physician: Janny Adame PA-C    Visit Date: 7/31/2024       Physician Orders: PT Eval and Treat   Medical Diagnosis from Referral:   M54.12 (ICD-10-CM) - Cervical radiculopathy   M54.2 (ICD-10-CM) - Cervicalgia      Evaluation Date: 7/2/2024  Authorization Period Expiration: 6/18/2025  Plan of Care Expiration: 8/27/2024  Progress Note Due: 8/2/2024  Date of Surgery: NA  Visit # / Visits authorized: 6/20   FOTO: 1/ 3     Precautions: Standard     PTA Visit #: 1/5     Time In: 1000  Time Out: 1055  Total Billable Time: 30 minutes 1:1 c PT    SUBJECTIVE     Pt reports:that she continues to feel better as sessions continue w/o new complaints today  She was compliant with home exercise program.      Pain: 2/10  Location: B upper traps      OBJECTIVE                                                                                                                                                                                                                                     Cervical ROM:                                              %                         Pain/dysfunction/movement  Flexion full pain   Extension full pain   Side-bending Right 25% limit pain   Side-bending Left 25% limit pain   Right rotation full none   Left rotation full none         Shoulder Range of Motion:   Shoulder Left Right   Flexion 140 140   Abduction 155 150   ER T3 T1   IR T10 T12         Upper Extremity Strength  (R) UE   (L) UE     Shoulder Flexion 4/5   4/5   Shoulder abduction 4/5   4/5   Shoulder ER 4/5   4/5   Shoulder IR 4+/5   4+/5   Low trap 4-/5   4-/5   Mid trap 4-/5   4-/5           Treatment     Lavonne received the treatments listed below:      therapeutic  exercises to develop endurance and ROM for 10 minutes including:  UBE 5/5 for endurance/ROM  Thoracic supine stretch with horiz towel roll 3 mins  Chest stretch supine with vertical towel roll 3 mins    manual therapy techniques: Myofacial release were applied to the: B upper traps for 0 minutes, including:  STM, B UT, Lev Scap. Manual intermittent distraction    Performed Anabelle PT  Discussed the purpose, mechanism, and indications for dry needling with Lavonne . Patient was cleared of all precautions and contraindications and pt signed written consent and gave verbal consent to dry needling Rx today.   Palpation used to determine dry needling sites. Increased tone noted at B upper traps. Pt rec'd dry needling in supine position to B upper traps with .30 mm needles.  Pt tolerated treatment well and was not in any distress at the completion of treatment.       neuromuscular re-education activities to improve: Coordination, Kinesthetic, Sense, Proprioception, and Posture for 45 minutes. The following activities were included:  Chin tucks x30  No moneys 3x10 RTB  B upper trap stretch 3x30: BUE  B Lev Scap stretch 3 x 30 sec  Seated scap retraction 3 sec hold 20x  Middle precor row 30# 3x15  Banded row c GTB 3x10  Banded shoulder ext c GTB 3x10  Supine ISO retraction 3 sec hold 20x    therapeutic activities to improve functional performance for   minutes, including:                  Patient Education and Home Exercises     Home Exercises Provided and Patient Education Provided     Education provided:   - on HEP and POC    Written Home Exercises Provided: yes. Exercises were reviewed and Lavonne was able to demonstrate them prior to the end of the session.  Lavonne demonstrated good  understanding of the education provided. See EMR under Patient Instructions for exercises provided during therapy sessions    ASSESSMENT     Pt with good tolerance to treatment today. Interventions solely focused on exercises with no dry  needling performed and no adverse reactions overall. Pt with mild endurance deficits noted with higher repetitions, suggesting a need to further target this area. Patient will continue to benefit from loading and progressions to tolerance going forward to improve overall function.    Lavonne Is progressing well towards her goals.   Pt prognosis is Good.     Pt will continue to benefit from skilled outpatient physical therapy to address the deficits listed in the problem list box on initial evaluation, provide pt/family education and to maximize pt's level of independence in the home and community environment.     Pt's spiritual, cultural and educational needs considered and pt agreeable to plan of care and goals.     Anticipated barriers to physical therapy:     Goals:   Short Term Goals: 4 weeks   Pt to report worst pain 4/10 to improve QOL  Pt to improve BUE strength by 1/2 grade  Pt to improve cervical range of motion by 25%  Pt to demo independence with initial HEP  Pt to improve FOTO by 15%     Long Term Goals: 8 weeks   Pt to report worst pain 1/10 to improve QOL  Pt to improve BUE strength by 1 grade  Pt to demo independence with final HEP  Pt to improve FOTO by 30%    PLAN     Plan of care Certification: 7/2/2024 to 8/27/2024.     Outpatient Physical Therapy 2 times weekly for 8 weeks to include the following interventions: Cervical/Lumbar Traction, Manual Therapy, Moist Heat/ Ice, Neuromuscular Re-ed, Patient Education, Self Care, Therapeutic Activities, and Therapeutic Exercise.     Rick Lockhart, PT

## 2024-08-02 ENCOUNTER — CLINICAL SUPPORT (OUTPATIENT)
Dept: REHABILITATION | Facility: HOSPITAL | Age: 49
End: 2024-08-02
Payer: MEDICARE

## 2024-08-02 DIAGNOSIS — R29.898 UPPER EXTREMITY WEAKNESS: Primary | ICD-10-CM

## 2024-08-02 PROCEDURE — 97112 NEUROMUSCULAR REEDUCATION: CPT | Mod: KX,HCNC,PO

## 2024-08-02 PROCEDURE — 97140 MANUAL THERAPY 1/> REGIONS: CPT | Mod: KX,HCNC,PO

## 2024-08-02 NOTE — PROGRESS NOTES
[facility]   Physical Therapy Re-assessment     Name: Lavonne Pierre  Clinic Number: [id number]    Therapy Diagnosis:   Encounter Diagnosis   Name Primary?    Upper extremity weakness Yes     Physician: Janny Adame PA-C    Visit Date: 8/2/2024       Physician Orders: PT Eval and Treat   Medical Diagnosis from Referral:   M54.12 (ICD-10-CM) - Cervical radiculopathy   M54.2 (ICD-10-CM) - Cervicalgia      Evaluation Date: 7/2/2024  Authorization Period Expiration: 6/18/2025  Plan of Care Expiration: 8/27/2024  Progress Note Due: 8/27/2024  Date of Surgery: NA  Visit # / Visits authorized: 7/20   FOTO: 1/ 3     Precautions: Standard     PTA Visit #: 1/5     Time In: 1000  Time Out: 1055  Total Billable Time: 55 minutes 1:1 c PT    SUBJECTIVE     Pt reports:that she continues to feel better as sessions continue w/o new complaints today  She was compliant with home exercise program.      Pain: 2/10  Location: B upper traps      OBJECTIVE                                                                                                                                                                                                                                     Cervical ROM:                                              %                         Pain/dysfunction/movement  Flexion full pain   Extension full pain   Side-bending Right 25% limit pain   Side-bending Left 25% limit pain   Right rotation full none   Left rotation full none         Shoulder Range of Motion:   Shoulder Left Right   Flexion 140 140   Abduction 155 152   ER T3 T2   IR T10 T11         Upper Extremity Strength  (R) UE   (L) UE     Shoulder Flexion 4/5   4/5   Shoulder abduction 4/5   4/5   Shoulder ER 4/5   4/5   Shoulder IR 4+/5   4+/5   Low trap 4/5   4/5   Mid trap 4/5   4/5       FOTO: 12% improvement    Treatment     Lavonne received the treatments listed below:      therapeutic exercises to develop endurance and  ROM for 0 minutes including:  UBE 5/5 for endurance/ROM  Thoracic supine stretch with horiz towel roll 3 mins  Chest stretch supine with vertical towel roll 3 mins    manual therapy techniques: Myofacial release were applied to the: B upper traps for 10 minutes, including:  STM, B UT, Lev Scap. Manual intermittent distraction    Performed Anabelle PT  Discussed the purpose, mechanism, and indications for dry needling with Lavonne . Patient was cleared of all precautions and contraindications and pt signed written consent and gave verbal consent to dry needling Rx today.   Palpation used to determine dry needling sites. Increased tone noted at B upper traps. Pt rec'd dry needling in supine position to B upper traps with .30 mm needles.  Pt tolerated treatment well and was not in any distress at the completion of treatment.       neuromuscular re-education activities to improve: Coordination, Kinesthetic, Sense, Proprioception, and Posture for 45 minutes. The following activities were included:  Chin tucks x30  No moneys 3x10 RTB  B upper trap stretch 3x30: BUE  B Lev Scap stretch 3 x 30 sec  Seated scap retraction 3 sec hold 20x  Middle precor row 30# 3x15  Banded row c GTB 3x10  Banded shoulder ext c GTB 3x10  Supine ISO retraction 3 sec hold 20x    therapeutic activities to improve functional performance for   minutes, including:                  Patient Education and Home Exercises     Home Exercises Provided and Patient Education Provided     Education provided:   - on HEP and POC    Written Home Exercises Provided: yes. Exercises were reviewed and Lavonne was able to demonstrate them prior to the end of the session.  Lavonne demonstrated good  understanding of the education provided. See EMR under Patient Instructions for exercises provided during therapy sessions    ASSESSMENT     Re-assessment performed today. Pt with notable improvement in range of motion, strength, and perceived function as a result of  current POC. Despite this, she continues to demonstrate deficits in each of these areas that will continue to benefit from progressions to tolerance going forward. Pt continues to tolerate progressive loading well with decreased reliance on dry needling. Patient will continue to benefit from loading and progressions to tolerance going forward to improve overall function.    Lavonne Is progressing well towards her goals.   Pt prognosis is Good.     Pt will continue to benefit from skilled outpatient physical therapy to address the deficits listed in the problem list box on initial evaluation, provide pt/family education and to maximize pt's level of independence in the home and community environment.     Pt's spiritual, cultural and educational needs considered and pt agreeable to plan of care and goals.     Anticipated barriers to physical therapy:     Goals:   Short Term Goals: 4 weeks   Pt to report worst pain 4/10 to improve QOL  Pt to improve BUE strength by 1/2 grade  Pt to improve cervical range of motion by 25%  Pt to demo independence with initial HEP  Pt to improve FOTO by 15%     Long Term Goals: 8 weeks   Pt to report worst pain 1/10 to improve QOL  Pt to improve BUE strength by 1 grade  Pt to demo independence with final HEP  Pt to improve FOTO by 30%    PLAN     Plan of care Certification: 7/2/2024 to 8/27/2024.     Outpatient Physical Therapy 2 times weekly for 8 weeks to include the following interventions: Cervical/Lumbar Traction, Manual Therapy, Moist Heat/ Ice, Neuromuscular Re-ed, Patient Education, Self Care, Therapeutic Activities, and Therapeutic Exercise.     Rick Lockhart, PT

## 2024-08-03 ENCOUNTER — HOSPITAL ENCOUNTER (OUTPATIENT)
Dept: RADIOLOGY | Facility: HOSPITAL | Age: 49
Discharge: HOME | End: 2024-08-03
Attending: PHYSICIAN ASSISTANT
Payer: MEDICARE

## 2024-08-03 DIAGNOSIS — M54.2 CERVICALGIA: ICD-10-CM

## 2024-08-03 DIAGNOSIS — M54.12 CERVICAL RADICULOPATHY: ICD-10-CM

## 2024-08-06 ENCOUNTER — TELEPHONE (OUTPATIENT)
Dept: PAIN MEDICINE | Facility: CLINIC | Age: 49
End: 2024-08-06
Payer: MEDICARE

## 2024-08-06 DIAGNOSIS — M54.12 CERVICAL RADICULOPATHY: ICD-10-CM

## 2024-08-06 DIAGNOSIS — Z98.890 H/O CERVICAL SPINE SURGERY: ICD-10-CM

## 2024-08-06 DIAGNOSIS — M54.2 CERVICALGIA: Primary | ICD-10-CM

## 2024-08-06 DIAGNOSIS — M54.12 CERVICAL RADICULOPATHY: Primary | ICD-10-CM

## 2024-08-06 DIAGNOSIS — M54.2 CERVICALGIA: ICD-10-CM

## 2024-08-07 RX ORDER — DIAZEPAM 10 MG/1
10 TABLET ORAL ONCE
Qty: 1 TABLET | Refills: 0 | Status: SHIPPED | OUTPATIENT
Start: 2024-08-07 | End: 2024-08-07

## 2024-08-08 ENCOUNTER — CLINICAL SUPPORT (OUTPATIENT)
Dept: REHABILITATION | Facility: HOSPITAL | Age: 49
End: 2024-08-08
Payer: MEDICARE

## 2024-08-08 DIAGNOSIS — M53.82 DECREASED RANGE OF MOTION OF INTERVERTEBRAL DISCS OF CERVICAL SPINE: Primary | ICD-10-CM

## 2024-08-08 PROCEDURE — 97140 MANUAL THERAPY 1/> REGIONS: CPT | Mod: HCNC,PO

## 2024-08-08 PROCEDURE — 97112 NEUROMUSCULAR REEDUCATION: CPT | Mod: HCNC,PO

## 2024-08-15 ENCOUNTER — CLINICAL SUPPORT (OUTPATIENT)
Dept: REHABILITATION | Facility: HOSPITAL | Age: 49
End: 2024-08-15
Payer: MEDICARE

## 2024-08-15 DIAGNOSIS — M53.82 DECREASED RANGE OF MOTION OF INTERVERTEBRAL DISCS OF CERVICAL SPINE: Primary | ICD-10-CM

## 2024-08-15 PROCEDURE — 97112 NEUROMUSCULAR REEDUCATION: CPT | Mod: KX,HCNC,PO,CQ

## 2024-08-15 PROCEDURE — 97140 MANUAL THERAPY 1/> REGIONS: CPT | Mod: KX,HCNC,PO,CQ

## 2024-08-15 PROCEDURE — 97110 THERAPEUTIC EXERCISES: CPT | Mod: KX,HCNC,PO,CQ

## 2024-08-15 NOTE — PROGRESS NOTES
[facility]   Physical Therapy Re-assessment     Name: Lavonne Tony  Clinic Number: [id number]    Therapy Diagnosis:   Encounter Diagnosis   Name Primary?    Decreased range of motion of intervertebral discs of cervical spine Yes     Physician: Janny Adame PA-C    Visit Date: 8/15/2024       Physician Orders: PT Eval and Treat   Medical Diagnosis from Referral:   M54.12 (ICD-10-CM) - Cervical radiculopathy   M54.2 (ICD-10-CM) - Cervicalgia      Evaluation Date: 7/2/2024  Authorization Period Expiration: 6/18/2025  Plan of Care Expiration: 8/27/2024  Progress Note Due: 8/27/2024  Date of Surgery: NA  Visit # / Visits authorized: 8/20   FOTO: 1/ 3     Precautions: Standard     PTA Visit #: 1/5     Time In: 0850  Time Out: 0945  Total Billable Time: 45 minutes 1:1 c PT    SUBJECTIVE     Pt reports:that she has a little neck pn today 3/10  She was compliant with home exercise program.      Pain: 3/10 pre-tx, 0/10 post  Location: B upper traps      OBJECTIVE                                                                                                                                                                                                                                     Cervical ROM:                                              %                         Pain/dysfunction/movement  Flexion full pain   Extension full pain   Side-bending Right 25% limit pain   Side-bending Left 25% limit pain   Right rotation full none   Left rotation full none         Shoulder Range of Motion:   Shoulder Left Right   Flexion 140 140   Abduction 155 152   ER T3 T2   IR T10 T11         Upper Extremity Strength  (R) UE   (L) UE     Shoulder Flexion 4/5   4/5   Shoulder abduction 4/5   4/5   Shoulder ER 4/5   4/5   Shoulder IR 4+/5   4+/5   Low trap 4/5   4/5   Mid trap 4/5   4/5       FOTO: 12% improvement    Treatment     Lavonne received the treatments listed below:      therapeutic exercises  to develop endurance and ROM for 16 minutes including:  UBE 5/5 for endurance/ROM  Thoracic supine stretch with horiz towel roll 3 mins  Chest stretch supine with vertical towel roll 3 mins    manual therapy techniques: Myofacial release were applied to the: B upper traps for 15 minutes, including:  STM, B UT, Lev Scap. Manual intermittent distraction    Not performed today  Performed Anabelle PT  Discussed the purpose, mechanism, and indications for dry needling with Lavonne . Patient was cleared of all precautions and contraindications and pt signed written consent and gave verbal consent to dry needling Rx today.   Palpation used to determine dry needling sites. Increased tone noted at B upper traps. Pt rec'd dry needling in supine position to B upper traps with .30 mm needles.  Pt tolerated treatment well and was not in any distress at the completion of treatment.       neuromuscular re-education activities to improve: Coordination, Kinesthetic, Sense, Proprioception, and Posture for 29 minutes. The following activities were included:  Chin tucks x30  No moneys 3x10 RTB  B upper trap stretch 3x30: BUE  B Lev Scap stretch 3 x 30 sec  Seated scap retraction 3 sec hold 20x  Middle precor row 30# 3x15  Banded row c GTB 3x10  Banded shoulder ext c GTB 3x10  Supine ISO retraction 3 sec hold 20x    therapeutic activities to improve functional performance for   minutes, including:                  Patient Education and Home Exercises     Home Exercises Provided and Patient Education Provided     Education provided:   - on HEP and POC    Written Home Exercises Provided: yes. Exercises were reviewed and Lavonne was able to demonstrate them prior to the end of the session.  Lavonne demonstrated good  understanding of the education provided. See EMR under Patient Instructions for exercises provided during therapy sessions    ASSESSMENT     Lavonne babatunde today's tx with ther ex, neuromuscular re-ed and manual intervention well  with benefit of relief from neck pn post tx as above. She was able to perform all activities w/o difficulty or complaint. She required few VCs for proper form, posture and ROM with exs. Pt continues to progress well at this time.  Patient will continue to benefit from loading and progressions to tolerance going forward to improve overall function.    Lavonne Is progressing well towards her goals.   Pt prognosis is Good.     Pt will continue to benefit from skilled outpatient physical therapy to address the deficits listed in the problem list box on initial evaluation, provide pt/family education and to maximize pt's level of independence in the home and community environment.     Pt's spiritual, cultural and educational needs considered and pt agreeable to plan of care and goals.     Anticipated barriers to physical therapy:     Goals:   Short Term Goals: 4 weeks   Pt to report worst pain 4/10 to improve QOL  Pt to improve BUE strength by 1/2 grade  Pt to improve cervical range of motion by 25%  Pt to demo independence with initial HEP  Pt to improve FOTO by 15%     Long Term Goals: 8 weeks   Pt to report worst pain 1/10 to improve QOL  Pt to improve BUE strength by 1 grade  Pt to demo independence with final HEP  Pt to improve FOTO by 30%    PLAN     Plan of care Certification: 7/2/2024 to 8/27/2024.     Outpatient Physical Therapy 2 times weekly for 8 weeks to include the following interventions: Cervical/Lumbar Traction, Manual Therapy, Moist Heat/ Ice, Neuromuscular Re-ed, Patient Education, Self Care, Therapeutic Activities, and Therapeutic Exercise.     Jan Stein, PTA

## 2024-08-21 ENCOUNTER — CLINICAL SUPPORT (OUTPATIENT)
Dept: REHABILITATION | Facility: HOSPITAL | Age: 49
End: 2024-08-21
Payer: MEDICARE

## 2024-08-21 ENCOUNTER — HOSPITAL ENCOUNTER (OUTPATIENT)
Dept: RADIOLOGY | Facility: HOSPITAL | Age: 49
Discharge: HOME | End: 2024-08-21
Attending: PHYSICIAN ASSISTANT
Payer: MEDICARE

## 2024-08-21 DIAGNOSIS — M54.12 CERVICAL RADICULOPATHY: ICD-10-CM

## 2024-08-21 DIAGNOSIS — M54.2 CERVICALGIA: ICD-10-CM

## 2024-08-21 DIAGNOSIS — Z98.890 H/O CERVICAL SPINE SURGERY: ICD-10-CM

## 2024-08-21 DIAGNOSIS — R29.898 UPPER EXTREMITY WEAKNESS: Primary | ICD-10-CM

## 2024-08-21 PROCEDURE — 72141 MRI NECK SPINE W/O DYE: CPT | Mod: 26,HCNC,, | Performed by: RADIOLOGY

## 2024-08-21 PROCEDURE — 97140 MANUAL THERAPY 1/> REGIONS: CPT | Mod: HCNC,PO

## 2024-08-21 PROCEDURE — 97110 THERAPEUTIC EXERCISES: CPT | Mod: HCNC,PO

## 2024-08-21 PROCEDURE — 72141 MRI NECK SPINE W/O DYE: CPT | Mod: TC,HCNC,PO

## 2024-08-21 PROCEDURE — 97112 NEUROMUSCULAR REEDUCATION: CPT | Mod: HCNC,PO

## 2024-08-21 NOTE — PROGRESS NOTES
[facility]   Physical Therapy Re-assessment     Name: Lavonne Tony  Clinic Number: [id number]    Therapy Diagnosis:   Encounter Diagnosis   Name Primary?    Upper extremity weakness Yes     Physician: Janny Adame PA-C    Visit Date: 8/21/2024       Physician Orders: PT Eval and Treat   Medical Diagnosis from Referral:   M54.12 (ICD-10-CM) - Cervical radiculopathy   M54.2 (ICD-10-CM) - Cervicalgia      Evaluation Date: 7/2/2024  Authorization Period Expiration: 6/18/2025  Plan of Care Expiration: 8/27/2024  Progress Note Due: 8/27/2024  Date of Surgery: NA  Visit # / Visits authorized: 10/20   FOTO: 1/ 3     Precautions: Standard     PTA Visit #: 1/5     Time In: 0955  Time Out: 1050  Total Billable Time: 55 minutes 1:1 c PT    SUBJECTIVE     Pt reports:that she didn't notice any change without dry needling last session  She was compliant with home exercise program.      Pain: 3/10 pre-tx, 0/10 post  Location: B upper traps      OBJECTIVE                                                                                                                                                                                                                                     Cervical ROM:                                              %                         Pain/dysfunction/movement  Flexion full pain   Extension full pain   Side-bending Right 25% limit pain   Side-bending Left 25% limit pain   Right rotation full none   Left rotation full none         Shoulder Range of Motion:   Shoulder Left Right   Flexion 140 140   Abduction 155 152   ER T3 T2   IR T10 T11         Upper Extremity Strength  (R) UE   (L) UE     Shoulder Flexion 4/5   4/5   Shoulder abduction 4/5   4/5   Shoulder ER 4/5   4/5   Shoulder IR 4+/5   4+/5   Low trap 4/5   4/5   Mid trap 4/5   4/5       FOTO: 12% improvement    Treatment     Lavonne received the treatments listed below:      therapeutic exercises to develop  endurance and ROM for 15 minutes including:  UBE 5/5 for endurance/ROM  Thoracic supine stretch with horiz towel roll 3 mins  Chest stretch supine with vertical towel roll 3 mins    manual therapy techniques: Myofacial release were applied to the: B upper traps for 15 minutes, including:  STM, B UT, Lev Scap. Manual intermittent distraction    Performed Anabelle PT  Discussed the purpose, mechanism, and indications for dry needling with Lavonne . Patient was cleared of all precautions and contraindications and pt signed written consent and gave verbal consent to dry needling Rx today.   Palpation used to determine dry needling sites. Increased tone noted at B upper traps. Pt rec'd dry needling in supine position to B upper traps with .30 mm needles.  Pt tolerated treatment well and was not in any distress at the completion of treatment.       neuromuscular re-education activities to improve: Coordination, Kinesthetic, Sense, Proprioception, and Posture for 25 minutes. The following activities were included:  Chin tucks x30  No moneys 3x10 RTB  B upper trap stretch 3x30: BUE  B Lev Scap stretch 3 x 30 sec  Seated scap retraction 3 sec hold 20x  Middle precor row 30# 3x15  Banded row c GTB 3x10  Banded shoulder ext c GTB 3x10  Supine ISO retraction 3 sec hold 20x  Banded wall clocks c GTB 3x10  Banded wall walks c GTB x20    therapeutic activities to improve functional performance for   minutes, including:                  Patient Education and Home Exercises     Home Exercises Provided and Patient Education Provided     Education provided:   - on HEP and POC    Written Home Exercises Provided: yes. Exercises were reviewed and Lavonne was able to demonstrate them prior to the end of the session.  Lavonne demonstrated good  understanding of the education provided. See EMR under Patient Instructions for exercises provided during therapy sessions    ASSESSMENT     Pt noted to continue to progress well, allowing for  progressed postural re-ed activities without increase in sx. Overall, pt appears to be reaching baseline and will benefit from re-assessment at next session to assess meeting of goals. Patient will continue to benefit from loading and progressions to tolerance going forward to improve overall function.    Lavonne Is progressing well towards her goals.   Pt prognosis is Good.     Pt will continue to benefit from skilled outpatient physical therapy to address the deficits listed in the problem list box on initial evaluation, provide pt/family education and to maximize pt's level of independence in the home and community environment.     Pt's spiritual, cultural and educational needs considered and pt agreeable to plan of care and goals.     Anticipated barriers to physical therapy:     Goals:   Short Term Goals: 4 weeks   Pt to report worst pain 4/10 to improve QOL  Pt to improve BUE strength by 1/2 grade  Pt to improve cervical range of motion by 25%  Pt to demo independence with initial HEP  Pt to improve FOTO by 15%     Long Term Goals: 8 weeks   Pt to report worst pain 1/10 to improve QOL  Pt to improve BUE strength by 1 grade  Pt to demo independence with final HEP  Pt to improve FOTO by 30%    PLAN     Plan of care Certification: 7/2/2024 to 8/27/2024.     Outpatient Physical Therapy 2 times weekly for 8 weeks to include the following interventions: Cervical/Lumbar Traction, Manual Therapy, Moist Heat/ Ice, Neuromuscular Re-ed, Patient Education, Self Care, Therapeutic Activities, and Therapeutic Exercise.     Rick Lockhart, PT

## 2024-08-22 ENCOUNTER — OFFICE VISIT (OUTPATIENT)
Dept: PAIN MEDICINE | Facility: CLINIC | Age: 49
End: 2024-08-22
Payer: MEDICARE

## 2024-08-22 VITALS
WEIGHT: 135.25 LBS | DIASTOLIC BLOOD PRESSURE: 77 MMHG | HEART RATE: 73 BPM | BODY MASS INDEX: 29.18 KG/M2 | HEIGHT: 57 IN | SYSTOLIC BLOOD PRESSURE: 146 MMHG

## 2024-08-22 DIAGNOSIS — Z98.890 H/O CERVICAL SPINE SURGERY: Primary | ICD-10-CM

## 2024-08-22 DIAGNOSIS — M54.2 CERVICALGIA: ICD-10-CM

## 2024-08-22 PROCEDURE — 3008F BODY MASS INDEX DOCD: CPT | Mod: HCNC,CPTII,S$GLB, | Performed by: PHYSICIAN ASSISTANT

## 2024-08-22 PROCEDURE — 3077F SYST BP >= 140 MM HG: CPT | Mod: HCNC,CPTII,S$GLB, | Performed by: PHYSICIAN ASSISTANT

## 2024-08-22 PROCEDURE — 99999 PR PBB SHADOW E&M-EST. PATIENT-LVL IV: CPT | Mod: PBBFAC,HCNC,, | Performed by: PHYSICIAN ASSISTANT

## 2024-08-22 PROCEDURE — 3078F DIAST BP <80 MM HG: CPT | Mod: HCNC,CPTII,S$GLB, | Performed by: PHYSICIAN ASSISTANT

## 2024-08-22 PROCEDURE — 1159F MED LIST DOCD IN RCRD: CPT | Mod: HCNC,CPTII,S$GLB, | Performed by: PHYSICIAN ASSISTANT

## 2024-08-22 PROCEDURE — 1160F RVW MEDS BY RX/DR IN RCRD: CPT | Mod: HCNC,CPTII,S$GLB, | Performed by: PHYSICIAN ASSISTANT

## 2024-08-22 PROCEDURE — 99214 OFFICE O/P EST MOD 30 MIN: CPT | Mod: HCNC,S$GLB,, | Performed by: PHYSICIAN ASSISTANT

## 2024-08-22 PROCEDURE — 4010F ACE/ARB THERAPY RXD/TAKEN: CPT | Mod: HCNC,CPTII,S$GLB, | Performed by: PHYSICIAN ASSISTANT

## 2024-08-22 RX ORDER — LISINOPRIL 5 MG/1
TABLET ORAL
COMMUNITY
Start: 2024-07-28

## 2024-08-22 NOTE — PROGRESS NOTES
[facility]        PCP:  Ranjit Bernabe MD    CC:   Chief Complaint   Patient presents with    Tailbone Pain     mri          HPI:   Lavonne Pierre is a 48 y.o. year old female patient who has a past medical history of Anxiety, Asthma, Depression, Fibromyalgia, GERD (gastroesophageal reflux disease), History of pneumonia, HTN (hypertension), Personal history of colonic polyps, Preeclampsia, and Sleep apnea. Ms. Banerjee returns for follow up of neck pain.  She has continued with PT with some improvement.  But neck pain does persist.  She has pain in the posterior neck and left shoulder region the most.  Pain is noticed more with driving or reading.  She denies any radicular arm pain or tingling.  She does have some isolated pain in the wrists and hands with activities such as doing crafts or chores around home.     HPI: 6-14-24  She presents for follow up neck and arm symptoms.  She continues with acupuncture.  She has been going to PT.  PT and dry needling have helped, but states she cannot continue to go becuasue of an insurance/ cost issue.  She continues with constant pain in the neck.  It is worse in the morning and felt as throbbing throughout the day.  Therapy also helped with right thumb region pain, however with stopping therapy thumb spasms and pain are starting to recur more frequently.    HPI 4-24-24:  She presents in referral from No ref. provider found for right arm and thumb pain.  She was seen in 2020 for neck pain, hip pain and leg pain.  Presents today for right thumb.  With cooking and doing activities with the right hand, the right handspasms in a the base of the thumb and the thum over extends.  It is painful and bothersom for her. She also describes onset of neck pain and bilateral arm pain to the hands and fingers.  She has intermittent shoulder pain.  She is right handed and had C5/6 artificial disk replacement.   Denies bowel/ bladder incontinence.    HPI from  "2-14-20:  44 year old female with anxiety, depression, fibromyalgia, GERD, hypertension and asthma presents for follow up of chronic neck/ back pain after undergoing imaging.  There have been no significant changes in symptoms since last assessment.  Per my last note:  "In 1996 someone fell from the top deck of a float hitting her back as he fell.  Since then she has felt a burning sensation in the bilateral hips and lateral legs to the feet.  She underwent PT with dry needling in December 2019 for her back without resolution.  Pain increases with standing and walking.  She has also had chronic neck pain and underwent artificial disk replacement with Dr. Martin in June 2019; this was not good experience for her and pain continues.  She did PT after surgery for her neck, but she continues to have pain in the posterior neck with radiation into the right greater than left shoulder to the arms and hands.  It is associated with numbness/ tingling in the bilateral hands.  She has tried voltaredn gel and tramadol for neck and back pain.  She also underwent right carpal tunnel release in 2017."  Oswestry score: 46%.  PHQ:  14.    She is neurologically intact with 5/5 strength, 2+ DTR and no sensory deficits in the upper/ lower extremities.  Gait and station fluid.  She denies bowel/ bladder dysfunction.      Past and current medications:  Antineuropathics:  NSAIDs: voltaren gel  Antidepressants:  Muscle relaxers:  Opioids: tramadol in past.  Antiplatelets/Anticoagulants:    Physical Therapy/ Chiropractic care:  Acupuncture - undergoing for fibroyalgia currently  PT - 2021 for lower back, hips and 2019 for neck pain  PT - srping 2024 with benefit  PT - recently restarted with 3 vistis since June 2024    Pain Intervention History:  SI joint injections 4-5-21    Past Spine Surgical History:  C5/6 artificaial disk replacedment in June 2019 with Dr. Bee  Right carpal tunnel release in " 2017.        History:    Current Outpatient Medications:     lisinopriL (PRINIVIL,ZESTRIL) 5 MG tablet, , Disp: , Rfl:     albuterol (PROVENTIL) 2.5 mg /3 mL (0.083 %) nebulizer solution, Take 3 mLs (2.5 mg total) by nebulization every 6 (six) hours as needed. Rescue, Disp: 270 mL, Rfl: 11    albuterol (VENTOLIN HFA) 90 mcg/actuation inhaler, Inhale 1-2 puffs into the lungs every 4 (four) hours as needed for Wheezing or Shortness of Breath. Rescue, Disp: 18 g, Rfl: 11    azelastine (ASTELIN) 137 mcg (0.1 %) nasal spray, , Disp: , Rfl: 0    cetirizine (ZYRTEC) 10 MG tablet, Take 1 tablet (10 mg total) by mouth once daily., Disp: 30 tablet, Rfl: 11    diclofenac sodium (VOLTAREN) 1 % Gel, Apply topically once daily., Disp: 100 g, Rfl: 5    fluticasone furoate-vilanteroL (BREO ELLIPTA) 200-25 mcg/dose DsDv diskus inhaler, Inhale 1 puff into the lungs once daily. Controller, Disp: 1 each, Rfl: 11    fluticasone propionate (FLONASE) 50 mcg/actuation nasal spray, , Disp: , Rfl: 5    montelukast (SINGULAIR) 10 mg tablet, Take 1 tablet (10 mg total) by mouth every evening., Disp: 30 tablet, Rfl: 11    multivitamin (THERAGRAN) per tablet, Take 1 tablet by mouth once daily., Disp: , Rfl:     norethindrone-ethinyl estradiol (JUNEL FE 1/20) 1 mg-20 mcg (21)/75 mg (7) per tablet, Take 1 tablet by mouth once daily., Disp: 30 tablet, Rfl: 11    omeprazole (PRILOSEC) 40 MG capsule, Take 40 mg by mouth., Disp: , Rfl:     RESTASIS 0.05 % ophthalmic emulsion, Place 1 drop into both eyes 2 (two) times daily., Disp: , Rfl:     semaglutide, weight loss, 2.4 mg/0.75 mL PnIj, Inject 2.4 mg into the skin every 7 days., Disp: , Rfl:     triamcinolone acetonide (KENALOG-40) 40 mg/mL injection, , Disp: , Rfl:     Past Medical History:   Diagnosis Date    Anxiety 07/16/2013    Asthma     Depression     Fibromyalgia     GERD (gastroesophageal reflux disease)     History of pneumonia 2015    HTN (hypertension)     Personal history of colonic  polyps 2024    Preeclampsia     Sleep apnea     Uses CPAP       Past Surgical History:   Procedure Laterality Date    BREAST BIOPSY      Benign    BREAST SURGERY  Right Breast 1998    Fibro cystic    CARPAL TUNNEL RELEASE Right 2017     SECTION, LOW TRANSVERSE  2008    for PREE    COLONOSCOPY  2024    NASAL SEPTUM SURGERY      NECK SURGERY      SPINE SURGERY      TUBAL LIGATION  2009    upper GI series         Family History   Problem Relation Name Age of Onset    Pancreatic cancer Mother Maya Tastet     Cancer Mother Maya Tastet         Mother passed in  from Pancreas Cancer    Lymphoma Father Bradly Tastet     Diabetes Father Bradly Tastet     Hypertension Father Bradly Tastet     Cancer Father Bradly Tastet         Small Cell Lung Cancer/ still alive    Breast cancer Maternal Aunt Pat     Cancer Maternal Aunt Pat         Breast Cancer both/ passed.  Im not sure of year i was youg.    Breast cancer Maternal Aunt Nicky 70    Arthritis Maternal Aunt Nicky     Pancreatic cancer Maternal Uncle      Breast cancer Paternal Aunt  50    Pancreatic cancer Maternal Grandmother      Heart disease Maternal Grandfather Jose Guadalupe Tastet     Diabetes Paternal Grandmother Holli Tastet     Cancer Paternal Grandfather Skin cancer         Melanoma    Asthma Brother Pacheco Tastet     Hypertension Brother Alan Tastet        Social History     Socioeconomic History    Marital status:    Tobacco Use    Smoking status: Never    Smokeless tobacco: Never   Substance and Sexual Activity    Alcohol use: No    Drug use: No    Sexual activity: Yes     Partners: Male     Birth control/protection: None     Social Determinants of Health     Financial Resource Strain: Low Risk  (2024)    Overall Financial Resource Strain (CARDIA)     Difficulty of Paying Living Expenses: Not hard at all   Food Insecurity: No Food Insecurity (2024)    Hunger Vital Sign     Worried About Running Out of  "Food in the Last Year: Never true     Ran Out of Food in the Last Year: Never true   Transportation Needs: No Transportation Needs (1/18/2024)    PRAPARE - Transportation     Lack of Transportation (Medical): No     Lack of Transportation (Non-Medical): No   Physical Activity: Insufficiently Active (1/18/2024)    Exercise Vital Sign     Days of Exercise per Week: 7 days     Minutes of Exercise per Session: 10 min   Stress: No Stress Concern Present (1/18/2024)    [employer] [place] [employer] - Occupational Stress Questionnaire     Feeling of Stress : Only a little   Housing Stability: Unknown (1/18/2024)    Housing Stability Vital Sign     Unable to Pay for Housing in the Last Year: No     Unstable Housing in the Last Year: No       Review of patient's allergies indicates:   Allergen Reactions    Ibuprofen Other (See Comments)     Affects stomach ulcer, avoids all NSAIDS    Latex, natural rubber Rash     Small red bumps on skin contact       Labs:  Lab Results   Component Value Date    HGBA1C 5.2 01/18/2023       Lab Results   Component Value Date    WBC 9.42 06/23/2023    HGB 13.8 06/23/2023    HCT 42.2 06/23/2023    MCV 88 06/23/2023     06/23/2023           Review of Systems:  Right arm pain.  Thumb pain, locking up.  Balance of review of systems is negative.    Physical Exam:  Vitals:    08/22/24 1017   BP: (!) 146/77   Pulse: 73   Weight: 61.4 kg (135 lb 4 oz)   Height: 4' 9" (1.448 m)   PainSc: 0-No pain   PainLoc: Neck     Body mass index is 29.27 kg/m².    Pain disability index:      8/22/2024    10:15 AM 4/24/2024     9:13 AM   Last 3 PDI Scores   Pain Disability Index (PDI) 12 19       Gen: NAD  Psych: mood appropriate for given condition  HEENT: eyes anicteric   CV: RRR, 2+ radial pulse  Respiratory: non-labored, no signs of respiratory distress  Abd: non-distended  Skin: warm, dry and intact.  Gait: Able to heel walk, toe walk. No antalgic gait.     Coordination:   Tandem walking " coordination: normal    Cervical spine: ROM is full in flexion, extension and lateral rotation without increased pain.  Spurling's maneuver causes no neck pain to either side.  Myofascial exam: No Tenderness to palpation across cervical paraspinous region bilaterally.    Lumbar spine:  Lumbar spine: ROM is full with flexion extension and oblique extension with no increased pain.    Edwin's test causes no increased pain on either side.    Supine straight leg raise is negative bilaterally.    Internal and external rotation of the hip causes no increased pain on either side.  Myofascial exam: No tenderness to palpation across lumbar paraspinous muscles. No tenderness to palpation over the bilateral greater trochanters and bilateral SI joint    Sensory:  Intact and symmetrical to light touch in C4-T1 dermatomes bilaterally. Intact and symmetrical to light touch in L1-S1 dermatomes bilaterally.    Motor:    Right Left   C4 Shoulder Abduction  5  5   C5 Elbow Flexion    5  5   C6 Wrist Extension  5  5   C7 Elbow Extension   5  5   C8/T1 Hand Intrinsics   5  5        Right Left   L2/3 Iliacus Hip flexion  5  5   L3/4 Qudratus Femoris Knee Extension  5  5   L4/5 Tib Anterior Ankle Dorsiflexion   5  5   L5/S1 Extensor Hallicus Longus Great toe extension  5  5   S1/S2 Gastroc/Soleus Plantar Flexion  5  5      Right Left   Triceps DTR 2+ 2+   Biceps DTR 2+ 2+   Brachioradialis DTR 2+ 2+   Patellar DTR 2+ 2+   Achilles DTR 2+ 2+   Greer Absent  Absent   Clonus Absent Absent   Babinski Absent Absent       Imaging:    X-ray and MRI cervical spine from 02/13/2020 reviewed.  Normal postoperative changes of C5-6 artificial disc replacement is seen with no evidence of instability on flexion or extension.  Mild multilevel degenerative changes.  There is mild at most foraminal narrowing at any given cervical spine level.  C5-6 foramina are not completely evaluated secondary to artifact from artificial disc.    X-ray and MRI lumbar  spine from 02/13/2020 reviewed.  There is good alignment with no evidence of instability on flexion or extension.  At L4-5 there is a mild disc osteophyte complex without most mild right foraminal narrowing.      Emg/ ncv upper extremities 3-4-24:  Impression:  Abnormal study.    Moderate right carpal tunnel syndrome, without active denervation.    Mild left carpal tunnel syndrome, without active denervation.    No electrophysiologic evidence of bilateral cervical radiculopathy/plexopathy, bilateral ulnar mononeuropathy, or peripheral neuropathy in bilateral extremities.    Xray cervical spine 7-16-24:  There is a disc prosthesis at the C5-6 level.  Vertebral body height and alignment are normal.  C7 is partially obscured by the overlying structures.  The odontoid process is intact.  Disc spaces are preserved.  There is no instability with flexion or extension.  The prevertebral soft tissues are normal.     MRI cervical spine 8/21/24 reviewed:  FINDINGS:  Alignment: Normal.  Vertebral Column: Stable postoperative changes of discectomy and disc prosthesis at C5-6 with associated susceptibility artifact which limits evaluation of the adjacent structures.  The remainder of the vertebral bodies maintain normal height and signal.  No acute fracture is identified; however, if trauma is suspected, a CT scan would be a more sensitive examination for fractures. No evidence of an aggressive marrow replacement process. Multilevel disc degeneration with disc desiccation, intervertebral disc space narrowing and posterior disc osteophyte complexes most notably at C3-4, C4-5 and C6-7.     Cord: Obscuration of the cervical cord at the C5-6 level secondary to susceptibility artifact.  Remainder of the cord demonstrates normal caliber and signal.     Skull base and craniocervical junction: Normal.     Degenerative findings:     C2-C3: Minimal posterior disc osteophyte complex.   No significant neural foraminal or spinal canal  stenosis.     C3-C4: Mild posterior disc osteophyte complex, which partially effaces the ventral thecal sac.  Mild bilateral facet arthropathy.  Mild bilateral uncovertebral joint spurring.  Mild right neural foraminal stenosis.  Mild spinal canal stenosis.     C4-C5: Small central disc protrusion/osteophyte complex.  Mild bilateral facet arthropathy.  Mild bilateral uncovertebral joint spurring.  Mild right neural foraminal stenosis.  Mild spinal canal stenosis.     C5-C6: Postoperative changes with disc prosthesis.  Susceptibility artifact limits evaluation at this level.     C6-C7: Minimal posterior disc osteophyte complex with small central disc protrusion.  Mild bilateral facet arthropathy.   No significant neural foraminal or spinal canal stenosis.     C7-T1: Minimal posterior disc osteophyte complex.  Mild bilateral facet arthropathy.   No significant neural foraminal or spinal canal stenosis.     Paraspinal muscles & soft tissues: Subcentimeter T2 hyperintense nodule in the right thyroid lobe, for which no further follow-up is recommended.  No other significant abnormality.     Normal signal voids are present in the vertebral arteries.    Assessment:   Lavonne Pierre is a 48 y.o. year old female patient who has a past medical history of Anxiety, Asthma, Depression, Fibromyalgia, GERD (gastroesophageal reflux disease), History of pneumonia, HTN (hypertension), Personal history of colonic polyps, Preeclampsia, and Sleep apnea. She presents for follow up of neck pain and wrist/ hand pain.    MRI cervical spine reviewed and shows mild degenerative changes.  C5/6 artificial disk replacement artifact changes.  C3/4 and C4/5 mild central canal stenosis. C3/4, C4/5 mild right foraminal narrowing.  No left foraminal narrowing.     Neck pain most likely myofacial given mild degenerative changes on cervical spine imaging.  But cannot completely rule out that the mild degenerative chagnes are not contributing to neck  pain.  Wrist and hand pain - arthritic  vs carpal tunnel as she did have findings for carpal tunnel on emg/ ncv.       Plan:  - discussed role of medicaitons, PT, exercise, to help with neck pain.  - if no continued improvement of neck pain with PT and home exercise, I can discuss JODIE vs MBB/ RFA with Dr. Cheatham.    Problem List Items Addressed This Visit    None  Visit Diagnoses       H/O cervical spine surgery    -  Primary    Cervicalgia                  Follow Up: RTC as needed    : Reviewed and consistent with medication use as prescribed.          Janny Adame PA-C  [facility]

## 2024-08-26 ENCOUNTER — OFFICE VISIT (OUTPATIENT)
Dept: PODIATRY | Facility: CLINIC | Age: 49
End: 2024-08-26
Payer: MEDICARE

## 2024-08-26 VITALS — WEIGHT: 135.38 LBS | HEIGHT: 57 IN | BODY MASS INDEX: 29.21 KG/M2

## 2024-08-26 DIAGNOSIS — G57.61 MORTON'S NEUROMA OF SECOND INTERSPACE OF RIGHT FOOT: Primary | ICD-10-CM

## 2024-08-26 PROCEDURE — 3008F BODY MASS INDEX DOCD: CPT | Mod: HCNC,CPTII,S$GLB, | Performed by: PODIATRIST

## 2024-08-26 PROCEDURE — 99999 PR PBB SHADOW E&M-EST. PATIENT-LVL III: CPT | Mod: PBBFAC,HCNC,, | Performed by: PODIATRIST

## 2024-08-26 PROCEDURE — 1159F MED LIST DOCD IN RCRD: CPT | Mod: HCNC,CPTII,S$GLB, | Performed by: PODIATRIST

## 2024-08-26 PROCEDURE — 99212 OFFICE O/P EST SF 10 MIN: CPT | Mod: HCNC,S$GLB,, | Performed by: PODIATRIST

## 2024-08-26 PROCEDURE — 1160F RVW MEDS BY RX/DR IN RCRD: CPT | Mod: HCNC,CPTII,S$GLB, | Performed by: PODIATRIST

## 2024-08-26 PROCEDURE — 4010F ACE/ARB THERAPY RXD/TAKEN: CPT | Mod: HCNC,CPTII,S$GLB, | Performed by: PODIATRIST

## 2024-08-26 NOTE — PROGRESS NOTES
Subjective:      Patient ID: Lavonne Pierre is a 48 y.o. female.    Chief Complaint: Foot Pain    Lavonne is a 48 y.o. female who presents to the podiatry clinic  with complaint of right foot pain in the ball of the foot with weight bearing activities it gets worse, pain radiated across the whole ball of the foot and into the toes.     8/26/24: Patient returns for follow up right neuroma pain, injection and met pads have helped and she relates no pain any longer with ambulation is doing well overall    Review of Systems   Constitutional: Negative for chills and fever.   Cardiovascular:  Positive for leg swelling. Negative for claudication.   Respiratory:  Negative for shortness of breath.    Skin:  Negative for itching, nail changes and rash.   Musculoskeletal:  Positive for back pain, joint pain, muscle weakness and myalgias. Negative for muscle cramps.   Gastrointestinal:  Negative for nausea and vomiting.   Neurological:  Positive for paresthesias. Negative for focal weakness, loss of balance and numbness.           Objective:      Physical Exam  Constitutional:       General: She is not in acute distress.     Appearance: She is well-developed. She is not diaphoretic.   Cardiovascular:      Pulses:           Dorsalis pedis pulses are 2+ on the right side and 2+ on the left side.        Posterior tibial pulses are 2+ on the right side and 2+ on the left side.      Comments: < 3 sec capillary refill time to toes 1-5 bilateral. Toes and feet are warm to touch proximally with normal distal cooling b/l. There is some hair growth on the feet and toes b/l. There is no edema b/l. No spider veins or varicosities present b/l.     Musculoskeletal:      Comments: Equinus noted b/l ankles with < 5 deg DF noted. MMT 5/5 in DF/PF/Inv/Ev resistance with no reproduction of pain in any direction. Passive range of motion of ankle and pedal joints is painless b/l.    There is no pain on palpation of the right second   intermetatarsal space with a positive Zbigniew's click. Minimal tenderness to palpation of the adjacent metatarsal heads.       Skin:     General: Skin is warm and dry.      Coloration: Skin is not pale.      Findings: No abrasion, bruising, burn, ecchymosis, erythema, laceration, lesion, petechiae or rash.      Nails: There is no clubbing.      Comments: Skin temperature, texture and turgor within normal limits.         Neurological:      Mental Status: She is alert and oriented to person, place, and time.      Sensory: No sensory deficit.      Motor: No tremor, atrophy or abnormal muscle tone.      Comments: Positive tinel sign bilateral.   Psychiatric:         Behavior: Behavior normal.               Assessment:       Encounter Diagnosis   Name Primary?    Wise's neuroma of second interspace of right foot Yes           Plan:       Lavonne was seen today for foot pain.    Diagnoses and all orders for this visit:    Wise's neuroma of second interspace of right foot        I counseled the patient on her conditions, their implications and medical management.    Continue to wear good supportive shoes    Avoid barefoot or flats        Return PRN if pain returns    Lester De Jesus DPM

## 2024-08-27 ENCOUNTER — CLINICAL SUPPORT (OUTPATIENT)
Dept: REHABILITATION | Facility: HOSPITAL | Age: 49
End: 2024-08-27
Payer: MEDICARE

## 2024-08-27 DIAGNOSIS — M53.82 DECREASED RANGE OF MOTION OF INTERVERTEBRAL DISCS OF CERVICAL SPINE: Primary | ICD-10-CM

## 2024-08-27 PROCEDURE — 97112 NEUROMUSCULAR REEDUCATION: CPT | Mod: KX,HCNC,PO,CQ

## 2024-08-27 PROCEDURE — 97110 THERAPEUTIC EXERCISES: CPT | Mod: KX,HCNC,PO,CQ

## 2024-08-27 NOTE — PROGRESS NOTES
[facility]   Physical Therapy Re-assessment     Name: Lavonne Pierre  Clinic Number: [id number]    Therapy Diagnosis:   Encounter Diagnosis   Name Primary?    Decreased range of motion of intervertebral discs of cervical spine Yes     Physician: Janny Adame PA-C    Visit Date: 8/27/2024       Physician Orders: PT Eval and Treat   Medical Diagnosis from Referral:   M54.12 (ICD-10-CM) - Cervical radiculopathy   M54.2 (ICD-10-CM) - Cervicalgia      Evaluation Date: 7/2/2024  Authorization Period Expiration: 6/18/2025  Plan of Care Expiration: 8/27/2024  Progress Note Due: 8/27/2024  Date of Surgery: NA  Visit # / Visits authorized: 11/20   FOTO: 1/ 3     Precautions: Standard     PTA Visit #: 1/5     Time In: 7:00  Time Out: 8:00  Total Billable Time: 60 minutes     SUBJECTIVE     Pt reports:that she is w/o c/o pn today, would like to receive DN, feels that today will be her last day of therapy.   She was compliant with home exercise program.      Pain: 0/10 pre-tx, 0/10 post  Location: B upper traps      OBJECTIVE                                                                                                                                                                                                                                     Cervical ROM:                                              %                         Pain/dysfunction/movement  Flexion full pain   Extension full pain   Side-bending Right 25% limit pain   Side-bending Left 25% limit pain   Right rotation full none   Left rotation full none         Shoulder Range of Motion:   Shoulder Left Right   Flexion 140 140   Abduction 155 152   ER T3 T2   IR T10 T11         Upper Extremity Strength  (R) UE   (L) UE     Shoulder Flexion 4/5   4/5   Shoulder abduction 4/5   4/5   Shoulder ER 4/5   4/5   Shoulder IR 4+/5   4+/5   Low trap 4/5   4/5   Mid trap 4/5   4/5       FOTO: 12% improvement    Treatment     Lavonne  received the treatments listed below:      therapeutic exercises to develop endurance and ROM for 15 minutes including:  UBE 5/5 for endurance/ROM  Thoracic supine stretch with horiz towel roll 3 mins  Chest stretch supine with vertical towel roll 3 mins    manual therapy techniques: Myofacial release were applied to the: B upper traps for 15 minutes, including:    Deirdre Ahn PT  Discussed the purpose, mechanism, and indications for dry needling with Lavonne . Patient was cleared of all precautions and contraindications and pt signed written consent and gave verbal consent to dry needling Rx today.   Palpation used to determine dry needling sites. Increased tone noted at B upper traps. Pt rec'd dry needling in supine position to B upper traps with .30 mm needles.  Pt tolerated treatment well and was not in any distress at the completion of treatment.       neuromuscular re-education activities to improve: Coordination, Kinesthetic, Sense, Proprioception, and Posture for 30 minutes. The following activities were included:  Chin tucks x30  No moneys 3x10 RTB  B upper trap stretch 3x30: BUE  B Lev Scap stretch 3 x 30 sec  Seated scap retraction 3 sec hold 20x  Middle precor row 30# 3x15  Banded row c GTB 3x10  Banded shoulder ext c GTB 3x10  Supine ISO retraction 3 sec hold 20x  Banded wall clocks c GTB 3x10  Banded wall walks c GTB x20    therapeutic activities to improve functional performance for   minutes, including:                  Patient Education and Home Exercises     Home Exercises Provided and Patient Education Provided     Education provided:   - on HEP and POC    Written Home Exercises Provided: yes. Exercises were reviewed and Lavonne was able to demonstrate them prior to the end of the session.  Lavonne demonstrated good  understanding of the education provided. See EMR under Patient Instructions for exercises provided during therapy sessions    ASSESSMENT     Lavonne fine today's tx with ther ex,  neuromuscular re-ed and manual intervention well. She was able to perform all activities w/o difficulty or complaint. She demonstrates good form, posture and core stab tech with exs. Pt noted to continue to progress well,  without provocation of  sx. Overall, pt appears to be reaching baseline and appears to be ready for discharge per her request. DN performed by PT NENITA LockhartAv Banerjee Is progressing well towards her goals.   Pt prognosis is Good.     Pt will continue to benefit from skilled outpatient physical therapy to address the deficits listed in the problem list box on initial evaluation, provide pt/family education and to maximize pt's level of independence in the home and community environment.     Pt's spiritual, cultural and educational needs considered and pt agreeable to plan of care and goals.     Anticipated barriers to physical therapy:     Goals:   Short Term Goals: 4 weeks   Pt to report worst pain 4/10 to improve QOL  Pt to improve BUE strength by 1/2 grade  Pt to improve cervical range of motion by 25%  Pt to demo independence with initial HEP  Pt to improve FOTO by 15%     Long Term Goals: 8 weeks   Pt to report worst pain 1/10 to improve QOL  Pt to improve BUE strength by 1 grade  Pt to demo independence with final HEP  Pt to improve FOTO by 30%    PLAN     Plan of care Certification: 7/2/2024 to 8/27/2024.     Pt is discharged post this visit per her request. Full summary to follow.     Jan Stein, PTA

## 2024-10-31 ENCOUNTER — TELEPHONE (OUTPATIENT)
Dept: ADMINISTRATIVE | Facility: CLINIC | Age: 49
End: 2024-10-31
Payer: MEDICARE

## 2024-11-01 ENCOUNTER — HOSPITAL ENCOUNTER (OUTPATIENT)
Dept: RADIOLOGY | Facility: HOSPITAL | Age: 49
Discharge: HOME | End: 2024-11-01
Attending: NURSE PRACTITIONER
Payer: MEDICARE

## 2024-11-01 ENCOUNTER — OFFICE VISIT (OUTPATIENT)
Dept: FAMILY MEDICINE | Facility: CLINIC | Age: 49
End: 2024-11-01
Payer: MEDICARE

## 2024-11-01 VITALS
WEIGHT: 134.94 LBS | BODY MASS INDEX: 29.11 KG/M2 | SYSTOLIC BLOOD PRESSURE: 136 MMHG | DIASTOLIC BLOOD PRESSURE: 84 MMHG | HEIGHT: 57 IN | HEART RATE: 72 BPM | OXYGEN SATURATION: 97 %

## 2024-11-01 DIAGNOSIS — M79.7 FIBROMYALGIA: ICD-10-CM

## 2024-11-01 DIAGNOSIS — Z00.00 ENCOUNTER FOR PREVENTIVE HEALTH EXAMINATION: Primary | ICD-10-CM

## 2024-11-01 DIAGNOSIS — M25.50 MULTIPLE JOINT PAIN: ICD-10-CM

## 2024-11-01 DIAGNOSIS — E66.9 OBESITY (BMI 30-39.9): ICD-10-CM

## 2024-11-01 DIAGNOSIS — J45.909 REACTIVE AIRWAY DISEASE WITHOUT COMPLICATION, UNSPECIFIED ASTHMA SEVERITY, UNSPECIFIED WHETHER PERSISTENT: ICD-10-CM

## 2024-11-01 DIAGNOSIS — M25.562 ACUTE PAIN OF LEFT KNEE: ICD-10-CM
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
## 2024-11-01 DIAGNOSIS — J41.1 MUCOPURULENT CHRONIC BRONCHITIS: ICD-10-CM

## 2024-11-01 DIAGNOSIS — Z13.6 ENCOUNTER FOR SCREENING FOR CARDIOVASCULAR DISORDERS: ICD-10-CM

## 2024-11-01 DIAGNOSIS — I10 ESSENTIAL HYPERTENSION: ICD-10-CM

## 2024-11-01 DIAGNOSIS — Z23 NEED FOR INFLUENZA VACCINATION: ICD-10-CM

## 2024-11-01 PROCEDURE — 99999 PR PBB SHADOW E&M-EST. PATIENT-LVL V: CPT | Mod: PBBFAC,HCNC,, | Performed by: NURSE PRACTITIONER

## 2024-11-01 PROCEDURE — 73562 X-RAY EXAM OF KNEE 3: CPT | Mod: 26,HCNC,LT, | Performed by: RADIOLOGY

## 2024-11-01 PROCEDURE — 73562 X-RAY EXAM OF KNEE 3: CPT | Mod: TC,HCNC,FY,PO,LT

## 2024-11-01 RX ORDER — LISINOPRIL 5 MG/1
5 TABLET ORAL DAILY
Qty: 90 TABLET | Refills: 3 | Status: SHIPPED | OUTPATIENT
Start: 2024-11-01

## 2024-11-01 RX ORDER — DICLOFENAC SODIUM 10 MG/G
GEL TOPICAL DAILY
Qty: 100 G | Refills: 5 | Status: SHIPPED | OUTPATIENT
Start: 2024-11-01

## 2024-11-04 ENCOUNTER — OFFICE VISIT (OUTPATIENT)
Dept: FAMILY MEDICINE | Facility: CLINIC | Age: 49
End: 2024-11-04
Payer: MEDICARE

## 2024-11-04 VITALS
HEART RATE: 69 BPM | OXYGEN SATURATION: 98 % | WEIGHT: 134.06 LBS | BODY MASS INDEX: 29.01 KG/M2 | DIASTOLIC BLOOD PRESSURE: 82 MMHG | SYSTOLIC BLOOD PRESSURE: 130 MMHG

## 2024-11-04 DIAGNOSIS — M25.562 ACUTE PAIN OF LEFT KNEE: Primary | ICD-10-CM

## 2024-11-04 DIAGNOSIS — M22.2X1 PATELLOFEMORAL ARTHRALGIA OF BOTH KNEES: ICD-10-CM

## 2024-11-04 DIAGNOSIS — M22.2X2 PATELLOFEMORAL ARTHRALGIA OF BOTH KNEES: ICD-10-CM

## 2024-11-04 PROCEDURE — 4010F ACE/ARB THERAPY RXD/TAKEN: CPT | Mod: HCNC,CPTII,S$GLB, | Performed by: INTERNAL MEDICINE

## 2024-11-04 PROCEDURE — 3079F DIAST BP 80-89 MM HG: CPT | Mod: HCNC,CPTII,S$GLB, | Performed by: INTERNAL MEDICINE

## 2024-11-04 PROCEDURE — 3075F SYST BP GE 130 - 139MM HG: CPT | Mod: HCNC,CPTII,S$GLB, | Performed by: INTERNAL MEDICINE

## 2024-11-04 PROCEDURE — 1160F RVW MEDS BY RX/DR IN RCRD: CPT | Mod: HCNC,CPTII,S$GLB, | Performed by: INTERNAL MEDICINE

## 2024-11-04 PROCEDURE — 3008F BODY MASS INDEX DOCD: CPT | Mod: HCNC,CPTII,S$GLB, | Performed by: INTERNAL MEDICINE

## 2024-11-04 PROCEDURE — 99213 OFFICE O/P EST LOW 20 MIN: CPT | Mod: HCNC,S$GLB,, | Performed by: INTERNAL MEDICINE

## 2024-11-04 PROCEDURE — 99999 PR PBB SHADOW E&M-EST. PATIENT-LVL III: CPT | Mod: PBBFAC,HCNC,, | Performed by: INTERNAL MEDICINE

## 2024-11-04 PROCEDURE — 1159F MED LIST DOCD IN RCRD: CPT | Mod: HCNC,CPTII,S$GLB, | Performed by: INTERNAL MEDICINE

## 2024-11-04 NOTE — PROGRESS NOTES
Subjective     Lavonne Pierre is a 49 y.o. old, female here for Results and Follow-up    50 y/o with PMH of fibromyalgia, HTN, asthma, allergies, SALVADOR, GERD     History of Present Illness    Patient presents today for knee pain and swelling.    She reports twisting her knee approximately three weeks ago, causing ongoing discomfort. Both knees randomly swell up, unrelated to specific activities. She experiences pinching pain at the top of both knees while walking, intermittent pain in the back of both knees not associated with any particular activity, and pain on the side of the knee. When swelling occurs, she has difficulty bending the affected knee fully. While walking, she experiences sharp, sudden pain that can be severe enough to cause loss of balance. The pain is described as occurring 'all of a sudden' and is intense enough to 'bring her down.'    She received a flu vaccine recently and reports feeling 'a little off' the day prior to the visit, which is unusual for her. Typically, she denies any symptoms after receiving the influenza vaccine.     She reports feeling 'off' today and experiencing dizziness, denying any association with blood pressure changes.        ROS  Medications     Outpatient Medications Marked as Taking for the 11/4/24 encounter (Office Visit) with Ranjit Bernabe MD   Medication Sig Dispense Refill    albuterol (VENTOLIN HFA) 90 mcg/actuation inhaler Inhale 1-2 puffs into the lungs every 4 (four) hours as needed for Wheezing or Shortness of Breath. Rescue 18 g 11    albuterol 2.5 mg /3 mL (0.083 %) Nebu 3 mL, albuterol 5 mg/mL Nebu 0.5 mL Inhale into the lungs.      azelastine (ASTELIN) 137 mcg (0.1 %) nasal spray   0    cetirizine (ZYRTEC) 10 MG tablet Take 1 tablet (10 mg total) by mouth once daily. 30 tablet 11    diclofenac sodium (VOLTAREN) 1 % Gel Apply topically once daily. 100 g 5    fluticasone furoate-vilanteroL (BREO ELLIPTA) 200-25 mcg/dose DsDv diskus inhaler Inhale  1 puff into the lungs once daily. Controller 1 each 11    fluticasone propionate (FLONASE) 50 mcg/actuation nasal spray   5    lisinopriL (PRINIVIL,ZESTRIL) 5 MG tablet Take 1 tablet (5 mg total) by mouth once daily. 90 tablet 3    montelukast (SINGULAIR) 10 mg tablet Take 1 tablet (10 mg total) by mouth every evening. 30 tablet 11    multivitamin (THERAGRAN) per tablet Take 1 tablet by mouth once daily.      norethindrone-ethinyl estradiol (JUNEL FE 1/20) 1 mg-20 mcg (21)/75 mg (7) per tablet Take 1 tablet by mouth once daily. 30 tablet 11    omeprazole (PRILOSEC) 40 MG capsule Take 40 mg by mouth.      RESTASIS 0.05 % ophthalmic emulsion Place 1 drop into both eyes 2 (two) times daily.      semaglutide, weight loss, 2.4 mg/0.75 mL PnIj Inject 2.4 mg into the skin every 7 days.      triamcinolone acetonide (KENALOG-40) 40 mg/mL injection        Objective     /82   Pulse 69   Wt 60.8 kg (134 lb 0.6 oz)   SpO2 98%   BMI 29.01 kg/m²   Physical Exam  Constitutional:       Appearance: Normal appearance.   Musculoskeletal:         General: No swelling, tenderness or deformity. Normal range of motion.   Neurological:      Mental Status: She is alert.       Assessment and Plan     Acute pain of left knee    Patellofemoral arthralgia of both knees      Discussed strengthening exercises and stretching to do for knee pain. Consider formal PT.  ___________________  Ranjit Bernabe MD  Internal Medicine and Pediatrics

## 2024-11-06 ENCOUNTER — TELEPHONE (OUTPATIENT)
Dept: FAMILY MEDICINE | Facility: CLINIC | Age: 49
End: 2024-11-06
Payer: MEDICARE

## 2024-11-06 DIAGNOSIS — M25.50 MULTIPLE JOINT PAIN: ICD-10-CM

## 2024-11-06 DIAGNOSIS — R76.8 ANA POSITIVE: Primary | ICD-10-CM

## 2024-11-06 NOTE — TELEPHONE ENCOUNTER
MIRIAN (auto immune test) is positive. But inflammatory markers  and rheumatoid factor are normal. I recommend follow up with a rheumatologist to evaluate further. Referral in. Please let patient know.

## 2025-01-16 ENCOUNTER — PATIENT MESSAGE (OUTPATIENT)
Dept: FAMILY MEDICINE | Facility: CLINIC | Age: 50
End: 2025-01-16

## 2025-01-16 ENCOUNTER — OFFICE VISIT (OUTPATIENT)
Dept: FAMILY MEDICINE | Facility: CLINIC | Age: 50
End: 2025-01-16
Payer: MEDICARE

## 2025-01-16 VITALS
OXYGEN SATURATION: 100 % | SYSTOLIC BLOOD PRESSURE: 130 MMHG | DIASTOLIC BLOOD PRESSURE: 80 MMHG | WEIGHT: 129.75 LBS | HEART RATE: 69 BPM | BODY MASS INDEX: 28.08 KG/M2

## 2025-01-16 DIAGNOSIS — I10 ESSENTIAL HYPERTENSION: ICD-10-CM

## 2025-01-16 DIAGNOSIS — M79.7 FIBROMYALGIA: Primary | ICD-10-CM

## 2025-01-16 DIAGNOSIS — M25.552 BILATERAL HIP PAIN: ICD-10-CM

## 2025-01-16 DIAGNOSIS — R76.8 ANA POSITIVE: ICD-10-CM

## 2025-01-16 DIAGNOSIS — M25.559 GREATER TROCHANTERIC PAIN SYNDROME: ICD-10-CM

## 2025-01-16 DIAGNOSIS — M25.551 BILATERAL HIP PAIN: ICD-10-CM

## 2025-01-16 DIAGNOSIS — Z12.31 ENCOUNTER FOR SCREENING MAMMOGRAM FOR BREAST CANCER: ICD-10-CM

## 2025-01-16 PROBLEM — J41.1 MUCOPURULENT CHRONIC BRONCHITIS: Status: RESOLVED | Noted: 2024-01-18 | Resolved: 2025-01-16

## 2025-01-16 PROBLEM — R29.3 POSTURE ABNORMALITY: Status: RESOLVED | Noted: 2018-05-22 | Resolved: 2025-01-16

## 2025-01-16 PROCEDURE — 99999 PR PBB SHADOW E&M-EST. PATIENT-LVL IV: CPT | Mod: PBBFAC,,, | Performed by: INTERNAL MEDICINE

## 2025-01-16 PROCEDURE — 99214 OFFICE O/P EST MOD 30 MIN: CPT | Mod: S$GLB,,, | Performed by: INTERNAL MEDICINE

## 2025-01-16 RX ORDER — LISINOPRIL 5 MG/1
5 TABLET ORAL DAILY
Qty: 90 TABLET | Refills: 3 | Status: SHIPPED | OUTPATIENT
Start: 2025-01-16

## 2025-01-29 ENCOUNTER — HOSPITAL ENCOUNTER (OUTPATIENT)
Dept: RADIOLOGY | Facility: HOSPITAL | Age: 50
Discharge: HOME | End: 2025-01-29
Attending: INTERNAL MEDICINE
Payer: MEDICARE

## 2025-01-29 DIAGNOSIS — Z12.31 ENCOUNTER FOR SCREENING MAMMOGRAM FOR BREAST CANCER: ICD-10-CM

## 2025-01-29 PROCEDURE — 77067 SCR MAMMO BI INCL CAD: CPT | Mod: 26,,, | Performed by: RADIOLOGY

## 2025-01-29 PROCEDURE — 77063 BREAST TOMOSYNTHESIS BI: CPT | Mod: 26,,, | Performed by: RADIOLOGY

## 2025-01-29 PROCEDURE — 77063 BREAST TOMOSYNTHESIS BI: CPT | Mod: TC,PO

## 2025-02-11 ENCOUNTER — LAB VISIT (OUTPATIENT)
Dept: LAB | Facility: HOSPITAL | Age: 50
End: 2025-02-11
Attending: OBSTETRICS & GYNECOLOGY
Payer: MEDICARE

## 2025-02-11 ENCOUNTER — OFFICE VISIT (OUTPATIENT)
Dept: OBSTETRICS AND GYNECOLOGY | Facility: CLINIC | Age: 50
End: 2025-02-11
Payer: MEDICARE

## 2025-02-11 VITALS
WEIGHT: 128.5 LBS | BODY MASS INDEX: 27.72 KG/M2 | DIASTOLIC BLOOD PRESSURE: 80 MMHG | HEIGHT: 57 IN | SYSTOLIC BLOOD PRESSURE: 124 MMHG

## 2025-02-11 DIAGNOSIS — R76.8 ANA POSITIVE: ICD-10-CM

## 2025-02-11 DIAGNOSIS — N89.8 VAGINAL ODOR: ICD-10-CM

## 2025-02-11 DIAGNOSIS — D21.9 FIBROID: ICD-10-CM

## 2025-02-11 DIAGNOSIS — M04.8 OTHER AUTOINFLAMMATORY SYNDROMES: ICD-10-CM

## 2025-02-11 DIAGNOSIS — N94.6 DYSMENORRHEA: ICD-10-CM

## 2025-02-11 DIAGNOSIS — N94.19 DYSPAREUNIA DUE TO MEDICAL CONDITION IN FEMALE: ICD-10-CM

## 2025-02-11 DIAGNOSIS — R87.610 ASCUS OF CERVIX WITH NEGATIVE HIGH RISK HPV: ICD-10-CM

## 2025-02-11 DIAGNOSIS — L68.9 EXCESSIVE HAIR GROWTH: ICD-10-CM

## 2025-02-11 DIAGNOSIS — Z01.419 ROUTINE GYNECOLOGICAL EXAMINATION: Primary | ICD-10-CM

## 2025-02-11 DIAGNOSIS — N92.1 MENORRHAGIA WITH IRREGULAR CYCLE: ICD-10-CM

## 2025-02-11 LAB
DHEA-S SERPL-MCNC: 131.3 UG/DL (ref 56.2–282.9)
ESTRADIOL SERPL-MCNC: 166 PG/ML
FSH SERPL-ACNC: 9.53 MIU/ML
TSH SERPL DL<=0.005 MIU/L-ACNC: 1.74 UIU/ML (ref 0.4–4)

## 2025-02-11 PROCEDURE — 58100 BIOPSY OF UTERUS LINING: CPT | Mod: HCNC,S$GLB,, | Performed by: OBSTETRICS & GYNECOLOGY

## 2025-02-11 PROCEDURE — 84270 ASSAY OF SEX HORMONE GLOBUL: CPT | Mod: HCNC | Performed by: OBSTETRICS & GYNECOLOGY

## 2025-02-11 PROCEDURE — 83001 ASSAY OF GONADOTROPIN (FSH): CPT | Mod: HCNC | Performed by: OBSTETRICS & GYNECOLOGY

## 2025-02-11 PROCEDURE — 36415 COLL VENOUS BLD VENIPUNCTURE: CPT | Mod: HCNC,PN | Performed by: OBSTETRICS & GYNECOLOGY

## 2025-02-11 PROCEDURE — 81515 NFCT DS BV&VAGINITIS DNA ALG: CPT | Mod: HCNC | Performed by: OBSTETRICS & GYNECOLOGY

## 2025-02-11 PROCEDURE — 3008F BODY MASS INDEX DOCD: CPT | Mod: HCNC,CPTII,S$GLB, | Performed by: OBSTETRICS & GYNECOLOGY

## 2025-02-11 PROCEDURE — 99999 PR PBB SHADOW E&M-EST. PATIENT-LVL III: CPT | Mod: PBBFAC,HCNC,, | Performed by: OBSTETRICS & GYNECOLOGY

## 2025-02-11 PROCEDURE — 3079F DIAST BP 80-89 MM HG: CPT | Mod: HCNC,CPTII,S$GLB, | Performed by: OBSTETRICS & GYNECOLOGY

## 2025-02-11 PROCEDURE — 87624 HPV HI-RISK TYP POOLED RSLT: CPT | Mod: HCNC | Performed by: OBSTETRICS & GYNECOLOGY

## 2025-02-11 PROCEDURE — 82670 ASSAY OF TOTAL ESTRADIOL: CPT | Mod: HCNC | Performed by: OBSTETRICS & GYNECOLOGY

## 2025-02-11 PROCEDURE — 84443 ASSAY THYROID STIM HORMONE: CPT | Mod: HCNC | Performed by: OBSTETRICS & GYNECOLOGY

## 2025-02-11 PROCEDURE — 3074F SYST BP LT 130 MM HG: CPT | Mod: HCNC,CPTII,S$GLB, | Performed by: OBSTETRICS & GYNECOLOGY

## 2025-02-11 PROCEDURE — G0101 CA SCREEN;PELVIC/BREAST EXAM: HCPCS | Mod: HCNC,S$GLB,, | Performed by: OBSTETRICS & GYNECOLOGY

## 2025-02-11 PROCEDURE — 82627 DEHYDROEPIANDROSTERONE: CPT | Mod: HCNC | Performed by: OBSTETRICS & GYNECOLOGY

## 2025-02-11 PROCEDURE — 4010F ACE/ARB THERAPY RXD/TAKEN: CPT | Mod: HCNC,CPTII,S$GLB, | Performed by: OBSTETRICS & GYNECOLOGY

## 2025-02-11 PROCEDURE — 1159F MED LIST DOCD IN RCRD: CPT | Mod: HCNC,CPTII,S$GLB, | Performed by: OBSTETRICS & GYNECOLOGY

## 2025-02-11 RX ORDER — SPIRONOLACTONE 50 MG/1
50 TABLET, FILM COATED ORAL DAILY
Qty: 30 TABLET | Refills: 5 | Status: SHIPPED | OUTPATIENT
Start: 2025-02-11 | End: 2026-02-11

## 2025-02-11 NOTE — PROGRESS NOTES
Chief Complaint   Patient presents with    Well Woman       History of Present Illness: Lavonne Pierre is a 49 y.o. female that presents today 2025 with Patient's last menstrual period was 2025.  for well gyn visit.  She reports heavy prolonged bleeding. She reports up to 21 days of bleeding with clots.  She reports vaginal odor with menses. She reports some cycles with pad changes every 2hours. She reports odor bothering her. She report painful cycles and painful intercourse.  Tried OCP but did not notice lighter periods. 60 lbs weight loss.     Past Medical History:   Diagnosis Date    Anxiety 2013    Asthma     Depression     Fibromyalgia     GERD (gastroesophageal reflux disease)     History of pneumonia     HTN (hypertension)     Personal history of colonic polyps 2024    Preeclampsia     Sleep apnea     Uses CPAP       Past Surgical History:   Procedure Laterality Date    BREAST BIOPSY Right     Benign    BREAST SURGERY  Right Breast 1998    Fibro cystic    CARPAL TUNNEL RELEASE Right 2017     SECTION, LOW TRANSVERSE  2008    for [facility]    COLONOSCOPY  2024    NASAL SEPTUM SURGERY      NECK SURGERY      SPINE SURGERY      TUBAL LIGATION      upper GI series         Outpatient Medications Prior to Visit   Medication Sig Dispense Refill    albuterol (VENTOLIN HFA) 90 mcg/actuation inhaler Inhale 1-2 puffs into the lungs every 4 (four) hours as needed for Wheezing or Shortness of Breath. Rescue 18 g 11    albuterol 2.5 mg /3 mL (0.083 %) Nebu 3 mL, albuterol 5 mg/mL Nebu 0.5 mL Inhale into the lungs.      azelastine (ASTELIN) 137 mcg (0.1 %) nasal spray   0    cetirizine (ZYRTEC) 10 MG tablet Take 1 tablet (10 mg total) by mouth once daily. 30 tablet 11    diclofenac sodium (VOLTAREN) 1 % Gel Apply topically once daily. 100 g 5    fluticasone furoate-vilanteroL (BREO ELLIPTA) 200-25 mcg/dose DsDv diskus inhaler Inhale 1 puff into the lungs once daily.  Controller 1 each 11    fluticasone propionate (FLONASE) 50 mcg/actuation nasal spray   5    lisinopriL (PRINIVIL,ZESTRIL) 5 MG tablet Take 1 tablet (5 mg total) by mouth once daily. 90 tablet 3    montelukast (SINGULAIR) 10 mg tablet Take 1 tablet (10 mg total) by mouth every evening. 30 tablet 11    multivitamin (THERAGRAN) per tablet Take 1 tablet by mouth once daily.      norethindrone-ethinyl estradiol (JUNEL FE 1/20) 1 mg-20 mcg (21)/75 mg (7) per tablet Take 1 tablet by mouth once daily. 30 tablet 11    omeprazole (PRILOSEC) 40 MG capsule Take 40 mg by mouth.      RESTASIS 0.05 % ophthalmic emulsion Place 1 drop into both eyes 2 (two) times daily.      semaglutide, weight loss, 2.4 mg/0.75 mL PnIj Inject 2.4 mg into the skin every 7 days.      triamcinolone acetonide (KENALOG-40) 40 mg/mL injection        No facility-administered medications prior to visit.       Review of patient's allergies indicates:   Allergen Reactions    Ibuprofen Other (See Comments)     Affects stomach ulcer, avoids all NSAIDS    Latex, natural rubber Rash     Small red bumps on skin contact       Family History   Problem Relation Name Age of Onset    Pancreatic cancer Mother Maya Tastet     Cancer Mother Maya Tastet         Mother passed in 2011 from Pancreas Cancer    Lymphoma Father Bradly Tastet     Diabetes Father Bradly Tastet     Hypertension Father Bradly Tastet     Cancer Father Rbadly Tastet         Small Cell Lung Cancer/ still alive    Breast cancer Maternal Aunt Pat     Cancer Maternal Aunt Pat         Breast Cancer both/ passed.  Im not sure of year i was youg.    Breast cancer Maternal Aunt Nicky 70    Arthritis Maternal Aunt Nicky     Pancreatic cancer Maternal Uncle      Breast cancer Paternal Aunt  50    Pancreatic cancer Maternal Grandmother      Heart disease Maternal Grandfather Jose Guadalupe Tastet     Diabetes Paternal Grandmother Annamay Tastet     Cancer Paternal Grandfather Skin cancer         Melanoma     Asthma Brother Pacheco Pierre     Hypertension Brother Alan Pierre        Social History     Socioeconomic History    Marital status:    Tobacco Use    Smoking status: Never    Smokeless tobacco: Never   Substance and Sexual Activity    Alcohol use: No    Drug use: No    Sexual activity: Yes     Partners: Male     Birth control/protection: None     Social Drivers of Health     Financial Resource Strain: Low Risk  (2024)    Overall Financial Resource Strain (CARDIA)     Difficulty of Paying Living Expenses: Not hard at all   Food Insecurity: No Food Insecurity (2024)    Hunger Vital Sign     Worried About Running Out of Food in the Last Year: Never true     Ran Out of Food in the Last Year: Never true   Transportation Needs: No Transportation Needs (2024)    PRAPARE - Transportation     Lack of Transportation (Medical): No     Lack of Transportation (Non-Medical): No   Physical Activity: Insufficiently Active (2024)    Exercise Vital Sign     Days of Exercise per Week: 7 days     Minutes of Exercise per Session: 10 min   Stress: No Stress Concern Present (2024)    [employer] - Occupational Stress Questionnaire     Feeling of Stress : Only a little   Housing Stability: Unknown (2024)    Housing Stability Vital Sign     Unable to Pay for Housing in the Last Year: No     Unstable Housing in the Last Year: No       OB History    Para Term  AB Living   2 2 1 1   2   SAB IAB Ectopic Multiple Live Births           2      # Outcome Date GA Lbr Ron/2nd Weight Sex Type Anes PTL Lv   2   29w0d   M CS-Unspec   ALEX   1 Term     M Vag-Spont   ALEX      Obstetric Comments    delivery for [facility]       Review of Symptoms:  GENERAL: Denies weight gain or weight loss. Feeling well overall.   SKIN: Denies rash or lesions. ++ hair growth on lower face  HEAD: Denies head injury or headache.   NODES: Denies enlarged lymph nodes.   CHEST: Denies  "chest pain or shortness of breath.   CARDIOVASCULAR: Denies palpitations or left sided chest pain.   ABDOMEN: No abdominal pain, constipation, diarrhea, nausea, vomiting or rectal bleeding.   URINARY: No frequency, dysuria, hematuria, or burning on urination.  HEMATOLOGIC: No easy bruisability or excessive bleeding.   MUSCULOSKELETAL: Denies joint pain or swelling.     /80   Ht 4' 9" (1.448 m)   Wt 58.3 kg (128 lb 8.5 oz)   LMP 01/21/2025   Physical Exam:  APPEARANCE: Well nourished, well developed, in no acute distress.  SKIN: Normal skin turgor, no lesions.  NECK: Neck symmetric without masses   RESPIRATORY: Normal respiratory effort with no retractions or use of accessory muscles  CARDIOVASCULAR: Peripheral vascular system with no swelling no varicosities and palpation of pulses normal  LYMPHATIC: No enlargements of the lymph nodes noted in the neck, axillae, or groin  ABDOMEN: Soft. No tenderness or masses. No hepatosplenomegaly. No hernias.  BREASTS: Symmetrical, no skin changes or visible lesions. No palpable masses, nipple discharge or adenopathy bilaterally.  PELVIC: Normal external female genitalia without lesions. Normal hair distribution. Adequate perineal body, normal urethral meatus. Urethra with no masses.  Bladder nontender. Vagina moist and well rugated without lesions or discharge. Cervix pink and without lesions. No significant cystocele or rectocele. Bimanual exam showed uterus normal size, shape, position, mobile and nontender. Adnexa without masses or tenderness. Urethra and bladder normal.   EXTREMITIES: No clubbing cyanosis or edema.    ENDOMETRIAL BIOPSY:    Female patient presents for an endometrial biopsy due to abnormal bleeding.      UPT is negative.    PRE ENDOMETRIAL BIOPSY COUNSELING:  The patient was informed of the risk of bleeding, infection, uterine perforation and pain and that the test will rule-out endometrial cancer with accuracy greater than 95%. She was counseled on " the alternatives to endometrial biopsy and agrees to proceed.    PROCEDURE:  TIME OUT PERFORMED.  The cervix was visualized with a speculum.  A single tooth tenaculum was placed on the anterior lip prior to the biopsy.  A sterile endometrial pipelle was passed without difficulty to a depth of 7 cm.  Adequate endometrial tissue was obtained.    The specimen was placed in formalyn and sent to Pathology for histology evaluation.  The patient tolerated the procedure well.    ASSESSMENT:   Abnormal uterine bleeding  626.8.    POST ENDOMETRIAL BIOPSY COUNSELING:  Manage post biopsy cramping with NSAIDs or Tylenol.  Expect spotting or light bleeding for a few days.  Report bleeding heavier than a period, fever > 101.0 F, worsening pain or a foul smelling vaginal discharge.    Counseling lasted approximately 15 minutes and all her questions were answered.    FOLLOW-UP: Pending biopsy results.      ASSESSMENT/PLAN:  Routine gynecological examination    Vaginal odor  -     Vaginosis Screen by DNA Probe; Future; Expected date: 02/11/2025    Menorrhagia with irregular cycle  Comments:  follow-up to discuss results  treatment options  Orders:  -     Specimen to Pathology, Ob/Gyn    Fibroid    ASCUS of cervix with negative high risk HPV  -     Liquid-Based Pap Smear, Screening  -     HPV High Risk Genotypes, PCR    Dysmenorrhea    Dyspareunia due to medical condition in female    MIRIAN positive  -     TSH; Future; Expected date: 02/11/2025    Excessive hair growth  Comments:  since age 14  Orders:  -     TESTOSTERONE PANEL; Future; Expected date: 02/11/2025  -     spironolactone (ALDACTONE) 50 MG tablet; Take 1 tablet (50 mg total) by mouth once daily.  Dispense: 30 tablet; Refill: 5  -     FOLLICLE STIMULATING HORMONE; Future; Expected date: 02/11/2025  -     ESTRADIOL; Future; Expected date: 02/11/2025  -     DHEA-SULFATE; Future; Expected date: 02/11/2025    Other autoinflammatory syndromes  -     TSH; Future; Expected date:  02/11/2025          Patient was counseled today on Pelvic exams and Pap Smear guidelines.   We discussed STD screening if at high risk for a STD.  We discussed recommendation for breast cancer screening with mammogram every other year after the age of 40 and annually after the age of 50.    We discussed colon cancer screening when indicated.   Osteoporosis screening discussed when indicated.   She was advised to see her primary care physician for all other health maintenance.     FOLLOW-UP with me for next routine visit.

## 2025-02-13 LAB
BACTERIAL VAGINOSIS DNA: NOT DETECTED
CANDIDA GLABRATA/KRUSEI: NOT DETECTED
CANDIDA RRNA VAG QL PROBE: NOT DETECTED
TRICHOMONAS VAGINALIS: NOT DETECTED

## 2025-02-19 LAB
ALBUMIN SERPL-MCNC: 4.5 G/DL (ref 3.6–5.1)
SHBG SERPL-SCNC: 95 NMOL/L (ref 17–124)
TESTOST FREE SERPL-MCNC: 1.2 PG/ML (ref 0.2–5)
TESTOST SERPL-MCNC: 26 NG/DL (ref 2–45)
TESTOSTERONE.FREE+WB SERPL-MCNC: 2.5 NG/DL (ref 0.5–8.5)

## 2025-02-27 ENCOUNTER — OFFICE VISIT (OUTPATIENT)
Dept: OBSTETRICS AND GYNECOLOGY | Facility: CLINIC | Age: 50
End: 2025-02-27
Payer: MEDICARE

## 2025-02-27 VITALS — DIASTOLIC BLOOD PRESSURE: 86 MMHG | BODY MASS INDEX: 27.81 KG/M2 | WEIGHT: 128.5 LBS | SYSTOLIC BLOOD PRESSURE: 126 MMHG

## 2025-02-27 DIAGNOSIS — R87.612 LGSIL ON PAP SMEAR OF CERVIX: ICD-10-CM

## 2025-02-27 DIAGNOSIS — N92.1 MENORRHAGIA WITH IRREGULAR CYCLE: Primary | ICD-10-CM

## 2025-02-27 DIAGNOSIS — N89.8 VAGINAL ODOR: ICD-10-CM

## 2025-02-27 DIAGNOSIS — D21.9 FIBROID: ICD-10-CM

## 2025-02-27 DIAGNOSIS — L68.9 EXCESSIVE HAIR GROWTH: ICD-10-CM

## 2025-02-27 DIAGNOSIS — N94.6 DYSMENORRHEA: ICD-10-CM

## 2025-02-27 DIAGNOSIS — R76.8 ANA POSITIVE: ICD-10-CM

## 2025-02-27 PROCEDURE — 4010F ACE/ARB THERAPY RXD/TAKEN: CPT | Mod: HCNC,CPTII,S$GLB, | Performed by: OBSTETRICS & GYNECOLOGY

## 2025-02-27 PROCEDURE — 3008F BODY MASS INDEX DOCD: CPT | Mod: HCNC,CPTII,S$GLB, | Performed by: OBSTETRICS & GYNECOLOGY

## 2025-02-27 PROCEDURE — 99214 OFFICE O/P EST MOD 30 MIN: CPT | Mod: HCNC,S$GLB,, | Performed by: OBSTETRICS & GYNECOLOGY

## 2025-02-27 PROCEDURE — 99999 PR PBB SHADOW E&M-EST. PATIENT-LVL IV: CPT | Mod: PBBFAC,HCNC,, | Performed by: OBSTETRICS & GYNECOLOGY

## 2025-02-27 PROCEDURE — 3074F SYST BP LT 130 MM HG: CPT | Mod: HCNC,CPTII,S$GLB, | Performed by: OBSTETRICS & GYNECOLOGY

## 2025-02-27 PROCEDURE — 1159F MED LIST DOCD IN RCRD: CPT | Mod: HCNC,CPTII,S$GLB, | Performed by: OBSTETRICS & GYNECOLOGY

## 2025-02-27 PROCEDURE — 3079F DIAST BP 80-89 MM HG: CPT | Mod: HCNC,CPTII,S$GLB, | Performed by: OBSTETRICS & GYNECOLOGY

## 2025-02-27 RX ORDER — METRONIDAZOLE 500 MG/1
500 TABLET ORAL EVERY 12 HOURS
Qty: 14 TABLET | Refills: 0 | Status: SHIPPED | OUTPATIENT
Start: 2025-02-27

## 2025-02-27 RX ORDER — METRONIDAZOLE 7.5 MG/G
1 GEL VAGINAL
Qty: 140 G | Refills: 1 | Status: SHIPPED | OUTPATIENT
Start: 2025-02-27 | End: 2025-05-20

## 2025-02-27 RX ORDER — SPIRONOLACTONE 50 MG/1
50 TABLET, FILM COATED ORAL DAILY
Qty: 30 TABLET | Refills: 5 | Status: SHIPPED | OUTPATIENT
Start: 2025-02-27 | End: 2026-02-27

## 2025-02-27 NOTE — PROGRESS NOTES
Chief Complaint   Patient presents with    Follow-up     2 week f/u       History of Present Illness: Lavonne Pierre is a 49 y.o. female that presents today 2025 for   Chief Complaint   Patient presents with    Follow-up     2 week f/u     Tried OCPS but makes her hair grow abnormal.  She tried it from may 2024 until .     She reports long periods are lasting 1-2 weeks. She did not miss any pills.     She reports irregular bleeding with only one week of no bleeding. She sees clots.     She reports doing on ozempic 175-124 lbs now and on a power pack now.     She reports odor on her period.     She is worried about her excessive hair growth on her face and noticed it since she was 14.      Past Medical History:   Diagnosis Date    Anxiety 2013    Asthma     Depression     Fibromyalgia     GERD (gastroesophageal reflux disease)     History of pneumonia     HTN (hypertension)     Personal history of colonic polyps 2024    Preeclampsia     Sleep apnea     Uses CPAP       Past Surgical History:   Procedure Laterality Date    BREAST BIOPSY Right     Benign    BREAST SURGERY  Right Breast 1998    Fibro cystic    CARPAL TUNNEL RELEASE Right 2017     SECTION, LOW TRANSVERSE  2008    for PREE    COLONOSCOPY  2024    NASAL SEPTUM SURGERY      NECK SURGERY      SPINE SURGERY      TUBAL LIGATION      upper GI series         Medications Prior to Visit[1]    Review of patient's allergies indicates:   Allergen Reactions    Ibuprofen Other (See Comments)     Affects stomach ulcer, avoids all NSAIDS    Latex, natural rubber Rash     Small red bumps on skin contact       Family History   Problem Relation Name Age of Onset    Pancreatic cancer Mother Maya Pierre     Cancer Mother Maya Pierre         Mother passed in  from Pancreas Cancer    Lymphoma Father Bradly Pierre     Diabetes Father Bradly Pierre     Hypertension Father Bradly Pierre     Cancer Father Bradly  Tastet         Small Cell Lung Cancer/ still alive    Breast cancer Maternal Aunt Pat     Cancer Maternal Aunt Pat         Breast Cancer both/ passed.  Im not sure of year i was youg.    Breast cancer Maternal Aunt Nicky 70    Arthritis Maternal Aunt Nicky     Pancreatic cancer Maternal Uncle      Breast cancer Paternal Aunt  50    Pancreatic cancer Maternal Grandmother      Heart disease Maternal Grandfather Jose Guadalupe Tastet     Diabetes Paternal Grandmother Holli Tastet     Cancer Paternal Grandfather Skin cancer         Melanoma    Asthma Brother Pacheco Tastet     Hypertension Brother Alan Pierre        Social History[2]    OB History    Para Term  AB Living   2 2 1 1  2   SAB IAB Ectopic Multiple Live Births       2      # Outcome Date GA Lbr Ron/2nd Weight Sex Type Anes PTL Lv   2   29w0d   M CS-Unspec   ALEX   1 Term     M Vag-Spont   ALEX      Obstetric Comments    delivery for PREE         /86 (BP Location: Right arm, Patient Position: Sitting)   Wt 58.3 kg (128 lb 8.5 oz)   LMP 2025 (Approximate)       ASSESSMENT/PLAN:  Menorrhagia with irregular cycle  Comments:  we discussed mirena, ablation, and hysterectomy and she declines    Dysmenorrhea  -     US Pelvis Comp with Transvag NON-OB (xpd; Future; Expected date: 2025    Fibroid  -     US Pelvis Comp with Transvag NON-OB (xpd; Future; Expected date: 2025    LGSIL on Pap smear of cervix  Comments:  will schedule colpo    Vaginal odor  -     metroNIDAZOLE (FLAGYL) 500 MG tablet; Take 1 tablet (500 mg total) by mouth every 12 (twelve) hours.  Dispense: 14 tablet; Refill: 0  -     metroNIDAZOLE (METROGEL) 0.75 % (37.5mg/5 gram) vaginal gel; Place 1 applicator vaginally twice a week. for 24 doses  Dispense: 140 g; Refill: 1    MIRIAN positive    Excessive hair growth  -     Ambulatory referral/consult to Endocrinology; Future; Expected date: 2025  -     spironolactone (ALDACTONE) 50 MG tablet; Take 1  tablet (50 mg total) by mouth once daily.  Dispense: 30 tablet; Refill: 5    Excessive hair growth  Comments:  since age 14  Orders:  -     Ambulatory referral/consult to Endocrinology; Future; Expected date: 03/06/2025  -     spironolactone (ALDACTONE) 50 MG tablet; Take 1 tablet (50 mg total) by mouth once daily.  Dispense: 30 tablet; Refill: 5      30 minutes spent today spent preparing reviewing previous external notes, reviewing previous results, performing medical examination, ordering tests or medications, counseling and documenting.            [1]   Outpatient Medications Prior to Visit   Medication Sig Dispense Refill    albuterol (VENTOLIN HFA) 90 mcg/actuation inhaler Inhale 1-2 puffs into the lungs every 4 (four) hours as needed for Wheezing or Shortness of Breath. Rescue 18 g 11    albuterol 2.5 mg /3 mL (0.083 %) Nebu 3 mL, albuterol 5 mg/mL Nebu 0.5 mL Inhale into the lungs.      azelastine (ASTELIN) 137 mcg (0.1 %) nasal spray   0    cetirizine (ZYRTEC) 10 MG tablet Take 1 tablet (10 mg total) by mouth once daily. 30 tablet 11    diclofenac sodium (VOLTAREN) 1 % Gel Apply topically once daily. 100 g 5    fluticasone furoate-vilanteroL (BREO ELLIPTA) 200-25 mcg/dose DsDv diskus inhaler Inhale 1 puff into the lungs once daily. Controller 1 each 11    fluticasone propionate (FLONASE) 50 mcg/actuation nasal spray   5    lisinopriL (PRINIVIL,ZESTRIL) 5 MG tablet Take 1 tablet (5 mg total) by mouth once daily. 90 tablet 3    montelukast (SINGULAIR) 10 mg tablet Take 1 tablet (10 mg total) by mouth every evening. 30 tablet 11    multivitamin (THERAGRAN) per tablet Take 1 tablet by mouth once daily.      norethindrone-ethinyl estradiol (JUNEL FE 1/20) 1 mg-20 mcg (21)/75 mg (7) per tablet Take 1 tablet by mouth once daily. 30 tablet 11    omeprazole (PRILOSEC) 40 MG capsule Take 40 mg by mouth.      RESTASIS 0.05 % ophthalmic emulsion Place 1 drop into both eyes 2 (two) times daily.      semaglutide, weight  loss, 2.4 mg/0.75 mL PnIj Inject 2.4 mg into the skin every 7 days.      triamcinolone acetonide (KENALOG-40) 40 mg/mL injection       spironolactone (ALDACTONE) 50 MG tablet Take 1 tablet (50 mg total) by mouth once daily. 30 tablet 5     No facility-administered medications prior to visit.   [2]   Social History  Tobacco Use    Smoking status: Never    Smokeless tobacco: Never   Substance Use Topics    Alcohol use: No    Drug use: No

## 2025-02-28 ENCOUNTER — HOSPITAL ENCOUNTER (OUTPATIENT)
Dept: RADIOLOGY | Facility: HOSPITAL | Age: 50
Discharge: HOME | End: 2025-02-28
Attending: OBSTETRICS & GYNECOLOGY
Payer: MEDICARE

## 2025-02-28 DIAGNOSIS — D21.9 FIBROID: ICD-10-CM

## 2025-02-28 DIAGNOSIS — N94.6 DYSMENORRHEA: ICD-10-CM

## 2025-02-28 PROCEDURE — 76856 US EXAM PELVIC COMPLETE: CPT | Mod: TC,HCNC,PN

## 2025-02-28 PROCEDURE — 76856 US EXAM PELVIC COMPLETE: CPT | Mod: 26,HCNC,, | Performed by: RADIOLOGY

## 2025-02-28 PROCEDURE — 76830 TRANSVAGINAL US NON-OB: CPT | Mod: 26,HCNC,, | Performed by: RADIOLOGY

## 2025-03-03 ENCOUNTER — RESULTS FOLLOW-UP (OUTPATIENT)
Dept: OBSTETRICS AND GYNECOLOGY | Facility: CLINIC | Age: 50
End: 2025-03-03

## 2025-03-19 ENCOUNTER — OFFICE VISIT (OUTPATIENT)
Dept: OBSTETRICS AND GYNECOLOGY | Facility: CLINIC | Age: 50
End: 2025-03-19
Payer: MEDICARE

## 2025-03-19 VITALS
DIASTOLIC BLOOD PRESSURE: 88 MMHG | BODY MASS INDEX: 28.53 KG/M2 | WEIGHT: 131.81 LBS | SYSTOLIC BLOOD PRESSURE: 126 MMHG

## 2025-03-19 DIAGNOSIS — R87.612 LOW GRADE SQUAMOUS INTRAEPITHELIAL LESION ON CYTOLOGIC SMEAR OF CERVIX (LGSIL): Primary | ICD-10-CM

## 2025-03-19 LAB
B-HCG UR QL: NEGATIVE
CTP QC/QA: YES

## 2025-03-19 PROCEDURE — 81025 URINE PREGNANCY TEST: CPT | Mod: HCNC,S$GLB,, | Performed by: OBSTETRICS & GYNECOLOGY

## 2025-03-19 PROCEDURE — 99499 UNLISTED E&M SERVICE: CPT | Mod: HCNC,S$GLB,, | Performed by: OBSTETRICS & GYNECOLOGY

## 2025-03-19 PROCEDURE — 99999 PR PBB SHADOW E&M-EST. PATIENT-LVL IV: CPT | Mod: PBBFAC,HCNC,, | Performed by: OBSTETRICS & GYNECOLOGY

## 2025-03-19 PROCEDURE — 57455 BIOPSY OF CERVIX W/SCOPE: CPT | Mod: HCNC,S$GLB,, | Performed by: OBSTETRICS & GYNECOLOGY

## 2025-03-19 NOTE — PROGRESS NOTES
TIMEOUT PERFORMED  Patient identified, procedure confirmed, and allergies reviewed.     COLPOSCOPY:    UPT: negative    Female patient presents for colposcopy.    PRE-COLPOSCOPY PROCEDURE COUNSELING:  Discussed the abnormal pap test findings, HPV, need for colposcopy and possible biopsies to determine a diagnosis and plan of care, treatments available, the minimal risks of bleeding and infection with a colposcopy, alternatives to colposcopy and she agrees to proceed.    COLPOSCOPY EXAM:   TIME OUT PERFORMED. The cervix was visualized with a speculum. Acetic acid was applied.  The entire tranformation zone could be adequately visualized.      White flat epithelium was present at 6,12,Location.  Mosaic pattern was not present.    Punctation was not present.    Abnormal vessels were not present.    Biopsy performed at 6,12 Location.    ECC - Not done.    Specimens placed in formalin and sent to pathology. Monsel's solution was applied at biopsy sites to stop bleeding.    The speculum was removed.  The patient tolerated the procedure well.    IMPRESSION:   Abnormal Pap 795.09    Colposcopy Impression:  Satisfactory:  DUSTY 1    POST COLPOSCOPY COUNSELING:   Manage post colposcopy cramping with NSAIDs, Tylenol or Rx per [employer].  Avoid anything in vagina (intercourse, douching, tampons) one week after the procedure.  Expect a clumpy blackish vaginal discharge (Monsel's solution) for several days.  Report bleeding heavier than a period, worsening pain, fever > 101.0 F, or foul-smelling vaginal discharge.  HPV vaccine recommended according to FDA age guidelines.  Importance of follow-up stressed.    Counseling lasted approximately 15 minutes and all her questions were answered.    FOLLOW-UP:   Based on biopsy results.

## 2025-03-21 DIAGNOSIS — N95.1 PERIMENOPAUSE: ICD-10-CM

## 2025-03-21 DIAGNOSIS — L68.9 EXCESSIVE HAIR GROWTH: ICD-10-CM

## 2025-03-21 DIAGNOSIS — N92.1 METRORRHAGIA: ICD-10-CM

## 2025-03-21 RX ORDER — NORETHINDRONE ACETATE AND ETHINYL ESTRADIOL 1MG-20(21)
1 KIT ORAL DAILY
Qty: 84 TABLET | Refills: 3 | Status: SHIPPED | OUTPATIENT
Start: 2025-03-21

## 2025-03-21 NOTE — TELEPHONE ENCOUNTER
Refill Routing Note   Medication(s) are not appropriate for processing by [facility] for the following reason(s):        Drug-disease interaction(no prev provider override found)    [facility] action(s):  Defer             Pharmacist review requested: Yes     Appointments  past 12m or future 3m with PCP    Date Provider   Last Visit   3/19/2025 Melissa Cagle MD   Next Visit   Visit date not found Melissa Cagle MD   ED visits in past 90 days: 0        Note composed:10:51 AM 03/21/2025

## 2025-03-21 NOTE — TELEPHONE ENCOUNTER
Lavonne Pierre  is requesting a refill authorization.  Brief Assessment and Rationale for Refill:  Approve     Medication Therapy Plan:         Pharmacist review requested: Yes   Extended chart review required: Yes   Comments:     Note composed:2:42 PM 03/21/2025

## 2025-03-24 ENCOUNTER — RESULTS FOLLOW-UP (OUTPATIENT)
Dept: OBSTETRICS AND GYNECOLOGY | Facility: CLINIC | Age: 50
End: 2025-03-24

## 2025-05-09 ENCOUNTER — TELEPHONE (OUTPATIENT)
Dept: ENDOCRINOLOGY | Facility: CLINIC | Age: 50
End: 2025-05-09
Payer: MEDICARE

## 2025-07-21 ENCOUNTER — PATIENT MESSAGE (OUTPATIENT)
Dept: FAMILY MEDICINE | Facility: CLINIC | Age: 50
End: 2025-07-21

## 2025-07-21 ENCOUNTER — OFFICE VISIT (OUTPATIENT)
Dept: FAMILY MEDICINE | Facility: CLINIC | Age: 50
End: 2025-07-21
Payer: MEDICARE

## 2025-07-21 VITALS
OXYGEN SATURATION: 100 % | BODY MASS INDEX: 30.99 KG/M2 | WEIGHT: 143.19 LBS | HEART RATE: 84 BPM | DIASTOLIC BLOOD PRESSURE: 80 MMHG | SYSTOLIC BLOOD PRESSURE: 128 MMHG

## 2025-07-21 DIAGNOSIS — M76.811 ANTERIOR TIBIAL TENDONITIS, RIGHT: ICD-10-CM

## 2025-07-21 DIAGNOSIS — H81.10 BENIGN PAROXYSMAL POSITIONAL VERTIGO, UNSPECIFIED LATERALITY: ICD-10-CM

## 2025-07-21 DIAGNOSIS — R41.840 INATTENTION: ICD-10-CM

## 2025-07-21 DIAGNOSIS — M79.7 FIBROMYALGIA: Primary | ICD-10-CM

## 2025-07-21 PROCEDURE — 1160F RVW MEDS BY RX/DR IN RCRD: CPT | Mod: CPTII,HCNC,S$GLB, | Performed by: INTERNAL MEDICINE

## 2025-07-21 PROCEDURE — 99214 OFFICE O/P EST MOD 30 MIN: CPT | Mod: HCNC,S$GLB,, | Performed by: INTERNAL MEDICINE

## 2025-07-21 PROCEDURE — 3079F DIAST BP 80-89 MM HG: CPT | Mod: CPTII,HCNC,S$GLB, | Performed by: INTERNAL MEDICINE

## 2025-07-21 PROCEDURE — 3074F SYST BP LT 130 MM HG: CPT | Mod: CPTII,HCNC,S$GLB, | Performed by: INTERNAL MEDICINE

## 2025-07-21 PROCEDURE — 4010F ACE/ARB THERAPY RXD/TAKEN: CPT | Mod: CPTII,HCNC,S$GLB, | Performed by: INTERNAL MEDICINE

## 2025-07-21 PROCEDURE — 3008F BODY MASS INDEX DOCD: CPT | Mod: CPTII,HCNC,S$GLB, | Performed by: INTERNAL MEDICINE

## 2025-07-21 PROCEDURE — 99999 PR PBB SHADOW E&M-EST. PATIENT-LVL IV: CPT | Mod: PBBFAC,HCNC,, | Performed by: INTERNAL MEDICINE

## 2025-07-21 PROCEDURE — 1159F MED LIST DOCD IN RCRD: CPT | Mod: CPTII,HCNC,S$GLB, | Performed by: INTERNAL MEDICINE

## 2025-07-21 NOTE — PROGRESS NOTES
Subjective     Lavonne Pierre is a 49 y.o. old, female here for Follow-up    48 y/o with PMH of fibromyalgia, HTN, asthma, allergies, SALVADOR, GERD     Patient is here for follow-up on chronic medical problems    New problem worse after hiking and walking around [place] for vacation. She has burning anterior shin pain on the right side, hurts at night, some pain radiates to foot.    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 It sounds like she has some test taking anxiety too.    She recently had 2-3 episodes of room spinning dizziness sensation associated with head position change. She has never had this before. She felt nauseated with it. It went away on its own.    ROS  Medications     Outpatient Medications Marked as Taking for the 7/21/25 encounter (Office Visit) with Ranjit Bernabe MD   Medication Sig Dispense Refill    albuterol (VENTOLIN HFA) 90 mcg/actuation inhaler Inhale 1-2 puffs into the lungs every 4 (four) hours as needed for Wheezing or Shortness of Breath. Rescue 18 g 11    albuterol 2.5 mg /3 mL (0.083 %) Nebu 3 mL, albuterol 5 mg/mL Nebu 0.5 mL Inhale into the lungs.      azelastine (ASTELIN) 137 mcg (0.1 %) nasal spray   0    budesonide-formoterol 160-4.5 mcg (SYMBICORT) 160-4.5 mcg/actuation HFAA Inhale 2 puffs into the lungs every 12 (twelve) hours. Controller 10 g 11    diclofenac sodium (VOLTAREN) 1 % Gel Apply topically once daily. 100 g 5    fluticasone furoate-vilanteroL (BREO ELLIPTA) 200-25 mcg/dose DsDv diskus inhaler Inhale 1 puff into the lungs once daily. Controller 1 each 11    fluticasone propionate (FLONASE) 50 mcg/actuation nasal spray   5    lisinopriL (PRINIVIL,ZESTRIL) 5 MG tablet Take 1 tablet (5 mg total) by mouth once daily. 90 tablet 3    montelukast (SINGULAIR) 10 mg tablet Take 1 tablet (10 mg total) by mouth every evening. 30 tablet 11    multivitamin (THERAGRAN) per tablet Take 1 tablet by  "mouth once daily.      RESTASIS 0.05 % ophthalmic emulsion Place 1 drop into both eyes 2 (two) times daily.      semaglutide, weight loss, 2.4 mg/0.75 mL PnIj Inject 2.4 mg into the skin every 7 days.       Objective     /80   Pulse 84   Wt 64.9 kg (143 lb 3 oz)   SpO2 100%   BMI 30.99 kg/m²   Physical Exam  Assessment and Plan     Fibromyalgia    Anterior tibial tendonitis, right    Inattention    Benign paroxysmal positional vertigo, unspecified laterality      Supportive care for "shin splints".  Good sleep and exercise habits, set time and minimize distractions, over-prepare and take practice tests.  Epley maneuver for BPPV if it recurs. Can send in meclizine for her if she has a recurrence.  ___________________  Ranjit Bernabe MD  Internal Medicine and Pediatrics  "